# Patient Record
Sex: FEMALE | Race: WHITE | NOT HISPANIC OR LATINO | URBAN - METROPOLITAN AREA
[De-identification: names, ages, dates, MRNs, and addresses within clinical notes are randomized per-mention and may not be internally consistent; named-entity substitution may affect disease eponyms.]

---

## 2017-02-06 ENCOUNTER — OFFICE (AMBULATORY)
Dept: URBAN - METROPOLITAN AREA CLINIC 64 | Facility: CLINIC | Age: 62
End: 2017-02-06

## 2017-02-06 VITALS
SYSTOLIC BLOOD PRESSURE: 111 MMHG | DIASTOLIC BLOOD PRESSURE: 62 MMHG | WEIGHT: 293 LBS | HEIGHT: 65 IN | HEART RATE: 84 BPM

## 2017-02-06 DIAGNOSIS — K62.5 HEMORRHAGE OF ANUS AND RECTUM: ICD-10-CM

## 2017-02-06 DIAGNOSIS — K62.89 OTHER SPECIFIED DISEASES OF ANUS AND RECTUM: ICD-10-CM

## 2017-02-06 PROCEDURE — 99203 OFFICE O/P NEW LOW 30 MIN: CPT | Performed by: NURSE PRACTITIONER

## 2017-02-06 RX ORDER — HYDROCORTISONE ACETATE AND PRAMOXINE HYDROCHLORIDE 25; 10 MG/G; MG/G
CREAM TOPICAL
Qty: 1 | Refills: -1 | Status: COMPLETED
Start: 2017-02-06 | End: 2019-01-07

## 2017-02-10 ENCOUNTER — ON CAMPUS - OUTPATIENT (AMBULATORY)
Dept: URBAN - METROPOLITAN AREA HOSPITAL 2 | Facility: HOSPITAL | Age: 62
End: 2017-02-10

## 2017-02-10 ENCOUNTER — HOSPITAL ENCOUNTER (OUTPATIENT)
Dept: OTHER | Facility: HOSPITAL | Age: 62
Setting detail: SPECIMEN
Discharge: HOME | End: 2017-02-10
Attending: INTERNAL MEDICINE | Admitting: INTERNAL MEDICINE

## 2017-02-10 ENCOUNTER — OFFICE (AMBULATORY)
Dept: URBAN - METROPOLITAN AREA CLINIC 64 | Facility: CLINIC | Age: 62
End: 2017-02-10

## 2017-02-10 VITALS
SYSTOLIC BLOOD PRESSURE: 86 MMHG | HEART RATE: 95 BPM | SYSTOLIC BLOOD PRESSURE: 155 MMHG | HEART RATE: 90 BPM | OXYGEN SATURATION: 97 % | SYSTOLIC BLOOD PRESSURE: 134 MMHG | OXYGEN SATURATION: 93 % | HEART RATE: 83 BPM | SYSTOLIC BLOOD PRESSURE: 128 MMHG | HEART RATE: 88 BPM | DIASTOLIC BLOOD PRESSURE: 42 MMHG | OXYGEN SATURATION: 100 % | DIASTOLIC BLOOD PRESSURE: 66 MMHG | SYSTOLIC BLOOD PRESSURE: 106 MMHG | HEART RATE: 108 BPM | OXYGEN SATURATION: 96 % | OXYGEN SATURATION: 91 % | DIASTOLIC BLOOD PRESSURE: 49 MMHG | SYSTOLIC BLOOD PRESSURE: 133 MMHG | RESPIRATION RATE: 16 BRPM | RESPIRATION RATE: 15 BRPM | WEIGHT: 293 LBS | SYSTOLIC BLOOD PRESSURE: 107 MMHG | DIASTOLIC BLOOD PRESSURE: 62 MMHG | TEMPERATURE: 97.2 F | SYSTOLIC BLOOD PRESSURE: 78 MMHG | DIASTOLIC BLOOD PRESSURE: 89 MMHG | DIASTOLIC BLOOD PRESSURE: 56 MMHG | HEART RATE: 76 BPM | SYSTOLIC BLOOD PRESSURE: 154 MMHG | DIASTOLIC BLOOD PRESSURE: 61 MMHG | RESPIRATION RATE: 18 BRPM | DIASTOLIC BLOOD PRESSURE: 57 MMHG | OXYGEN SATURATION: 90 % | HEART RATE: 82 BPM | RESPIRATION RATE: 14 BRPM | HEIGHT: 65 IN | HEART RATE: 111 BPM

## 2017-02-10 DIAGNOSIS — K62.5 HEMORRHAGE OF ANUS AND RECTUM: ICD-10-CM

## 2017-02-10 DIAGNOSIS — Z86.010 PERSONAL HISTORY OF COLONIC POLYPS: ICD-10-CM

## 2017-02-10 DIAGNOSIS — D12.8 BENIGN NEOPLASM OF RECTUM: ICD-10-CM

## 2017-02-10 DIAGNOSIS — K64.1 SECOND DEGREE HEMORRHOIDS: ICD-10-CM

## 2017-02-10 DIAGNOSIS — D17.79 BENIGN LIPOMATOUS NEOPLASM OF OTHER SITES: ICD-10-CM

## 2017-02-10 LAB
GI HISTOLOGY: A. UNSPECIFIED: (no result)
GI HISTOLOGY: PDF REPORT: (no result)

## 2017-02-10 PROCEDURE — 88305 TISSUE EXAM BY PATHOLOGIST: CPT | Mod: 26 | Performed by: INTERNAL MEDICINE

## 2017-02-10 PROCEDURE — 45380 COLONOSCOPY AND BIOPSY: CPT | Performed by: INTERNAL MEDICINE

## 2017-02-10 RX ADMIN — PROPOFOL: 10 INJECTION, EMULSION INTRAVENOUS at 08:12

## 2017-02-15 ENCOUNTER — HOSPITAL ENCOUNTER (OUTPATIENT)
Dept: CT IMAGING | Facility: HOSPITAL | Age: 62
Discharge: HOME | End: 2017-02-15
Attending: INTERNAL MEDICINE | Admitting: INTERNAL MEDICINE

## 2017-02-15 LAB — CREAT BLDA-MCNC: 0.8 MG/DL (ref 0.6–1.3)

## 2017-03-07 ENCOUNTER — ON CAMPUS - OUTPATIENT (AMBULATORY)
Dept: URBAN - METROPOLITAN AREA HOSPITAL 77 | Facility: HOSPITAL | Age: 62
End: 2017-03-07

## 2017-03-07 DIAGNOSIS — D12.8 BENIGN NEOPLASM OF RECTUM: ICD-10-CM

## 2017-03-07 PROCEDURE — 45341 SIGMOIDOSCOPY W/ULTRASOUND: CPT | Mod: 59 | Performed by: INTERNAL MEDICINE

## 2017-03-07 PROCEDURE — 45349 SIGMOIDOSCOPY W/RESECTION: CPT | Performed by: INTERNAL MEDICINE

## 2018-12-28 ENCOUNTER — HOSPITAL ENCOUNTER (OUTPATIENT)
Dept: OTHER | Facility: HOSPITAL | Age: 63
Discharge: HOME | End: 2018-12-28
Attending: SURGERY | Admitting: SURGERY

## 2018-12-28 LAB
ANION GAP SERPL CALC-SCNC: 11.1 MMOL/L (ref 10–20)
BASOPHILS # BLD AUTO: 0 10*3/UL (ref 0–0.2)
BASOPHILS NFR BLD AUTO: 1 % (ref 0–2)
BUN SERPL-MCNC: 15 MG/DL (ref 8–20)
BUN/CREAT SERPL: 16.7 (ref 5.4–26.2)
CALCIUM SERPL-MCNC: 9.1 MG/DL (ref 8.9–10.3)
CHLORIDE SERPL-SCNC: 101 MMOL/L (ref 101–111)
CONV CO2: 28 MMOL/L (ref 22–32)
CREAT UR-MCNC: 0.9 MG/DL (ref 0.4–1)
DIFFERENTIAL METHOD BLD: (no result)
EOSINOPHIL # BLD AUTO: 0.1 10*3/UL (ref 0–0.3)
EOSINOPHIL # BLD AUTO: 2 % (ref 0–3)
ERYTHROCYTE [DISTWIDTH] IN BLOOD BY AUTOMATED COUNT: 13 % (ref 11.5–14.5)
GLUCOSE SERPL-MCNC: 98 MG/DL (ref 65–99)
HCT VFR BLD AUTO: 39.8 % (ref 35–49)
HGB BLD-MCNC: 12.8 G/DL (ref 12–15)
LYMPHOCYTES # BLD AUTO: 2.3 10*3/UL (ref 0.8–4.8)
LYMPHOCYTES NFR BLD AUTO: 40 % (ref 18–42)
MCH RBC QN AUTO: 30.8 PG (ref 26–32)
MCHC RBC AUTO-ENTMCNC: 32 G/DL (ref 32–36)
MCV RBC AUTO: 96.2 FL (ref 80–94)
MONOCYTES # BLD AUTO: 0.4 10*3/UL (ref 0.1–1.3)
MONOCYTES NFR BLD AUTO: 7 % (ref 2–11)
NEUTROPHILS # BLD AUTO: 2.9 10*3/UL (ref 2.3–8.6)
NEUTROPHILS NFR BLD AUTO: 50 % (ref 50–75)
NRBC BLD AUTO-RTO: 0 /100{WBCS}
NRBC/RBC NFR BLD MANUAL: 0 10*3/UL
PLATELET # BLD AUTO: 241 10*3/UL (ref 150–450)
PMV BLD AUTO: 8.6 FL (ref 7.4–10.4)
POTASSIUM SERPL-SCNC: 4.1 MMOL/L (ref 3.6–5.1)
RBC # BLD AUTO: 4.14 10*6/UL (ref 4–5.4)
SODIUM SERPL-SCNC: 136 MMOL/L (ref 136–144)
WBC # BLD AUTO: 5.7 10*3/UL (ref 4.5–11.5)

## 2019-01-07 ENCOUNTER — OFFICE (AMBULATORY)
Dept: URBAN - METROPOLITAN AREA CLINIC 64 | Facility: CLINIC | Age: 64
End: 2019-01-07

## 2019-01-07 VITALS
WEIGHT: 219 LBS | SYSTOLIC BLOOD PRESSURE: 99 MMHG | HEIGHT: 65 IN | DIASTOLIC BLOOD PRESSURE: 66 MMHG | HEART RATE: 73 BPM

## 2019-01-07 DIAGNOSIS — K60.2 ANAL FISSURE, UNSPECIFIED: ICD-10-CM

## 2019-01-07 DIAGNOSIS — Z86.010 PERSONAL HISTORY OF COLONIC POLYPS: ICD-10-CM

## 2019-01-07 PROCEDURE — 99214 OFFICE O/P EST MOD 30 MIN: CPT | Performed by: INTERNAL MEDICINE

## 2019-02-08 ENCOUNTER — OFFICE (AMBULATORY)
Dept: URBAN - METROPOLITAN AREA CLINIC 64 | Facility: CLINIC | Age: 64
End: 2019-02-08

## 2019-02-08 VITALS
SYSTOLIC BLOOD PRESSURE: 115 MMHG | HEART RATE: 60 BPM | WEIGHT: 206 LBS | HEIGHT: 65 IN | DIASTOLIC BLOOD PRESSURE: 60 MMHG

## 2019-02-08 DIAGNOSIS — K62.89 OTHER SPECIFIED DISEASES OF ANUS AND RECTUM: ICD-10-CM

## 2019-02-08 DIAGNOSIS — K60.2 ANAL FISSURE, UNSPECIFIED: ICD-10-CM

## 2019-02-08 PROCEDURE — 99214 OFFICE O/P EST MOD 30 MIN: CPT | Performed by: NURSE PRACTITIONER

## 2019-04-12 ENCOUNTER — HOSPITAL ENCOUNTER (OUTPATIENT)
Dept: GASTROENTEROLOGY | Facility: HOSPITAL | Age: 64
Setting detail: HOSPITAL OUTPATIENT SURGERY
Discharge: HOME | End: 2019-04-12
Attending: INTERNAL MEDICINE | Admitting: INTERNAL MEDICINE

## 2019-04-15 LAB — GLUCOSE BLD-MCNC: 88 MG/DL (ref 70–105)

## 2019-08-27 ENCOUNTER — HOSPITAL ENCOUNTER (OUTPATIENT)
Facility: HOSPITAL | Age: 64
Setting detail: HOSPITAL OUTPATIENT SURGERY
End: 2019-08-27
Attending: INTERNAL MEDICINE | Admitting: INTERNAL MEDICINE

## 2019-09-20 ENCOUNTER — HOSPITAL ENCOUNTER (OUTPATIENT)
Facility: HOSPITAL | Age: 64
Setting detail: OBSERVATION
LOS: 1 days | Discharge: HOME | End: 2019-09-24
Attending: EMERGENCY MEDICINE | Admitting: INTERNAL MEDICINE

## 2019-09-20 ENCOUNTER — APPOINTMENT (OUTPATIENT)
Dept: GENERAL RADIOLOGY | Facility: HOSPITAL | Age: 64
End: 2019-09-20

## 2019-09-20 ENCOUNTER — APPOINTMENT (OUTPATIENT)
Dept: CT IMAGING | Facility: HOSPITAL | Age: 64
End: 2019-09-20

## 2019-09-20 DIAGNOSIS — R55 SYNCOPE, UNSPECIFIED SYNCOPE TYPE: Primary | ICD-10-CM

## 2019-09-20 DIAGNOSIS — S80.812A ABRASION OF ANTERIOR LEFT LOWER LEG, INITIAL ENCOUNTER: ICD-10-CM

## 2019-09-20 LAB
ANION GAP SERPL CALCULATED.3IONS-SCNC: 16.3 MMOL/L (ref 5–15)
APTT PPP: 18.3 SECONDS (ref 24–31)
BASOPHILS # BLD AUTO: 0.1 10*3/MM3 (ref 0–0.2)
BASOPHILS NFR BLD AUTO: 0.5 % (ref 0–1.5)
BUN BLD-MCNC: 28 MG/DL (ref 8–20)
BUN/CREAT SERPL: 18.7 (ref 5.4–26.2)
CALCIUM SPEC-SCNC: 9.3 MG/DL (ref 8.9–10.3)
CHLORIDE SERPL-SCNC: 102 MMOL/L (ref 101–111)
CO2 SERPL-SCNC: 25 MMOL/L (ref 22–32)
CREAT BLD-MCNC: 1.5 MG/DL (ref 0.4–1)
DEPRECATED RDW RBC AUTO: 41.6 FL (ref 37–54)
EOSINOPHIL # BLD AUTO: 0.1 10*3/MM3 (ref 0–0.4)
EOSINOPHIL NFR BLD AUTO: 1.6 % (ref 0.3–6.2)
ERYTHROCYTE [DISTWIDTH] IN BLOOD BY AUTOMATED COUNT: 12.8 % (ref 12.3–15.4)
GFR SERPL CREATININE-BSD FRML MDRD: 35 ML/MIN/1.73
GLUCOSE BLD-MCNC: 108 MG/DL (ref 65–99)
GLUCOSE BLDC GLUCOMTR-MCNC: 122 MG/DL (ref 70–105)
HCT VFR BLD AUTO: 40.5 % (ref 34–46.6)
HGB BLD-MCNC: 13.4 G/DL (ref 12–15.9)
INR PPP: 1 (ref 0.9–1.1)
LYMPHOCYTES # BLD AUTO: 2.6 10*3/MM3 (ref 0.7–3.1)
LYMPHOCYTES NFR BLD AUTO: 28.7 % (ref 19.6–45.3)
MCH RBC QN AUTO: 30.4 PG (ref 26.6–33)
MCHC RBC AUTO-ENTMCNC: 33.1 G/DL (ref 31.5–35.7)
MCV RBC AUTO: 91.8 FL (ref 79–97)
MONOCYTES # BLD AUTO: 0.7 10*3/MM3 (ref 0.1–0.9)
MONOCYTES NFR BLD AUTO: 7.4 % (ref 5–12)
NEUTROPHILS # BLD AUTO: 5.6 10*3/MM3 (ref 1.7–7)
NEUTROPHILS NFR BLD AUTO: 61.8 % (ref 42.7–76)
NRBC BLD AUTO-RTO: 0.1 /100 WBC (ref 0–0.2)
PLATELET # BLD AUTO: 288 10*3/MM3 (ref 140–450)
PMV BLD AUTO: 7.9 FL (ref 6–12)
POTASSIUM BLD-SCNC: 4.3 MMOL/L (ref 3.6–5.1)
PROTHROMBIN TIME: 10.3 SECONDS (ref 9.6–11.7)
RBC # BLD AUTO: 4.41 10*6/MM3 (ref 3.77–5.28)
SODIUM BLD-SCNC: 139 MMOL/L (ref 136–144)
TROPONIN I SERPL-MCNC: <0.03 NG/ML (ref 0–0.03)
WBC NRBC COR # BLD: 9.1 10*3/MM3 (ref 3.4–10.8)

## 2019-09-20 PROCEDURE — 85025 COMPLETE CBC W/AUTO DIFF WBC: CPT | Performed by: EMERGENCY MEDICINE

## 2019-09-20 PROCEDURE — 70450 CT HEAD/BRAIN W/O DYE: CPT

## 2019-09-20 PROCEDURE — G0378 HOSPITAL OBSERVATION PER HR: HCPCS

## 2019-09-20 PROCEDURE — 85730 THROMBOPLASTIN TIME PARTIAL: CPT | Performed by: EMERGENCY MEDICINE

## 2019-09-20 PROCEDURE — 85652 RBC SED RATE AUTOMATED: CPT | Performed by: INTERNAL MEDICINE

## 2019-09-20 PROCEDURE — 93005 ELECTROCARDIOGRAM TRACING: CPT | Performed by: EMERGENCY MEDICINE

## 2019-09-20 PROCEDURE — 84484 ASSAY OF TROPONIN QUANT: CPT | Performed by: EMERGENCY MEDICINE

## 2019-09-20 PROCEDURE — 99284 EMERGENCY DEPT VISIT MOD MDM: CPT

## 2019-09-20 PROCEDURE — 86140 C-REACTIVE PROTEIN: CPT | Performed by: INTERNAL MEDICINE

## 2019-09-20 PROCEDURE — 99220 PR INITIAL OBSERVATION CARE/DAY 70 MINUTES: CPT | Performed by: INTERNAL MEDICINE

## 2019-09-20 PROCEDURE — 71045 X-RAY EXAM CHEST 1 VIEW: CPT

## 2019-09-20 PROCEDURE — 82962 GLUCOSE BLOOD TEST: CPT

## 2019-09-20 PROCEDURE — 85610 PROTHROMBIN TIME: CPT | Performed by: EMERGENCY MEDICINE

## 2019-09-20 PROCEDURE — 84550 ASSAY OF BLOOD/URIC ACID: CPT | Performed by: INTERNAL MEDICINE

## 2019-09-20 PROCEDURE — 82728 ASSAY OF FERRITIN: CPT | Performed by: INTERNAL MEDICINE

## 2019-09-20 PROCEDURE — 80048 BASIC METABOLIC PNL TOTAL CA: CPT | Performed by: EMERGENCY MEDICINE

## 2019-09-20 PROCEDURE — 73590 X-RAY EXAM OF LOWER LEG: CPT

## 2019-09-20 RX ORDER — GABAPENTIN 100 MG/1
100 CAPSULE ORAL 2 TIMES DAILY
COMMUNITY
End: 2019-09-24 | Stop reason: HOSPADM

## 2019-09-20 RX ORDER — OXYCODONE AND ACETAMINOPHEN 10; 325 MG/1; MG/1
1 TABLET ORAL EVERY 8 HOURS PRN
COMMUNITY

## 2019-09-20 RX ORDER — SODIUM CHLORIDE 0.9 % (FLUSH) 0.9 %
10 SYRINGE (ML) INJECTION AS NEEDED
Status: DISCONTINUED | OUTPATIENT
Start: 2019-09-20 | End: 2019-09-24 | Stop reason: HOSPADM

## 2019-09-20 RX ORDER — TIZANIDINE 4 MG/1
2 TABLET ORAL 2 TIMES DAILY
COMMUNITY
End: 2019-09-24 | Stop reason: HOSPADM

## 2019-09-20 RX ORDER — TEMAZEPAM 30 MG/1
CAPSULE ORAL
COMMUNITY
Start: 2012-11-16 | End: 2019-09-24 | Stop reason: HOSPADM

## 2019-09-20 RX ORDER — CITALOPRAM 20 MG/1
20 TABLET ORAL DAILY
COMMUNITY
Start: 2012-11-16

## 2019-09-20 RX ORDER — GEMFIBROZIL 600 MG/1
600 TABLET, FILM COATED ORAL
COMMUNITY
End: 2019-09-24 | Stop reason: HOSPADM

## 2019-09-20 RX ORDER — LISINOPRIL 40 MG/1
TABLET ORAL
COMMUNITY
Start: 2012-11-16 | End: 2019-09-24 | Stop reason: HOSPADM

## 2019-09-20 RX ORDER — MELOXICAM 15 MG/1
15 TABLET ORAL DAILY
Status: ON HOLD | COMMUNITY
End: 2019-09-20

## 2019-09-20 RX ORDER — PHENTERMINE HYDROCHLORIDE 30 MG/1
15 CAPSULE ORAL EVERY MORNING
COMMUNITY
End: 2019-09-24 | Stop reason: HOSPADM

## 2019-09-20 RX ORDER — SIMVASTATIN 40 MG
40 TABLET ORAL NIGHTLY
COMMUNITY
End: 2022-08-25

## 2019-09-20 RX ORDER — FENOFIBRATE 160 MG/1
160 TABLET ORAL DAILY
COMMUNITY
Start: 2012-11-16 | End: 2022-08-25

## 2019-09-20 NOTE — ED NOTES
Pt reports syncopal episode x 2 since last night.  Denies hitting head.  States unknown cause for syncopal episodes.  C/O dizziness with ambulation, nausea.       Ly Bird RN  09/20/19 8886

## 2019-09-20 NOTE — ED PROVIDER NOTES
Subjective   64-year-old female presents after multiple syncopal episodes.  Patient states she started noticing some dizziness on Tuesday.  She states when she would stand up she would feel lightheaded like she might pass out.  Yesterday evening she was walking to her bedroom and was feeling lightheaded.  She states she was having to hold onto the walls to steady herself.  When she got to her bedroom she had a syncopal episode and fell forward.  She hit her left shin on a dresser.  Then today she was standing talking on the phone and became lightheaded and the next thing she knew she was waking up on the floor.  She denies any injuries from that fall.  She has had no chest pain or shortness of breath.  She denies any focal numbness, weakness, tingling.  She states she recently got over an upper respiratory infection but she has been feeling well for the past week until this started.        History provided by:  Patient      Review of Systems   Constitutional: Negative for fatigue and fever.   HENT: Negative for congestion and sore throat.    Eyes: Negative for pain and redness.   Respiratory: Negative for cough and shortness of breath.    Cardiovascular: Negative for chest pain and palpitations.   Gastrointestinal: Negative for abdominal pain, nausea and vomiting.   Genitourinary: Negative for dysuria and frequency.   Musculoskeletal: Negative for back pain.   Skin: Negative for rash.   Neurological: Positive for dizziness and syncope. Negative for headaches.   Psychiatric/Behavioral: Negative for behavioral problems and confusion.       No past medical history on file.    Allergies   Allergen Reactions   • Indomethacin Hives   • Iodine Hives       No past surgical history on file.    No family history on file.    Social History     Socioeconomic History   • Marital status:      Spouse name: Not on file   • Number of children: Not on file   • Years of education: Not on file   • Highest education level: Not on  "file       /71 (BP Location: Left arm, Patient Position: Sitting)   Pulse 85   Temp 98.8 °F (37.1 °C) (Oral)   Resp 14   Ht 162.6 cm (64\")   Wt 102 kg (225 lb 1.4 oz)   SpO2 100%   Breastfeeding? No   BMI 38.64 kg/m²       Objective   Physical Exam   Constitutional: She is oriented to person, place, and time. She appears well-developed and well-nourished.   HENT:   Head: Normocephalic and atraumatic.   Eyes: EOM are normal. Pupils are equal, round, and reactive to light.   Neck: Normal range of motion. Neck supple.   Cardiovascular: Normal rate, regular rhythm and normal heart sounds.   Pulmonary/Chest: Effort normal and breath sounds normal. No respiratory distress.   Abdominal: Soft. Bowel sounds are normal. There is no tenderness.   Musculoskeletal: Normal range of motion.   There is some tenderness to the left mid shin with a small abrasion, neurovascular intact distally   Neurological: She is alert and oriented to person, place, and time.   Skin: Skin is warm and dry.   Nursing note and vitals reviewed.      Procedures           ED Course      My interpretation of EKG shows sinus rhythm, rate of 80, no ST elevation    Results for orders placed or performed during the hospital encounter of 09/20/19   Basic Metabolic Panel   Result Value Ref Range    Glucose 108 (H) 65 - 99 mg/dL    BUN 28 (H) 8 - 20 mg/dL    Creatinine 1.50 (H) 0.40 - 1.00 mg/dL    Sodium 139 136 - 144 mmol/L    Potassium 4.3 3.6 - 5.1 mmol/L    Chloride 102 101 - 111 mmol/L    CO2 25.0 22.0 - 32.0 mmol/L    Calcium 9.3 8.9 - 10.3 mg/dL    eGFR Non African Amer 35 (L) >60 mL/min/1.73    BUN/Creatinine Ratio 18.7 5.4 - 26.2    Anion Gap 16.3 (H) 5.0 - 15.0 mmol/L   Protime-INR   Result Value Ref Range    Protime 10.3 9.6 - 11.7 Seconds    INR 1.00 0.90 - 1.10   aPTT   Result Value Ref Range    PTT 18.3 (L) 24.0 - 31.0 seconds   Troponin   Result Value Ref Range    Troponin I <0.030 0.000 - 0.030 ng/mL   CBC Auto Differential "   Result Value Ref Range    WBC 9.10 3.40 - 10.80 10*3/mm3    RBC 4.41 3.77 - 5.28 10*6/mm3    Hemoglobin 13.4 12.0 - 15.9 g/dL    Hematocrit 40.5 34.0 - 46.6 %    MCV 91.8 79.0 - 97.0 fL    MCH 30.4 26.6 - 33.0 pg    MCHC 33.1 31.5 - 35.7 g/dL    RDW 12.8 12.3 - 15.4 %    RDW-SD 41.6 37.0 - 54.0 fl    MPV 7.9 6.0 - 12.0 fL    Platelets 288 140 - 450 10*3/mm3    Neutrophil % 61.8 42.7 - 76.0 %    Lymphocyte % 28.7 19.6 - 45.3 %    Monocyte % 7.4 5.0 - 12.0 %    Eosinophil % 1.6 0.3 - 6.2 %    Basophil % 0.5 0.0 - 1.5 %    Neutrophils, Absolute 5.60 1.70 - 7.00 10*3/mm3    Lymphocytes, Absolute 2.60 0.70 - 3.10 10*3/mm3    Monocytes, Absolute 0.70 0.10 - 0.90 10*3/mm3    Eosinophils, Absolute 0.10 0.00 - 0.40 10*3/mm3    Basophils, Absolute 0.10 0.00 - 0.20 10*3/mm3    nRBC 0.1 0.0 - 0.2 /100 WBC     Xr Tibia Fibula 2 View Left    Result Date: 9/20/2019  Negative for fracture.  Electronically Signed By-Mahesh Mouser On:9/20/2019 5:54 PM This report was finalized on [id number] by  Mahesh Carr .    Ct Head Without Contrast    Result Date: 9/20/2019  No acute intracranial abnormality.  Electronically Signed By-Mahesh Mouser On:9/20/2019 6:55 PM This report was finalized on [id number] by  Mahesh Carr .    Xr Chest 1 View    Result Date: 9/20/2019  No acute process.  Electronically Signed By-Mahesh Mouser On:9/20/2019 5:50 PM This report was finalized on [id number] by  Mahesh Carr .            MDM   Patient had the above evaluation.  Results were discussed with the patient.  Patient remained well-appearing in the emergency room.  Work-up is been unremarkable.  CT head showed no acute on normality.  Chest x-ray showed no acute disease.  X-ray of the left tib-fib showed no fracture.  Labs were unremarkable.  Patient will be admitted for further evaluation of her syncopal episodes.  I discussed with the PA on call for the hospitalist who agreed to admit.      Final diagnoses:   Syncope, unspecified syncope type    Abrasion of anterior left lower leg, initial encounter              Robin Brown MD  09/20/19 1927

## 2019-09-21 ENCOUNTER — HOSPITAL ENCOUNTER (OUTPATIENT)
Dept: CARDIOLOGY | Facility: HOSPITAL | Age: 64
Setting detail: OBSERVATION
Discharge: HOME | End: 2019-09-21

## 2019-09-21 ENCOUNTER — APPOINTMENT (OUTPATIENT)
Dept: RESPIRATORY THERAPY | Facility: HOSPITAL | Age: 64
End: 2019-09-21

## 2019-09-21 VITALS
HEIGHT: 64 IN | BODY MASS INDEX: 38.41 KG/M2 | DIASTOLIC BLOOD PRESSURE: 63 MMHG | WEIGHT: 225 LBS | SYSTOLIC BLOOD PRESSURE: 101 MMHG

## 2019-09-21 LAB
ALBUMIN SERPL-MCNC: 3.4 G/DL (ref 3.5–4.8)
ALBUMIN/GLOB SERPL: 1.3 G/DL (ref 1–1.7)
ALP SERPL-CCNC: 95 U/L (ref 32–91)
ALT SERPL W P-5'-P-CCNC: 14 U/L (ref 14–54)
ANION GAP SERPL CALCULATED.3IONS-SCNC: 15.3 MMOL/L (ref 5–15)
ARTICHOKE IGE QN: 118 MG/DL (ref 0–100)
AST SERPL-CCNC: 19 U/L (ref 15–41)
BASOPHILS # BLD AUTO: 0 10*3/MM3 (ref 0–0.2)
BASOPHILS NFR BLD AUTO: 0.4 % (ref 0–1.5)
BH CV ECHO MEAS - EF(MOD-BP): 71 %
BH CV ECHO MEAS - LA DIMENSION(2D): 4.2 CM
BILIRUB SERPL-MCNC: 0.2 MG/DL (ref 0.3–1.2)
BILIRUB UR QL STRIP: NEGATIVE
BNP SERPL-MCNC: 25 PG/ML
BUN BLD-MCNC: 28 MG/DL (ref 8–20)
BUN/CREAT SERPL: 18.7 (ref 5.4–26.2)
CALCIUM SPEC-SCNC: 9.1 MG/DL (ref 8.9–10.3)
CHLORIDE SERPL-SCNC: 104 MMOL/L (ref 101–111)
CHOLEST SERPL-MCNC: 176 MG/DL
CK SERPL-CCNC: 68 U/L (ref 38–234)
CLARITY UR: CLEAR
CO2 SERPL-SCNC: 25 MMOL/L (ref 22–32)
COLOR UR: YELLOW
CORTIS SERPL-MCNC: 11.61 MCG/DL
CORTIS SERPL-MCNC: 22.87 MCG/DL
CORTIS SERPL-MCNC: 25.06 MCG/DL
CREAT BLD-MCNC: 1.5 MG/DL (ref 0.4–1)
CRP SERPL-MCNC: 0.23 MG/DL (ref 0–0.7)
D DIMER PPP FEU-MCNC: 0.38 MCGFEU/ML (ref 0.17–0.59)
D-LACTATE SERPL-SCNC: 0.8 MMOL/L (ref 0.5–2.2)
DEPRECATED RDW RBC AUTO: 42.9 FL (ref 37–54)
EOSINOPHIL # BLD AUTO: 0.2 10*3/MM3 (ref 0–0.4)
EOSINOPHIL NFR BLD AUTO: 1.9 % (ref 0.3–6.2)
ERYTHROCYTE [DISTWIDTH] IN BLOOD BY AUTOMATED COUNT: 13.1 % (ref 12.3–15.4)
ERYTHROCYTE [SEDIMENTATION RATE] IN BLOOD: 21 MM/HR (ref 0–30)
FERRITIN SERPL-MCNC: 113 NG/ML (ref 11–307)
FERRITIN SERPL-MCNC: 97 NG/ML (ref 11–307)
FOLATE SERPL-MCNC: >24.8 NG/ML (ref 5.9–24.8)
GFR SERPL CREATININE-BSD FRML MDRD: 35 ML/MIN/1.73
GLOBULIN UR ELPH-MCNC: 2.6 GM/DL (ref 2.5–3.8)
GLUCOSE BLD-MCNC: 95 MG/DL (ref 65–99)
GLUCOSE BLDC GLUCOMTR-MCNC: 86 MG/DL (ref 70–105)
GLUCOSE BLDC GLUCOMTR-MCNC: 94 MG/DL (ref 70–105)
GLUCOSE BLDC GLUCOMTR-MCNC: 95 MG/DL (ref 70–105)
GLUCOSE BLDC GLUCOMTR-MCNC: 97 MG/DL (ref 70–105)
GLUCOSE UR STRIP-MCNC: NEGATIVE MG/DL
HCT VFR BLD AUTO: 38.2 % (ref 34–46.6)
HDLC SERPL QL: 4.4
HDLC SERPL-MCNC: 40 MG/DL
HGB BLD-MCNC: 12.8 G/DL (ref 12–15.9)
HGB UR QL STRIP.AUTO: NEGATIVE
INR PPP: 1.01 (ref 0.9–1.1)
KETONES UR QL STRIP: NEGATIVE
LDLC/HDLC SERPL: 2.64 {RATIO}
LEUKOCYTE ESTERASE UR QL STRIP.AUTO: NEGATIVE
LV EF 2D ECHO EST: 67 %
LYMPHOCYTES # BLD AUTO: 2.9 10*3/MM3 (ref 0.7–3.1)
LYMPHOCYTES NFR BLD AUTO: 31.6 % (ref 19.6–45.3)
MAGNESIUM SERPL-MCNC: 1.8 MG/DL (ref 1.8–2.5)
MAXIMAL PREDICTED HEART RATE: 157 BPM
MCH RBC QN AUTO: 30.8 PG (ref 26.6–33)
MCHC RBC AUTO-ENTMCNC: 33.4 G/DL (ref 31.5–35.7)
MCV RBC AUTO: 92.3 FL (ref 79–97)
MONOCYTES # BLD AUTO: 0.8 10*3/MM3 (ref 0.1–0.9)
MONOCYTES NFR BLD AUTO: 8.9 % (ref 5–12)
NEUTROPHILS # BLD AUTO: 5.3 10*3/MM3 (ref 1.7–7)
NEUTROPHILS NFR BLD AUTO: 57.2 % (ref 42.7–76)
NITRITE UR QL STRIP: NEGATIVE
NRBC BLD AUTO-RTO: 0.1 /100 WBC (ref 0–0.2)
PH UR STRIP.AUTO: 5.5 [PH] (ref 5–8)
PHOSPHATE SERPL-MCNC: 3.9 MG/DL (ref 2.4–4.7)
PLATELET # BLD AUTO: 278 10*3/MM3 (ref 140–450)
PMV BLD AUTO: 8.3 FL (ref 6–12)
POTASSIUM BLD-SCNC: 4.3 MMOL/L (ref 3.6–5.1)
PROCALCITONIN SERPL-MCNC: <0.05 NG/ML (ref 0–0.5)
PROT SERPL-MCNC: 6 G/DL (ref 6.1–7.9)
PROT UR QL STRIP: NEGATIVE
PROTHROMBIN TIME: 10.4 SECONDS (ref 9.6–11.7)
RBC # BLD AUTO: 4.14 10*6/MM3 (ref 3.77–5.28)
SODIUM BLD-SCNC: 140 MMOL/L (ref 136–144)
SP GR UR STRIP: 1.01 (ref 1–1.03)
STRESS TARGET HR: 133 BPM
T4 FREE SERPL-MCNC: 0.85 NG/DL (ref 0.58–1.64)
TRIGL SERPL-MCNC: 153 MG/DL
TROPONIN I SERPL-MCNC: <0.03 NG/ML (ref 0–0.03)
TSH SERPL DL<=0.05 MIU/L-ACNC: 1.88 UIU/ML (ref 0.34–5.6)
URATE SERPL-MCNC: 5.3 MG/DL (ref 2.6–8)
UROBILINOGEN UR QL STRIP: NORMAL
VIT B12 BLD-MCNC: 205 PG/ML (ref 180–914)
VLDLC SERPL-MCNC: 30.6 MG/DL
WBC NRBC COR # BLD: 9.3 10*3/MM3 (ref 3.4–10.8)

## 2019-09-21 PROCEDURE — 85379 FIBRIN DEGRADATION QUANT: CPT | Performed by: INTERNAL MEDICINE

## 2019-09-21 PROCEDURE — 25010000002 COSYNTROPIN PER 0.25 MG: Performed by: INTERNAL MEDICINE

## 2019-09-21 PROCEDURE — 93306 TTE W/DOPPLER COMPLETE: CPT | Performed by: INTERNAL MEDICINE

## 2019-09-21 PROCEDURE — 84443 ASSAY THYROID STIM HORMONE: CPT | Performed by: INTERNAL MEDICINE

## 2019-09-21 PROCEDURE — 96372 THER/PROPH/DIAG INJ SC/IM: CPT

## 2019-09-21 PROCEDURE — 82607 VITAMIN B-12: CPT | Performed by: INTERNAL MEDICINE

## 2019-09-21 PROCEDURE — 82728 ASSAY OF FERRITIN: CPT | Performed by: INTERNAL MEDICINE

## 2019-09-21 PROCEDURE — 25010000002 SULFUR HEXAFLUORIDE MICROSPH 60.7-25 MG RECONSTITUTED SUSPENSION: Performed by: INTERNAL MEDICINE

## 2019-09-21 PROCEDURE — 84100 ASSAY OF PHOSPHORUS: CPT | Performed by: INTERNAL MEDICINE

## 2019-09-21 PROCEDURE — 84145 PROCALCITONIN (PCT): CPT | Performed by: INTERNAL MEDICINE

## 2019-09-21 PROCEDURE — 93225 XTRNL ECG REC<48 HRS REC: CPT

## 2019-09-21 PROCEDURE — 83880 ASSAY OF NATRIURETIC PEPTIDE: CPT | Performed by: INTERNAL MEDICINE

## 2019-09-21 PROCEDURE — 85610 PROTHROMBIN TIME: CPT | Performed by: INTERNAL MEDICINE

## 2019-09-21 PROCEDURE — 84484 ASSAY OF TROPONIN QUANT: CPT | Performed by: INTERNAL MEDICINE

## 2019-09-21 PROCEDURE — 82746 ASSAY OF FOLIC ACID SERUM: CPT | Performed by: INTERNAL MEDICINE

## 2019-09-21 PROCEDURE — 83036 HEMOGLOBIN GLYCOSYLATED A1C: CPT | Performed by: INTERNAL MEDICINE

## 2019-09-21 PROCEDURE — 83735 ASSAY OF MAGNESIUM: CPT | Performed by: INTERNAL MEDICINE

## 2019-09-21 PROCEDURE — 82550 ASSAY OF CK (CPK): CPT | Performed by: INTERNAL MEDICINE

## 2019-09-21 PROCEDURE — 83605 ASSAY OF LACTIC ACID: CPT | Performed by: INTERNAL MEDICINE

## 2019-09-21 PROCEDURE — 80061 LIPID PANEL: CPT | Performed by: INTERNAL MEDICINE

## 2019-09-21 PROCEDURE — 82962 GLUCOSE BLOOD TEST: CPT

## 2019-09-21 PROCEDURE — 96375 TX/PRO/DX INJ NEW DRUG ADDON: CPT

## 2019-09-21 PROCEDURE — 96374 THER/PROPH/DIAG INJ IV PUSH: CPT

## 2019-09-21 PROCEDURE — 85025 COMPLETE CBC W/AUTO DIFF WBC: CPT | Performed by: INTERNAL MEDICINE

## 2019-09-21 PROCEDURE — 82533 TOTAL CORTISOL: CPT | Performed by: INTERNAL MEDICINE

## 2019-09-21 PROCEDURE — 80053 COMPREHEN METABOLIC PANEL: CPT | Performed by: INTERNAL MEDICINE

## 2019-09-21 PROCEDURE — 25010000002 CYANOCOBALAMIN PER 1000 MCG: Performed by: HOSPITALIST

## 2019-09-21 PROCEDURE — 84439 ASSAY OF FREE THYROXINE: CPT | Performed by: INTERNAL MEDICINE

## 2019-09-21 PROCEDURE — 25010000002 KETOROLAC TROMETHAMINE PER 15 MG: Performed by: INTERNAL MEDICINE

## 2019-09-21 PROCEDURE — 99226 PR SBSQ OBSERVATION CARE/DAY 35 MINUTES: CPT | Performed by: HOSPITALIST

## 2019-09-21 PROCEDURE — G0378 HOSPITAL OBSERVATION PER HR: HCPCS

## 2019-09-21 PROCEDURE — 93306 TTE W/DOPPLER COMPLETE: CPT

## 2019-09-21 PROCEDURE — 81003 URINALYSIS AUTO W/O SCOPE: CPT | Performed by: HOSPITALIST

## 2019-09-21 RX ORDER — ACETAMINOPHEN 325 MG/1
650 TABLET ORAL EVERY 4 HOURS PRN
Status: DISCONTINUED | OUTPATIENT
Start: 2019-09-21 | End: 2019-09-24 | Stop reason: HOSPADM

## 2019-09-21 RX ORDER — ACETAMINOPHEN 160 MG/5ML
650 SOLUTION ORAL EVERY 4 HOURS PRN
Status: DISCONTINUED | OUTPATIENT
Start: 2019-09-21 | End: 2019-09-24 | Stop reason: HOSPADM

## 2019-09-21 RX ORDER — SODIUM CHLORIDE 0.9 % (FLUSH) 0.9 %
10 SYRINGE (ML) INJECTION AS NEEDED
Status: DISCONTINUED | OUTPATIENT
Start: 2019-09-21 | End: 2019-09-24 | Stop reason: HOSPADM

## 2019-09-21 RX ORDER — TIZANIDINE 4 MG/1
2 TABLET ORAL 2 TIMES DAILY
Status: DISCONTINUED | OUTPATIENT
Start: 2019-09-21 | End: 2019-09-23

## 2019-09-21 RX ORDER — KETOROLAC TROMETHAMINE 30 MG/ML
30 INJECTION, SOLUTION INTRAMUSCULAR; INTRAVENOUS EVERY 6 HOURS PRN
Status: DISCONTINUED | OUTPATIENT
Start: 2019-09-21 | End: 2019-09-21

## 2019-09-21 RX ORDER — FAMOTIDINE 20 MG/1
40 TABLET, FILM COATED ORAL DAILY
Status: DISCONTINUED | OUTPATIENT
Start: 2019-09-21 | End: 2019-09-24 | Stop reason: HOSPADM

## 2019-09-21 RX ORDER — CHOLECALCIFEROL (VITAMIN D3) 125 MCG
5 CAPSULE ORAL NIGHTLY PRN
Status: DISCONTINUED | OUTPATIENT
Start: 2019-09-21 | End: 2019-09-24 | Stop reason: HOSPADM

## 2019-09-21 RX ORDER — ACETAMINOPHEN 650 MG/1
650 SUPPOSITORY RECTAL EVERY 4 HOURS PRN
Status: DISCONTINUED | OUTPATIENT
Start: 2019-09-21 | End: 2019-09-24 | Stop reason: HOSPADM

## 2019-09-21 RX ORDER — COSYNTROPIN 0.25 MG/ML
0.25 INJECTION, POWDER, FOR SOLUTION INTRAMUSCULAR; INTRAVENOUS ONCE
Status: DISCONTINUED | OUTPATIENT
Start: 2019-09-21 | End: 2019-09-21

## 2019-09-21 RX ORDER — DOCUSATE SODIUM 100 MG/1
100 CAPSULE, LIQUID FILLED ORAL 2 TIMES DAILY PRN
Status: DISCONTINUED | OUTPATIENT
Start: 2019-09-21 | End: 2019-09-24 | Stop reason: HOSPADM

## 2019-09-21 RX ORDER — ATORVASTATIN CALCIUM 20 MG/1
20 TABLET, FILM COATED ORAL DAILY
Status: DISCONTINUED | OUTPATIENT
Start: 2019-09-21 | End: 2019-09-24 | Stop reason: HOSPADM

## 2019-09-21 RX ORDER — SODIUM CHLORIDE 0.9 % (FLUSH) 0.9 %
10 SYRINGE (ML) INJECTION EVERY 12 HOURS SCHEDULED
Status: DISCONTINUED | OUTPATIENT
Start: 2019-09-21 | End: 2019-09-24 | Stop reason: HOSPADM

## 2019-09-21 RX ORDER — GABAPENTIN 100 MG/1
100 CAPSULE ORAL 2 TIMES DAILY
Status: DISCONTINUED | OUTPATIENT
Start: 2019-09-21 | End: 2019-09-23

## 2019-09-21 RX ORDER — COSYNTROPIN 0.25 MG/ML
0.25 INJECTION, POWDER, FOR SOLUTION INTRAMUSCULAR; INTRAVENOUS ONCE
Status: COMPLETED | OUTPATIENT
Start: 2019-09-21 | End: 2019-09-21

## 2019-09-21 RX ORDER — NITROGLYCERIN 0.4 MG/1
0.4 TABLET SUBLINGUAL
Status: DISCONTINUED | OUTPATIENT
Start: 2019-09-21 | End: 2019-09-24 | Stop reason: HOSPADM

## 2019-09-21 RX ORDER — OXYCODONE HYDROCHLORIDE 5 MG/1
10 TABLET ORAL EVERY 8 HOURS PRN
Status: DISCONTINUED | OUTPATIENT
Start: 2019-09-21 | End: 2019-09-24 | Stop reason: HOSPADM

## 2019-09-21 RX ORDER — CITALOPRAM 20 MG/1
20 TABLET ORAL DAILY
Status: DISCONTINUED | OUTPATIENT
Start: 2019-09-21 | End: 2019-09-24 | Stop reason: HOSPADM

## 2019-09-21 RX ORDER — CYANOCOBALAMIN 1000 UG/ML
1000 INJECTION, SOLUTION INTRAMUSCULAR; SUBCUTANEOUS DAILY
Status: DISCONTINUED | OUTPATIENT
Start: 2019-09-21 | End: 2019-09-24

## 2019-09-21 RX ORDER — FENOFIBRATE 145 MG/1
145 TABLET, COATED ORAL DAILY
Status: DISCONTINUED | OUTPATIENT
Start: 2019-09-21 | End: 2019-09-24 | Stop reason: HOSPADM

## 2019-09-21 RX ORDER — SODIUM CHLORIDE 9 MG/ML
100 INJECTION, SOLUTION INTRAVENOUS CONTINUOUS
Status: DISCONTINUED | OUTPATIENT
Start: 2019-09-21 | End: 2019-09-23

## 2019-09-21 RX ORDER — LISINOPRIL 20 MG/1
40 TABLET ORAL
Status: DISCONTINUED | OUTPATIENT
Start: 2019-09-21 | End: 2019-09-21

## 2019-09-21 RX ORDER — ONDANSETRON 2 MG/ML
4 INJECTION INTRAMUSCULAR; INTRAVENOUS EVERY 6 HOURS PRN
Status: DISCONTINUED | OUTPATIENT
Start: 2019-09-21 | End: 2019-09-24 | Stop reason: HOSPADM

## 2019-09-21 RX ADMIN — GABAPENTIN 100 MG: 100 CAPSULE ORAL at 00:37

## 2019-09-21 RX ADMIN — SODIUM CHLORIDE 100 ML/HR: 900 INJECTION, SOLUTION INTRAVENOUS at 01:21

## 2019-09-21 RX ADMIN — GABAPENTIN 100 MG: 100 CAPSULE ORAL at 21:59

## 2019-09-21 RX ADMIN — LISINOPRIL 40 MG: 20 TABLET ORAL at 08:50

## 2019-09-21 RX ADMIN — TIZANIDINE 2 MG: 4 TABLET ORAL at 00:37

## 2019-09-21 RX ADMIN — TIZANIDINE 2 MG: 4 TABLET ORAL at 08:57

## 2019-09-21 RX ADMIN — FAMOTIDINE 40 MG: 20 TABLET ORAL at 08:50

## 2019-09-21 RX ADMIN — GABAPENTIN 100 MG: 100 CAPSULE ORAL at 08:56

## 2019-09-21 RX ADMIN — OXYCODONE HYDROCHLORIDE 10 MG: 5 TABLET ORAL at 23:21

## 2019-09-21 RX ADMIN — Medication 10 ML: at 00:38

## 2019-09-21 RX ADMIN — Medication 10 ML: at 22:00

## 2019-09-21 RX ADMIN — KETOROLAC TROMETHAMINE 30 MG: 30 INJECTION, SOLUTION INTRAMUSCULAR at 00:37

## 2019-09-21 RX ADMIN — CITALOPRAM HYDROBROMIDE 20 MG: 20 TABLET ORAL at 08:50

## 2019-09-21 RX ADMIN — SODIUM CHLORIDE 1000 ML: 900 INJECTION, SOLUTION INTRAVENOUS at 02:38

## 2019-09-21 RX ADMIN — CYANOCOBALAMIN 1000 MCG: 1000 INJECTION, SOLUTION INTRAMUSCULAR; SUBCUTANEOUS at 10:39

## 2019-09-21 RX ADMIN — Medication 10 ML: at 01:21

## 2019-09-21 RX ADMIN — COSYNTROPIN 0.25 MG: 0.25 INJECTION, POWDER, LYOPHILIZED, FOR SOLUTION INTRAMUSCULAR; INTRAVENOUS at 08:10

## 2019-09-21 RX ADMIN — SULFUR HEXAFLUORIDE 2 ML: KIT at 11:00

## 2019-09-21 RX ADMIN — TIZANIDINE 2 MG: 4 TABLET ORAL at 21:59

## 2019-09-21 RX ADMIN — ATORVASTATIN CALCIUM 20 MG: 20 TABLET, FILM COATED ORAL at 08:50

## 2019-09-21 RX ADMIN — OXYCODONE HYDROCHLORIDE 10 MG: 5 TABLET ORAL at 15:02

## 2019-09-21 RX ADMIN — METFORMIN HYDROCHLORIDE 500 MG: 500 TABLET ORAL at 08:50

## 2019-09-21 RX ADMIN — FENOFIBRATE 145 MG: 145 TABLET ORAL at 08:50

## 2019-09-21 NOTE — PLAN OF CARE
Problem: Patient Care Overview  Goal: Plan of Care Review  Outcome: Ongoing (interventions implemented as appropriate)   09/21/19 0548   Coping/Psychosocial   Plan of Care Reviewed With patient   Plan of Care Review   Progress improving       Problem: Pain, Chronic (Adult)  Goal: Identify Related Risk Factors and Signs and Symptoms  Outcome: Ongoing (interventions implemented as appropriate)   09/21/19 0548   Pain, Chronic (Adult)   Related Risk Factors (Chronic Pain) disease process;procedures/treatments   Signs and Symptoms (Chronic Pain) fatigue/weakness;verbalization of pain descriptors     Goal: Acceptable Pain/Comfort Level and Functional Ability  Outcome: Ongoing (interventions implemented as appropriate)   09/21/19 0548   Pain, Chronic (Adult)   Acceptable Pain/Comfort Level and Functional Ability making progress toward outcome       Problem: Syncope (Adult)  Goal: Identify Related Risk Factors and Signs and Symptoms  Outcome: Ongoing (interventions implemented as appropriate)   09/21/19 0548   Syncope (Adult)   Related Risk Factors (Syncope) hypotension   Signs and Symptoms (Syncope) dizziness     Goal: Physical Safety/Health Maintenance  Outcome: Ongoing (interventions implemented as appropriate)   09/21/19 0548   Syncope (Adult)   Physical Safety/Health Maintenance making progress toward outcome     Goal: Optimal Emotional/Functional [place]  Outcome: Ongoing (interventions implemented as appropriate)   09/21/19 0548   Syncope (Adult)   Optimal Emotional/Functional [place] making progress toward outcome       Problem: Fall Risk (Adult)  Goal: Identify Related Risk Factors and Signs and Symptoms  Outcome: Ongoing (interventions implemented as appropriate)   09/21/19 0548   Fall Risk (Adult)   Related Risk Factors (Fall Risk) age-related changes;history of falls   Signs and Symptoms (Fall Risk) presence of risk factors     Goal: Absence of Fall  Outcome: Ongoing (interventions implemented as  appropriate)   09/21/19 0548   Fall Risk (Adult)   Absence of Fall making progress toward outcome

## 2019-09-21 NOTE — PROGRESS NOTES
"Hospitalist Team      Patient Care Team:  Berlin Ramos MD as PCP - General (Family Medicine)  Berlin Ramos MD as PCP - Family Medicine        Chief Complaint: Dizziness with lightheadedness and syncopal episodes    History of present illness and Hospital course  HPI: Patient is a 64-year-old female with past medical history of uterine cancer, kidney stones, hyperlipidemia, diabetes mellitus type 2, hypertension and degenerative disc disease who presents to the ED with multiple episodes of dizziness, lightheadedness and syncopal episodes for the past 4 days.  Patient states she has been having symptoms intermittently with changes in position especially standing.  She notes that yesterday evening while walking to her bedroom she began feeling dizzy weak and had a syncopal episode.  She notes that typically when she begins feeling lightheaded she can just sit down and the symptoms will resolve within 10 to 15 minutes without further treatment.  No pain, prodrome other than dizziness or preceding aura reported.  She notes she was recently treated by her primary care physician for a cough and fever on an outpatient basis with cough suppressant.  She did not receive any antibiotics.  She reports some recent nausea and diarrhea but denies any vomiting, chest pain or shortness of breath.  In addition to her uterine cancer patient has a history of multiple \"precancerous colon lesions\", and is on a 3-month colonoscopy schedule with her next one scheduled in October.  She denies any bright red blood or dark tarry stools, notes her urine has been normal and does not report any changes in appetite.  She has not noted any palpitations.  Patient did have a soft tissue injury to her left leg with her last syncopal episode.        In the ED troponins were less than 0.030, creatinine: 1.50, BUN: 28, EGFR 35, WBCs: 9.10, hemoglobin: 3.4, hematocrit: 40.5.  EKG shows sinus rhythm at 80.  CT of the head without contrast " "shows no acute intracranial abnormality.  Chest x-ray shows no acute process and x-ray of the left tibia and fibula are negative for fracture.  Patient's neurologic exam is normal.  During the interview orthostatic vital signs were obtained by ER tech, patient had a decrease of greater than 40 mmHg systolic blood pressure with change from lying to standing position, and she noted profound symptoms with this change.  She will be admitted to the hospital for further evaluation and management of her symptoms.     Past medical history is positive for history of depression/hypertension/hyperlipidemia      Subjective/Objective  Patient reports continuing to feel poorly.  She reports any change in position from laying to sitting or sitting to standing causes lightheadedness.  She denies any chest pain or shortness of breath, congestion or cough, GI or  complaints.    Review of systems  12 point review of systems was reviewed and was negative except as above.    Vital Signs  Temp:  [97.8 °F (36.6 °C)-98.8 °F (37.1 °C)] 98.5 °F (36.9 °C)  Heart Rate:  [60-97] 70  Resp:  [12-20] 12  BP: ()/(49-78) 104/67  Oxygen Therapy  SpO2: 98 %  Pulse Oximetry Type: Intermittent  Device (Oxygen Therapy): room air  Flowsheet Rows      First Filed Value   Admission Height  162.6 cm (64\") Documented at 09/20/2019 1634   Admission Weight  102 kg (225 lb 1.4 oz) Documented at 09/20/2019 1634        Intake & Output (last 3 days)       09/18 0701 - 09/19 0700 09/19 0701 - 09/20 0700 09/20 0701 - 09/21 0700 09/21 0701 - 09/22 0700    P.O.    240    I.V. (mL/kg)   985 (9.7)     Total Intake(mL/kg)   985 (9.7) 240 (2.4)    Urine (mL/kg/hr)   200     Stool   0     Total Output   200     Net   +785 +240            Stool Unmeasured Occurrence   0 x         Lines, Drains & Airways    Active LDAs     Name:   Placement date:   Placement time:   Site:   Days:    Peripheral IV 09/20/19 1719 Right Antecubital   09/20/19 1719    Antecubital   less " than 1                Physical Exam:  Well-developed over nourished female sitting up in bed awake and alert comfortable on room air no acute distress; sclera anicteric, mucous membranes moist; lungs clear to auscultation bilaterally; CV regular rate and rhythm; abdomen soft and nontender; extremities with no edema or calf tenderness; left anterior lower leg with ecchymosis and tenderness to palpation; palpable pedal pulses bilaterally.      Results Review:     I reviewed the patient's new clinical results.    Lab Results (last 24 hours)     Procedure Component Value Units Date/Time    POC Glucose Once [[id number]]  (Normal) Collected:  09/21/19 1145    Specimen:  Blood Updated:  09/21/19 1147     Glucose 97 mg/dL      Comment: Serial Number: [id number]:  [id number]       Cortisol [[id number]]  (Abnormal) Collected:  09/21/19 0916    Specimen:  Blood Updated:  09/21/19 1012     Cortisol 25.06 mcg/dL      Comment: Results may be falsely increased if patient taking Biotin.       Narrative:       Cortisol Reference Ranges:    Cortisol AM 6-28 ug/dL  Cortisol PM 2-12 ug/dL    Cortisol [[id number]]  (Abnormal) Collected:  09/21/19 0844    Specimen:  Blood Updated:  09/21/19 0930     Cortisol 22.87 mcg/dL      Comment: Results may be falsely increased if patient taking Biotin.       Narrative:       Cortisol Reference Ranges:    Cortisol AM 6-28 ug/dL  Cortisol PM 2-12 ug/dL    Cortisol [[id number]]  (Normal) Collected:  09/21/19 0813    Specimen:  Blood Updated:  09/21/19 0930     Cortisol 11.61 mcg/dL      Comment: Results may be falsely increased if patient taking Biotin.       Narrative:       Cortisol Reference Ranges:    Cortisol AM 6-28 ug/dL  Cortisol PM 2-12 ug/dL    POC Glucose Once [[id number]]  (Normal) Collected:  09/21/19 0744    Specimen:  Blood Updated:  09/21/19 0745     Glucose 86 mg/dL      Comment: Serial Number: [id number]:  [id number]       Troponin [[id number]]  (Normal) Collected:  09/21/19  0607    Specimen:  Blood Updated:  09/21/19 0651     Troponin I <0.030 ng/mL     Narrative:       Troponin I Reference Range:    0.00-0.03  Negative.  Repeat testing in 4-6 hours if clinically indicated.    0.04-0.29  Suspicious for myocardial injury. Serial measurements and clinical  correlation may be necessary to confirm or exclude diagnosis of acute  coronary syndrome.  Repeat testing in 4-6 hours if indicated.     >0.29 Consistent with myocardial injury.  Recommend clinical and laboratory correlation.     Results my be falsely decreased if patient taking Biotin.     Lactic Acid, Plasma [[id number]]  (Normal) Collected:  09/21/19 0607    Specimen:  Blood Updated:  09/21/19 0632     Lactate 0.8 mmol/L     Vitamin B12 [[id number]]  (Normal) Collected:  09/21/19 0110    Specimen:  Blood Updated:  09/21/19 0333     Vitamin B-12 205 pg/mL      Comment: Results may be falsely increased if patient taking Biotin.       Folate [[id number]]  (Abnormal) Collected:  09/21/19 0110    Specimen:  Blood Updated:  09/21/19 0333     Folate >24.80 ng/mL     Narrative:       Results may be falsely increased if patient taking Biotin.    CK [[id number]]  (Normal) Collected:  09/21/19 0110    Specimen:  Blood Updated:  09/21/19 0312     Creatine Kinase 68 U/L     Comprehensive Metabolic Panel [[id number]]  (Abnormal) Collected:  09/21/19 0110    Specimen:  Blood Updated:  09/21/19 0312     Glucose 95 mg/dL      BUN 28 mg/dL      Creatinine 1.50 mg/dL      Sodium 140 mmol/L      Potassium 4.3 mmol/L      Chloride 104 mmol/L      CO2 25.0 mmol/L      Calcium 9.1 mg/dL      Total Protein 6.0 g/dL      Albumin 3.40 g/dL      ALT (SGPT) 14 U/L      AST (SGOT) 19 U/L      Alkaline Phosphatase 95 U/L      Total Bilirubin 0.2 mg/dL      eGFR Non African Amer 35 mL/min/1.73      Globulin 2.6 gm/dL      A/G Ratio 1.3 g/dL      BUN/Creatinine Ratio 18.7     Anion Gap 15.3 mmol/L     Magnesium [[id number]]  (Normal) Collected:  09/21/19 0110     Specimen:  Blood Updated:  09/21/19 0312     Magnesium 1.8 mg/dL     Phosphorus [[id number]]  (Normal) Collected:  09/21/19 0110    Specimen:  Blood Updated:  09/21/19 0312     Phosphorus 3.9 mg/dL     Procalcitonin [[id number]]  (Normal) Collected:  09/21/19 0110    Specimen:  Blood Updated:  09/21/19 0308     Procalcitonin <0.05 ng/mL     Ferritin [[id number]]  (Normal) Collected:  09/21/19 0110    Specimen:  Blood Updated:  09/21/19 0253     Ferritin 97.00 ng/mL      Comment: Results may be falsely decreased if patient taking Biotin.       T4, Free [[id number]]  (Normal) Collected:  09/21/19 0110    Specimen:  Blood Updated:  09/21/19 0253     Free T4 0.85 ng/dL      Comment: Results may be falsely increased if patient taking Biotin.       TSH [[id number]]  (Normal) Collected:  09/21/19 0110    Specimen:  Blood Updated:  09/21/19 0253     TSH 1.880 uIU/mL      Comment: Results may be falsely decreased if patient taking Biotin.       Lipid Panel [[id number]]  (Abnormal) Collected:  09/21/19 0110    Specimen:  Blood Updated:  09/21/19 0239     Total Cholesterol 176 mg/dL      Triglycerides 153 mg/dL      HDL Cholesterol 40 mg/dL      LDL Cholesterol  118 mg/dL      VLDL Cholesterol 30.6 mg/dL      LDL/HDL Ratio 2.64     Chol/HDL Ratio 4.40    Narrative:       The following guidelines have been recommended by the NCEP for Total Cholesterol, Total Triglycerides, LDL Cholesterol, and HDL Cholesterols    Total Cholesterol  Desirable:        <200 mg/dL  Borderline High:  200-239 mg/dL  High:             > or = 240 mg/dL    Total Triglyceride  Normal:           <150 mg/dL  Borderline High:  150-199 mg/dL  High:             200-499 mg/dL  Very High:        > or = 500 mg/dL    HDL Cholesterol  Low HDL:          <40 mg/dL  Normal:           40-60 mg/dL  Desirable:        >60 mg/dL    LDL Cholesterol  Optimal:          <100 mg/dL  Low Risk:         100-129 mg/dL  Borderline High:  130-159 mg/dL  High:             160-189  mg/dL  Very High:        > or = 190 mg/dL    The following ratios of LDL to HDL and Total cholesterol to HDL are for information only:    LDL/HDL Ratio  Desirable:        <5  Optimal:          < or = 3.5    Total Cholesterol/HDL Ratio  Low Risk:         3.3-4.4  Average Risk:     4.4-7.1  Medium Risk:      7.1-11  High Risk:        >11       BNP [[id number]]  (Normal) Collected:  09/21/19 0110    Specimen:  Blood Updated:  09/21/19 0230     BNP 25.0 pg/mL      Comment: Results may be falsely decreased if patient taking Biotin.       D-dimer, Quantitative [[id number]]  (Normal) Collected:  09/21/19 0110    Specimen:  Blood Updated:  09/21/19 0219     D-Dimer, Quantitative 0.38 MCGFEU/mL     Narrative:       Reference Range  --------------------------------------------------------------------     < 0.50   Negative Predictive Value  0.50-0.59   Indeterminate    >= 0.60   Probable VTE             A very low percentage of patients with DVT may yield D-Dimer results   below the cut-off of 0.50 MCGFEU/mL.  This is known to be more   prevalent in patients with distal DVT.             Results of this test should always be interpreted in conjunction with   the patient's medical history, clinical presentation and other   findings.  Clinical diagnosis should not be based on the result of   [place] D-Dimer alone.    Protime-INR [[id number]]  (Normal) Collected:  09/21/19 0110    Specimen:  Blood Updated:  09/21/19 0219     Protime 10.4 Seconds      INR 1.01    CBC Auto Differential [[id number]]  (Normal) Collected:  09/21/19 0110    Specimen:  Blood Updated:  09/21/19 0209     WBC 9.30 10*3/mm3      RBC 4.14 10*6/mm3      Hemoglobin 12.8 g/dL      Hematocrit 38.2 %      MCV 92.3 fL      MCH 30.8 pg      MCHC 33.4 g/dL      RDW 13.1 %      RDW-SD 42.9 fl      MPV 8.3 fL      Platelets 278 10*3/mm3      Neutrophil % 57.2 %      Lymphocyte % 31.6 %      Monocyte % 8.9 %      Eosinophil % 1.9 %      Basophil % 0.4 %      Neutrophils,  Absolute 5.30 10*3/mm3      Lymphocytes, Absolute 2.90 10*3/mm3      Monocytes, Absolute 0.80 10*3/mm3      Eosinophils, Absolute 0.20 10*3/mm3      Basophils, Absolute 0.00 10*3/mm3      nRBC 0.1 /100 WBC     Hemoglobin A1c [[id number]] Collected:  09/21/19 0110    Specimen:  Blood Updated:  09/21/19 0203    Ferritin [[id number]]  (Normal) Collected:  09/20/19 1708    Specimen:  Blood Updated:  09/21/19 0146     Ferritin 113.00 ng/mL      Comment: Results may be falsely decreased if patient taking Biotin.       Sedimentation Rate [[id number]]  (Normal) Collected:  09/20/19 1708    Specimen:  Blood Updated:  09/21/19 0105     Sed Rate 21 mm/hr     Uric Acid [[id number]]  (Normal) Collected:  09/20/19 1708    Specimen:  Blood Updated:  09/21/19 0101     Uric Acid 5.3 mg/dL     C-reactive Protein [[id number]]  (Normal) Collected:  09/20/19 1708    Specimen:  Blood Updated:  09/21/19 0032     C-Reactive Protein 0.23 mg/dL     POC Glucose Once [[id number]]  (Abnormal) Collected:  09/20/19 2344    Specimen:  Blood Updated:  09/20/19 2345     Glucose 122 mg/dL      Comment: Serial Number: [id number]:  [id number]       Troponin [[id number]]  (Normal) Collected:  09/20/19 1708    Specimen:  Blood Updated:  09/20/19 1735     Troponin I <0.030 ng/mL     Narrative:       Troponin I Reference Range:    0.00-0.03  Negative.  Repeat testing in 4-6 hours if clinically indicated.    0.04-0.29  Suspicious for myocardial injury. Serial measurements and clinical  correlation may be necessary to confirm or exclude diagnosis of acute  coronary syndrome.  Repeat testing in 4-6 hours if indicated.     >0.29 Consistent with myocardial injury.  Recommend clinical and laboratory correlation.     Results my be falsely decreased if patient taking Biotin.     Basic Metabolic Panel [[id number]]  (Abnormal) Collected:  09/20/19 1708    Specimen:  Blood Updated:  09/20/19 1729     Glucose 108 mg/dL      BUN 28 mg/dL      Creatinine 1.50 mg/dL       Sodium 139 mmol/L      Potassium 4.3 mmol/L      Chloride 102 mmol/L      CO2 25.0 mmol/L      Calcium 9.3 mg/dL      eGFR Non African Amer 35 mL/min/1.73      BUN/Creatinine Ratio 18.7     Anion Gap 16.3 mmol/L     Protime-INR [[id number]]  (Normal) Collected:  09/20/19 1708    Specimen:  Blood Updated:  09/20/19 1722     Protime 10.3 Seconds      INR 1.00    aPTT [[id number]]  (Abnormal) Collected:  09/20/19 1708    Specimen:  Blood Updated:  09/20/19 1722     PTT 18.3 seconds     CBC & Differential [[id number]] Collected:  09/20/19 1708    Specimen:  Blood Updated:  09/20/19 1711    Narrative:       The following orders were created for panel order CBC & Differential.  Procedure                               Abnormality         Status                     ---------                               -----------         ------                     CBC Auto Differential[[id number]]        Normal              Final result                 Please view results for these tests on the individual orders.    CBC Auto Differential [[id number]]  (Normal) Collected:  09/20/19 1708    Specimen:  Blood Updated:  09/20/19 1711     WBC 9.10 10*3/mm3      RBC 4.41 10*6/mm3      Hemoglobin 13.4 g/dL      Hematocrit 40.5 %      MCV 91.8 fL      MCH 30.4 pg      MCHC 33.1 g/dL      RDW 12.8 %      RDW-SD 41.6 fl      MPV 7.9 fL      Platelets 288 10*3/mm3      Neutrophil % 61.8 %      Lymphocyte % 28.7 %      Monocyte % 7.4 %      Eosinophil % 1.6 %      Basophil % 0.5 %      Neutrophils, Absolute 5.60 10*3/mm3      Lymphocytes, Absolute 2.60 10*3/mm3      Monocytes, Absolute 0.70 10*3/mm3      Eosinophils, Absolute 0.10 10*3/mm3      Basophils, Absolute 0.10 10*3/mm3      nRBC 0.1 /100 WBC           Imaging Results (last 24 hours)     Procedure Component Value Units Date/Time    CT Head Without Contrast [[id number]] Collected:  09/20/19 1853     Updated:  09/20/19 1857    Narrative:          DATE OF EXAM:  9/20/2019 6:32 PM     PROCEDURE:   CT  HEAD WO CONTRAST-     INDICATIONS:   syncope, fall     COMPARISON:  No Comparisons Available     TECHNIQUE:   Routine transaxial cuts were obtained through the head without the  administration of contrast. Automated exposure control and iterative  reconstruction methods were used.      FINDINGS:  No acute intracranial hemorrhage. No mass effect or midline shift. The  ventricles and cortical sulci are normally configured. Gray-white matter  differentiation is maintained. Basilar cisterns are patent. The  posterior fossa is without acute abnormality. Globes are intact and  symmetric. No retro-orbital abnormality. The paranasal sinuses are  without air fluid level. Mastoid air cells well aerated. Calvarium  intact. Small dural calcification falx cerebra measuring 4 mm related to  tiny meningioma.       Impression:       No acute intracranial abnormality.      Electronically Signed By-Mahesh Mouser On:9/20/2019 6:55 PM  This report was finalized on [id number] by  Mahesh Carr .    XR Tibia Fibula 2 View Left [[id number]] Collected:  09/20/19 1751     Updated:  09/20/19 1756    Narrative:       DATE OF EXAM:  9/20/2019 5:25 PM     PROCEDURE:  XR TIBIA FIBULA 2 VW LEFT-     INDICATIONS:  fall     COMPARISON:  No Comparisons Available     TECHNIQUE:   Two views of the left tibia and fibula were obtained.     FINDINGS:  Mild degenerative spurring at the knee related to osteoarthritis. The  tibia and fibula are intact. No joint effusion at the knee.        Impression:       Negative for fracture.     Electronically Signed By-Mahesh Mouser On:9/20/2019 5:54 PM  This report was finalized on [id number] by  Mahesh Carr .    XR Chest 1 View [[id number]] Collected:  09/20/19 1750     Updated:  09/20/19 1754    Narrative:          DATE OF EXAM:   9/20/2019 5:25 PM     PROCEDURE:   XR CHEST 1 VW-     INDICATIONS:   syncope     COMPARISON:  07/24/2017     TECHNIQUE:   [Portable chest radiograph]     FINDINGS:    The  cardiomediastinal silhouette is within normal limits. The lungs are  clear. There is no pneumothorax, focal consolidation, or large pleural  effusion. Osseous structures grossly intact.        Impression:       No acute process.      Electronically Signed By-Mahesh Carr On:9/20/2019 5:50 PM  This report was finalized on [id number] by  Mahesh Carr, .                                     Xrays, labs reviewed personally by physician.    ECG/EMG Results (most recent)     Procedure Component Value Units Date/Time    ECG 12 Lead [[id number]] Collected:  09/20/19 1648     Updated:  09/21/19 0623    Narrative:       HEART RATE= 80  bpm  RR Interval= 748  ms  [place] Interval= 164  ms  P Horizontal Axis= 10  deg  P Front Axis= 33  deg  QRSD Interval= 82  ms  QT Interval= 384  ms  QRS Axis= -20  deg  T Wave Axis= 45  deg  - NORMAL ECG -  Sinus rhythm  Electronically Signed By:   Date and Time of Study: 2019-09-20 16:48:00    Transthoracic Echo Complete With Contrast if Necessary Per Protocol [[id number]] Resulted:  09/21/19 1012     Updated:  09/21/19 1013     Target HR (85%) 133 bpm      Max. Pred. HR (100%) 157 bpm      EF(MOD-bp) 71.0 %      LA dimension(2D) 4.2 cm             Medication Review:   I have reviewed the patient's current medication list  Scheduled Meds:  atorvastatin 20 mg Oral Daily   citalopram 20 mg Oral Daily   cyanocobalamin 1,000 mcg Subcutaneous Daily   enoxaparin 40 mg Subcutaneous Q24H   famotidine 40 mg Oral Daily   fenofibrate 145 mg Oral Daily   gabapentin 100 mg Oral BID   sodium chloride 10 mL Intravenous Q12H   tiZANidine 2 mg Oral BID     Continuous Infusions:  sodium chloride 100 mL/hr Last Rate: Stopped (09/21/19 0636)     PRN Meds:.•  acetaminophen **OR** acetaminophen **OR** acetaminophen  •  docusate sodium  •  melatonin  •  nitroglycerin  •  ondansetron  •  oxyCODONE  •  [COMPLETED] Insert peripheral IV **AND** sodium chloride  •  sodium chloride        Assessment/Plan     Syncope  due to  volume depletion versus cardiogenic cause  -Patient noted with an episode of syncope and several near syncopal episodes which involve profound dizziness and are related to position changes. positive orthostatic changes are noted.  No carotid bruits appreciated on exam  -EKG: Sinus rhythm at 80 with a QTC of 423 ms  -Continue IV fluids  -CT of the head without contrast shows no acute intracranial abnormality  -Serial troponins negative  -Continue cardiac monitoring  -d-dimer normal  -cosyntropin stimulation test normal  -Cardiology consulted    CRISSY likely secondary to prerenal azotemia from volume deficit  -SCr: 1.50, BUN-28, this represents a change from normal values on 12/28/2018  -Hold lisinopril and metformin  -Avoid nephrotoxic medications  -Avoid IV contrast  -Continue IV fluids   -UA clear  -Monitor BMP     Diabetes Mellitus type 2 well-controlled  -Hold metformin  -Start SSI  -Monitor glucose before meals and at bedtime     Primary hypertension currently with orthostatic changes  -Patient BP decreased from 141/60 to 98/49 with change of position from lying to standing, and patient experience profound symptoms.  -Lisinopril held as above  -Monitor while admitted     Hyperlipidemia  -Continue atorvastatin     Obesity, BMI 39  -Encourage diet and lifestyle modification after resolution of acute symptoms     VTE prophylaxis: Lovenox    Plan for disposition: Pending clinical course    Marla Marrufo MD  09/21/19  2:57 PM

## 2019-09-21 NOTE — H&P
"[facility] HOSPITALIST     Berlin Ramos MD    Patient Care Team:  Berlin Ramos MD as PCP - General (Family Medicine)  Berlin Ramos MD as PCP - Family Medicine    CHIEF COMPLAINT:     Chief Complaint   Patient presents with   • Syncope       Syncope    HISTORY OF PRESENT ILLNESS:    HPI: Patient is a 64-year-old female with past medical history of uterine cancer, kidney stones, hyperlipidemia, diabetes mellitus type 2, hypertension and degenerative disc disease who presents to the ED with multiple episodes of dizziness, lightheadedness and syncopal episodes for the past 4 days.  Patient states she has been having symptoms intermittently with changes in position especially standing.  She notes that yesterday evening while walking to her bedroom she began feeling dizzy weak and had a syncopal episode.  She notes that typically when she begins feeling lightheaded she can just sit down and the symptoms will resolve within 10 to 15 minutes without further treatment.  No pain, prodrome other than dizziness or preceding aura reported.  She notes she was recently treated by her primary care physician for a cough and fever on an outpatient basis with cough suppressant.  She did not receive any antibiotics.  She reports some recent nausea and diarrhea but denies any vomiting, chest pain or shortness of breath.  In addition to her uterine cancer patient has a history of multiple \"precancerous colon lesions\", and is on a 3-month colonoscopy schedule with her next one scheduled in October.  She denies any bright red blood or dark tarry stools, notes her urine has been normal and does not report any changes in appetite.  She has not noted any palpitations.  Patient did have a soft tissue injury to her left leg with her last syncopal episode.      In the ED troponins were less than 0.030, creatinine: 1.50, BUN: 28, EGFR 35, WBCs: 9.10, hemoglobin: 3.4, hematocrit: 40.5.  EKG shows sinus rhythm " at 80.  CT of the head without contrast shows no acute intracranial abnormality.  Chest x-ray shows no acute process and x-ray of the left tibia and fibula are negative for fracture.  Patient's neurologic exam is normal.  During the interview orthostatic vital signs were obtained by ER tech, patient had a decrease of greater than 40 mmHg systolic blood pressure with change from lying to standing position, and she noted profound symptoms with this change.  She will be admitted to the hospital for further evaluation and management of her symptoms.    Past medical history is positive for history of depression/hypertension/hyperlipidemia    Social history is negative    Family history is negative    History reviewed. No pertinent past medical history.  History reviewed. No pertinent surgical history.  History reviewed. No pertinent family history.  Social History     Tobacco Use   • Smoking status: Never Smoker   • Smokeless tobacco: Never Used   Substance Use Topics   • Alcohol use: Not on file   • Drug use: Not on file     Medications Prior to Admission   Medication Sig Dispense Refill Last Dose   • citalopram (CeleXA) 20 MG tablet CITALOPRAM HYDROBROMIDE 20 MG TABS      • fenofibrate 160 MG tablet FENOFIBRATE 160 MG TABS      • gabapentin (NEURONTIN) 100 MG capsule Take 100 mg by mouth 2 (Two) Times a Day.      • gemfibrozil (LOPID) 600 MG tablet Take 600 mg by mouth 2 (Two) Times a Day Before Meals.      • lisinopril (PRINIVIL,ZESTRIL) 40 MG tablet LISINOPRIL 40 MG TABS      • metFORMIN (GLUCOPHAGE) 500 MG tablet Take 500 mg by mouth 2 (Two) Times a Day With Meals.      • oxyCODONE-acetaminophen (PERCOCET)  MG per tablet Take 1 tablet by mouth 3 (Three) Times a Day.      • phentermine 30 MG capsule Take 15 mg by mouth Every Morning.      • simvastatin (ZOCOR) 40 MG tablet Take 40 mg by mouth Every Night.      • temazepam (RESTORIL) 30 MG capsule TEMAZEPAM 30 MG CAPS      • tiZANidine (ZANAFLEX) 4 MG tablet  "Take 2 mg by mouth 2 (Two) Times a Day.        Allergies:  Indomethacin and Iodine      There is no immunization history on file for this patient.        REVIEW OF SYSTEMS: TO BE COMPLETED BY ATTENDING    Review of Systems   Constitution: Negative.   HENT: Negative.    Eyes: Negative.    Cardiovascular: Negative.    Respiratory: Negative.    Endocrine: Negative.    Hematologic/Lymphatic: Negative.    Skin: Negative.    Musculoskeletal: Negative.    Gastrointestinal: Negative.    Genitourinary: Negative.    Neurological: Negative.    Psychiatric/Behavioral: Negative.    Allergic/Immunologic: Negative.    All other systems reviewed and are negative.      Vital Signs  Temp:  [97.9 °F (36.6 °C)-98.8 °F (37.1 °C)] 97.9 °F (36.6 °C)  Heart Rate:  [71-97] 77  Resp:  [14-20] 20  BP: ()/(49-71) 111/67    Flowsheet Rows      First Filed Value   Admission Height  162.6 cm (64\") Documented at 09/20/2019 1634   Admission Weight  102 kg (225 lb 1.4 oz) Documented at 09/20/2019 1634           Physical Exam:TO BE COMPLETED BY ATTENDING    Physical Exam   Constitutional: She is oriented to person, place, and time. She appears well-developed and well-nourished. No distress.   HENT:   Head: Normocephalic and atraumatic.   Right Ear: External ear normal.   Left Ear: External ear normal.   Nose: Nose normal.   Mouth/Throat: Oropharynx is clear and moist. No oropharyngeal exudate.   Eyes: Conjunctivae and EOM are normal. Pupils are equal, round, and reactive to light. Right eye exhibits no discharge. Left eye exhibits no discharge. No scleral icterus.   Neck: Normal range of motion. No JVD present. No tracheal deviation present. No thyromegaly present.   Cardiovascular: Normal rate, regular rhythm, normal heart sounds and intact distal pulses. Exam reveals no gallop and no friction rub.   No murmur heard.  Pulmonary/Chest: Effort normal and breath sounds normal. No stridor. No respiratory distress. She has no wheezes. She has no " rales. She exhibits no tenderness.   Abdominal: Soft. Bowel sounds are normal. She exhibits no distension and no mass. There is no tenderness. There is no rebound and no guarding. No hernia.   Musculoskeletal: Normal range of motion. She exhibits no edema, tenderness or deformity.   Lymphadenopathy:     She has no cervical adenopathy.   Neurological: She is alert and oriented to person, place, and time. No cranial nerve deficit or sensory deficit. She exhibits normal muscle tone. Coordination normal.   Skin: Skin is warm and dry. No rash noted. She is not diaphoretic. No erythema.   Psychiatric: She has a normal mood and affect. Her behavior is normal.   Nursing note and vitals reviewed.      Emotional Behavior:   APPRORPIATE  Debilities:  Age appropriate      Results Review:    I reviewed the patient's new clinical results.  Lab Results (most recent)     Procedure Component Value Units Date/Time    Troponin [[id number]]  (Normal) Collected:  09/20/19 1708    Specimen:  Blood Updated:  09/20/19 1735     Troponin I <0.030 ng/mL     Narrative:       Troponin I Reference Range:    0.00-0.03  Negative.  Repeat testing in 4-6 hours if clinically indicated.    0.04-0.29  Suspicious for myocardial injury. Serial measurements and clinical  correlation may be necessary to confirm or exclude diagnosis of acute  coronary syndrome.  Repeat testing in 4-6 hours if indicated.     >0.29 Consistent with myocardial injury.  Recommend clinical and laboratory correlation.     Results my be falsely decreased if patient taking Biotin.     Basic Metabolic Panel [[id number]]  (Abnormal) Collected:  09/20/19 1708    Specimen:  Blood Updated:  09/20/19 1729     Glucose 108 mg/dL      BUN 28 mg/dL      Creatinine 1.50 mg/dL      Sodium 139 mmol/L      Potassium 4.3 mmol/L      Chloride 102 mmol/L      CO2 25.0 mmol/L      Calcium 9.3 mg/dL      eGFR Non African Amer 35 mL/min/1.73      BUN/Creatinine Ratio 18.7     Anion Gap 16.3 mmol/L      Protime-INR [[id number]]  (Normal) Collected:  09/20/19 1708    Specimen:  Blood Updated:  09/20/19 1722     Protime 10.3 Seconds      INR 1.00    aPTT [[id number]]  (Abnormal) Collected:  09/20/19 1708    Specimen:  Blood Updated:  09/20/19 1722     PTT 18.3 seconds     CBC & Differential [[id number]] Collected:  09/20/19 1708    Specimen:  Blood Updated:  09/20/19 1711    Narrative:       The following orders were created for panel order CBC & Differential.  Procedure                               Abnormality         Status                     ---------                               -----------         ------                     CBC Auto Differential[[id number]]        Normal              Final result                 Please view results for these tests on the individual orders.    CBC Auto Differential [[id number]]  (Normal) Collected:  09/20/19 1708    Specimen:  Blood Updated:  09/20/19 1711     WBC 9.10 10*3/mm3      RBC 4.41 10*6/mm3      Hemoglobin 13.4 g/dL      Hematocrit 40.5 %      MCV 91.8 fL      MCH 30.4 pg      MCHC 33.1 g/dL      RDW 12.8 %      RDW-SD 41.6 fl      MPV 7.9 fL      Platelets 288 10*3/mm3      Neutrophil % 61.8 %      Lymphocyte % 28.7 %      Monocyte % 7.4 %      Eosinophil % 1.6 %      Basophil % 0.5 %      Neutrophils, Absolute 5.60 10*3/mm3      Lymphocytes, Absolute 2.60 10*3/mm3      Monocytes, Absolute 0.70 10*3/mm3      Eosinophils, Absolute 0.10 10*3/mm3      Basophils, Absolute 0.10 10*3/mm3      nRBC 0.1 /100 WBC           Imaging Results (most recent)     Procedure Component Value Units Date/Time    CT Head Without Contrast [[id number]] Collected:  09/20/19 1853     Updated:  09/20/19 1857    Narrative:          DATE OF EXAM:  9/20/2019 6:32 PM     PROCEDURE:   CT HEAD WO CONTRAST-     INDICATIONS:   syncope, fall     COMPARISON:  No Comparisons Available     TECHNIQUE:   Routine transaxial cuts were obtained through the head without the  administration of contrast. Automated  exposure control and iterative  reconstruction methods were used.      FINDINGS:  No acute intracranial hemorrhage. No mass effect or midline shift. The  ventricles and cortical sulci are normally configured. Gray-white matter  differentiation is maintained. Basilar cisterns are patent. The  posterior fossa is without acute abnormality. Globes are intact and  symmetric. No retro-orbital abnormality. The paranasal sinuses are  without air fluid level. Mastoid air cells well aerated. Calvarium  intact. Small dural calcification falx cerebra measuring 4 mm related to  tiny meningioma.       Impression:       No acute intracranial abnormality.      Electronically Signed By-Mahesh Mouser On:9/20/2019 6:55 PM  This report was finalized on [id number] by  Mahesh Carr .    XR Tibia Fibula 2 View Left [[id number]] Collected:  09/20/19 1751     Updated:  09/20/19 1756    Narrative:       DATE OF EXAM:  9/20/2019 5:25 PM     PROCEDURE:  XR TIBIA FIBULA 2 VW LEFT-     INDICATIONS:  fall     COMPARISON:  No Comparisons Available     TECHNIQUE:   Two views of the left tibia and fibula were obtained.     FINDINGS:  Mild degenerative spurring at the knee related to osteoarthritis. The  tibia and fibula are intact. No joint effusion at the knee.        Impression:       Negative for fracture.     Electronically Signed By-Mahesh Mouser On:9/20/2019 5:54 PM  This report was finalized on [id number] by  Mahesh Carr .    XR Chest 1 View [[id number]] Collected:  09/20/19 1750     Updated:  09/20/19 1754    Narrative:          DATE OF EXAM:   9/20/2019 5:25 PM     PROCEDURE:   XR CHEST 1 VW-     INDICATIONS:   syncope     COMPARISON:  07/24/2017     TECHNIQUE:   [Portable chest radiograph]     FINDINGS:    The cardiomediastinal silhouette is within normal limits. The lungs are  clear. There is no pneumothorax, focal consolidation, or large pleural  effusion. Osseous structures grossly intact.        Impression:       No acute process.       Electronically Signed By-Mahesh Carr On:9/20/2019 5:50 PM  This report was finalized on [id number] by  Mahesh Carr .        reviewed    ECG/EMG Results (most recent)     Procedure Component Value Units Date/Time    ECG 12 Lead [[id number]] Collected:  09/20/19 1648     Updated:  09/20/19 1651    Narrative:       HEART RATE= 80  bpm  RR Interval= 748  ms  FL Interval= 164  ms  P Horizontal Axis= 10  deg  P Front Axis= 33  deg  QRSD Interval= 82  ms  QT Interval= 384  ms  QRS Axis= -20  deg  T Wave Axis= 45  deg  - NORMAL ECG -  Sinus rhythm  Electronically Signed By:   Date and Time of Study: 2019-09-20 16:48:00        reviewed              CT Head Without Contrast  Narrative:    DATE OF EXAM:  9/20/2019 6:32 PM     PROCEDURE:   CT HEAD WO CONTRAST-     INDICATIONS:   syncope, fall     COMPARISON:  No Comparisons Available     TECHNIQUE:   Routine transaxial cuts were obtained through the head without the  administration of contrast. Automated exposure control and iterative  reconstruction methods were used.      FINDINGS:  No acute intracranial hemorrhage. No mass effect or midline shift. The  ventricles and cortical sulci are normally configured. Gray-white matter  differentiation is maintained. Basilar cisterns are patent. The  posterior fossa is without acute abnormality. Globes are intact and  symmetric. No retro-orbital abnormality. The paranasal sinuses are  without air fluid level. Mastoid air cells well aerated. Calvarium  intact. Small dural calcification falx cerebra measuring 4 mm related to  tiny meningioma.     Impression: No acute intracranial abnormality.      Electronically Signed By-Mahesh Carr On:9/20/2019 6:55 PM  This report was finalized on [id number] by  Mahesh Carr .  XR Tibia Fibula 2 View Left  Narrative: DATE OF EXAM:  9/20/2019 5:25 PM     PROCEDURE:  XR TIBIA FIBULA 2 VW LEFT-     INDICATIONS:  fall     COMPARISON:  No Comparisons Available     TECHNIQUE:   Two views of the  left tibia and fibula were obtained.     FINDINGS:  Mild degenerative spurring at the knee related to osteoarthritis. The  tibia and fibula are intact. No joint effusion at the knee.      Impression: Negative for fracture.     Electronically Signed By-Mahesh Carr On:9/20/2019 5:54 PM  This report was finalized on [id number] by  Mahesh Carr .  XR Chest 1 View  Narrative:    DATE OF EXAM:   9/20/2019 5:25 PM     PROCEDURE:   XR CHEST 1 VW-     INDICATIONS:   syncope     COMPARISON:  07/24/2017     TECHNIQUE:   [Portable chest radiograph]     FINDINGS:    The cardiomediastinal silhouette is within normal limits. The lungs are  clear. There is no pneumothorax, focal consolidation, or large pleural  effusion. Osseous structures grossly intact.      Impression: No acute process.      Electronically Signed By-Mahesh Carr On:9/20/2019 5:50 PM  This report was finalized on [id number] by  Mahesh Carr .      Assessment/Plan       Syncope  CRISSY  Diabetes Mellitus type 2   Hypertension  Hyperlipidemia  Obesity    Plan    Syncope volume depletion versus cardiogenic cause  -Patient noted with an episode of syncope and several near syncopal episodes which involve profound dizziness and are related to position changes. positive orthostatic changes are noted.  No carotid bruits appreciated on exam  -EKG: Sinus rhythm at 80 with a QTC of 423 ms  -Continue IV fluids  -CT of the head without contrast shows no acute intracranial abnormality  -Troponin less than 0.030, monitor serial  -Continue cardiac monitoring  -Check d-dimer  -Ordered cosyntropin stimulation test  -Monitor VS per protocol    CRISSY  -SCr: 1.50, BUN-28, this represents a change from normal values on 12/28/2018  -Hold lisinopril and metformin  -Avoid nephrotoxic medications  -Avoid IV contrast  -Continue IV fluids as above  -Check UA  -Monitor BMP    Diabetes Mellitus type 2 well-controlled  -Hold metformin  -Start SSI  -Monitor glucose before meals and at  bedtime    Hypertension currently with orthostatic changes  -Patient BP decreased from 141/60 to 98/49 with change of position from lying to standing, and patient experience profound symptoms.  -Current BP 1 one 1/67, HR: 77  -Lisinopril held as above  -Monitor while admitted    Hyperlipidemia  -Continue atorvastatin    Obesity, BMI three 8.17  -Encourage diet and lifestyle modification after resolution of acute symptoms    VTE prophylaxis: Lovenox    Disposition      I discussed the patients findings and my recommendations with patient and family.     Dougie Leigh MD  09/21/19  1:40 AM

## 2019-09-21 NOTE — PLAN OF CARE
Problem: Patient Care Overview  Goal: Plan of Care Review  Outcome: Ongoing (interventions implemented as appropriate)   09/21/19 0548 09/21/19 0857 09/21/19 1549   Coping/Psychosocial   Plan of Care Reviewed With --  patient --    Plan of Care Review   Progress improving --  --    OTHER   Outcome Summary --  --  Patient states that she is feeling better today but still feels dizzy when standing up. MD has placed a holter monitor on patient for 24 hours to monitor heart. Patient has had no complaints today outside of chonic backpain.

## 2019-09-21 NOTE — PLAN OF CARE
Problem: Fall Risk (Adult)  Intervention: Monitor/Assist with Self Care   09/21/19 0558   Activity   Activity Assistance Provided assistance, 1 person   Daily Care Interventions   Self-Care Promotion independence encouraged       Goal: Identify Related Risk Factors and Signs and Symptoms   09/21/19 0556   Fall Risk (Adult)   Related Risk Factors (Fall Risk) age-related changes;history of falls     Goal: Absence of Fall  Outcome: Ongoing (interventions implemented as appropriate)

## 2019-09-22 LAB
GLUCOSE BLDC GLUCOMTR-MCNC: 100 MG/DL (ref 70–105)
GLUCOSE BLDC GLUCOMTR-MCNC: 105 MG/DL (ref 70–105)
GLUCOSE BLDC GLUCOMTR-MCNC: 82 MG/DL (ref 70–105)
GLUCOSE BLDC GLUCOMTR-MCNC: 91 MG/DL (ref 70–105)
TROPONIN I SERPL-MCNC: <0.03 NG/ML (ref 0–0.03)

## 2019-09-22 PROCEDURE — 84484 ASSAY OF TROPONIN QUANT: CPT | Performed by: NURSE PRACTITIONER

## 2019-09-22 PROCEDURE — 96372 THER/PROPH/DIAG INJ SC/IM: CPT

## 2019-09-22 PROCEDURE — 25010000002 CYANOCOBALAMIN PER 1000 MCG: Performed by: HOSPITALIST

## 2019-09-22 PROCEDURE — 25010000002 ENOXAPARIN PER 10 MG: Performed by: INTERNAL MEDICINE

## 2019-09-22 PROCEDURE — 99204 OFFICE O/P NEW MOD 45 MIN: CPT | Performed by: NURSE PRACTITIONER

## 2019-09-22 PROCEDURE — G0378 HOSPITAL OBSERVATION PER HR: HCPCS

## 2019-09-22 PROCEDURE — 99226 PR SBSQ OBSERVATION CARE/DAY 35 MINUTES: CPT | Performed by: HOSPITALIST

## 2019-09-22 PROCEDURE — 96361 HYDRATE IV INFUSION ADD-ON: CPT

## 2019-09-22 PROCEDURE — 82962 GLUCOSE BLOOD TEST: CPT

## 2019-09-22 RX ORDER — SODIUM CHLORIDE 9 MG/ML
75 INJECTION, SOLUTION INTRAVENOUS CONTINUOUS
Status: DISCONTINUED | OUTPATIENT
Start: 2019-09-22 | End: 2019-09-23

## 2019-09-22 RX ORDER — MIDODRINE HYDROCHLORIDE 5 MG/1
5 TABLET ORAL 2 TIMES DAILY
Status: DISCONTINUED | OUTPATIENT
Start: 2019-09-22 | End: 2019-09-24 | Stop reason: HOSPADM

## 2019-09-22 RX ADMIN — GABAPENTIN 100 MG: 100 CAPSULE ORAL at 21:16

## 2019-09-22 RX ADMIN — OXYCODONE HYDROCHLORIDE 10 MG: 5 TABLET ORAL at 19:14

## 2019-09-22 RX ADMIN — FAMOTIDINE 40 MG: 20 TABLET ORAL at 09:31

## 2019-09-22 RX ADMIN — FENOFIBRATE 145 MG: 145 TABLET ORAL at 09:31

## 2019-09-22 RX ADMIN — GABAPENTIN 100 MG: 100 CAPSULE ORAL at 09:31

## 2019-09-22 RX ADMIN — MIDODRINE HYDROCHLORIDE 5 MG: 5 TABLET ORAL at 21:16

## 2019-09-22 RX ADMIN — Medication 10 ML: at 09:36

## 2019-09-22 RX ADMIN — Medication 10 ML: at 21:16

## 2019-09-22 RX ADMIN — CITALOPRAM HYDROBROMIDE 20 MG: 20 TABLET ORAL at 09:31

## 2019-09-22 RX ADMIN — SODIUM CHLORIDE 75 ML/HR: 900 INJECTION, SOLUTION INTRAVENOUS at 17:43

## 2019-09-22 RX ADMIN — CYANOCOBALAMIN 1000 MCG: 1000 INJECTION, SOLUTION INTRAMUSCULAR; SUBCUTANEOUS at 09:31

## 2019-09-22 RX ADMIN — OXYCODONE HYDROCHLORIDE 10 MG: 5 TABLET ORAL at 09:34

## 2019-09-22 RX ADMIN — ATORVASTATIN CALCIUM 20 MG: 20 TABLET, FILM COATED ORAL at 09:31

## 2019-09-22 RX ADMIN — ENOXAPARIN SODIUM 40 MG: 40 INJECTION SUBCUTANEOUS at 17:42

## 2019-09-22 RX ADMIN — TIZANIDINE 2 MG: 4 TABLET ORAL at 09:31

## 2019-09-22 RX ADMIN — TIZANIDINE 2 MG: 4 TABLET ORAL at 21:16

## 2019-09-22 NOTE — PLAN OF CARE
Problem: Patient Care Overview  Goal: Plan of Care Review  Outcome: Ongoing (interventions implemented as appropriate)   09/21/19 0548 09/21/19 1915 09/22/19 0409   Coping/Psychosocial   Plan of Care Reviewed With --  patient --    Plan of Care Review   Progress improving --  --    OTHER   Outcome Summary --  --  reqiurs assist with ambulation , holtr monitor in place , chronic back pain control with medication        Problem: Pain, Chronic (Adult)  Goal: Identify Related Risk Factors and Signs and Symptoms  Outcome: Ongoing (interventions implemented as appropriate)   09/21/19 0548   Pain, Chronic (Adult)   Related Risk Factors (Chronic Pain) disease process;procedures/treatments   Signs and Symptoms (Chronic Pain) fatigue/weakness;verbalization of pain descriptors     Goal: Acceptable Pain/Comfort Level and Functional Ability  Outcome: Ongoing (interventions implemented as appropriate)   09/21/19 0548   Pain, Chronic (Adult)   Acceptable Pain/Comfort Level and Functional Ability making progress toward outcome       Problem: Syncope (Adult)  Goal: Identify Related Risk Factors and Signs and Symptoms  Outcome: Ongoing (interventions implemented as appropriate)   09/21/19 0548   Syncope (Adult)   Related Risk Factors (Syncope) hypotension   Signs and Symptoms (Syncope) dizziness     Goal: Physical Safety/Health Maintenance  Outcome: Ongoing (interventions implemented as appropriate)   09/22/19 0409   Syncope (Adult)   Physical Safety/Health Maintenance making progress toward outcome     Goal: Optimal Emotional/Functional [place]  Outcome: Ongoing (interventions implemented as appropriate)   09/22/19 0409   Syncope (Adult)   Optimal Emotional/Functional [place] making progress toward outcome       Problem: Fall Risk (Adult)  Goal: Identify Related Risk Factors and Signs and Symptoms  Outcome: Ongoing (interventions implemented as appropriate)   09/21/19 0548   Fall Risk (Adult)   Related Risk Factors (Fall  Risk) age-related changes;history of falls   Signs and Symptoms (Fall Risk) presence of risk factors     Goal: Absence of Fall  Outcome: Ongoing (interventions implemented as appropriate)   09/21/19 3212   Fall Risk (Adult)   Absence of Fall making progress toward outcome

## 2019-09-22 NOTE — PROGRESS NOTES
Hospitalist Team      Patient Care Team:  Berlin Ramos MD as PCP - General (Family Medicine)  Berlin Ramos MD as PCP - Family Medicine        Chief Complaint: Dizziness with lightheadedness and syncopal episodes    History of present illness and Hospital course  HPI: Patient is a 64-year-old female with past medical history of uterine cancer, kidney stones, hyperlipidemia, diabetes mellitus type 2, hypertension and degenerative disc disease who presents to the ED with multiple episodes of dizziness, lightheadedness and syncopal episodes for the past 4 days.  Patient states she has been having symptoms intermittently with changes in position especially standing.  She notes that yesterday evening while walking to her bedroom she began feeling dizzy weak and had a syncopal episode.  She notes that typically when she begins feeling lightheaded she can just sit down and the symptoms will resolve within 10 to 15 minutes without further treatment.  No pain, prodrome other than dizziness or preceding aura reported.  She notes she was recently treated by her primary care physician for a cough and fever on an outpatient basis with cough suppressant.  She did not receive any antibiotics.  She reports some recent nausea and diarrhea but denies any vomiting, chest pain or shortness of breath.  In addition to her uterine cancer patient has a history of rapidly growing polyps with precancerous changes, and and is on a 3-month colonoscopy schedule with her next one scheduled in October.  She denies any bright red blood or dark tarry stools.  Her urine has been normal and she does not report any changes in appetite.  She has not noted any palpitations.  Patient did have a soft tissue injury to her anterior lower left leg with her last syncopal episode.        In the ED troponins were less than 0.030, creatinine: 1.50, BUN: 28, EGFR 35, WBCs: 9.10, hemoglobin: 3.4, hematocrit: 40.5.  EKG shows sinus rhythm at 80.  CT of  "the head without contrast shows no acute intracranial abnormality.  Chest x-ray shows no acute process and x-ray of the left tibia and fibula are negative for fracture.  Patient's neurologic exam is normal.  During the interview orthostatic vital signs were obtained by ER tech, patient had a decrease of greater than 40 mmHg systolic blood pressure with change from lying to standing position, and she noted profound symptoms with this change.  She was admitted to the hospital for further evaluation and management of her symptoms.     Past medical history is positive for history of depression/hypertension/hyperlipidemia    9/21/2019: Patient reports continuing to feel poorly.  She reports any change in position from laying to sitting or sitting to standing causes lightheadedness.  She denies any chest pain or shortness of breath, congestion or cough, GI or  complaints.    Subjective/Objective  Patient reports feeling about the same.  She denies chest pain or shortness of breath.  There were multiple family members at the bedside and all questions were answered.  I also spoke with Dr. Gudino.    Review of systems  12 point review of systems was reviewed and was negative except as above.    Vital Signs  Temp:  [97.4 °F (36.3 °C)-98.1 °F (36.7 °C)] 97.7 °F (36.5 °C)  Heart Rate:  [54-65] 60  Resp:  [12-20] 12  BP: (116-128)/(68-73) 116/68  Oxygen Therapy  SpO2: 98 %  Pulse Oximetry Type: Intermittent  Device (Oxygen Therapy): room air  Flowsheet Rows      First Filed Value   Admission Height  162.6 cm (64\") Documented at 09/20/2019 1634   Admission Weight  102 kg (225 lb 1.4 oz) Documented at 09/20/2019 1634        Intake & Output (last 3 days)       09/19 0701 - 09/20 0700 09/20 0701 - 09/21 0700 09/21 0701 - 09/22 0700 09/22 0701 - 09/23 0700    P.O.   240 240    I.V. (mL/kg)  985 (9.7)      Total Intake(mL/kg)  985 (9.7) 240 (2.3) 240 (2.3)    Urine (mL/kg/hr)  200 400 (0.2) 2200 (2)    Stool  0 0     Total Output  200 " 400 0960    Net  +785 -160 -1960            Stool Unmeasured Occurrence  0 x 0 x         Lines, Drains & Airways    Active LDAs     Name:   Placement date:   Placement time:   Site:   Days:    Peripheral IV 09/20/19 1719 Right Antecubital   09/20/19 1719    Antecubital   less than 1                Physical Exam:  Well-developed over nourished female sitting up in the bed awake and alert comfortable on room air no acute distress; sclera anicteric, mucous membranes moist; lungs clear to auscultation bilaterally; CV regular rate and rhythm; abdomen soft and nontender; extremities with no edema or calf tenderness; left anterior lower leg with ecchymosis and tenderness to palpation; palpable pedal pulses bilaterally.  No Purvis catheter.      Results Review:     I reviewed the patient's new clinical results.    Lab Results (last 24 hours)     Procedure Component Value Units Date/Time    POC Glucose Once [[id number]]  (Normal) Collected:  09/22/19 1629    Specimen:  Blood Updated:  09/22/19 1630     Glucose 91 mg/dL      Comment: Serial Number: [id number]:  [id number]       Troponin [[id number]]  (Normal) Collected:  09/22/19 1426    Specimen:  Blood Updated:  09/22/19 1520     Troponin I <0.030 ng/mL     Narrative:       Troponin I Reference Range:    0.00-0.03  Negative.  Repeat testing in 4-6 hours if clinically indicated.    0.04-0.29  Suspicious for myocardial injury. Serial measurements and clinical  correlation may be necessary to confirm or exclude diagnosis of acute  coronary syndrome.  Repeat testing in 4-6 hours if indicated.     >0.29 Consistent with myocardial injury.  Recommend clinical and laboratory correlation.     Results my be falsely decreased if patient taking Biotin.     POC Glucose Once [[id number]]  (Normal) Collected:  09/22/19 1110    Specimen:  Blood Updated:  09/22/19 1111     Glucose 100 mg/dL      Comment: Serial Number: [id number]:  [id number]       POC Glucose Once [[id number]]   (Normal) Collected:  09/22/19 0740    Specimen:  Blood Updated:  09/22/19 0741     Glucose 82 mg/dL      Comment: Serial Number: [id number]:  [id number]       POC Glucose Once [[id number]]  (Normal) Collected:  09/21/19 2005    Specimen:  Blood Updated:  09/21/19 2006     Glucose 95 mg/dL      Comment: Serial Number: [id number]:  [id number]             Imaging Results (last 24 hours)     ** No results found for the last 24 hours. **                                     Xrays, labs reviewed personally by physician.    ECG/EMG Results (most recent)     Procedure Component Value Units Date/Time    Transthoracic Echo Complete With Contrast if Necessary Per Protocol [[id number]] Resulted:  09/21/19 1556     Updated:  09/21/19 1600     Target HR (85%) 133 bpm      Max. Pred. HR (100%) 157 bpm      EF(MOD-bp) 71.0 %      LA dimension(2D) 4.2 cm      Echo EF Estimated 67 %     Narrative:       · Left ventricular systolic function is normal.  · Estimated EF = 67%.       ECG 12 Lead [[id number]] Collected:  09/20/19 1648     Updated:  09/22/19 1121    Narrative:       HEART RATE= 80  bpm  RR Interval= 748  ms  MO Interval= 164  ms  P Horizontal Axis= 10  deg  P Front Axis= 33  deg  QRSD Interval= 82  ms  QT Interval= 384  ms  QRS Axis= -20  deg  T Wave Axis= 45  deg  - NORMAL ECG -  Sinus rhythm  Electronically Signed By: Robin Brown ([place]) 22-Sep-2019 11:20:49  Date and Time of Study: 2019-09-20 16:48:00            Medication Review:   I have reviewed the patient's current medication list  Scheduled Meds:    atorvastatin 20 mg Oral Daily   citalopram 20 mg Oral Daily   cyanocobalamin 1,000 mcg Subcutaneous Daily   enoxaparin 40 mg Subcutaneous Q24H   famotidine 40 mg Oral Daily   fenofibrate 145 mg Oral Daily   gabapentin 100 mg Oral BID   midodrine 5 mg Oral BID   sodium chloride 10 mL Intravenous Q12H   tiZANidine 2 mg Oral BID     Continuous Infusions:    sodium chloride 100 mL/hr Last Rate: Stopped (09/21/19 0636)    sodium chloride 75 mL/hr      PRN Meds:.•  acetaminophen **OR** acetaminophen **OR** acetaminophen  •  docusate sodium  •  melatonin  •  nitroglycerin  •  ondansetron  •  oxyCODONE  •  [COMPLETED] Insert peripheral IV **AND** sodium chloride  •  sodium chloride        Assessment/Plan     Syncope  likely due to volume depletion on top of autonomic dysfunction due to diabetes  -Patient noted with an episode of syncope and several near syncopal episodes which involve profound dizziness and are related to position changes  -positive orthostatic changes are noted  -No carotid bruits appreciated on exam  -EKG: Sinus rhythm at 80 with a QTC of 423 ms  -Continuous IV fluids  -CT of the head without contrast shows no acute intracranial abnormality  -Serial troponins negative  -d-dimer normal  -cosyntropin stimulation test normal  -Continue cardiac monitoring  -Cardiology consulting plans for stress Myoview 9/23/2019    CRISSY likely secondary to prerenal azotemia from volume deficit  -SCr: 1.50, BUN-28, this represents a change from normal values on 12/28/2018  -Continue to hold lisinopril and metformin  -Avoid nephrotoxic medications  -Avoid IV contrast  -Continue IV fluids   -UA clear  -Monitor BMP     Diabetes Mellitus type 2 chronic and controlled  -Hold metformin  -Continue SSI  -Monitor glucose before meals and at bedtime  -Check A1c     Primary hypertension currently with orthostatic changes  -Patient BP decreased from 141/60 to 98/49 with change of position from lying to standing, and patient experience profound symptoms.  -Lisinopril held as above  -Monitor     Hyperlipidemia  -Continue atorvastatin     Obesity, BMI 39  -Encourage diet and lifestyle modification after resolution of acute symptoms     VTE prophylaxis: Lovenox    Plan for disposition: Pending clinical course    Marla Marrufo MD  09/22/19  5:40 PM

## 2019-09-22 NOTE — PROGRESS NOTES
Continued Stay Note  [facility]     Patient Name: Kiki Perdomo  MRN: [id number]  Today's Date: 9/22/2019    Admit Date: 9/20/2019    Discharge Plan     Row Name 09/22/19 1615       Plan    Plan  Anticipate routine home.                  Anjana Lynn RN

## 2019-09-22 NOTE — PLAN OF CARE
Problem: Patient Care Overview  Goal: Plan of Care Review  Outcome: Ongoing (interventions implemented as appropriate)   09/21/19 0548 09/22/19 0933 09/22/19 5004   Coping/Psychosocial   Plan of Care Reviewed With --  patient --    Plan of Care Review   Progress improving --  --    OTHER   Outcome Summary --  --  Patient will be having a myoview tomorrow and a cardio consult was called (Dr. Gudino). Has only asked for pain medication once and has been fine throughout the day.

## 2019-09-22 NOTE — CONSULTS
CARDIOLOGY CONSULT NOTE      Referring Provider: Dr. Marrufo    Reason for Consultation: Syncope, orthostatic hypotension    Attending: Marla Marrufo MD    Chief complaint    Syncope    Subjective .     History of present illness:  Kiki Perdomo is a 64 y.o. female who presents with reported syncope.     She has a  past medical history of uterine cancer, kidney stones, hyperlipidemia, diabetes mellitus type 2, hypertension and degenerative disc disease, chronic fatigue syndrome    She presented to the ED with multiple episodes of dizziness, lightheadedness and syncopal episodes for the past 4 days.  Patient states she has been having symptoms intermittently with changes in position especially standing.  She notes that while walking to her bedroom she began feeling dizzy weak and had a syncopal episode.  She notes that typically when she begins feeling lightheaded she can just sit down and the symptoms will resolve within 10 to 15 minutes without further treatment.      She denies chest pain or shortness of breath.  She denies palpitations or feelings of tachycardia    EKG shows sinus rhythm at 80.  CT of the head without contrast shows no acute intracranial abnormality.    Orthostatic vital signs were obtained by ER tech, patient had a decrease of greater than 40 mmHg systolic blood pressure with change from lying to standing position, and she noted profound symptoms with this change.       Reports she had a negative cardiac catheterization in 2005 at [facility] following complaints of chest pain    Review of Systems   Constitution: Negative for decreased appetite, diaphoresis and weakness.   HENT: Negative for congestion, hearing loss and nosebleeds.    Cardiovascular: Positive for near-syncope and syncope. Negative for chest pain, claudication, dyspnea on exertion, irregular heartbeat, leg swelling, orthopnea, palpitations and paroxysmal nocturnal dyspnea.   Respiratory: Negative for cough, shortness of  breath and sleep disturbances due to breathing.    Endocrine: Negative for polyuria.   Hematologic/Lymphatic: Does not bruise/bleed easily.   Skin: Negative for itching and rash.   Musculoskeletal: Negative for back pain, muscle weakness and myalgias.   Gastrointestinal: Negative for abdominal pain, change in bowel habit and nausea.   Genitourinary: Negative for dysuria, flank pain, frequency and hesitancy.   Neurological: Negative for dizziness and tremors.   Psychiatric/Behavioral: Negative for altered mental status. The patient does not have insomnia.        History  Past Medical History:   Diagnosis Date   • Chronic fatigue syndrome    • Diabetes (CMS/HCC)    • Hypertension    • Uterine cancer (CMS/HCC)        History reviewed. No pertinent surgical history.    Family History   Problem Relation Age of Onset   • Heart disease Mother    • Heart disease Father        Social History     Tobacco Use   • Smoking status: Never Smoker   • Smokeless tobacco: Never Used   Substance Use Topics   • Alcohol use: No     Frequency: Never   • Drug use: No        Medications Prior to Admission   Medication Sig Dispense Refill Last Dose   • citalopram (CeleXA) 20 MG tablet CITALOPRAM HYDROBROMIDE 20 MG TABS      • fenofibrate 160 MG tablet FENOFIBRATE 160 MG TABS      • gabapentin (NEURONTIN) 100 MG capsule Take 100 mg by mouth 2 (Two) Times a Day.      • gemfibrozil (LOPID) 600 MG tablet Take 600 mg by mouth 2 (Two) Times a Day Before Meals.      • lisinopril (PRINIVIL,ZESTRIL) 40 MG tablet LISINOPRIL 40 MG TABS      • metFORMIN (GLUCOPHAGE) 500 MG tablet Take 500 mg by mouth 2 (Two) Times a Day With Meals.      • oxyCODONE-acetaminophen (PERCOCET)  MG per tablet Take 1 tablet by mouth 3 (Three) Times a Day.      • phentermine 30 MG capsule Take 15 mg by mouth Every Morning.      • simvastatin (ZOCOR) 40 MG tablet Take 40 mg by mouth Every Night.      • temazepam (RESTORIL) 30 MG capsule TEMAZEPAM 30 MG CAPS      •  "tiZANidine (ZANAFLEX) 4 MG tablet Take 2 mg by mouth 2 (Two) Times a Day.            Indomethacin and Iodine    Scheduled Meds:    atorvastatin 20 mg Oral Daily   citalopram 20 mg Oral Daily   cyanocobalamin 1,000 mcg Subcutaneous Daily   enoxaparin 40 mg Subcutaneous Q24H   famotidine 40 mg Oral Daily   fenofibrate 145 mg Oral Daily   gabapentin 100 mg Oral BID   sodium chloride 10 mL Intravenous Q12H   tiZANidine 2 mg Oral BID     Continuous Infusions:    sodium chloride 100 mL/hr Last Rate: Stopped (09/21/19 0636)     PRN Meds:.•  acetaminophen **OR** acetaminophen **OR** acetaminophen  •  docusate sodium  •  melatonin  •  nitroglycerin  •  ondansetron  •  oxyCODONE  •  [COMPLETED] Insert peripheral IV **AND** sodium chloride  •  sodium chloride    Objective     VITAL SIGNS  Vitals:    09/21/19 1147 09/21/19 2007 09/22/19 0322 09/22/19 1108   BP: 104/67 119/71 128/73 116/68   BP Location: Left arm   Right arm   Patient Position: Standing   Lying   Pulse:  65 54 60   Resp:  20 16 12   Temp:  98.1 °F (36.7 °C) 97.4 °F (36.3 °C) 97.7 °F (36.5 °C)   TempSrc:    Oral   SpO2:  98% 98% 98%   Weight:   104 kg (229 lb 15 oz)    Height:           Flowsheet Rows      First Filed Value   Admission Height  162.6 cm (64\") Documented at 09/20/2019 1634   Admission Weight  102 kg (225 lb 1.4 oz) Documented at 09/20/2019 1634           TELEMETRY: SR    Physical Exam:  Physical Exam   Constitutional: She is oriented to person, place, and time. She appears well-developed and well-nourished. No distress.   HENT:   Head: Normocephalic and atraumatic.   Eyes: Pupils are equal, round, and reactive to light.   Neck: Normal range of motion. Neck supple. No JVD present.   Cardiovascular: Normal rate, regular rhythm, S1 normal, S2 normal, normal heart sounds and intact distal pulses.   No murmur heard.  Pulmonary/Chest: Effort normal and breath sounds normal.   Abdominal: Soft. Normal appearance. She exhibits no distension. There is no " tenderness.   Musculoskeletal: Normal range of motion. She exhibits no edema.   Neurological: She is alert and oriented to person, place, and time.   Skin: Skin is warm and dry.   Psychiatric: She has a normal mood and affect.        Results Review:   I reviewed the patient's new clinical results.  CBC    Results from last 7 days   Lab Units 09/21/19  0110 09/20/19  1708   WBC 10*3/mm3 9.30 9.10   HEMOGLOBIN g/dL 12.8 13.4   PLATELETS 10*3/mm3 278 288     BMP   Results from last 7 days   Lab Units 09/21/19  0110 09/20/19  1708   SODIUM mmol/L 140 139   POTASSIUM mmol/L 4.3 4.3   CHLORIDE mmol/L 104 102   CO2 mmol/L 25.0 25.0   BUN mg/dL 28* 28*   CREATININE mg/dL 1.50* 1.50*   GLUCOSE mg/dL 95 108*   MAGNESIUM mg/dL 1.8  --    PHOSPHORUS mg/dL 3.9  --      Cr Clearance Estimated Creatinine Clearance: 45.1 mL/min (A) (by C-G formula based on SCr of 1.5 mg/dL (H)).  Coag   Results from last 7 days   Lab Units 09/21/19  0110 09/20/19  1708   INR  1.01 1.00   APTT seconds  --  18.3*     HbA1C No results found for: HGBA1C  Blood Glucose   Glucose   Date/Time Value Ref Range Status   09/22/2019 1110 100 70 - 105 mg/dL Final     Comment:     Serial Number: [id number]:  [id number]   09/22/2019 0740 82 70 - 105 mg/dL Final     Comment:     Serial Number: [id number]:  [id number]   09/21/2019 2005 95 70 - 105 mg/dL Final     Comment:     Serial Number: [id number]:  [id number]   09/21/2019 1644 94 70 - 105 mg/dL Final     Comment:     Serial Number: [id number]:  [id number]   09/21/2019 1145 97 70 - 105 mg/dL Final     Comment:     Serial Number: [id number]:  [id number]   09/21/2019 0744 86 70 - 105 mg/dL Final     Comment:     Serial Number: [id number]:  [id number]   09/20/2019 2344 122 (H) 70 - 105 mg/dL Final     Comment:     Serial Number: [id number]:  [id number]     Infection   Results from last 7 days   Lab Units 09/21/19  0110   PROCALCITONIN ng/mL <0.05     CMP   Results from last 7  days   Lab Units 09/21/19  0110 09/20/19  1708   SODIUM mmol/L 140 139   POTASSIUM mmol/L 4.3 4.3   CHLORIDE mmol/L 104 102   CO2 mmol/L 25.0 25.0   BUN mg/dL 28* 28*   CREATININE mg/dL 1.50* 1.50*   GLUCOSE mg/dL 95 108*   ALBUMIN g/dL 3.40*  --    BILIRUBIN mg/dL 0.2*  --    ALK PHOS U/L 95*  --    AST (SGOT) U/L 19  --    ALT (SGPT) U/L 14  --      ABG      UA    Results from last 7 days   Lab Units 09/21/19  1611   NITRITE UA  Negative     SRI  No results found for: POCMETH, POCAMPHET, POCBARBITUR, POCBENZO, POCCOCAINE, POCOPIATES, POCOXYCODO, POCPHENCYC, POCPROPOXY, POCTHC, POCTRICYC  Lysis Labs   Results from last 7 days   Lab Units 09/21/19  0110 09/20/19  1708   INR  1.01 1.00   APTT seconds  --  18.3*   HEMOGLOBIN g/dL 12.8 13.4   PLATELETS 10*3/mm3 278 288   CREATININE mg/dL 1.50* 1.50*     Radiology(recent) Xr Tibia Fibula 2 View Left    Result Date: 9/20/2019  Negative for fracture.  Electronically Signed By-Mahesh Mouser On:9/20/2019 5:54 PM This report was finalized on [id number] by  Mahesh Carr .    Ct Head Without Contrast    Result Date: 9/20/2019  No acute intracranial abnormality.  Electronically Signed By-[employer] Mouser On:9/20/2019 6:55 PM This report was finalized on [id number] by  Mahesh Carr .    Xr Chest 1 View    Result Date: 9/20/2019  No acute process.  Electronically Signed By-Mahesh Mouser On:9/20/2019 5:50 PM This report was finalized on [id number] by  Mahesh Carr .      Results from last 7 days   Lab Units 09/21/19  0607 09/21/19  0110   CK TOTAL U/L  --  68   TROPONIN I ng/mL <0.030  --        Imaging Results (last 24 hours)     ** No results found for the last 24 hours. **          EKG          I personally viewed and interpreted the patient's EKG/Telemetry data:    ECHOCARDIOGRAM:  Echocardiogram Findings     Left Ventricle Left ventricular systolic function is normal. Calculated EF = 71.0%. Estimated EF = 67%. Normal left ventricular cavity size and wall thickness noted.  All left ventricular wall segments contract normally. Left ventricular diastolic function is normal. There is no evidence of a left ventricular thrombus present.   Right Ventricle Normal right ventricular cavity size and systolic function noted.   Left Atrium Normal left atrial size noted.   Right Atrium Normal right atrial size noted. The inferior vena cava is normally sized. Normal IVC inspiratory collapse of greater than 50% noted.   Aortic Valve The aortic valve is structurally normal. No aortic valve regurgitation is present. No aortic valve stenosis is present.   Mitral Valve No mitral valve regurgitation is present. No significant mitral valve stenosis is present.   Tricuspid Valve The tricuspid valve is grossly normal. No evidence of tricuspid valve stenosis is present. No tricuspid valve regurgitation is present.   Pulmonic Valve The pulmonic valve is grossly normal in structure. There is no significant pulmonic valve stenosis present. There is no pulmonic valve regurgitation present.   Greater Vessels No dilation of the aortic root is present.   Pericardium There is no evidence of pericardial effusion.         STRESS MYOVIEW:    CARDIAC CATHETERIZATION:    OTHER:         Assessment/Plan     Syncope  Orthostatic Hypotension  Hx Hypertension  Uterine Cancer  DM      EF normal by echo with no significant valvular flow abnormalities  Holter pending, no observed arrhythmias  Significant orthostasis documented  Stop Lisinopril  Tizanadine can also precipitate hypotension and neurontin dizziness  Consider Compression stocking  Stress myoview in am to rule out ischemia    I discussed the patients findings and my recommendations with patient and     KANDY Baxter  09/22/19  1:52 PM

## 2019-09-23 ENCOUNTER — APPOINTMENT (OUTPATIENT)
Dept: NUCLEAR MEDICINE | Facility: HOSPITAL | Age: 64
End: 2019-09-23

## 2019-09-23 LAB
ANION GAP SERPL CALCULATED.3IONS-SCNC: 13.9 MMOL/L (ref 5–15)
BASOPHILS # BLD AUTO: 0.1 10*3/MM3 (ref 0–0.2)
BASOPHILS NFR BLD AUTO: 0.8 % (ref 0–1.5)
BH CV NUCLEAR PRIOR STUDY: 3
BH CV STRESS BP STAGE 1: NORMAL
BH CV STRESS BP STAGE 2: NORMAL
BH CV STRESS BP STAGE 3: NORMAL
BH CV STRESS BP STAGE 4: NORMAL
BH CV STRESS COMMENTS STAGE 1: NORMAL
BH CV STRESS DOSE REGADENOSON STAGE 1: 0.4
BH CV STRESS DURATION MIN STAGE 1: 1
BH CV STRESS DURATION MIN STAGE 2: 1
BH CV STRESS DURATION MIN STAGE 3: 1
BH CV STRESS DURATION MIN STAGE 4: 1
BH CV STRESS DURATION SEC STAGE 2: 0
BH CV STRESS DURATION SEC STAGE 4: 3
BH CV STRESS HR STAGE 1: 64
BH CV STRESS HR STAGE 2: 90
BH CV STRESS HR STAGE 3: 95
BH CV STRESS HR STAGE 4: 85
BH CV STRESS PROTOCOL 1: NORMAL
BH CV STRESS RECOVERY BP: NORMAL MMHG
BH CV STRESS RECOVERY HR: 69 BPM
BH CV STRESS STAGE 1: 1
BH CV STRESS STAGE 2: 2
BH CV STRESS STAGE 3: 3
BH CV STRESS STAGE 4: 4
BUN BLD-MCNC: 29 MG/DL (ref 8–20)
BUN/CREAT SERPL: 32.2 (ref 5.4–26.2)
CALCIUM SPEC-SCNC: 8.5 MG/DL (ref 8.9–10.3)
CHLORIDE SERPL-SCNC: 102 MMOL/L (ref 101–111)
CO2 SERPL-SCNC: 26 MMOL/L (ref 22–32)
CREAT BLD-MCNC: 0.9 MG/DL (ref 0.4–1)
DEPRECATED RDW RBC AUTO: 41.6 FL (ref 37–54)
EOSINOPHIL # BLD AUTO: 0.2 10*3/MM3 (ref 0–0.4)
EOSINOPHIL NFR BLD AUTO: 2.6 % (ref 0.3–6.2)
ERYTHROCYTE [DISTWIDTH] IN BLOOD BY AUTOMATED COUNT: 12.6 % (ref 12.3–15.4)
GFR SERPL CREATININE-BSD FRML MDRD: 63 ML/MIN/1.73
GLUCOSE BLD-MCNC: 103 MG/DL (ref 65–99)
GLUCOSE BLDC GLUCOMTR-MCNC: 95 MG/DL (ref 70–105)
GLUCOSE BLDC GLUCOMTR-MCNC: 96 MG/DL (ref 70–105)
GLUCOSE BLDC GLUCOMTR-MCNC: 99 MG/DL (ref 70–105)
HBA1C MFR BLD: 5.5 % (ref 3.5–5.6)
HCT VFR BLD AUTO: 36.3 % (ref 34–46.6)
HGB BLD-MCNC: 11.9 G/DL (ref 12–15.9)
LV EF NUC BP: 66 %
LYMPHOCYTES # BLD AUTO: 3 10*3/MM3 (ref 0.7–3.1)
LYMPHOCYTES NFR BLD AUTO: 41.3 % (ref 19.6–45.3)
MAXIMAL PREDICTED HEART RATE: 157 BPM
MCH RBC QN AUTO: 30.5 PG (ref 26.6–33)
MCHC RBC AUTO-ENTMCNC: 32.8 G/DL (ref 31.5–35.7)
MCV RBC AUTO: 93.1 FL (ref 79–97)
MONOCYTES # BLD AUTO: 0.5 10*3/MM3 (ref 0.1–0.9)
MONOCYTES NFR BLD AUTO: 6.9 % (ref 5–12)
NEUTROPHILS # BLD AUTO: 3.6 10*3/MM3 (ref 1.7–7)
NEUTROPHILS NFR BLD AUTO: 48.4 % (ref 42.7–76)
NRBC BLD AUTO-RTO: 0.1 /100 WBC (ref 0–0.2)
PERCENT MAX PREDICTED HR: 61.15 %
PLATELET # BLD AUTO: 232 10*3/MM3 (ref 140–450)
PMV BLD AUTO: 8 FL (ref 6–12)
POTASSIUM BLD-SCNC: 4.9 MMOL/L (ref 3.6–5.1)
RBC # BLD AUTO: 3.9 10*6/MM3 (ref 3.77–5.28)
SODIUM BLD-SCNC: 137 MMOL/L (ref 136–144)
STRESS BASELINE BP: NORMAL MMHG
STRESS BASELINE HR: 64 BPM
STRESS PERCENT HR: 72 %
STRESS POST PEAK BP: NORMAL MMHG
STRESS POST PEAK HR: 96 BPM
STRESS TARGET HR: 133 BPM
WBC NRBC COR # BLD: 7.4 10*3/MM3 (ref 3.4–10.8)

## 2019-09-23 PROCEDURE — 25010000002 CYANOCOBALAMIN PER 1000 MCG: Performed by: HOSPITALIST

## 2019-09-23 PROCEDURE — 82962 GLUCOSE BLOOD TEST: CPT

## 2019-09-23 PROCEDURE — G0378 HOSPITAL OBSERVATION PER HR: HCPCS

## 2019-09-23 PROCEDURE — 25010000002 REGADENOSON 0.4 MG/5ML SOLUTION: Performed by: INTERNAL MEDICINE

## 2019-09-23 PROCEDURE — 93017 CV STRESS TEST TRACING ONLY: CPT

## 2019-09-23 PROCEDURE — 78452 HT MUSCLE IMAGE SPECT MULT: CPT

## 2019-09-23 PROCEDURE — 80048 BASIC METABOLIC PNL TOTAL CA: CPT | Performed by: HOSPITALIST

## 2019-09-23 PROCEDURE — 78452 HT MUSCLE IMAGE SPECT MULT: CPT | Performed by: INTERNAL MEDICINE

## 2019-09-23 PROCEDURE — A9500 TC99M SESTAMIBI: HCPCS | Performed by: INTERNAL MEDICINE

## 2019-09-23 PROCEDURE — 96361 HYDRATE IV INFUSION ADD-ON: CPT

## 2019-09-23 PROCEDURE — 85025 COMPLETE CBC W/AUTO DIFF WBC: CPT | Performed by: HOSPITALIST

## 2019-09-23 PROCEDURE — 0 TECHNETIUM SESTAMIBI: Performed by: INTERNAL MEDICINE

## 2019-09-23 PROCEDURE — 25010000002 ENOXAPARIN PER 10 MG: Performed by: INTERNAL MEDICINE

## 2019-09-23 PROCEDURE — 96372 THER/PROPH/DIAG INJ SC/IM: CPT

## 2019-09-23 PROCEDURE — 93018 CV STRESS TEST I&R ONLY: CPT | Performed by: INTERNAL MEDICINE

## 2019-09-23 PROCEDURE — 93016 CV STRESS TEST SUPVJ ONLY: CPT | Performed by: NURSE PRACTITIONER

## 2019-09-23 PROCEDURE — 99225 PR SBSQ OBSERVATION CARE/DAY 25 MINUTES: CPT | Performed by: INTERNAL MEDICINE

## 2019-09-23 PROCEDURE — 99213 OFFICE O/P EST LOW 20 MIN: CPT | Performed by: NURSE PRACTITIONER

## 2019-09-23 RX ADMIN — CITALOPRAM HYDROBROMIDE 20 MG: 20 TABLET ORAL at 09:29

## 2019-09-23 RX ADMIN — TECHNETIUM TC 99M SESTAMIBI 1 DOSE: 1 INJECTION INTRAVENOUS at 09:05

## 2019-09-23 RX ADMIN — OXYCODONE HYDROCHLORIDE 10 MG: 5 TABLET ORAL at 06:55

## 2019-09-23 RX ADMIN — OXYCODONE HYDROCHLORIDE 10 MG: 5 TABLET ORAL at 21:58

## 2019-09-23 RX ADMIN — OXYCODONE HYDROCHLORIDE 10 MG: 5 TABLET ORAL at 15:30

## 2019-09-23 RX ADMIN — Medication 10 ML: at 22:00

## 2019-09-23 RX ADMIN — ENOXAPARIN SODIUM 40 MG: 40 INJECTION SUBCUTANEOUS at 15:31

## 2019-09-23 RX ADMIN — TECHNETIUM TC 99M SESTAMIBI 1 DOSE: 1 INJECTION INTRAVENOUS at 11:15

## 2019-09-23 RX ADMIN — ATORVASTATIN CALCIUM 20 MG: 20 TABLET, FILM COATED ORAL at 09:29

## 2019-09-23 RX ADMIN — FAMOTIDINE 40 MG: 20 TABLET ORAL at 09:29

## 2019-09-23 RX ADMIN — MIDODRINE HYDROCHLORIDE 5 MG: 5 TABLET ORAL at 21:58

## 2019-09-23 RX ADMIN — SODIUM CHLORIDE 75 ML/HR: 900 INJECTION, SOLUTION INTRAVENOUS at 03:22

## 2019-09-23 RX ADMIN — Medication 10 ML: at 09:32

## 2019-09-23 RX ADMIN — REGADENOSON 0.4 MG: 0.08 INJECTION, SOLUTION INTRAVENOUS at 11:15

## 2019-09-23 RX ADMIN — MIDODRINE HYDROCHLORIDE 5 MG: 5 TABLET ORAL at 09:28

## 2019-09-23 RX ADMIN — GABAPENTIN 100 MG: 100 CAPSULE ORAL at 09:28

## 2019-09-23 RX ADMIN — FENOFIBRATE 145 MG: 145 TABLET ORAL at 09:28

## 2019-09-23 RX ADMIN — TIZANIDINE 2 MG: 4 TABLET ORAL at 09:28

## 2019-09-23 RX ADMIN — CYANOCOBALAMIN 1000 MCG: 1000 INJECTION, SOLUTION INTRAMUSCULAR; SUBCUTANEOUS at 09:29

## 2019-09-23 NOTE — PLAN OF CARE
Problem: Patient Care Overview  Goal: Plan of Care Review  Outcome: Ongoing (interventions implemented as appropriate)   09/21/19 0548 09/22/19 1829 09/22/19 1914   Coping/Psychosocial   Plan of Care Reviewed With --  --  patient   Plan of Care Review   Progress improving --  --    OTHER   Outcome Summary --  Patient will be having a myoview tomorrow and a cardio consult was called (Dr. Gudino). Has only asked for pain medication once and has been fine throughout the day. --        Problem: Pain, Chronic (Adult)  Goal: Identify Related Risk Factors and Signs and Symptoms  Outcome: Ongoing (interventions implemented as appropriate)   09/21/19 0548   Pain, Chronic (Adult)   Related Risk Factors (Chronic Pain) disease process;procedures/treatments   Signs and Symptoms (Chronic Pain) fatigue/weakness;verbalization of pain descriptors     Goal: Acceptable Pain/Comfort Level and Functional Ability  Outcome: Ongoing (interventions implemented as appropriate)   09/21/19 0548   Pain, Chronic (Adult)   Acceptable Pain/Comfort Level and Functional Ability making progress toward outcome       Problem: Syncope (Adult)  Goal: Identify Related Risk Factors and Signs and Symptoms  Outcome: Ongoing (interventions implemented as appropriate)   09/21/19 0548   Syncope (Adult)   Related Risk Factors (Syncope) hypotension   Signs and Symptoms (Syncope) dizziness     Goal: Physical Safety/Health Maintenance  Outcome: Ongoing (interventions implemented as appropriate)   09/22/19 0409   Syncope (Adult)   Physical Safety/Health Maintenance making progress toward outcome     Goal: Optimal Emotional/Functional [place]  Outcome: Ongoing (interventions implemented as appropriate)   09/22/19 0409   Syncope (Adult)   Optimal Emotional/Functional [place] making progress toward outcome       Problem: Fall Risk (Adult)  Goal: Identify Related Risk Factors and Signs and Symptoms  Outcome: Ongoing (interventions implemented as appropriate)    09/21/19 0548   Fall Risk (Adult)   Related Risk Factors (Fall Risk) age-related changes;history of falls   Signs and Symptoms (Fall Risk) presence of risk factors      09/21/19 0548   Fall Risk (Adult)   Related Risk Factors (Fall Risk) age-related changes;history of falls   Signs and Symptoms (Fall Risk) presence of risk factors     Goal: Absence of Fall  Outcome: Ongoing (interventions implemented as appropriate)

## 2019-09-23 NOTE — PLAN OF CARE
Problem: Patient Care Overview  Goal: Plan of Care Review  Outcome: Ongoing (interventions implemented as appropriate)    Goal: Individualization and Mutuality  Outcome: Ongoing (interventions implemented as appropriate)    Goal: Discharge Needs Assessment  Outcome: Ongoing (interventions implemented as appropriate)    Goal: Interprofessional Rounds/Family Conf  Outcome: Ongoing (interventions implemented as appropriate)      Problem: Pain, Chronic (Adult)  Goal: Identify Related Risk Factors and Signs and Symptoms  Outcome: Ongoing (interventions implemented as appropriate)    Goal: Acceptable Pain/Comfort Level and Functional Ability  Outcome: Ongoing (interventions implemented as appropriate)      Problem: Syncope (Adult)  Goal: Identify Related Risk Factors and Signs and Symptoms  Outcome: Ongoing (interventions implemented as appropriate)    Goal: Physical Safety/Health Maintenance  Outcome: Ongoing (interventions implemented as appropriate)    Goal: Optimal Emotional/Functional [place]  Outcome: Ongoing (interventions implemented as appropriate)      Problem: Fall Risk (Adult)  Goal: Identify Related Risk Factors and Signs and Symptoms  Outcome: Ongoing (interventions implemented as appropriate)    Goal: Absence of Fall  Outcome: Ongoing (interventions implemented as appropriate)

## 2019-09-23 NOTE — PROGRESS NOTES
CARDIOLOGY FOLLOW-UP PROGRESS NOTE      Reason for follow-up:    Syncope  Orthostatic Hypotension     Attending: Jorge L Stone DO Subjective .     No recurrent syncope     Review of Systems   Constitution: Negative for decreased appetite, diaphoresis and weakness.   HENT: Negative for congestion, hearing loss and nosebleeds.    Cardiovascular: Negative for chest pain, claudication, dyspnea on exertion, irregular heartbeat, leg swelling, near-syncope, orthopnea, palpitations, paroxysmal nocturnal dyspnea and syncope.   Respiratory: Negative for cough, shortness of breath and sleep disturbances due to breathing.    Endocrine: Negative for polyuria.   Hematologic/Lymphatic: Does not bruise/bleed easily.   Skin: Negative for itching and rash.   Musculoskeletal: Negative for back pain, muscle weakness and myalgias.   Gastrointestinal: Negative for abdominal pain, change in bowel habit and nausea.   Genitourinary: Negative for dysuria, flank pain, frequency and hesitancy.   Neurological: Negative for dizziness and tremors.   Psychiatric/Behavioral: Negative for altered mental status. The patient does not have insomnia.        Allergies: Indomethacin and Iodine    Scheduled Meds:  atorvastatin 20 mg Oral Daily   citalopram 20 mg Oral Daily   cyanocobalamin 1,000 mcg Subcutaneous Daily   enoxaparin 40 mg Subcutaneous Q24H   famotidine 40 mg Oral Daily   fenofibrate 145 mg Oral Daily   gabapentin 100 mg Oral BID   midodrine 5 mg Oral BID   sodium chloride 10 mL Intravenous Q12H   tiZANidine 2 mg Oral BID       Continuous Infusions:  sodium chloride 100 mL/hr Last Rate: Stopped (09/21/19 0636)   sodium chloride 75 mL/hr Last Rate: 75 mL/hr (09/23/19 0322)       PRN Meds:.•  acetaminophen **OR** acetaminophen **OR** acetaminophen  •  docusate sodium  •  melatonin  •  nitroglycerin  •  ondansetron  •  oxyCODONE  •  [COMPLETED] Insert peripheral IV **AND** sodium chloride  •  sodium chloride    Objective     VITAL  "SIGNS  Patient Vitals for the past 24 hrs:   BP Temp Temp src Pulse Resp SpO2 Weight   09/23/19 0341 125/71 97.3 °F (36.3 °C) Oral 54 16 98 % 104 kg (228 lb 2.8 oz)   09/22/19 2101 124/73 97.7 °F (36.5 °C) Oral 65 18 96 % --        Flowsheet Rows      First Filed Value   Admission Height  162.6 cm (64\") Documented at 09/20/2019 1634   Admission Weight  102 kg (225 lb 1.4 oz) Documented at 09/20/2019 1634            Intake/Output Summary (Last 24 hours) at 9/23/2019 1123  Last data filed at 9/23/2019 0822  Gross per 24 hour   Intake 1165 ml   Output 2000 ml   Net -835 ml        TELEMETRY:     SR    Physical Exam:  Physical Exam   Constitutional: She is oriented to person, place, and time. She appears well-developed and well-nourished. No distress.   HENT:   Head: Normocephalic and atraumatic.   Eyes: Pupils are equal, round, and reactive to light.   Neck: Normal range of motion. Neck supple. No JVD present.   Cardiovascular: Normal rate, regular rhythm, S1 normal, S2 normal, normal heart sounds and intact distal pulses.   No murmur heard.  Pulmonary/Chest: Effort normal and breath sounds normal.   Abdominal: Soft. Normal appearance. She exhibits no distension. There is no tenderness.   Musculoskeletal: Normal range of motion. She exhibits no edema.   Neurological: She is alert and oriented to person, place, and time.   Skin: Skin is warm and dry.   Psychiatric: She has a normal mood and affect.          Results Review:   I reviewed the patient's new clinical results.    CBC    Results from last 7 days   Lab Units 09/23/19  0426 09/21/19  0110 09/20/19  1708   WBC 10*3/mm3 7.40 9.30 9.10   HEMOGLOBIN g/dL 11.9* 12.8 13.4   PLATELETS 10*3/mm3 232 278 288     BMP   Results from last 7 days   Lab Units 09/23/19  0415 09/21/19  0110 09/20/19  1708   SODIUM mmol/L 137 140 139   POTASSIUM mmol/L 4.9 4.3 4.3   CHLORIDE mmol/L 102 104 102   CO2 mmol/L 26.0 25.0 25.0   BUN mg/dL 29* 28* 28*   CREATININE mg/dL 0.90 1.50* 1.50* "   GLUCOSE mg/dL 103* 95 108*   MAGNESIUM mg/dL  --  1.8  --    PHOSPHORUS mg/dL  --  3.9  --      Cr Clearance Estimated Creatinine Clearance: 75.1 mL/min (by C-G formula based on SCr of 0.9 mg/dL).  Coag   Results from last 7 days   Lab Units 09/21/19  0110 09/20/19  1708   INR  1.01 1.00   APTT seconds  --  18.3*     HbA1C   Lab Results   Component Value Date    HGBA1C 5.5 09/21/2019     Blood Glucose   Glucose   Date/Time Value Ref Range Status   09/23/2019 0727 95 70 - 105 mg/dL Final     Comment:     Serial Number: [id number]:  [id number]   09/22/2019 2046 105 70 - 105 mg/dL Final     Comment:     Serial Number: [id number]:  [id number]   09/22/2019 1629 91 70 - 105 mg/dL Final     Comment:     Serial Number: [id number]:  [id number]   09/22/2019 1110 100 70 - 105 mg/dL Final     Comment:     Serial Number: [id number]:  [id number]   09/22/2019 0740 82 70 - 105 mg/dL Final     Comment:     Serial Number: [id number]:  [id number]   09/21/2019 2005 95 70 - 105 mg/dL Final     Comment:     Serial Number: [id number]:  [id number]   09/21/2019 1644 94 70 - 105 mg/dL Final     Comment:     Serial Number: [id number]:  [id number]   09/21/2019 1145 97 70 - 105 mg/dL Final     Comment:     Serial Number: [id number]:  [id number]     Infection   Results from last 7 days   Lab Units 09/21/19  0110   PROCALCITONIN ng/mL <0.05     CMP   Results from last 7 days   Lab Units 09/23/19  0415 09/21/19  0110 09/20/19  1708   SODIUM mmol/L 137 140 139   POTASSIUM mmol/L 4.9 4.3 4.3   CHLORIDE mmol/L 102 104 102   CO2 mmol/L 26.0 25.0 25.0   BUN mg/dL 29* 28* 28*   CREATININE mg/dL 0.90 1.50* 1.50*   GLUCOSE mg/dL 103* 95 108*   ALBUMIN g/dL  --  3.40*  --    BILIRUBIN mg/dL  --  0.2*  --    ALK PHOS U/L  --  95*  --    AST (SGOT) U/L  --  19  --    ALT (SGPT) U/L  --  14  --      ABG      UA    Results from last 7 days   Lab Units 09/21/19  1611   NITRITE UA  Negative     SRI  No results found  for: POCMETH, POCAMPHET, POCBARBITUR, POCBENZO, POCCOCAINE, POCOPIATES, POCOXYCODO, POCPHENCYC, POCPROPOXY, POCTHC, POCTRICYC  Lysis Labs   Results from last 7 days   Lab Units 09/23/19  0426 09/23/19  0415 09/21/19  0110 09/20/19  1708   INR   --   --  1.01 1.00   APTT seconds  --   --   --  18.3*   HEMOGLOBIN g/dL 11.9*  --  12.8 13.4   PLATELETS 10*3/mm3 232  --  278 288   CREATININE mg/dL  --  0.90 1.50* 1.50*     Radiology(recent) No radiology results for the last day    Imaging Results (last 24 hours)     ** No results found for the last 24 hours. **          Results from last 7 days   Lab Units 09/22/19  1426  09/21/19  0110   CK TOTAL U/L  --   --  68   TROPONIN I ng/mL <0.030   < >  --     < > = values in this interval not displayed.       EKG             I personally viewed and interpreted the patient's EKG/Telemetry data:        ECHOCARDIOGRAM:     STRESS MYOVIEW:    CARDIAC CATHETERIZATION:    OTHER:         Assessment/Plan     Syncope  Orthostatic Hypotension  Hx Hypertension  Uterine Cancer  DM        EF normal by echo with no significant valvular flow abnormalities  Holter pending, no observed arrhythmias  Significant orthostasis documented  Stop Lisinopril  Tizanadine can also precipitate hypotension and neurontin dizziness  Consider Compression stocking  Stress myoview completed/results pending      Dr. Gudino to review echocardiogram  Additional recommendations per Dr. Gudino    I discussed the patients findings and my recommendations with patient.    Meagan Martin, APRN  09/23/19  11:23 AM

## 2019-09-23 NOTE — PROGRESS NOTES
[facility], [place], operated by [facility] Hospitalist Team      Patient Care Team:  Berlin Ramos MD as PCP - General (Family Medicine)  Berlin Ramos MD as PCP - Family Medicine    History of present illness and Hospital course  HPI: Patient is a 64-year-old female with past medical history of uterine cancer, kidney stones, hyperlipidemia, diabetes mellitus type 2, hypertension and degenerative disc disease who presents to the ED with multiple episodes of dizziness, lightheadedness and syncopal episodes for the past 4 days.  Patient states she has been having symptoms intermittently with changes in position especially standing.  She notes that yesterday evening while walking to her bedroom she began feeling dizzy weak and had a syncopal episode.  She notes that typically when she begins feeling lightheaded she can just sit down and the symptoms will resolve within 10 to 15 minutes without further treatment.  No pain, prodrome other than dizziness or preceding aura reported.  She notes she was recently treated by her primary care physician for a cough and fever on an outpatient basis with cough suppressant.  She did not receive any antibiotics.  She reports some recent nausea and diarrhea but denies any vomiting, chest pain or shortness of breath.  In addition to her uterine cancer patient has a history of rapidly growing polyps with precancerous changes, and and is on a 3-month colonoscopy schedule with her next one scheduled in October.  She denies any bright red blood or dark tarry stools.  Her urine has been normal and she does not report any changes in appetite.  She has not noted any palpitations.  Patient did have a soft tissue injury to her anterior lower left leg with her last syncopal episode.        In the ED troponins were less than 0.030, creatinine: 1.50, BUN: 28, EGFR 35, WBCs: 9.10, hemoglobin: 3.4, hematocrit: 40.5.  EKG shows sinus rhythm at 80.  CT of the head without contrast shows no acute intracranial  "abnormality.  Chest x-ray shows no acute process and x-ray of the left tibia and fibula are negative for fracture.  Patient's neurologic exam is normal.  During the interview orthostatic vital signs were obtained by ER tech, patient had a decrease of greater than 40 mmHg systolic blood pressure with change from lying to standing position, and she noted profound symptoms with this change.  She was admitted to the hospital for further evaluation and management of her symptoms.    Chief Complaint:  F/u syncope, dizziness    Subjective: Patient still complaining of significant dizziness worse with standing. Denies any chest pain, palpitations or sob.        Objective:    Review of Systems   Respiratory: Negative for shortness of breath and wheezing.    Cardiovascular: Negative for chest pain and palpitations.   Gastrointestinal: Negative for nausea and vomiting.   Neurological: Positive for dizziness.         Vital Signs  Temp:  [97.3 °F (36.3 °C)-98 °F (36.7 °C)] 98 °F (36.7 °C)  Heart Rate:  [54-72] 72  Resp:  [16-18] 17  BP: (124-152)/(71-77) 152/77  Oxygen Therapy  SpO2: 99 %  Pulse Oximetry Type: Intermittent  Device (Oxygen Therapy): room air  Flowsheet Rows      First Filed Value   Admission Height  162.6 cm (64\") Documented at 09/20/2019 1634   Admission Weight  102 kg (225 lb 1.4 oz) Documented at 09/20/2019 1634        Intake & Output (last 3 days)       09/20 0701 - 09/21 0700 09/21 0701 - 09/22 0700 09/22 0701 - 09/23 0700 09/23 0701 - 09/24 0700    P.O.  240 240 360    I.V. (mL/kg) 985 (9.7)  925 (9)     Total Intake(mL/kg) 985 (9.7) 240 (2.3) 1165 (11.3) 360 (3.5)    Urine (mL/kg/hr) 200 400 (0.2) 2600 (1.1) 200 (0.2)    Stool 0 0 0     Total Output  200    Net +785 -160 -1435 +160            Stool Unmeasured Occurrence 0 x 0 x          Lines, Drains & Airways    Active LDAs     Name:   Placement date:   Placement time:   Site:   Days:    Peripheral IV 09/20/19 1719 Right Antecubital   09/20/19 "    1719    Antecubital   3                Physical Exam:    General: NAD, resting in bed  Card: S1, S2, no murmurs  Resp: CTA b/l, no r/r/w  GI: soft, nt, nd, +bs  Skin: warm, dry, intact, no rashes  Extremities: LLE ecchymosis, +2 pedal pulses b/l   Neuro: CN II-XII grossly intact, no focal deficits  Psych: appropriate mood and affect        Results Review:      Results from last 7 days   Lab Units 09/23/19  0426 09/21/19  0110 09/20/19  1708   WBC 10*3/mm3 7.40 9.30 9.10   HEMOGLOBIN g/dL 11.9* 12.8 13.4   HEMATOCRIT % 36.3 38.2 40.5   PLATELETS 10*3/mm3 232 278 288     Results from last 7 days   Lab Units 09/23/19  0415 09/21/19  0110 09/20/19  1708   SODIUM mmol/L 137 140 139   POTASSIUM mmol/L 4.9 4.3 4.3   CHLORIDE mmol/L 102 104 102   CO2 mmol/L 26.0 25.0 25.0   BUN mg/dL 29* 28* 28*   CREATININE mg/dL 0.90 1.50* 1.50*   CALCIUM mg/dL 8.5* 9.1 9.3   BILIRUBIN mg/dL  --  0.2*  --    ALK PHOS U/L  --  95*  --    ALT (SGPT) U/L  --  14  --    AST (SGOT) U/L  --  19  --    GLUCOSE mg/dL 103* 95 108*       Results from last 7 days   Lab Units 09/21/19  0110   MAGNESIUM mg/dL 1.8                                Imaging Results (last 24 hours)     ** No results found for the last 24 hours. **          I have personally reviewed the patient's new imaging and lab results.          ECG/EMG Results (most recent)     Procedure Component Value Units Date/Time    Transthoracic Echo Complete With Contrast if Necessary Per Protocol [[id number]] Resulted:  09/21/19 1556     Updated:  09/21/19 1600     Target HR (85%) 133 bpm      Max. Pred. HR (100%) 157 bpm      EF(MOD-bp) 71.0 %      LA dimension(2D) 4.2 cm      Echo EF Estimated 67 %     Narrative:       · Left ventricular systolic function is normal.  · Estimated EF = 67%.       ECG 12 Lead [[id number]] Collected:  09/20/19 1648     Updated:  09/22/19 1121    Narrative:       HEART RATE= 80  bpm  RR Interval= 748  ms  NE Interval= 164  ms  P Horizontal Axis= 10  deg  P Front  Axis= 33  deg  QRSD Interval= 82  ms  QT Interval= 384  ms  QRS Axis= -20  deg  T Wave Axis= 45  deg  - NORMAL ECG -  Sinus rhythm  Electronically Signed By: Robin Brown ([place]) 22-Sep-2019 11:20:49  Date and Time of Study: 2019-09-20 16:48:00          Results for orders placed during the hospital encounter of 09/20/19   Transthoracic Echo Complete With Contrast if Necessary Per Protocol    Narrative · Left ventricular systolic function is normal.  · Estimated EF = 67%.              Medication Review:     Scheduled Meds:  atorvastatin 20 mg Oral Daily   citalopram 20 mg Oral Daily   cyanocobalamin 1,000 mcg Subcutaneous Daily   enoxaparin 40 mg Subcutaneous Q24H   famotidine 40 mg Oral Daily   fenofibrate 145 mg Oral Daily   midodrine 5 mg Oral BID   sodium chloride 10 mL Intravenous Q12H     Continuous Infusions:   PRN Meds:.•  acetaminophen **OR** acetaminophen **OR** acetaminophen  •  docusate sodium  •  melatonin  •  nitroglycerin  •  ondansetron  •  oxyCODONE  •  [COMPLETED] Insert peripheral IV **AND** sodium chloride  •  sodium chloride      I have reviewed the patient's current medication list    Assessment/Plan        Syncope  likely due to volume depletion on top of autonomic dysfunction due to diabetes  -Patient noted with an episode of syncope and several near syncopal episodes which involve profound dizziness and are related to position changes  -positive orthostatic changes are noted  -No carotid bruits appreciated on exam  -EKG: Sinus rhythm at 80 with a QTC of 423 ms  -CT of the head without contrast shows no acute intracranial abnormality  -Serial troponins negative  -d-dimer normal  -cosyntropin stimulation test normal  -Continue cardiac monitoring  -cardiology consulted  - stress test reportedly negative  - will dc tizandine and gabapentin as both of these can cause dizziness as well as hypotension  - consult neurology  - consider CTA head and neck but will defer to neurology      CRISSY likely  secondary to prerenal azotemia from volume deficit  - SCr: 1.50, BUN-28, this represents a change from normal values on 12/28/2018  - Continue to hold lisinopril and metformin  - Avoid nephrotoxic medications  - Continue IV fluids   - UA clear  - Monitor BMP     Diabetes Mellitus type 2 chronic and controlled  - Hold metformin  - Continue SSI  - Monitor glucose before meals and at bedtime  - Hgb A1c 5.5    Primary hypertension currently with orthostatic changes  - Patient BP decreased from 141/60 to 98/49 with change of position from lying to standing, and patient experience profound symptoms.  - Lisinopril held as above  - dc tizanidine      Hyperlipidemia  - Continue atorvastatin     Obesity, BMI 39  - Encourage diet and lifestyle modification after resolution of acute symptoms     VTE prophylaxis  - Lovenox         Plan for disposition: home hopefully tomorrow     Jorge L Stone DO  09/23/19  5:40 PM

## 2019-09-24 ENCOUNTER — HOSPITAL ENCOUNTER (INPATIENT)
Dept: CARDIOLOGY | Facility: HOSPITAL | Age: 64
Discharge: HOME | End: 2019-09-24

## 2019-09-24 VITALS
HEART RATE: 64 BPM | WEIGHT: 227.29 LBS | DIASTOLIC BLOOD PRESSURE: 80 MMHG | SYSTOLIC BLOOD PRESSURE: 149 MMHG | OXYGEN SATURATION: 100 % | TEMPERATURE: 97.8 F | BODY MASS INDEX: 38.8 KG/M2 | RESPIRATION RATE: 14 BRPM | HEIGHT: 64 IN

## 2019-09-24 LAB
ANION GAP SERPL CALCULATED.3IONS-SCNC: 12.3 MMOL/L (ref 5–15)
BH CV XLRA MEAS LEFT CCA RATIO VEL: 86.2 CM/SEC
BH CV XLRA MEAS LEFT DIST CCA EDV: 23.1 CM/SEC
BH CV XLRA MEAS LEFT DIST CCA PSV: 79.2 CM/SEC
BH CV XLRA MEAS LEFT DIST ICA EDV: -35 CM/SEC
BH CV XLRA MEAS LEFT DIST ICA PSV: -94.6 CM/SEC
BH CV XLRA MEAS LEFT ICA RATIO VEL: -94.6 CM/SEC
BH CV XLRA MEAS LEFT ICA/CCA RATIO: -1.1
BH CV XLRA MEAS LEFT PROX CCA EDV: 23.2 CM/SEC
BH CV XLRA MEAS LEFT PROX CCA PSV: 86.2 CM/SEC
BH CV XLRA MEAS LEFT PROX ECA EDV: -6.3 CM/SEC
BH CV XLRA MEAS LEFT PROX ECA PSV: -68 CM/SEC
BH CV XLRA MEAS LEFT PROX ICA EDV: -31.5 CM/SEC
BH CV XLRA MEAS LEFT PROX ICA PSV: -87.6 CM/SEC
BH CV XLRA MEAS LEFT PROX SCLA EDV: 1.1 CM/SEC
BH CV XLRA MEAS LEFT PROX SCLA PSV: 99.9 CM/SEC
BH CV XLRA MEAS LEFT VERTEBRAL A EDV: 20.1 CM/SEC
BH CV XLRA MEAS LEFT VERTEBRAL A PSV: 63.9 CM/SEC
BH CV XLRA MEAS RIGHT CCA RATIO VEL: 82.6 CM/SEC
BH CV XLRA MEAS RIGHT DIST CCA EDV: 21.1 CM/SEC
BH CV XLRA MEAS RIGHT DIST CCA PSV: 76.4 CM/SEC
BH CV XLRA MEAS RIGHT DIST ICA EDV: -21.9 CM/SEC
BH CV XLRA MEAS RIGHT DIST ICA PSV: -71.3 CM/SEC
BH CV XLRA MEAS RIGHT ICA RATIO VEL: -95.1 CM/SEC
BH CV XLRA MEAS RIGHT ICA/CCA RATIO: -1.2
BH CV XLRA MEAS RIGHT PROX CCA EDV: 14.9 CM/SEC
BH CV XLRA MEAS RIGHT PROX CCA PSV: 82.6 CM/SEC
BH CV XLRA MEAS RIGHT PROX ECA EDV: -13.7 CM/SEC
BH CV XLRA MEAS RIGHT PROX ECA PSV: -80.1 CM/SEC
BH CV XLRA MEAS RIGHT PROX ICA EDV: -29.2 CM/SEC
BH CV XLRA MEAS RIGHT PROX ICA PSV: -95.1 CM/SEC
BH CV XLRA MEAS RIGHT PROX SCLA EDV: -1.9 CM/SEC
BH CV XLRA MEAS RIGHT PROX SCLA PSV: -116 CM/SEC
BH CV XLRA MEAS RIGHT VERTEBRAL A EDV: 19.4 CM/SEC
BH CV XLRA MEAS RIGHT VERTEBRAL A PSV: 79.4 CM/SEC
BUN BLD-MCNC: 26 MG/DL (ref 8–20)
BUN/CREAT SERPL: 23.6 (ref 5.4–26.2)
CALCIUM SPEC-SCNC: 8.5 MG/DL (ref 8.9–10.3)
CHLORIDE SERPL-SCNC: 103 MMOL/L (ref 101–111)
CO2 SERPL-SCNC: 27 MMOL/L (ref 22–32)
CREAT BLD-MCNC: 1.1 MG/DL (ref 0.4–1)
GFR SERPL CREATININE-BSD FRML MDRD: 50 ML/MIN/1.73
GLUCOSE BLD-MCNC: 105 MG/DL (ref 65–99)
GLUCOSE BLDC GLUCOMTR-MCNC: 102 MG/DL (ref 70–105)
GLUCOSE BLDC GLUCOMTR-MCNC: 102 MG/DL (ref 70–105)
POTASSIUM BLD-SCNC: 4.3 MMOL/L (ref 3.6–5.1)
SODIUM BLD-SCNC: 138 MMOL/L (ref 136–144)

## 2019-09-24 PROCEDURE — 93880 EXTRACRANIAL BILAT STUDY: CPT

## 2019-09-24 PROCEDURE — G0378 HOSPITAL OBSERVATION PER HR: HCPCS

## 2019-09-24 PROCEDURE — 99217 PR OBSERVATION CARE DISCHARGE MANAGEMENT: CPT | Performed by: INTERNAL MEDICINE

## 2019-09-24 PROCEDURE — 80048 BASIC METABOLIC PNL TOTAL CA: CPT | Performed by: INTERNAL MEDICINE

## 2019-09-24 PROCEDURE — 99213 OFFICE O/P EST LOW 20 MIN: CPT | Performed by: INTERNAL MEDICINE

## 2019-09-24 PROCEDURE — 96361 HYDRATE IV INFUSION ADD-ON: CPT

## 2019-09-24 PROCEDURE — 82962 GLUCOSE BLOOD TEST: CPT

## 2019-09-24 RX ORDER — MIDODRINE HYDROCHLORIDE 5 MG/1
5 TABLET ORAL 2 TIMES DAILY
Qty: 60 TABLET | Refills: 0 | Status: SHIPPED | OUTPATIENT
Start: 2019-09-24 | End: 2019-10-24

## 2019-09-24 RX ORDER — LANOLIN ALCOHOL/MO/W.PET/CERES
1000 CREAM (GRAM) TOPICAL DAILY
Status: DISCONTINUED | OUTPATIENT
Start: 2019-09-24 | End: 2019-09-24 | Stop reason: HOSPADM

## 2019-09-24 RX ADMIN — OXYCODONE HYDROCHLORIDE 10 MG: 5 TABLET ORAL at 17:29

## 2019-09-24 RX ADMIN — Medication 10 ML: at 08:03

## 2019-09-24 RX ADMIN — CITALOPRAM HYDROBROMIDE 20 MG: 20 TABLET ORAL at 08:01

## 2019-09-24 RX ADMIN — CYANOCOBALAMIN TAB 1000 MCG 1000 MCG: 1000 TAB at 12:15

## 2019-09-24 RX ADMIN — ATORVASTATIN CALCIUM 20 MG: 20 TABLET, FILM COATED ORAL at 08:02

## 2019-09-24 RX ADMIN — FENOFIBRATE 145 MG: 145 TABLET ORAL at 08:01

## 2019-09-24 RX ADMIN — FAMOTIDINE 40 MG: 20 TABLET ORAL at 08:02

## 2019-09-24 RX ADMIN — OXYCODONE HYDROCHLORIDE 10 MG: 5 TABLET ORAL at 08:01

## 2019-09-24 RX ADMIN — MIDODRINE HYDROCHLORIDE 5 MG: 5 TABLET ORAL at 08:01

## 2019-09-24 NOTE — PROGRESS NOTES
LOS: 1 day   Admiting Physician- No att. providers found    Reason For Followup:    Near-syncope  Orthostatic hypotension  Diabetes mellitus  Hypertension    Subjective     No chest pain    Objective     Hemodynamics are stable.    Review of Systems:   Review of Systems   Constitution: Negative for chills and fever.   HENT: Negative for ear discharge and nosebleeds.    Eyes: Negative for discharge and redness.   Cardiovascular: Negative for chest pain, orthopnea, palpitations, paroxysmal nocturnal dyspnea and syncope.   Respiratory: Negative for cough, shortness of breath and wheezing.    Endocrine: Negative for heat intolerance.   Skin: Negative for rash.   Musculoskeletal: Negative for arthritis and myalgias.   Gastrointestinal: Negative for abdominal pain, melena, nausea and vomiting.   Genitourinary: Negative for dysuria and hematuria.   Neurological: Negative for dizziness, light-headedness, numbness and tremors.   Psychiatric/Behavioral: Negative for depression. The patient is not nervous/anxious.          Vital Signs  Vitals:    09/23/19 1505 09/23/19 1928 09/24/19 0333 09/24/19 1121   BP: 152/77 137/76 136/73 149/80   BP Location: Left arm Left arm Left arm Right arm   Patient Position: Sitting Lying Lying Lying   Pulse: 72 70 55 64   Resp: 17 16 16 14   Temp: 98 °F (36.7 °C) 98.2 °F (36.8 °C) 97.9 °F (36.6 °C) 97.8 °F (36.6 °C)   TempSrc: Oral Oral Oral Oral   SpO2: 99% 94% 97% 100%   Weight:   103 kg (227 lb 4.7 oz)    Height:         Wt Readings from Last 1 Encounters:   09/24/19 103 kg (227 lb 4.7 oz)     No intake or output data in the 24 hours ending 09/24/19 1945  Physical Exam:  Physical Exam   Constitutional: She is oriented to person, place, and time. She appears well-developed and well-nourished.   HENT:   Head: Normocephalic and atraumatic.   Eyes: No scleral icterus.   Neck: No thyromegaly present.   Cardiovascular: Normal rate, regular rhythm and normal heart sounds. Exam reveals no gallop  and no friction rub.   No murmur heard.  Pulmonary/Chest: Effort normal and breath sounds normal. No respiratory distress. She has no wheezes. She has no rales.   Abdominal: There is no tenderness.   Musculoskeletal: She exhibits no edema.   Lymphadenopathy:     She has no cervical adenopathy.   Neurological: She is alert and oriented to person, place, and time.   Skin: No rash noted. No erythema.   Psychiatric: She has a normal mood and affect.       Results Review:   Lab Results (last 24 hours)     Procedure Component Value Units Date/Time    POC Glucose Once [[id number]]  (Normal) Collected:  09/24/19 1123    Specimen:  Blood Updated:  09/24/19 1126     Glucose 102 mg/dL      Comment: Serial Number: [id number]:  [id number]       POC Glucose Once [[id number]]  (Normal) Collected:  09/24/19 0730    Specimen:  Blood Updated:  09/24/19 0732     Glucose 102 mg/dL      Comment: Serial Number: [id number]:  [id number]       Basic Metabolic Panel [[id number]]  (Abnormal) Collected:  09/24/19 0356    Specimen:  Blood Updated:  09/24/19 0441     Glucose 105 mg/dL      BUN 26 mg/dL      Creatinine 1.10 mg/dL      Sodium 138 mmol/L      Potassium 4.3 mmol/L      Chloride 103 mmol/L      CO2 27.0 mmol/L      Calcium 8.5 mg/dL      eGFR Non African Amer 50 mL/min/1.73      BUN/Creatinine Ratio 23.6     Anion Gap 12.3 mmol/L     POC Glucose Once [[id number]]  (Normal) Collected:  09/23/19 1927    Specimen:  Blood Updated:  09/23/19 1929     Glucose 96 mg/dL      Comment: Serial Number: [id number]:  [id number]           Imaging Results (last 72 hours)     ** No results found for the last 72 hours. **        ECG/EMG Results (most recent)     Procedure Component Value Units Date/Time    Transthoracic Echo Complete With Contrast if Necessary Per Protocol [[id number]] Resulted:  09/21/19 1556     Updated:  09/21/19 1600     Target HR (85%) 133 bpm      Max. Pred. HR (100%) 157 bpm      EF(MOD-bp) 71.0 %      LA  dimension(2D) 4.2 cm      Echo EF Estimated 67 %     Narrative:       · Left ventricular systolic function is normal.  · Estimated EF = 67%.       ECG 12 Lead [[id number]] Collected:  09/20/19 1648     Updated:  09/22/19 1121    Narrative:       HEART RATE= 80  bpm  RR Interval= 748  ms  W[place] Interval= 164  ms  P Horizontal Axis= 10  deg  P Front Axis= 33  deg  QRSD Interval= 82  ms  QT Interval= 384  ms  QRS Axis= -20  deg  T Wave Axis= 45  deg  - NORMAL ECG -  Sinus rhythm  Electronically Signed By: Robin Brown ([place]) 22-Sep-2019 11:20:49  Date and Time of Study: 2019-09-20 16:48:00        CBC    Results from last 7 days   Lab Units 09/23/19  0426 09/21/19  0110 09/20/19  1708   WBC 10*3/mm3 7.40 9.30 9.10   HEMOGLOBIN g/dL 11.9* 12.8 13.4   PLATELETS 10*3/mm3 232 278 288     BMP   Results from last 7 days   Lab Units 09/24/19  0356 09/23/19  0415 09/21/19  0110 09/20/19  1708   SODIUM mmol/L 138 137 140 139   POTASSIUM mmol/L 4.3 4.9 4.3 4.3   CHLORIDE mmol/L 103 102 104 102   CO2 mmol/L 27.0 26.0 25.0 25.0   BUN mg/dL 26* 29* 28* 28*   CREATININE mg/dL 1.10* 0.90 1.50* 1.50*   GLUCOSE mg/dL 105* 103* 95 108*   MAGNESIUM mg/dL  --   --  1.8  --    PHOSPHORUS mg/dL  --   --  3.9  --      CMP   Results from last 7 days   Lab Units 09/24/19  0356 09/23/19  0415 09/21/19  0110 09/20/19  1708   SODIUM mmol/L 138 137 140 139   POTASSIUM mmol/L 4.3 4.9 4.3 4.3   CHLORIDE mmol/L 103 102 104 102   CO2 mmol/L 27.0 26.0 25.0 25.0   BUN mg/dL 26* 29* 28* 28*   CREATININE mg/dL 1.10* 0.90 1.50* 1.50*   GLUCOSE mg/dL 105* 103* 95 108*   ALBUMIN g/dL  --   --  3.40*  --    BILIRUBIN mg/dL  --   --  0.2*  --    ALK PHOS U/L  --   --  95*  --    AST (SGOT) U/L  --   --  19  --    ALT (SGPT) U/L  --   --  14  --      Cardiac Studies:  Echo-   Results for orders placed during the hospital encounter of 09/20/19   Transthoracic Echo Complete With Contrast if Necessary Per Protocol    Narrative · Left ventricular systolic function is  normal.  · Estimated EF = 67%.        Stress Myoview-  Cath-      Medication Review:   Scheduled Meds:  atorvastatin 20 mg Oral Daily   citalopram 20 mg Oral Daily   enoxaparin 40 mg Subcutaneous Q24H   famotidine 40 mg Oral Daily   fenofibrate 145 mg Oral Daily   midodrine 5 mg Oral BID   sodium chloride 10 mL Intravenous Q12H   vitamin B-12 1,000 mcg Oral Daily     Continuous Infusions:   PRN Meds:.•  acetaminophen **OR** acetaminophen **OR** acetaminophen  •  docusate sodium  •  melatonin  •  nitroglycerin  •  ondansetron  •  oxyCODONE  •  [COMPLETED] Insert peripheral IV **AND** sodium chloride  •  sodium chloride      Assessment/Plan   Patient Active Problem List   Diagnosis   • Syncope     Plan:    At this stage, I will continue current treatment.  Patient is advised to take precaution for fall.  Elastic stocking and hydration is recommended.  I discussed in detail that patient has probably underlying autonomic dysfunction due to diabetic neuropathy.  If patient is interested patient would need comprehensive autonomic function testing as a outpatient possibly in [place] or [facility].  At this stage continue current treatment.  Patient can be discharged and follow with me.    Frederick Gudino MD  09/24/19  7:45 PM

## 2019-09-24 NOTE — CONSULTS
Primary Care Provider: Berlin Ramos MD     Consult requested by: Dr. Stone    Reason for Consultation: Neurological evaluation, dizziness    History taken from: patient chart family    Chief complaint: Dizziness, syncope       SUBJECTIVE:    History of present illness: HPI: Patient is a 64-year-old female with past medical history of uterine cancer, kidney stones, hyperlipidemia, diabetes mellitus type 2, hypertension and degenerative disc disease who presents to the ED with multiple episodes of dizziness, lightheadedness and syncopal episodes for the past 4 days.  Patient states she has been having symptoms intermittently with changes in position especially standing.  She notes that yesterday evening while walking to her bedroom she began feeling dizzy weak and had a syncopal episode.  She notes that typically when she begins feeling lightheaded she can just sit down and the symptoms will resolve within 10 to 15 minutes without further treatment.  No pain, prodrome other than dizziness or preceding aura reported.  She notes she was recently treated by her primary care physician for a cough and fever on an outpatient basis with cough suppressant.  She did not receive any antibiotics.  She reports some recent nausea and diarrhea but denies any vomiting, chest pain or shortness of breath.  In addition to her uterine cancer patient has a history of rapidly growing polyps with precancerous changes, and and is on a 3-month colonoscopy schedule with her next one scheduled in October.  She denies any bright red blood or dark tarry stools.  Her urine has been normal and she does not report any changes in appetite.  She has not noted any palpitations.  Patient did have a soft tissue injury to her anterior lower left leg with her last syncopal episode.    Portions of the above HPI have been copied from previous encounters and edited as appropriate.    Patient states she had multiple spells where she gets very  lightheaded after standing.  She had 2 syncopal episodes with these spells, she also feels otherwise weakness after standing.  This started on Wednesday night after she recently recovered from an upper respiratory viral infection.  Both syncopal events happened after patient went from sitting to standing position.  She feels very lightheaded, diaphoretic, and has tunnel vision with the spells.  She denies any history of seizures, no vomiting, no loss of bowel bladder control, no unexplained falls or events.  She does have a mild headache right now but no other significant complaints.  She denies any room spinning spells, hearing loss or tinnitus, no syncopal events with position of head/arms, no difficulty with speech, no focal deficits.     Review of Systems   Constitutional: Negative.    Cardiovascular: Negative.    Neurological: Positive for light-headedness. Negative for dizziness, tremors, seizures, syncope, facial asymmetry, speech difficulty, weakness, numbness and headaches.          PATIENT HISTORY:  Past Medical History:   Diagnosis Date   • Chronic fatigue syndrome    • Diabetes (CMS/HCC)    • Hypertension    • Uterine cancer (CMS/HCC)    , History reviewed. No pertinent surgical history., Family History   Problem Relation Age of Onset   • Heart disease Mother    • Heart disease Father    , Social History     Tobacco Use   • Smoking status: Never Smoker   • Smokeless tobacco: Never Used   Substance Use Topics   • Alcohol use: No     Frequency: Never   • Drug use: No   , Medications Prior to Admission   Medication Sig Dispense Refill Last Dose   • citalopram (CeleXA) 20 MG tablet CITALOPRAM HYDROBROMIDE 20 MG TABS      • fenofibrate 160 MG tablet FENOFIBRATE 160 MG TABS      • gabapentin (NEURONTIN) 100 MG capsule Take 100 mg by mouth 2 (Two) Times a Day.      • gemfibrozil (LOPID) 600 MG tablet Take 600 mg by mouth 2 (Two) Times a Day Before Meals.      • lisinopril (PRINIVIL,ZESTRIL) 40 MG tablet  LISINOPRIL 40 MG TABS      • metFORMIN (GLUCOPHAGE) 500 MG tablet Take 500 mg by mouth 2 (Two) Times a Day With Meals.      • oxyCODONE-acetaminophen (PERCOCET)  MG per tablet Take 1 tablet by mouth 3 (Three) Times a Day.      • phentermine 30 MG capsule Take 15 mg by mouth Every Morning.      • simvastatin (ZOCOR) 40 MG tablet Take 40 mg by mouth Every Night.      • temazepam (RESTORIL) 30 MG capsule TEMAZEPAM 30 MG CAPS      • tiZANidine (ZANAFLEX) 4 MG tablet Take 2 mg by mouth 2 (Two) Times a Day.      , Scheduled Meds:    atorvastatin 20 mg Oral Daily   citalopram 20 mg Oral Daily   enoxaparin 40 mg Subcutaneous Q24H   famotidine 40 mg Oral Daily   fenofibrate 145 mg Oral Daily   midodrine 5 mg Oral BID   sodium chloride 10 mL Intravenous Q12H   vitamin B-12 1,000 mcg Oral Daily   , Continuous Infusions:   , PRN Meds:  •  acetaminophen **OR** acetaminophen **OR** acetaminophen  •  docusate sodium  •  melatonin  •  nitroglycerin  •  ondansetron  •  oxyCODONE  •  [COMPLETED] Insert peripheral IV **AND** sodium chloride  •  sodium chloride, Allergies:  Indomethacin and Iodine    ________________________________________________________        OBJECTIVE:    PHYSICAL EXAM:    Constitutional: The patient is in no apparent distress, bright awake and alert. There is no shortness of breath.     HEENT: There is no tenderness over the temporal arteries bilaterally. Normocephalic, atraumatic.     Chest: Breathing unlabored    Cardiac: Regular rate and rhythm.     Extremities:  No clubbing, cyanosis or edema.    NEUROLOGICAL:    Cognition:   Fully oriented.  Fund of knowledge excellent.  Concentration and attention normal.   Language normal with normal comprehension, fluent speech, intact repetition and naming.   Short and long term memory appears intact    Cranial nerves;    II - pupils bilaterally equal reacting to light,  No new Visual field deficits;  Fundoscopic exam- Not able to be done, non-dilated  exam  III,IV,VI: EOMI with no diplopia  V: Normal facial sensations  VII: No facial asymmetry,  VIII: No New hearing abnormality  IX, X, XI: normal gag and shoulder shrug;  XII: tongue is in the midline.    Sensory:  Intact to light touch in all extremities.     Motor: Strength 5/5 bilaterally upper and lower extremities, 5-/5 left lower extremity patient states is chronic.  No involuntary movements present. Normal tone and bulk.  Deep tendon reflexes: 2/4 and symmetrical in biceps, brachioradialis, triceps, bilateral 2/4 knees and ankles. Both plantars are flexor.    Cerebellar: Finger to nose and mirror movements normal bilaterally.    Gait and balance: Deferred.    Physical exam performed by NEEL Hutton  ________________________________________________________   RESULTS REVIEW:    VITAL SIGNS:   Temp:  [97.9 °F (36.6 °C)-98.2 °F (36.8 °C)] 97.9 °F (36.6 °C)  Heart Rate:  [55-72] 55  Resp:  [16-17] 16  BP: (136-152)/(73-77) 136/73     LABS:  WBC   Date Value Ref Range Status   09/23/2019 7.40 3.40 - 10.80 10*3/mm3 Final     RBC   Date Value Ref Range Status   09/23/2019 3.90 3.77 - 5.28 10*6/mm3 Final     Hemoglobin   Date Value Ref Range Status   09/23/2019 11.9 (L) 12.0 - 15.9 g/dL Final     Hematocrit   Date Value Ref Range Status   09/23/2019 36.3 34.0 - 46.6 % Final     MCV   Date Value Ref Range Status   09/23/2019 93.1 79.0 - 97.0 fL Final     MCH   Date Value Ref Range Status   09/23/2019 30.5 26.6 - 33.0 pg Final     MCHC   Date Value Ref Range Status   09/23/2019 32.8 31.5 - 35.7 g/dL Final     RDW   Date Value Ref Range Status   09/23/2019 12.6 12.3 - 15.4 % Final     RDW-SD   Date Value Ref Range Status   09/23/2019 41.6 37.0 - 54.0 fl Final     MPV   Date Value Ref Range Status   09/23/2019 8.0 6.0 - 12.0 fL Final     Platelets   Date Value Ref Range Status   09/23/2019 232 140 - 450 10*3/mm3 Final     Neutrophil %   Date Value Ref Range Status   09/23/2019 48.4 42.7 - 76.0 % Final      Lymphocyte %   Date Value Ref Range Status   09/23/2019 41.3 19.6 - 45.3 % Final     Monocyte %   Date Value Ref Range Status   09/23/2019 6.9 5.0 - 12.0 % Final     Eosinophil %   Date Value Ref Range Status   09/23/2019 2.6 0.3 - 6.2 % Final     Basophil %   Date Value Ref Range Status   09/23/2019 0.8 0.0 - 1.5 % Final     Neutrophils, Absolute   Date Value Ref Range Status   09/23/2019 3.60 1.70 - 7.00 10*3/mm3 Final     Lymphocytes, Absolute   Date Value Ref Range Status   09/23/2019 3.00 0.70 - 3.10 10*3/mm3 Final     Monocytes, Absolute   Date Value Ref Range Status   09/23/2019 0.50 0.10 - 0.90 10*3/mm3 Final     Eosinophils, Absolute   Date Value Ref Range Status   09/23/2019 0.20 0.00 - 0.40 10*3/mm3 Final     Basophils, Absolute   Date Value Ref Range Status   09/23/2019 0.10 0.00 - 0.20 10*3/mm3 Final     nRBC   Date Value Ref Range Status   09/23/2019 0.1 0.0 - 0.2 /100 WBC Final     Glucose   Date Value Ref Range Status   09/24/2019 105 (H) 65 - 99 mg/dL Final     BUN   Date Value Ref Range Status   09/24/2019 26 (H) 8 - 20 mg/dL Final     Creatinine   Date Value Ref Range Status   09/24/2019 1.10 (H) 0.40 - 1.00 mg/dL Final     Sodium   Date Value Ref Range Status   09/24/2019 138 136 - 144 mmol/L Final     Potassium   Date Value Ref Range Status   09/24/2019 4.3 3.6 - 5.1 mmol/L Final     Chloride   Date Value Ref Range Status   09/24/2019 103 101 - 111 mmol/L Final     CO2   Date Value Ref Range Status   09/24/2019 27.0 22.0 - 32.0 mmol/L Final     Calcium   Date Value Ref Range Status   09/24/2019 8.5 (L) 8.9 - 10.3 mg/dL Final     eGFR Non  Amer   Date Value Ref Range Status   09/24/2019 50 (L) >60 mL/min/1.73 Final     BUN/Creatinine Ratio   Date Value Ref Range Status   09/24/2019 23.6 5.4 - 26.2 Final     Anion Gap   Date Value Ref Range Status   09/24/2019 12.3 5.0 - 15.0 mmol/L Final       Lab Results   Component Value Date    TSH 1.880 09/21/2019     (H) 09/21/2019     HGBA1C 5.5 09/21/2019    [id number] 09/21/2019         IMAGING STUDIES:  No radiology results for the last day    I reviewed the patient's new clinical results.      ________________________________________________________     PROBLEM LIST:    Syncope          Assessment/Plan   ASSESSMENT/PLAN:  1.  Lightheadedness with 2 syncopal episodes. These spells are most likely caused by orthostatic hypotension concerning for autonomic dysfunction.  The spells do not sound like seizures, a CVA, or VBI.   - CT head reviewed and negative   - Carotids: pending   - Echo is normal, EF 57%  - EKG: Sinus rhythm, rate 80  - Holter monitor on discharge  - Midodrine per primary  - Would recommend trial of Mestinon if no improvement from Midodrine   - Tilt table test outpatient  - Compression stockings on at all times when out of bed  - Discussed with patient importance of sitting 2-3 minutes prior to standing when getting out of bed.   - Avoid alcohol and smoking  - Control blood glucose     2. CRISSY  - Lisinopril and metformin held at this time  - IVF    3. Type 2 DM, controlled   - SSI, A1C 5.5    4. Vitamin B12 deficiency  - replaced, continue outpatient     Ok to be discharged if carotid duplex is WNL. Would recommend follow up with PCP for tilt table test outpatient.  I discussed the patients findings and my recommendations with patient, family and nursing staff.     Brianda Escobar, KANDY  09/24/19  11:14 AM

## 2019-09-24 NOTE — PLAN OF CARE
Problem: Patient Care Overview  Goal: Plan of Care Review  Outcome: Ongoing (interventions implemented as appropriate)   09/24/19 1321   Coping/Psychosocial   Plan of Care Reviewed With patient;spouse   OTHER   Outcome Summary Neuro NP seen patient, new orders received. May go home today or tomorrow depending on what testing shows     Goal: Individualization and Mutuality  Outcome: Ongoing (interventions implemented as appropriate)    Goal: Discharge Needs Assessment  Outcome: Ongoing (interventions implemented as appropriate)    Goal: Interprofessional Rounds/Family Conf  Outcome: Ongoing (interventions implemented as appropriate)   09/24/19 1321   Interdisciplinary Rounds/Family Conf   Summary Patient lives at home with her . At this time no needs will be noted for discharge   Participants ;nursing;patient;pharmacy;social work/services       Problem: Pain, Chronic (Adult)  Goal: Identify Related Risk Factors and Signs and Symptoms  Outcome: Ongoing (interventions implemented as appropriate)    Goal: Acceptable Pain/Comfort Level and Functional Ability  Outcome: Ongoing (interventions implemented as appropriate)      Problem: Syncope (Adult)  Goal: Identify Related Risk Factors and Signs and Symptoms  Outcome: Ongoing (interventions implemented as appropriate)    Goal: Physical Safety/Health Maintenance  Outcome: Ongoing (interventions implemented as appropriate)    Goal: Optimal Emotional/Functional [place]  Outcome: Ongoing (interventions implemented as appropriate)      Problem: Fall Risk (Adult)  Goal: Identify Related Risk Factors and Signs and Symptoms  Outcome: Ongoing (interventions implemented as appropriate)    Goal: Absence of Fall  Outcome: Ongoing (interventions implemented as appropriate)

## 2019-09-24 NOTE — DISCHARGE INSTR - LAB
Dr Gudino office Cardiology, office number [phone] call to schedule appointment for one month

## 2019-09-24 NOTE — PLAN OF CARE
Problem: Patient Care Overview  Goal: Plan of Care Review  Outcome: Ongoing (interventions implemented as appropriate)   09/21/19 0548 09/23/19 1902 09/24/19 0634   Coping/Psychosocial   Plan of Care Reviewed With --  patient;spouse --    Plan of Care Review   Progress improving --  --    OTHER   Outcome Summary --  --  neuro consult , med held to eval        Problem: Pain, Chronic (Adult)  Goal: Identify Related Risk Factors and Signs and Symptoms  Outcome: Ongoing (interventions implemented as appropriate)   09/21/19 0548   Pain, Chronic (Adult)   Related Risk Factors (Chronic Pain) disease process;procedures/treatments   Signs and Symptoms (Chronic Pain) fatigue/weakness;verbalization of pain descriptors     Goal: Acceptable Pain/Comfort Level and Functional Ability  Outcome: Ongoing (interventions implemented as appropriate)   09/23/19 1350   Pain, Chronic (Adult)   Acceptable Pain/Comfort Level and Functional Ability making progress toward outcome       Problem: Syncope (Adult)  Goal: Identify Related Risk Factors and Signs and Symptoms  Outcome: Ongoing (interventions implemented as appropriate)   09/21/19 0548   Syncope (Adult)   Related Risk Factors (Syncope) hypotension   Signs and Symptoms (Syncope) dizziness     Goal: Physical Safety/Health Maintenance   09/23/19 1350   Syncope (Adult)   Physical Safety/Health Maintenance making progress toward outcome     Goal: Optimal Emotional/Functional [place]   09/23/19 1350   Syncope (Adult)   Optimal Emotional/Functional [place] making progress toward outcome       Problem: Fall Risk (Adult)  Goal: Identify Related Risk Factors and Signs and Symptoms  Outcome: Ongoing (interventions implemented as appropriate)   09/21/19 0548 09/23/19 1350   Fall Risk (Adult)   Related Risk Factors (Fall Risk) --  history of falls   Signs and Symptoms (Fall Risk) presence of risk factors --      Goal: Absence of Fall   09/23/19 1350   Fall Risk (Adult)   Absence of Fall  making progress toward outcome

## 2019-09-25 ENCOUNTER — READMISSION MANAGEMENT (OUTPATIENT)
Dept: CALL CENTER | Facility: HOSPITAL | Age: 64
End: 2019-09-25

## 2019-09-25 NOTE — DISCHARGE SUMMARY
Date of Admission: 9/20/2019    Date of Discharge:  9/24/2019    Length of stay:  LOS: 1 day     Admission Diagnosis:   Syncope volume depletion versus cardiogenic cause     CRISSY      Discharge Diagnosis:      Syncope  likely due to volume depletion on top of autonomic dysfunction due to diabetes  -Patient noted with an episode of syncope and several near syncopal episodes which involve profound dizziness and are related to position changes  -positive orthostatic changes are noted  -No carotid bruits appreciated on exam  -EKG: Sinus rhythm at 80 with a QTC of 423 ms  -CT of the head without contrast shows no acute intracranial abnormality  -Serial troponins negative  -d-dimer normal  -cosyntropin stimulation test normal  -Continue cardiac monitoring  -cardiology consulted  - stress test reportedly negative  - will dc tizandine and gabapentin as both of these can cause dizziness as well as hypotension  - consult neurology  - carotid doppler normal   - add compression stockings  - consider tilt table testing as an outpt  - f/u with Dr. Gudino     CRISSY likely secondary to prerenal azotemia from volume deficit  - resolved  - stop lisinopril  - resume metformin     Diabetes Mellitus type 2 chronic and controlled  - resume metformin   - Hgb A1c 5.5     Primary hypertension currently with orthostatic changes  - Lisinopril held as above  - dc tizanidine   - started on midodrine 5mg bid      Hyperlipidemia  - Continue atorvastatin     Obesity, BMI 39  - Encourage diet and lifestyle modification     Hospital Course:  Patient is a 64 y.o. female  with past medical history of uterine cancer, kidney stones, hyperlipidemia, diabetes mellitus type 2, hypertension and degenerative disc disease who presents to the ED with multiple episodes of dizziness, lightheadedness and syncopal episodes for the past 4 days.  Patient states she has been having symptoms intermittently with changes in position especially standing.  She notes that  "yesterday evening while walking to her bedroom she began feeling dizzy weak and had a syncopal episode.  She notes that typically when she begins feeling lightheaded she can just sit down and the symptoms will resolve within 10 to 15 minutes without further treatment.  No pain, prodrome other than dizziness or preceding aura reported.  She notes she was recently treated by her primary care physician for a cough and fever on an outpatient basis with cough suppressant.  She did not receive any antibiotics.  She reports some recent nausea and diarrhea but denies any vomiting, chest pain or shortness of breath.  In addition to her uterine cancer patient has a history of multiple \"precancerous colon lesions\", and is on a 3-month colonoscopy schedule with her next one scheduled in October.  She denies any bright red blood or dark tarry stools, notes her urine has been normal and does not report any changes in appetite.  She has not noted any palpitations.  Patient did have a soft tissue injury to her left leg with her last syncopal episode.        In the ED troponins were less than 0.030, creatinine: 1.50, BUN: 28, EGFR 35, WBCs: 9.10, hemoglobin: 3.4, hematocrit: 40.5.  EKG shows sinus rhythm at 80.  CT of the head without contrast shows no acute intracranial abnormality.  Chest x-ray shows no acute process and x-ray of the left tibia and fibula are negative for fracture.  Patient's neurologic exam is normal.  During the interview orthostatic vital signs were obtained by ER tech, patient had a decrease of greater than 40 mmHg systolic blood pressure with change from lying to standing position, and she noted profound symptoms with this change.  She will be admitted to the hospital for further evaluation and management of her symptoms.       Patient underwent the above testing. She was found to have orthostatic hypotension and was started on midodrine and lisionpril was discontinued. She complained or some continued " dizziness. She had compression stockings placed and was instructed to wear these when ambulating and to have slow position changes. She will also follow up with PCP and Dr. Gudino and may consider tilt table test as an outpatient.  Patient will be discharged home ins table condition.   Procedures Performed:         Consults:   Consults     Date and Time Order Name Status Description    9/22/2019 1152 Inpatient Cardiology Consult Completed     9/20/2019 1902 Hospitalist (on-call MD unless specified) Completed           Vital Signs:  Temp:  [97.8 °F (36.6 °C)-97.9 °F (36.6 °C)] 97.8 °F (36.6 °C)  Heart Rate:  [55-64] 64  Resp:  [14-16] 14  BP: (136-149)/(73-80) 149/80      Physical Exam:  Physical Exam   Constitutional: She is oriented to person, place, and time. She appears well-developed and well-nourished.   Cardiovascular: Normal rate and regular rhythm.   Pulmonary/Chest: Effort normal and breath sounds normal.   Neurological: She is alert and oriented to person, place, and time.   Skin: Skin is warm and dry.             Pertinent Test Results:   Lab Results (last 72 hours)     Procedure Component Value Units Date/Time    POC Glucose Once [[id number]]  (Normal) Collected:  09/24/19 1123    Specimen:  Blood Updated:  09/24/19 1126     Glucose 102 mg/dL      Comment: Serial Number: [id number]:  [id number]       POC Glucose Once [[id number]]  (Normal) Collected:  09/24/19 0730    Specimen:  Blood Updated:  09/24/19 0732     Glucose 102 mg/dL      Comment: Serial Number: [id number]:  [id number]       Basic Metabolic Panel [[id number]]  (Abnormal) Collected:  09/24/19 0356    Specimen:  Blood Updated:  09/24/19 0441     Glucose 105 mg/dL      BUN 26 mg/dL      Creatinine 1.10 mg/dL      Sodium 138 mmol/L      Potassium 4.3 mmol/L      Chloride 103 mmol/L      CO2 27.0 mmol/L      Calcium 8.5 mg/dL      eGFR Non African Amer 50 mL/min/1.73      BUN/Creatinine Ratio 23.6     Anion Gap 12.3 mmol/L     POC Glucose  Once [[id number]]  (Normal) Collected:  09/23/19 1927    Specimen:  Blood Updated:  09/23/19 1929     Glucose 96 mg/dL      Comment: Serial Number: [id number]:  [id number]       POC Glucose Once [[id number]]  (Normal) Collected:  09/23/19 1647    Specimen:  Blood Updated:  09/23/19 1648     Glucose 99 mg/dL      Comment: Serial Number: [id number]:  [id number]       Hemoglobin A1c [[id number]]  (Normal) Collected:  09/21/19 0110    Specimen:  Blood Updated:  09/23/19 0802     Hemoglobin A1C 5.5 %     Narrative:       Hemoglobin A1C Reference Range:    <5.7 %        Normal  5.7-6.4 %     Increased risk for diabetes  > 6.4 %        Diabetes       These guidelines have been recommended by the American Diabetic Association for Hgb A1c.      The following 2010 guidelines have been recommended by the American Diabetes Association for Hemoglobin A1c.    HBA1c 5.7-6.4% Increased risk for future diabetes (pre-diabetes)  HBA1c     >6.4% Diabetes    POC Glucose Once [[id number]]  (Normal) Collected:  09/23/19 0727    Specimen:  Blood Updated:  09/23/19 0729     Glucose 95 mg/dL      Comment: Serial Number: [id number]:  [id number]       Basic Metabolic Panel [[id number]]  (Abnormal) Collected:  09/23/19 0415    Specimen:  Blood Updated:  09/23/19 0515     Glucose 103 mg/dL      BUN 29 mg/dL      Creatinine 0.90 mg/dL      Sodium 137 mmol/L      Potassium 4.9 mmol/L      Comment: Specimen hemolyzed.  Results may be affected.        Chloride 102 mmol/L      CO2 26.0 mmol/L      Calcium 8.5 mg/dL      eGFR Non African Amer 63 mL/min/1.73      BUN/Creatinine Ratio 32.2     Anion Gap 13.9 mmol/L     CBC & Differential [[id number]] Collected:  09/23/19 0426    Specimen:  Blood Updated:  09/23/19 0454    Narrative:       The following orders were created for panel order CBC & Differential.  Procedure                               Abnormality         Status                     ---------                                -----------         ------                     CBC Auto Differential[[id number]]        Abnormal            Final result                 Please view results for these tests on the individual orders.    CBC Auto Differential [[id number]]  (Abnormal) Collected:  09/23/19 0426    Specimen:  Blood Updated:  09/23/19 0454     WBC 7.40 10*3/mm3      RBC 3.90 10*6/mm3      Hemoglobin 11.9 g/dL      Hematocrit 36.3 %      MCV 93.1 fL      MCH 30.5 pg      MCHC 32.8 g/dL      RDW 12.6 %      RDW-SD 41.6 fl      MPV 8.0 fL      Platelets 232 10*3/mm3      Neutrophil % 48.4 %      Lymphocyte % 41.3 %      Monocyte % 6.9 %      Eosinophil % 2.6 %      Basophil % 0.8 %      Neutrophils, Absolute 3.60 10*3/mm3      Lymphocytes, Absolute 3.00 10*3/mm3      Monocytes, Absolute 0.50 10*3/mm3      Eosinophils, Absolute 0.20 10*3/mm3      Basophils, Absolute 0.10 10*3/mm3      nRBC 0.1 /100 WBC     POC Glucose Once [[id number]]  (Normal) Collected:  09/22/19 2046    Specimen:  Blood Updated:  09/22/19 2107     Glucose 105 mg/dL      Comment: Serial Number: [id number]:  [id number]       POC Glucose Once [[id number]]  (Normal) Collected:  09/22/19 1629    Specimen:  Blood Updated:  09/22/19 1630     Glucose 91 mg/dL      Comment: Serial Number: [id number]:  [id number]       Troponin [[id number]]  (Normal) Collected:  09/22/19 1426    Specimen:  Blood Updated:  09/22/19 1520     Troponin I <0.030 ng/mL     Narrative:       Troponin I Reference Range:    0.00-0.03  Negative.  Repeat testing in 4-6 hours if clinically indicated.    0.04-0.29  Suspicious for myocardial injury. Serial measurements and clinical  correlation may be necessary to confirm or exclude diagnosis of acute  coronary syndrome.  Repeat testing in 4-6 hours if indicated.     >0.29 Consistent with myocardial injury.  Recommend clinical and laboratory correlation.     Results my be falsely decreased if patient taking Biotin.     POC Glucose Once [[id number]]  (Normal)  Collected:  09/22/19 1110    Specimen:  Blood Updated:  09/22/19 1111     Glucose 100 mg/dL      Comment: Serial Number: [id number]:  [id number]       POC Glucose Once [[id number]]  (Normal) Collected:  09/22/19 0740    Specimen:  Blood Updated:  09/22/19 0741     Glucose 82 mg/dL      Comment: Serial Number: [id number]:  [id number]       POC Glucose Once [[id number]]  (Normal) Collected:  09/21/19 2005    Specimen:  Blood Updated:  09/21/19 2006     Glucose 95 mg/dL      Comment: Serial Number: [id number]:  [id number]               Results for orders placed during the hospital encounter of 09/20/19   Transthoracic Echo Complete With Contrast if Necessary Per Protocol    Narrative · Left ventricular systolic function is normal.  · Estimated EF = 67%.          Imaging Results (all)     Procedure Component Value Units Date/Time    CT Head Without Contrast [[id number]] Collected:  09/20/19 1853     Updated:  09/20/19 1857    Narrative:          DATE OF EXAM:  9/20/2019 6:32 PM     PROCEDURE:   CT HEAD WO CONTRAST-     INDICATIONS:   syncope, fall     COMPARISON:  No Comparisons Available     TECHNIQUE:   Routine transaxial cuts were obtained through the head without the  administration of contrast. Automated exposure control and iterative  reconstruction methods were used.      FINDINGS:  No acute intracranial hemorrhage. No mass effect or midline shift. The  ventricles and cortical sulci are normally configured. Gray-white matter  differentiation is maintained. Basilar cisterns are patent. The  posterior fossa is without acute abnormality. Globes are intact and  symmetric. No retro-orbital abnormality. The paranasal sinuses are  without air fluid level. Mastoid air cells well aerated. Calvarium  intact. Small dural calcification falx cerebra measuring 4 mm related to  tiny meningioma.       Impression:       No acute intracranial abnormality.      Electronically Signed By-Mahesh Mouser On:9/20/2019 6:55  PM  This report was finalized on [id number] by  Mahesh Carr .    XR Tibia Fibula 2 View Left [[id number]] Collected:  09/20/19 1751     Updated:  09/20/19 1756    Narrative:       DATE OF EXAM:  9/20/2019 5:25 PM     PROCEDURE:  XR TIBIA FIBULA 2 VW LEFT-     INDICATIONS:  fall     COMPARISON:  No Comparisons Available     TECHNIQUE:   Two views of the left tibia and fibula were obtained.     FINDINGS:  Mild degenerative spurring at the knee related to osteoarthritis. The  tibia and fibula are intact. No joint effusion at the knee.        Impression:       Negative for fracture.     Electronically Signed By-Mahesh Carr On:9/20/2019 5:54 PM  This report was finalized on [id number] by  Paulette Leary    XR Chest 1 View [[id number]] Collected:  09/20/19 1750     Updated:  09/20/19 1754    Narrative:          DATE OF EXAM:   9/20/2019 5:25 PM     PROCEDURE:   XR CHEST 1 VW-     INDICATIONS:   syncope     COMPARISON:  07/24/2017     TECHNIQUE:   [Portable chest radiograph]     FINDINGS:    The cardiomediastinal silhouette is within normal limits. The lungs are  clear. There is no pneumothorax, focal consolidation, or large pleural  effusion. Osseous structures grossly intact.        Impression:       No acute process.      Electronically Signed By-Mahesh Carr On:9/20/2019 5:50 PM  This report was finalized on [id number] by  Mahesh Carr .                Discharge Disposition:  Home or Self Care    Discharge Medications:     Discharge Medications      New Medications      Instructions Start Date   cyanocobalamin 1000 MCG tablet  Commonly known as:  VITAMIN B-12   1,000 mcg, Oral, Daily   Start Date:  9/25/2019     midodrine 5 MG tablet  Commonly known as:  PROAMATINE   5 mg, Oral, 2 Times Daily         Continue These Medications      Instructions Start Date   citalopram 20 MG tablet  Commonly known as:  CeleXA   CITALOPRAM HYDROBROMIDE 20 MG TABS      fenofibrate 160 MG tablet   FENOFIBRATE 160 MG TABS       metFORMIN 500 MG tablet  Commonly known as:  GLUCOPHAGE   500 mg, Oral, 2 Times Daily With Meals      oxyCODONE-acetaminophen  MG per tablet  Commonly known as:  PERCOCET   1 tablet, Oral, 3 Times Daily      simvastatin 40 MG tablet  Commonly known as:  ZOCOR   40 mg, Oral, Nightly         Stop These Medications    gabapentin 100 MG capsule  Commonly known as:  NEURONTIN     gemfibrozil 600 MG tablet  Commonly known as:  LOPID     lisinopril 40 MG tablet  Commonly known as:  PRINIVIL,ZESTRIL     phentermine 30 MG capsule     temazepam 30 MG capsule  Commonly known as:  RESTORIL     tiZANidine 4 MG tablet  Commonly known as:  ZANAFLEX            Discharge Diet:   Diet Instructions     Diet: Consistent Carbohydrate      Discharge Diet:  Consistent Carbohydrate          Activity at Discharge:   Activity Instructions     Driving Restrictions      Type of Restriction:  Driving    Driving Restrictions:  No Driving Until Next Appointment    Gradually Increase Activity Until at Pre-Hospitalization Level      Other Activity Instructions      Activity Instructions: Slow position changes          Follow-up Appointments:  No future appointments.      Test Results Pending at Discharge:  none    Condition on Discharge:    stable     Risk for Readmission (LACE): Score: 4 (9/24/2019  6:00 AM)          Jorge L Stone DO  09/24/19  8:16 PM    Time: Discharge 33 min with face-to-face history/exam, writing all prescriptions, and documenting discharge data including care coordination

## 2019-09-25 NOTE — PROGRESS NOTES
Continued Stay Note   [facility]     Patient Name: Kiki Perdomo  MRN: [id number]  Today's Date: 9/25/2019    Admit Date: 9/20/2019    Discharge Plan     Row Name 09/25/19 0856       Plan    Final Discharge Disposition Code  01 - home or self-care              Expected Discharge Date and Time     Expected Discharge Date Expected Discharge Time    Sep 24, 2019             Anjana Lynn RN

## 2019-09-25 NOTE — OUTREACH NOTE
Prep Survey      Responses   Facility patient discharged from?  [facility]   Is patient eligible?  Yes   Discharge diagnosis  SYNCOPE   Does the patient have one of the following disease processes/diagnoses(primary or secondary)?  Other   Does the patient have Home health ordered?  No   Is there a DME ordered?  No   Medication alerts for this patient  SEE AVS   Prep survey completed?  Yes          Freida Manzanares LPN

## 2019-09-26 ENCOUNTER — READMISSION MANAGEMENT (OUTPATIENT)
Dept: CALL CENTER | Facility: HOSPITAL | Age: 64
End: 2019-09-26

## 2019-09-26 NOTE — OUTREACH NOTE
Medical Week 1 Survey      Responses   Facility patient discharged from?  [facility]   Does the patient have one of the following disease processes/diagnoses(primary or secondary)?  Other   Is there a successful TCM telephone encounter documented?  No   Week 1 attempt successful?  Yes   Call start time  1527   Call end time  1529   Discharge diagnosis  SYNCOPE   Meds reviewed with patient/caregiver?  Yes   Is the patient having any side effects they believe may be caused by any medication additions or changes?  No   Does the patient have all medications ordered at discharge?  Yes   Is the patient taking all medications as directed (includes completed medication regime)?  Yes   Does the patient have a primary care provider?   Yes   Does the patient have an appointment with their PCP within 7 days of discharge?  Yes   Has the patient kept scheduled appointments due by today?  N/A   Has home health visited the patient within 72 hours of discharge?  N/A   Did the patient receive a copy of their discharge instructions?  Yes   Nursing interventions  Reviewed instructions with patient   What is the patient's perception of their health status since discharge?  Improving   Is the patient/caregiver able to teach back signs and symptoms related to disease process for when to call PCP?  Yes   Is the patient/caregiver able to teach back signs and symptoms related to disease process for when to call 911?  Yes   Week 1 call completed?  Yes   Graduated  Yes   Did the patient feel the follow up calls were helpful during their recovery period?  Yes   Was the number of calls appropriate?  Yes          Freida Manzanares LPN

## 2019-10-06 PROCEDURE — 93227 XTRNL ECG REC<48 HR R&I: CPT | Performed by: INTERNAL MEDICINE

## 2020-01-05 ENCOUNTER — APPOINTMENT (OUTPATIENT)
Dept: GENERAL RADIOLOGY | Facility: HOSPITAL | Age: 65
End: 2020-01-05

## 2020-01-05 ENCOUNTER — HOSPITAL ENCOUNTER (INPATIENT)
Facility: HOSPITAL | Age: 65
LOS: 1 days | Discharge: HOME | End: 2020-01-06
Attending: INTERNAL MEDICINE | Admitting: INTERNAL MEDICINE

## 2020-01-05 DIAGNOSIS — R09.02 HYPOXIA: Primary | ICD-10-CM

## 2020-01-05 DIAGNOSIS — J10.1 INFLUENZA B: ICD-10-CM

## 2020-01-05 DIAGNOSIS — R06.2 WHEEZING: ICD-10-CM

## 2020-01-05 PROBLEM — J45.909 ASTHMA: Status: ACTIVE | Noted: 2020-01-05

## 2020-01-05 LAB
ALBUMIN SERPL-MCNC: 3.6 G/DL (ref 3.5–5.2)
ALBUMIN/GLOB SERPL: 1.3 G/DL
ALP SERPL-CCNC: 70 U/L (ref 39–117)
ALT SERPL W P-5'-P-CCNC: 14 U/L (ref 1–33)
ANION GAP SERPL CALCULATED.3IONS-SCNC: 12 MMOL/L (ref 5–15)
AST SERPL-CCNC: 22 U/L (ref 1–32)
BASOPHILS # BLD AUTO: 0 10*3/MM3 (ref 0–0.2)
BASOPHILS NFR BLD AUTO: 0.4 % (ref 0–1.5)
BILIRUB SERPL-MCNC: 0.2 MG/DL (ref 0.2–1.2)
BUN BLD-MCNC: 11 MG/DL (ref 8–23)
BUN/CREAT SERPL: 12.2 (ref 7–25)
CALCIUM SPEC-SCNC: 8.9 MG/DL (ref 8.6–10.5)
CHLORIDE SERPL-SCNC: 100 MMOL/L (ref 98–107)
CO2 SERPL-SCNC: 28 MMOL/L (ref 22–29)
CREAT BLD-MCNC: 0.9 MG/DL (ref 0.57–1)
DEPRECATED RDW RBC AUTO: 42.4 FL (ref 37–54)
EOSINOPHIL # BLD AUTO: 0 10*3/MM3 (ref 0–0.4)
EOSINOPHIL NFR BLD AUTO: 0 % (ref 0.3–6.2)
ERYTHROCYTE [DISTWIDTH] IN BLOOD BY AUTOMATED COUNT: 12.8 % (ref 12.3–15.4)
FLUAV SUBTYP SPEC NAA+PROBE: NOT DETECTED
FLUBV RNA ISLT QL NAA+PROBE: DETECTED
GFR SERPL CREATININE-BSD FRML MDRD: 63 ML/MIN/1.73
GLOBULIN UR ELPH-MCNC: 2.7 GM/DL
GLUCOSE BLD-MCNC: 126 MG/DL (ref 65–99)
GLUCOSE BLDC GLUCOMTR-MCNC: 189 MG/DL (ref 70–105)
HCT VFR BLD AUTO: 37.2 % (ref 34–46.6)
HGB BLD-MCNC: 12.2 G/DL (ref 12–15.9)
HOLD SPECIMEN: NORMAL
HOLD SPECIMEN: NORMAL
LYMPHOCYTES # BLD AUTO: 0.8 10*3/MM3 (ref 0.7–3.1)
LYMPHOCYTES NFR BLD AUTO: 12.2 % (ref 19.6–45.3)
MCH RBC QN AUTO: 30.5 PG (ref 26.6–33)
MCHC RBC AUTO-ENTMCNC: 32.9 G/DL (ref 31.5–35.7)
MCV RBC AUTO: 93 FL (ref 79–97)
MONOCYTES # BLD AUTO: 0.7 10*3/MM3 (ref 0.1–0.9)
MONOCYTES NFR BLD AUTO: 11.2 % (ref 5–12)
NEUTROPHILS # BLD AUTO: 4.9 10*3/MM3 (ref 1.7–7)
NEUTROPHILS NFR BLD AUTO: 76.2 % (ref 42.7–76)
NRBC BLD AUTO-RTO: 0.1 /100 WBC (ref 0–0.2)
PLATELET # BLD AUTO: 165 10*3/MM3 (ref 140–450)
PMV BLD AUTO: 8.1 FL (ref 6–12)
POTASSIUM BLD-SCNC: 4 MMOL/L (ref 3.5–5.2)
PROT SERPL-MCNC: 6.3 G/DL (ref 6–8.5)
RBC # BLD AUTO: 4 10*6/MM3 (ref 3.77–5.28)
S PYO AG THROAT QL: NEGATIVE
SODIUM BLD-SCNC: 140 MMOL/L (ref 136–145)
WBC NRBC COR # BLD: 6.4 10*3/MM3 (ref 3.4–10.8)
WHOLE BLOOD HOLD SPECIMEN: NORMAL
WHOLE BLOOD HOLD SPECIMEN: NORMAL

## 2020-01-05 PROCEDURE — 25010000002 ENOXAPARIN PER 10 MG: Performed by: INTERNAL MEDICINE

## 2020-01-05 PROCEDURE — 94799 UNLISTED PULMONARY SVC/PX: CPT

## 2020-01-05 PROCEDURE — 87502 INFLUENZA DNA AMP PROBE: CPT | Performed by: NURSE PRACTITIONER

## 2020-01-05 PROCEDURE — 87651 STREP A DNA AMP PROBE: CPT | Performed by: NURSE PRACTITIONER

## 2020-01-05 PROCEDURE — 36415 COLL VENOUS BLD VENIPUNCTURE: CPT

## 2020-01-05 PROCEDURE — 25010000002 METHYLPREDNISOLONE PER 125 MG: Performed by: NURSE PRACTITIONER

## 2020-01-05 PROCEDURE — 94640 AIRWAY INHALATION TREATMENT: CPT

## 2020-01-05 PROCEDURE — 99222 1ST HOSP IP/OBS MODERATE 55: CPT | Performed by: INTERNAL MEDICINE

## 2020-01-05 PROCEDURE — 80053 COMPREHEN METABOLIC PANEL: CPT | Performed by: NURSE PRACTITIONER

## 2020-01-05 PROCEDURE — 63710000001 INSULIN LISPRO (HUMAN) PER 5 UNITS: Performed by: INTERNAL MEDICINE

## 2020-01-05 PROCEDURE — 85025 COMPLETE CBC W/AUTO DIFF WBC: CPT | Performed by: NURSE PRACTITIONER

## 2020-01-05 PROCEDURE — 71046 X-RAY EXAM CHEST 2 VIEWS: CPT

## 2020-01-05 PROCEDURE — 82962 GLUCOSE BLOOD TEST: CPT

## 2020-01-05 PROCEDURE — 99284 EMERGENCY DEPT VISIT MOD MDM: CPT

## 2020-01-05 RX ORDER — METHYLPREDNISOLONE SODIUM SUCCINATE 125 MG/2ML
125 INJECTION, POWDER, LYOPHILIZED, FOR SOLUTION INTRAMUSCULAR; INTRAVENOUS ONCE
Status: COMPLETED | OUTPATIENT
Start: 2020-01-05 | End: 2020-01-05

## 2020-01-05 RX ORDER — GABAPENTIN 100 MG/1
100 CAPSULE ORAL 2 TIMES DAILY
COMMUNITY
End: 2022-08-25

## 2020-01-05 RX ORDER — ALBUTEROL SULFATE 2.5 MG/3ML
2.5 SOLUTION RESPIRATORY (INHALATION) ONCE
Status: COMPLETED | OUTPATIENT
Start: 2020-01-05 | End: 2020-01-05

## 2020-01-05 RX ORDER — OMEPRAZOLE 20 MG/1
40 CAPSULE, DELAYED RELEASE ORAL DAILY
COMMUNITY
End: 2023-02-14 | Stop reason: SDUPTHER

## 2020-01-05 RX ORDER — PHENTERMINE HYDROCHLORIDE 15 MG/1
15 CAPSULE ORAL EVERY MORNING
COMMUNITY
End: 2020-04-02 | Stop reason: HOSPADM

## 2020-01-05 RX ORDER — HYDROXYZINE 50 MG/1
50 TABLET, FILM COATED ORAL NIGHTLY PRN
COMMUNITY
End: 2022-02-19 | Stop reason: HOSPADM

## 2020-01-05 RX ORDER — ACETAMINOPHEN 325 MG/1
650 TABLET ORAL EVERY 4 HOURS PRN
Status: DISCONTINUED | OUTPATIENT
Start: 2020-01-05 | End: 2020-01-06 | Stop reason: HOSPADM

## 2020-01-05 RX ORDER — MAGNESIUM SULFATE HEPTAHYDRATE 40 MG/ML
2 INJECTION, SOLUTION INTRAVENOUS AS NEEDED
Status: DISCONTINUED | OUTPATIENT
Start: 2020-01-05 | End: 2020-01-06 | Stop reason: HOSPADM

## 2020-01-05 RX ORDER — ATORVASTATIN CALCIUM 20 MG/1
20 TABLET, FILM COATED ORAL NIGHTLY
Status: DISCONTINUED | OUTPATIENT
Start: 2020-01-05 | End: 2020-01-06 | Stop reason: HOSPADM

## 2020-01-05 RX ORDER — LISINOPRIL 20 MG/1
20 TABLET ORAL DAILY
COMMUNITY

## 2020-01-05 RX ORDER — OXYCODONE HYDROCHLORIDE 5 MG/1
10 TABLET ORAL EVERY 4 HOURS PRN
Status: DISCONTINUED | OUTPATIENT
Start: 2020-01-05 | End: 2020-01-06 | Stop reason: HOSPADM

## 2020-01-05 RX ORDER — SODIUM CHLORIDE 0.9 % (FLUSH) 0.9 %
10 SYRINGE (ML) INJECTION AS NEEDED
Status: DISCONTINUED | OUTPATIENT
Start: 2020-01-05 | End: 2020-01-06 | Stop reason: HOSPADM

## 2020-01-05 RX ORDER — POTASSIUM CHLORIDE 1.5 G/1.77G
40 POWDER, FOR SOLUTION ORAL AS NEEDED
Status: DISCONTINUED | OUTPATIENT
Start: 2020-01-05 | End: 2020-01-06 | Stop reason: HOSPADM

## 2020-01-05 RX ORDER — ACETAMINOPHEN 160 MG/5ML
650 SOLUTION ORAL EVERY 4 HOURS PRN
Status: DISCONTINUED | OUTPATIENT
Start: 2020-01-05 | End: 2020-01-06 | Stop reason: HOSPADM

## 2020-01-05 RX ORDER — DOCUSATE SODIUM 100 MG/1
100 CAPSULE, LIQUID FILLED ORAL 2 TIMES DAILY PRN
Status: DISCONTINUED | OUTPATIENT
Start: 2020-01-05 | End: 2020-01-06 | Stop reason: HOSPADM

## 2020-01-05 RX ORDER — NICOTINE POLACRILEX 4 MG
15 LOZENGE BUCCAL
Status: DISCONTINUED | OUTPATIENT
Start: 2020-01-05 | End: 2020-01-06 | Stop reason: HOSPADM

## 2020-01-05 RX ORDER — DEXTROSE MONOHYDRATE 25 G/50ML
25 INJECTION, SOLUTION INTRAVENOUS
Status: DISCONTINUED | OUTPATIENT
Start: 2020-01-05 | End: 2020-01-06 | Stop reason: HOSPADM

## 2020-01-05 RX ORDER — ALBUTEROL SULFATE 2.5 MG/3ML
2.5 SOLUTION RESPIRATORY (INHALATION)
Status: DISCONTINUED | OUTPATIENT
Start: 2020-01-05 | End: 2020-01-06 | Stop reason: HOSPADM

## 2020-01-05 RX ORDER — CHOLECALCIFEROL (VITAMIN D3) 125 MCG
5 CAPSULE ORAL NIGHTLY PRN
Status: DISCONTINUED | OUTPATIENT
Start: 2020-01-05 | End: 2020-01-06 | Stop reason: HOSPADM

## 2020-01-05 RX ORDER — OXYCODONE HYDROCHLORIDE 5 MG/1
10 TABLET ORAL EVERY 6 HOURS PRN
Status: DISCONTINUED | OUTPATIENT
Start: 2020-01-05 | End: 2020-01-06 | Stop reason: HOSPADM

## 2020-01-05 RX ORDER — ACETAMINOPHEN 650 MG/1
650 SUPPOSITORY RECTAL EVERY 4 HOURS PRN
Status: DISCONTINUED | OUTPATIENT
Start: 2020-01-05 | End: 2020-01-06 | Stop reason: HOSPADM

## 2020-01-05 RX ORDER — MAGNESIUM SULFATE HEPTAHYDRATE 40 MG/ML
4 INJECTION, SOLUTION INTRAVENOUS AS NEEDED
Status: DISCONTINUED | OUTPATIENT
Start: 2020-01-05 | End: 2020-01-06 | Stop reason: HOSPADM

## 2020-01-05 RX ORDER — IPRATROPIUM BROMIDE AND ALBUTEROL SULFATE 2.5; .5 MG/3ML; MG/3ML
3 SOLUTION RESPIRATORY (INHALATION)
Status: DISCONTINUED | OUTPATIENT
Start: 2020-01-05 | End: 2020-01-06 | Stop reason: HOSPADM

## 2020-01-05 RX ORDER — ONDANSETRON 2 MG/ML
4 INJECTION INTRAMUSCULAR; INTRAVENOUS EVERY 6 HOURS PRN
Status: DISCONTINUED | OUTPATIENT
Start: 2020-01-05 | End: 2020-01-06 | Stop reason: HOSPADM

## 2020-01-05 RX ORDER — ONDANSETRON 4 MG/1
4 TABLET, FILM COATED ORAL EVERY 6 HOURS PRN
Status: DISCONTINUED | OUTPATIENT
Start: 2020-01-05 | End: 2020-01-06 | Stop reason: HOSPADM

## 2020-01-05 RX ORDER — POTASSIUM CHLORIDE 750 MG/1
10 TABLET, FILM COATED, EXTENDED RELEASE ORAL DAILY
Status: DISCONTINUED | OUTPATIENT
Start: 2020-01-06 | End: 2020-01-06 | Stop reason: HOSPADM

## 2020-01-05 RX ORDER — FUROSEMIDE 40 MG/1
40 TABLET ORAL DAILY
COMMUNITY
End: 2020-03-31

## 2020-01-05 RX ORDER — CITALOPRAM 20 MG/1
20 TABLET ORAL DAILY
Status: DISCONTINUED | OUTPATIENT
Start: 2020-01-06 | End: 2020-01-06 | Stop reason: HOSPADM

## 2020-01-05 RX ORDER — HYDROXYZINE HYDROCHLORIDE 25 MG/1
50 TABLET, FILM COATED ORAL NIGHTLY PRN
Status: DISCONTINUED | OUTPATIENT
Start: 2020-01-05 | End: 2020-01-06 | Stop reason: HOSPADM

## 2020-01-05 RX ORDER — POTASSIUM CHLORIDE 20 MEQ/1
40 TABLET, EXTENDED RELEASE ORAL AS NEEDED
Status: DISCONTINUED | OUTPATIENT
Start: 2020-01-05 | End: 2020-01-06 | Stop reason: HOSPADM

## 2020-01-05 RX ORDER — OSELTAMIVIR PHOSPHATE 75 MG/1
75 CAPSULE ORAL EVERY 12 HOURS SCHEDULED
Status: DISCONTINUED | OUTPATIENT
Start: 2020-01-05 | End: 2020-01-06 | Stop reason: HOSPADM

## 2020-01-05 RX ORDER — ACETAMINOPHEN 325 MG/1
650 TABLET ORAL EVERY 6 HOURS PRN
Status: DISCONTINUED | OUTPATIENT
Start: 2020-01-05 | End: 2020-01-06 | Stop reason: HOSPADM

## 2020-01-05 RX ORDER — NITROGLYCERIN 0.4 MG/1
0.4 TABLET SUBLINGUAL
Status: DISCONTINUED | OUTPATIENT
Start: 2020-01-05 | End: 2020-01-06 | Stop reason: HOSPADM

## 2020-01-05 RX ORDER — GABAPENTIN 100 MG/1
100 CAPSULE ORAL 2 TIMES DAILY
Status: DISCONTINUED | OUTPATIENT
Start: 2020-01-05 | End: 2020-01-06 | Stop reason: HOSPADM

## 2020-01-05 RX ORDER — PANTOPRAZOLE SODIUM 40 MG/1
40 TABLET, DELAYED RELEASE ORAL EVERY MORNING
Status: DISCONTINUED | OUTPATIENT
Start: 2020-01-06 | End: 2020-01-06 | Stop reason: HOSPADM

## 2020-01-05 RX ORDER — BENZONATATE 100 MG/1
200 CAPSULE ORAL 3 TIMES DAILY PRN
Status: DISCONTINUED | OUTPATIENT
Start: 2020-01-05 | End: 2020-01-06 | Stop reason: HOSPADM

## 2020-01-05 RX ORDER — POTASSIUM CHLORIDE 750 MG/1
10 TABLET, EXTENDED RELEASE ORAL DAILY
COMMUNITY
End: 2020-03-31

## 2020-01-05 RX ORDER — SODIUM CHLORIDE 0.9 % (FLUSH) 0.9 %
10 SYRINGE (ML) INJECTION EVERY 12 HOURS SCHEDULED
Status: DISCONTINUED | OUTPATIENT
Start: 2020-01-05 | End: 2020-01-06 | Stop reason: HOSPADM

## 2020-01-05 RX ADMIN — ALBUTEROL SULFATE 2.5 MG: 2.5 SOLUTION RESPIRATORY (INHALATION) at 14:15

## 2020-01-05 RX ADMIN — ENOXAPARIN SODIUM 40 MG: 40 INJECTION SUBCUTANEOUS at 21:12

## 2020-01-05 RX ADMIN — SODIUM CHLORIDE 1000 ML: 900 INJECTION, SOLUTION INTRAVENOUS at 13:00

## 2020-01-05 RX ADMIN — ACETAMINOPHEN 650 MG: 325 TABLET ORAL at 17:50

## 2020-01-05 RX ADMIN — IPRATROPIUM BROMIDE AND ALBUTEROL SULFATE 3 ML: .5; 3 SOLUTION RESPIRATORY (INHALATION) at 23:18

## 2020-01-05 RX ADMIN — METHYLPREDNISOLONE SODIUM SUCCINATE 125 MG: 125 INJECTION, POWDER, FOR SOLUTION INTRAMUSCULAR; INTRAVENOUS at 13:00

## 2020-01-05 RX ADMIN — INSULIN LISPRO 2 UNITS: 100 INJECTION, SOLUTION INTRAVENOUS; SUBCUTANEOUS at 21:26

## 2020-01-05 RX ADMIN — OXYCODONE HYDROCHLORIDE 10 MG: 5 TABLET ORAL at 16:00

## 2020-01-05 RX ADMIN — ATORVASTATIN CALCIUM 20 MG: 20 TABLET, FILM COATED ORAL at 21:12

## 2020-01-05 RX ADMIN — PHENOL 2 SPRAY: 1.5 LIQUID ORAL at 21:12

## 2020-01-05 RX ADMIN — OXYCODONE HYDROCHLORIDE 10 MG: 5 TABLET ORAL at 23:28

## 2020-01-05 RX ADMIN — GABAPENTIN 100 MG: 100 CAPSULE ORAL at 21:13

## 2020-01-05 RX ADMIN — ALBUTEROL SULFATE 2.5 MG: 2.5 SOLUTION RESPIRATORY (INHALATION) at 12:07

## 2020-01-05 RX ADMIN — OSELTAMIVIR PHOSPHATE 75 MG: 75 CAPSULE ORAL at 21:13

## 2020-01-05 NOTE — PLAN OF CARE
Patient just arrived on floor, labored breathing noted, wheezes are audible. Patient educated on admission, pleasant, alert and oriented xs 4

## 2020-01-05 NOTE — ED PROVIDER NOTES
Subjective   64-year-old female presents with a 2-day history of subjective fever, cough with pleuritic chest pain with coughing and deep inspiration, dyspnea, and myalgias.  She denies any pulmonary history.  She denies nausea vomiting diarrhea.  Denies urinary signs or symptoms.  She does also complain of a sore throat.  She reports she was recently on antibiotics for bilateral otitis media 1 month ago and completed her amoxicillin.  She denies recent ill exposure.  Denies obtaining a flu shot.    1. Location: Right upper chest  2. Quality: Sharp  3. Severity: Moderate  4. Worsening factors: Cough, deep inspiration  5. Alleviating factors: Rest  6. Onset: 2 days  7. Radiation: Denies  8. Frequency: Intermittent  9. Co-morbidities: Chronic fatigue syndrome, chronic back pain, depression, RLS, diabetes mellitus type 2, hypertension, hyperlipidemia  10. Source: Patient            Review of Systems   Constitutional: Negative for chills, diaphoresis and fever.   HENT: Positive for ear pain and sore throat. Negative for congestion, rhinorrhea, sneezing, trouble swallowing and voice change.    Respiratory: Positive for cough and shortness of breath. Negative for chest tightness, wheezing and stridor.    Cardiovascular: Negative for chest pain, palpitations and leg swelling.   Gastrointestinal: Negative for abdominal pain, nausea and vomiting.   Genitourinary: Negative for dysuria.   Musculoskeletal: Negative for arthralgias and myalgias.   Skin: Negative for color change, pallor and rash.   Allergic/Immunologic: Negative for immunocompromised state.   Neurological: Negative for dizziness, weakness and headaches.   Psychiatric/Behavioral: Negative for confusion.   All other systems reviewed and are negative.      Past Medical History:   Diagnosis Date   • Chronic fatigue syndrome    • Diabetes (CMS/HCC)    • Hypertension    • Uterine cancer (CMS/HCC)        Allergies   Allergen Reactions   • Indomethacin Hives   • Iodine  Hives       No past surgical history on file.    Family History   Problem Relation Age of Onset   • Heart disease Mother    • Heart disease Father        Social History     Socioeconomic History   • Marital status:      Spouse name: Not on file   • Number of children: Not on file   • Years of education: Not on file   • Highest education level: Not on file   Tobacco Use   • Smoking status: Never Smoker   • Smokeless tobacco: Never Used   Substance and Sexual Activity   • Alcohol use: No     Frequency: Never   • Drug use: No           Objective   Physical Exam   Constitutional: She is oriented to person, place, and time. Vital signs are normal. She appears well-developed and well-nourished. She is active and cooperative. She appears ill. No distress.   HENT:   Head: Normocephalic and atraumatic. Head is without right periorbital erythema and without left periorbital erythema.   Right Ear: Hearing, external ear and ear canal normal. Tympanic membrane is erythematous and bulging. Tympanic membrane is not injected, not scarred, not perforated and not retracted. A middle ear effusion is present.   Left Ear: Hearing, external ear and ear canal normal. Tympanic membrane is erythematous and bulging. Tympanic membrane is not injected, not scarred, not perforated and not retracted. A middle ear effusion is present.   Nose: Mucosal edema present. No rhinorrhea or sinus tenderness. Right sinus exhibits no maxillary sinus tenderness and no frontal sinus tenderness. Left sinus exhibits no maxillary sinus tenderness and no frontal sinus tenderness.   Mouth/Throat: Uvula is midline and mucous membranes are normal. Normal dentition. No dental abscesses, uvula swelling or dental caries. Posterior oropharyngeal erythema present. No oropharyngeal exudate. Tonsils are 2+ on the right. Tonsils are 2+ on the left. No tonsillar exudate.   Eyes: Pupils are equal, round, and reactive to light. Conjunctivae and EOM are normal.   Neck:  Trachea normal, normal range of motion, full passive range of motion without pain and phonation normal. Neck supple. Normal range of motion present. No Brudzinski's sign and no Kernig's sign noted.   Cardiovascular: Normal rate, regular rhythm, S1 normal, S2 normal, normal heart sounds and normal pulses. Exam reveals no gallop and no friction rub.   No murmur heard.  Pulses:       Radial pulses are 2+ on the right side, and 2+ on the left side.        Dorsalis pedis pulses are 2+ on the right side, and 2+ on the left side.        Posterior tibial pulses are 2+ on the right side, and 2+ on the left side.   Pulmonary/Chest: Effort normal. No stridor. No respiratory distress. She has decreased breath sounds in the right lower field and the left lower field. She has no wheezes. She has no rales.   Abdominal: Soft. Normal appearance and bowel sounds are normal. There is no tenderness.   Lymphadenopathy:     She has no cervical adenopathy.   Neurological: She is alert and oriented to person, place, and time. She has normal strength. No cranial nerve deficit or sensory deficit. She exhibits normal muscle tone. GCS eye subscore is 4. GCS verbal subscore is 5. GCS motor subscore is 6.   Skin: Skin is warm and dry. Capillary refill takes less than 2 seconds. No rash noted. No pallor.   Psychiatric: She has a normal mood and affect. Her behavior is normal. Judgment and thought content normal.   Nursing note and vitals reviewed.      Procedures           ED Course      Xr Chest 2 View    Result Date: 1/5/2020   1. Mild cardiomegaly. 2. No active pulmonary disease.  Electronically Signed By-Osman Soto On:1/5/2020 12:30 PM This report was finalized on [id number] by  Osman Soto, .    Medications   sodium chloride 0.9 % flush 10 mL (has no administration in time range)   albuterol (PROVENTIL) nebulizer solution 0.083% 2.5 mg/3mL (has no administration in time range)   albuterol (PROVENTIL) nebulizer solution 0.083% 2.5 mg/3mL  (2.5 mg Nebulization Given 1/5/20 1207)   methylPREDNISolone sodium succinate (SOLU-Medrol) injection 125 mg (125 mg Intravenous Given 1/5/20 1300)   sodium chloride 0.9 % bolus 1,000 mL (1,000 mL Intravenous New Bag 1/5/20 1300)     Labs Reviewed   INFLUENZA ANTIGEN, RAPID - Abnormal; Notable for the following components:       Result Value    Influenza B PCR Detected (*)     All other components within normal limits   COMPREHENSIVE METABOLIC PANEL - Abnormal; Notable for the following components:    Glucose 126 (*)     All other components within normal limits    Narrative:     GFR Normal >60  Chronic Kidney Disease <60  Kidney Failure <15     CBC WITH AUTO DIFFERENTIAL - Abnormal; Notable for the following components:    Neutrophil % 76.2 (*)     Lymphocyte % 12.2 (*)     Eosinophil % 0.0 (*)     All other components within normal limits   RAPID STREP A SCREEN - Normal   RAINBOW DRAW    Narrative:     The following orders were created for panel order [facility] Draw.  Procedure                               Abnormality         Status                     ---------                               -----------         ------                     Light Blue Top[[id number]]                                   In process                 Green Top (Gel)[[id number]]                                  In process                 Lavender Top[[id number]]                                     In process                 Gold Top - SST[[id number]]                                   In process                   Please view results for these tests on the individual orders.   CBC AND DIFFERENTIAL    Narrative:     The following orders were created for panel order CBC & Differential.  Procedure                               Abnormality         Status                     ---------                               -----------         ------                     CBC Auto Differential[[id number]]        Abnormal            Final result                  Please view results for these tests on the individual orders.   LIGHT BLUE TOP   GREEN TOP   LAVENDER TOP   GOLD TOP - SST                                              MDM  Number of Diagnoses or Management Options  Hypoxia:   Influenza B:   Wheezing:   Diagnosis management comments: Chart Review: 12/26/2019 patient was seen by PCP for refills on her phentermine and Percocet.  Comorbidity: Chronic fatigue syndrome, chronic back pain, depression, RLS, diabetes mellitus type 2, hypertension, hyperlipidemia  Imaging: Was interpreted by physician and reviewed by myself: Xr Chest 2 View    Result Date: 1/5/2020   1. Mild cardiomegaly. 2. No active pulmonary disease.  Electronically Signed By-Osman Soto On:1/5/2020 12:30 PM This report was finalized on [id number] by  Osman Soto, .    Disposition/Treatment: Discussed results with patient, verbalized understanding.  Agreeable with plan of care.    Patient undressed and placed in gown for exam. 64-year-old female presents with a 2-day history of subjective fever, cough with pleuritic chest pain with coughing and deep inspiration, dyspnea, and myalgias.  She denies any pulmonary history.  She denies nausea vomiting diarrhea.  Denies urinary signs or symptoms.  She does also complain of a sore throat.  She reports she was recently on antibiotics for bilateral otitis media 1 month ago and completed her amoxicillin.  She denies recent ill exposure.  Denies obtaining a flu shot.  IV established and labs obtained.  Normal saline 1 L bolus, Solu-Medrol 125 mg IV, and albuterol mini neb given.  Influenza and strep screens obtained.  Chest x-ray obtained, which showed mild cardiomegaly otherwise no active pulmonary disease.  Patient's influenza was positive for influenza B.  Upon reassessment, patient remains flushed, warm.  She denies relief with medications given.  Removed nasal cannula and rechecked sats which decreased to 82% on room air.  Nasal cannula was reapplied and  she improved to 98% on 2 L.  Albuterol mini neb ordered at this time.  Hospitalist paged for admission.  Spoke with Dr. Burrows who accepted admission.         Amount and/or Complexity of Data Reviewed  Clinical lab tests: reviewed  Tests in the radiology section of CPT®: reviewed    Patient Progress  Patient progress: stable      Final diagnoses:   Hypoxia   Influenza B   Wheezing            Kristie Tariq, NP  01/09/20 6397

## 2020-01-05 NOTE — ED NOTES
Pt states she feels like she has the flu. C/o cough, difficulty breathing, fever, chest pain, generalized body aches starting Thursday. Pt denies exposure to flu and did not receive flu shot.     Prior, ANAT Castano  01/05/20 7946

## 2020-01-06 VITALS
OXYGEN SATURATION: 94 % | DIASTOLIC BLOOD PRESSURE: 69 MMHG | TEMPERATURE: 98.4 F | RESPIRATION RATE: 18 BRPM | WEIGHT: 263.6 LBS | HEART RATE: 51 BPM | HEIGHT: 65 IN | SYSTOLIC BLOOD PRESSURE: 127 MMHG | BODY MASS INDEX: 43.92 KG/M2

## 2020-01-06 PROBLEM — M54.16 LUMBAR RADICULOPATHY: Status: ACTIVE | Noted: 2018-09-17

## 2020-01-06 PROBLEM — E66.9 OBESITY WITH BODY MASS INDEX 30 OR GREATER: Status: ACTIVE | Noted: 2018-10-15

## 2020-01-06 PROBLEM — K21.9 GASTROESOPHAGEAL REFLUX DISEASE: Status: ACTIVE | Noted: 2020-01-06

## 2020-01-06 PROBLEM — M06.9 RHEUMATOID ARTHRITIS: Status: ACTIVE | Noted: 2020-01-06

## 2020-01-06 PROBLEM — R06.2 WHEEZING: Status: ACTIVE | Noted: 2020-01-06

## 2020-01-06 PROBLEM — R09.02 HYPOXIA: Status: RESOLVED | Noted: 2020-01-05 | Resolved: 2020-01-06

## 2020-01-06 PROBLEM — R06.2 WHEEZING: Status: RESOLVED | Noted: 2020-01-06 | Resolved: 2020-01-06

## 2020-01-06 PROBLEM — R55 SYNCOPE: Status: RESOLVED | Noted: 2019-09-20 | Resolved: 2020-01-06

## 2020-01-06 PROBLEM — K59.00 CONSTIPATION: Status: ACTIVE | Noted: 2018-09-12

## 2020-01-06 PROBLEM — E11.9 DIABETES MELLITUS: Status: ACTIVE | Noted: 2018-09-12

## 2020-01-06 PROBLEM — F32.A DEPRESSION: Status: ACTIVE | Noted: 2020-01-06

## 2020-01-06 PROBLEM — G93.32 CHRONIC FATIGUE SYNDROME: Status: ACTIVE | Noted: 2018-09-12

## 2020-01-06 PROBLEM — IMO0002 UNCONTROLLED TYPE 2 DIABETES MELLITUS: Status: ACTIVE | Noted: 2019-07-30

## 2020-01-06 PROBLEM — E78.5 HYPERLIPIDEMIA: Status: ACTIVE | Noted: 2018-09-17

## 2020-01-06 PROBLEM — I10 HYPERTENSION: Status: ACTIVE | Noted: 2018-09-12

## 2020-01-06 LAB
ANION GAP SERPL CALCULATED.3IONS-SCNC: 10 MMOL/L (ref 5–15)
BASOPHILS # BLD AUTO: 0 10*3/MM3 (ref 0–0.2)
BASOPHILS NFR BLD AUTO: 0.4 % (ref 0–1.5)
BUN BLD-MCNC: 11 MG/DL (ref 8–23)
BUN/CREAT SERPL: 14.7 (ref 7–25)
CALCIUM SPEC-SCNC: 8.7 MG/DL (ref 8.6–10.5)
CHLORIDE SERPL-SCNC: 102 MMOL/L (ref 98–107)
CO2 SERPL-SCNC: 27 MMOL/L (ref 22–29)
CREAT BLD-MCNC: 0.75 MG/DL (ref 0.57–1)
DEPRECATED RDW RBC AUTO: 42.9 FL (ref 37–54)
EOSINOPHIL # BLD AUTO: 0 10*3/MM3 (ref 0–0.4)
EOSINOPHIL NFR BLD AUTO: 0 % (ref 0.3–6.2)
ERYTHROCYTE [DISTWIDTH] IN BLOOD BY AUTOMATED COUNT: 12.7 % (ref 12.3–15.4)
GFR SERPL CREATININE-BSD FRML MDRD: 78 ML/MIN/1.73
GLUCOSE BLD-MCNC: 131 MG/DL (ref 65–99)
GLUCOSE BLDC GLUCOMTR-MCNC: 112 MG/DL (ref 70–105)
HCT VFR BLD AUTO: 36.3 % (ref 34–46.6)
HGB BLD-MCNC: 11.9 G/DL (ref 12–15.9)
LYMPHOCYTES # BLD AUTO: 1 10*3/MM3 (ref 0.7–3.1)
LYMPHOCYTES NFR BLD AUTO: 20.5 % (ref 19.6–45.3)
MCH RBC QN AUTO: 31 PG (ref 26.6–33)
MCHC RBC AUTO-ENTMCNC: 32.8 G/DL (ref 31.5–35.7)
MCV RBC AUTO: 94.4 FL (ref 79–97)
MONOCYTES # BLD AUTO: 0.5 10*3/MM3 (ref 0.1–0.9)
MONOCYTES NFR BLD AUTO: 10.2 % (ref 5–12)
NEUTROPHILS # BLD AUTO: 3.4 10*3/MM3 (ref 1.7–7)
NEUTROPHILS NFR BLD AUTO: 68.9 % (ref 42.7–76)
NRBC BLD AUTO-RTO: 0 /100 WBC (ref 0–0.2)
PLATELET # BLD AUTO: 149 10*3/MM3 (ref 140–450)
PMV BLD AUTO: 9 FL (ref 6–12)
POTASSIUM BLD-SCNC: 3.7 MMOL/L (ref 3.5–5.2)
RBC # BLD AUTO: 3.84 10*6/MM3 (ref 3.77–5.28)
SODIUM BLD-SCNC: 139 MMOL/L (ref 136–145)
WBC NRBC COR # BLD: 5 10*3/MM3 (ref 3.4–10.8)

## 2020-01-06 PROCEDURE — 25010000002 ENOXAPARIN PER 10 MG: Performed by: INTERNAL MEDICINE

## 2020-01-06 PROCEDURE — 85025 COMPLETE CBC W/AUTO DIFF WBC: CPT | Performed by: INTERNAL MEDICINE

## 2020-01-06 PROCEDURE — 80048 BASIC METABOLIC PNL TOTAL CA: CPT | Performed by: INTERNAL MEDICINE

## 2020-01-06 PROCEDURE — 94799 UNLISTED PULMONARY SVC/PX: CPT

## 2020-01-06 PROCEDURE — 82962 GLUCOSE BLOOD TEST: CPT

## 2020-01-06 PROCEDURE — 99238 HOSP IP/OBS DSCHRG MGMT 30/<: CPT | Performed by: INTERNAL MEDICINE

## 2020-01-06 RX ORDER — OSELTAMIVIR PHOSPHATE 75 MG/1
75 CAPSULE ORAL 2 TIMES DAILY
Qty: 14 CAPSULE | Refills: 0 | Status: SHIPPED | OUTPATIENT
Start: 2020-01-06 | End: 2020-03-31

## 2020-01-06 RX ADMIN — POTASSIUM CHLORIDE 10 MEQ: 750 TABLET, EXTENDED RELEASE ORAL at 09:03

## 2020-01-06 RX ADMIN — CITALOPRAM HYDROBROMIDE 20 MG: 20 TABLET ORAL at 09:03

## 2020-01-06 RX ADMIN — ENOXAPARIN SODIUM 40 MG: 40 INJECTION SUBCUTANEOUS at 09:03

## 2020-01-06 RX ADMIN — IPRATROPIUM BROMIDE AND ALBUTEROL SULFATE 3 ML: .5; 3 SOLUTION RESPIRATORY (INHALATION) at 06:51

## 2020-01-06 RX ADMIN — IPRATROPIUM BROMIDE AND ALBUTEROL SULFATE 3 ML: .5; 3 SOLUTION RESPIRATORY (INHALATION) at 11:12

## 2020-01-06 RX ADMIN — OXYCODONE HYDROCHLORIDE 10 MG: 5 TABLET ORAL at 07:44

## 2020-01-06 RX ADMIN — PANTOPRAZOLE SODIUM 40 MG: 40 TABLET, DELAYED RELEASE ORAL at 07:38

## 2020-01-06 RX ADMIN — GABAPENTIN 100 MG: 100 CAPSULE ORAL at 09:03

## 2020-01-06 RX ADMIN — OSELTAMIVIR PHOSPHATE 75 MG: 75 CAPSULE ORAL at 09:03

## 2020-01-06 NOTE — PLAN OF CARE
Problem: Patient Care Overview  Goal: Plan of Care Review  Outcome: Ongoing (interventions implemented as appropriate)  Note:   Pt on room air and feeling well. Will discharge today.

## 2020-01-06 NOTE — PROGRESS NOTES
Discharge Planning Assessment  [facility]     Patient Name: Kiki Perdomo  MRN: [id number]  Today's Date: 1/6/2020    Admit Date: 1/5/2020    Discharge Needs Assessment     Row Name 01/06/20 1016       Living Environment    Lives With  spouse    Current Living Arrangements  home/apartment/condo    Primary Care Provided by  self    Provides Primary Care For  no one    Family Caregiver if Needed  none    Able to Return to Prior Arrangements  yes       Resource/Environmental Concerns    Resource/Environmental Concerns  none    Transportation Concerns  car, none       Transition Planning    Patient/Family Anticipates Transition to  home;home with family    Patient/Family Anticipated Services at Transition  none    Transportation Anticipated  car, drives self       Discharge Needs Assessment    Readmission Within the Last 30 Days  no previous admission in last 30 days    Concerns to be Addressed  no discharge needs identified    Equipment Currently Used at Home  none    Anticipated Changes Related to Illness  none    Equipment Needed After Discharge  none    Provided post acute provider list?  -- None needed    Discharge Coordination/Progress  Routine discharge home with spouse. Denies any needs at this time.         Discharge Plan     Row Name 01/06/20 1017       Plan    Plan  Routine discharge home with spouse. Denies any needs at this time.     Patient/Family in Agreement with Plan  yes    Plan Comments  Patient has been independent, drives, and is IADL's. PCP is Dr. Ramos. Barriers to discharge: None noted.          Demographic Summary     Row Name 01/06/20 1014       General Information    Admission Type  inpatient    Arrived From  emergency department    Referral Source  other (see comments)    Reason for Consult  discharge planning    Preferred Language  English     Used During This Interaction  no       Contact Information    Permission Granted to Share Info With          Functional  Status     Row Name 01/06/20 1015       Functional Status    Usual Activity Tolerance  good    Current Activity Tolerance  moderate       Functional Status, IADL    Medications  independent    Meal Preparation  independent    Housekeeping  independent    Laundry  independent    Shopping  independent       Mental Status    General Appearance WDL  WDL       Mental Status Summary    Recent Changes in Mental Status/Cognitive Functioning  no changes        Psychosocial     Row Name 01/06/20 1015       Values/Beliefs    Spiritual, Cultural Beliefs, Worship Practices, Values that Affect Care  no       Behavior WDL    Behavior WDL  WDL       Emotion Mood WDL    Emotion/Mood/Affect WDL  WDL       Speech WDL    Speech WDL  WDL       Perceptual State WDL    Perceptual State WDL  WDL       Thought Process WDL    Thought Process WDL  WDL       Intellectual Performance WDL    Intellectual Performance WDL  WDL    Level of Consciousness  Alert       Coping/Stress    Major Change/Loss/Stressor  none    Patient Personal Strengths  able to adapt    Sources of Support  spouse    Reaction to Health Status  accepting    Understanding of Condition and Treatment  adequate understanding of medical condition       C-SSRS (Screen-Recent) Past Month    Q1 Wished to be Dead (Past Month)  no    Q2 Suicidal Thoughts (Past Month)  no    Q6 Suicide Behavior (Lifetime)  no       Violence Risk    Feels Like Hurting Others  no    Previous Attempt to Harm Others  no          Anna Naegele RN    Phone [phone]  Fax   [phone]

## 2020-01-06 NOTE — DISCHARGE INSTRUCTIONS
1. Please take your medicationss as prescribed    2. Please review your discharge medication list and compare it with medication you are already taking or will be taking. Report any inconsistency to our team or your primary care provider.    3. Please call us if you have any concerns or questions about your stay at this hospital. You may call us at [phone] and [phone] or email [email or web address]    4. Please follow up with your primary care doctor as soon as possible and reveiw your pending results from your stay at the hospital as you may have some pendng results for your test.     5. Please fill out your survey if you receive one and provide us with your honest feedback.     6. We wish you get well soon and let us know if there is anything we can do to make it easy for your next visit if any.

## 2020-01-07 ENCOUNTER — READMISSION MANAGEMENT (OUTPATIENT)
Dept: CALL CENTER | Facility: HOSPITAL | Age: 65
End: 2020-01-07

## 2020-01-07 NOTE — PROGRESS NOTES
Case Management Discharge Note      Final Note: Routine discharge home with spouse. Denies any needs at this time    Provided post acute provider list?: (None needed)              Final Discharge Disposition Code: 01 - home or self-care

## 2020-01-07 NOTE — OUTREACH NOTE
Prep Survey      Responses   Facility patient discharged from?  [facility]   Is patient eligible?  Yes   Discharge diagnosis  Influenza B   Does the patient have one of the following disease processes/diagnoses(primary or secondary)?  Other   Does the patient have Home health ordered?  No   Is there a DME ordered?  No   Prep survey completed?  Yes          Radha Beckett RN

## 2020-01-07 NOTE — DISCHARGE SUMMARY
[facility]  DISCHARGE SUMMARY        Prepared For PCP:  Berlin Ramos MD    Patient Name: Kiki Perdomo  : 1955  MRN: [id number]      Date of Admission:   2020    Date of Discharge:  2020    Length of stay:  LOS: 1 day     Hospital Course     Presenting Problem:   Wheezing [R06.2]  Influenza B [J10.1]  Hypoxia [R09.02]  Hypoxia [R09.02]      Active Hospital Problems    Diagnosis  POA   • **Influenza B [J10.1]  Yes   • Gastroesophageal reflux disease [K21.9]  Yes   • Depression [F32.9]  Yes   • Rheumatoid arthritis (CMS/HCC) [M06.9]  Yes   • Asthma [J45.909]  Yes   • Uncontrolled type 2 diabetes mellitus (CMS/[facility]) [E11.65]  Yes   • Obesity with body mass index 30 or greater [E66.9]  Yes   • Hyperlipidemia [E78.5]  Yes   • Lumbar radiculopathy [M54.16]  Yes   • Chronic fatigue syndrome [R53.82]  Yes   • Constipation [K59.00]  Yes   • Diabetes mellitus (CMS/HCC) [E11.9]  Yes   • Hypertension [I10]  Yes      Resolved Hospital Problems    Diagnosis Date Resolved POA   • Wheezing [R06.2] 2020 Yes   • Hypoxia [R09.02] 2020 Yes           Hospital Course:  The patient is a 64-year-old female who has a history of asthma, hypertension and type 2 diabetes mellitus who has had a 4-day history of fever, cough, chills, sore throat and myalgias and arthralgias.  The patient says she is just felt too poorly to even get out of bed.  When it did not improve at all she decided to come to the hospital this evening and was found to have influenza B and the hospitalist service has been asked to admit the patient.     The patient was found to have hypoxemia in the emergency room.  The patient was placed on oxygen and given breathing treatments.  The patient also received some Solu-Medrol.  The patient's main complaints at this time are the sore throat, fever, chills and cough.  The patient not really coughing anything up and her chest x-ray was negative.  The  "patient just overall feels \"like a truck hit her\".          Recommendation for Outpatient Providers:             Reasons For Change In Medications and Indications for New Medications:        Day of Discharge     HPI:       Vital Signs:   Temp:  [98 °F (36.7 °C)-98.6 °F (37 °C)] 98.4 °F (36.9 °C)  Heart Rate:  [44-57] 51  Resp:  [16-18] 18  BP: (122-165)/(55-77) 127/69     Physical Exam:  Physical Exam   Constitutional: She is oriented to person, place, and time. She appears well-developed and well-nourished. No distress.   HENT:   Head: Normocephalic and atraumatic.   Right Ear: External ear normal.   Left Ear: External ear normal.   Nose: Nose normal.   Mouth/Throat: Oropharynx is clear and moist. No oropharyngeal exudate.   Eyes: Pupils are equal, round, and reactive to light. Conjunctivae and EOM are normal. Right eye exhibits no discharge. Left eye exhibits no discharge. No scleral icterus.   Neck: Normal range of motion. No JVD present. No tracheal deviation present. No thyromegaly present.   Cardiovascular: Normal rate, regular rhythm, normal heart sounds and intact distal pulses. Exam reveals no gallop and no friction rub.   No murmur heard.  Pulmonary/Chest: Effort normal and breath sounds normal. No stridor. No respiratory distress. She has no wheezes. She has no rales. She exhibits no tenderness.   Abdominal: Soft. Bowel sounds are normal. She exhibits no distension and no mass. There is no tenderness. There is no rebound and no guarding. No hernia.   Musculoskeletal: Normal range of motion. She exhibits no edema, tenderness or deformity.   Lymphadenopathy:     She has no cervical adenopathy.   Neurological: She is alert and oriented to person, place, and time. No cranial nerve deficit or sensory deficit. She exhibits normal muscle tone. Coordination normal.   Skin: Skin is warm and dry. No rash noted. She is not diaphoretic. No erythema.   Psychiatric: She has a normal mood and affect. Her behavior is " normal.   Nursing note and vitals reviewed.      Pertinent  and/or Most Recent Results     Results from last 7 days   Lab Units 01/06/20  0455 01/05/20  1311   WBC 10*3/mm3 5.00 6.40   HEMOGLOBIN g/dL 11.9* 12.2   HEMATOCRIT % 36.3 37.2   PLATELETS 10*3/mm3 149 165   SODIUM mmol/L 139 140   POTASSIUM mmol/L 3.7 4.0   CHLORIDE mmol/L 102 100   CO2 mmol/L 27.0 28.0   BUN mg/dL 11 11   CREATININE mg/dL 0.75 0.90   GLUCOSE mg/dL 131* 126*   CALCIUM mg/dL 8.7 8.9     Results from last 7 days   Lab Units 01/05/20  1311   BILIRUBIN mg/dL 0.2   ALK PHOS U/L 70   ALT (SGPT) U/L 14   AST (SGOT) U/L 22           Invalid input(s): TG, LDLCALC, LDLREALC        Brief Urine Lab Results  (Last result in the past 365 days)      Color   Clarity   Blood   Leuk Est   Nitrite   Protein   CREAT   Urine HCG        09/21/19 1611 Yellow Clear Negative Negative Negative Negative               Microbiology Results Abnormal     Procedure Component Value - Date/Time    Rapid Strep A Screen - Swab, Throat [[id number]]  (Normal) Collected:  01/05/20 1300    Lab Status:  Final result Specimen:  Swab from Throat Updated:  01/05/20 1322     Strep A Ag Negative    Influenza Antigen, Rapid - Swab, Nasopharynx [[id number]]  (Abnormal) Collected:  01/05/20 1300    Lab Status:  Final result Specimen:  Swab from Nasopharynx Updated:  01/05/20 1321     Influenza A PCR Not Detected     Influenza B PCR Detected          Xr Chest 2 View    Result Date: 1/5/2020  Impression:  1. Mild cardiomegaly. 2. No active pulmonary disease.  Electronically Signed By-Osman Soto On:1/5/2020 12:30 PM This report was finalized on [id number] by  Osman Soto, .      Results for orders placed during the hospital encounter of 09/20/19   Duplex Carotid Ultrasound CAR    Narrative · Proximal right internal carotid artery is normal.  · Mid right internal carotid artery is normal.  · Distal right internal carotid artery is normal.  · All other right side carotid system vessels are  normal.  · Proximal left internal carotid artery is normal.  · Mid left internal carotid artery is normal.  · Distal left internal carotid artery is normal.  · All other left side carotid system vessels are normal.          Results for orders placed during the hospital encounter of 09/20/19   Duplex Carotid Ultrasound CAR    Narrative · Proximal right internal carotid artery is normal.  · Mid right internal carotid artery is normal.  · Distal right internal carotid artery is normal.  · All other right side carotid system vessels are normal.  · Proximal left internal carotid artery is normal.  · Mid left internal carotid artery is normal.  · Distal left internal carotid artery is normal.  · All other left side carotid system vessels are normal.          Results for orders placed during the hospital encounter of 09/20/19   Transthoracic Echo Complete With Contrast if Necessary Per Protocol    Narrative · Left ventricular systolic function is normal.  · Estimated EF = 67%.                  Test Results Pending at Discharge        Procedures Performed           Consults:   Consults     Date and Time Order Name Status Description    1/5/2020 1401 Hospitalist (on-call MD unless specified) Completed             Discharge Details        Discharge Medications      New Medications      Instructions Start Date   oseltamivir 75 MG capsule  Commonly known as:  TAMIFLU   75 mg, Oral, 2 Times Daily         Continue These Medications      Instructions Start Date   citalopram 20 MG tablet  Commonly known as:  CeleXA   20 mg, Oral, Daily      fenofibrate 160 MG tablet   160 mg, Oral, Daily      furosemide 40 MG tablet  Commonly known as:  LASIX   40 mg, Oral, Daily      gabapentin 100 MG capsule  Commonly known as:  NEURONTIN   100 mg, Oral, 2 Times Daily      hydrOXYzine 50 MG tablet  Commonly known as:  ATARAX   50 mg, Oral, Nightly PRN      lisinopril 20 MG tablet  Commonly known as:  PRINIVIL,ZESTRIL   20 mg, Oral, Daily       metFORMIN 500 MG tablet  Commonly known as:  GLUCOPHAGE   500 mg, Oral, 2 Times Daily With Meals      omeprazole 20 MG capsule  Commonly known as:  priLOSEC   20 mg, Oral, Daily      oxyCODONE-acetaminophen  MG per tablet  Commonly known as:  PERCOCET   1 tablet, Oral, Every 8 Hours PRN      phentermine 15 MG capsule   15 mg, Oral, Every Morning      potassium chloride 10 MEQ CR tablet  Commonly known as:  K-DUR,KLOR-CON   10 mEq, Oral, Daily      simvastatin 40 MG tablet  Commonly known as:  ZOCOR   40 mg, Oral, Nightly             Allergies   Allergen Reactions   • Indomethacin Hives   • Iodine Hives         Discharge Disposition:  Home or Self Care    Diet:  Hospital:No active diet order        Discharge Activity:   Activity Instructions     As tolerated                  CODE STATUS:    Code Status and Medical Interventions:   Ordered at: 01/05/20 2014     Level Of Support Discussed With:    Patient     Code Status:    CPR     Medical Interventions (Level of Support Prior to Arrest):    Full         Follow-up Appointments  No future appointments.          Condition on Discharge:      Stable          Electronically signed by Dougie Leigh MD, 01/06/20, 9:00 PM.    Time: I spent  23  minutes on this discharge activity which included face-to-face encounter with the patient/reviewing the data in the system/coordination of the care with the nursing staff as well as consultants/documentation/entering orders.

## 2020-01-08 ENCOUNTER — READMISSION MANAGEMENT (OUTPATIENT)
Dept: CALL CENTER | Facility: HOSPITAL | Age: 65
End: 2020-01-08

## 2020-01-08 NOTE — PAYOR COMM NOTE
"Clinicals for Inpatient Authorization per request -  For Kiki Jordan  1955   Pending  [id number]    AUTHORIZATION PENDING  PLEASE FORWARD DETERMINATION TO FOLLOWING CONTACT:    CARLA SAMS LPN [facility]    [phone]    Kiki Jordan (64 y.o. Female)     Date of Birth Social Security Number Address Home Phone MRN    1955  [id number] [place] [id number] [phone] [id number]    Hoahaoism Marital Status          Taoism        Admission Date Admission Type Admitting Provider Attending Provider Department, Room/Bed    20 Emergency Dougie Leigh MD  [facility] SURGICAL INPATIENT,     Discharge Date Discharge Disposition Discharge Destination        2020 Home or Self Care              Attending Provider:  (none)   Allergies:  Indomethacin, Iodine    Isolation:  Contact Drop   Infection:  Influenza (20)   Code Status:  Prior    Ht:  165.1 cm (65\")   Wt:  120 kg (263 lb 9.6 oz)    Admission Cmt:  None   Principal Problem:  Influenza B [J10.1]                 Active Insurance as of 2020     Primary Coverage     Payor Plan Insurance Group Employer/Plan Group    HUMANA MEDICARE REPLACEMENT HUMANA MEDICARE REPLACEMENT [id number]     Payor Plan Address Payor Plan Phone Number Payor Plan Fax Number Effective Dates    [address] [address] [phone]  2018 - None Entered    [facility] [address]       Subscriber Name Subscriber Birth Date Member ID       KIKI JORDAN 1955 [id number]                 Emergency Contacts      (Rel.) Home Phone Work Phone Mobile Phone    MILTON JORDAN (Spouse) -- -- [phone]               History & Physical      StormyNatalee MD at 20          [facility] HOSPITALIST     Berlin Ramos MD    CHIEF COMPLAINT:   Fever, chills, sore throat, cough, decreased appetite  HISTORY OF PRESENT ILLNESS:  The patient is a 64-year-old female who " "has a history of asthma, hypertension and type 2 diabetes mellitus who has had a 4-day history of fever, cough, chills, sore throat and myalgias and arthralgias.  The patient says she is just felt too poorly to even get out of bed.  When it did not improve at all she decided to come to the hospital this evening and was found to have influenza B and the hospitalist service has been asked to admit the patient.    The patient was found to have hypoxemia in the emergency room.  The patient was placed on oxygen and given breathing treatments.  The patient also received some Solu-Medrol.  The patient's main complaints at this time are the sore throat, fever, chills and cough.  The patient not really coughing anything up and her chest x-ray was negative.  The patient just overall feels \"like a truck hit her\".      Past Medical History:   Diagnosis Date   • Chronic fatigue syndrome    • Diabetes (CMS/HCC)    • Hypertension    • Uterine cancer (CMS/HCC)      No past surgical history on file.  Family History   Problem Relation Age of Onset   • Heart disease Mother    • Heart disease Father      Social History     Tobacco Use   • Smoking status: Never Smoker   • Smokeless tobacco: Never Used   Substance Use Topics   • Alcohol use: No     Frequency: Never   • Drug use: No     Medications Prior to Admission   Medication Sig Dispense Refill Last Dose   • furosemide (LASIX) 40 MG tablet Take 40 mg by mouth Daily.      • hydrOXYzine (ATARAX) 50 MG tablet Take 50 mg by mouth At Night As Needed for Itching.      • lisinopril (PRINIVIL,ZESTRIL) 20 MG tablet Take 20 mg by mouth Daily.      • omeprazole (priLOSEC) 20 MG capsule Take 20 mg by mouth Daily.      • phentermine 15 MG capsule Take 15 mg by mouth Every Morning.      • potassium chloride (K-DUR,KLOR-CON) 10 MEQ CR tablet Take 10 mEq by mouth Daily.      • citalopram (CeleXA) 20 MG tablet Take 20 mg by mouth Daily.      • fenofibrate 160 MG tablet Take 160 mg by mouth Daily.    " "  • gabapentin (NEURONTIN) 100 MG capsule Take 100 mg by mouth 2 (Two) Times a Day.      • metFORMIN (GLUCOPHAGE) 500 MG tablet Take 500 mg by mouth 2 (Two) Times a Day With Meals.      • oxyCODONE-acetaminophen (PERCOCET)  MG per tablet Take 1 tablet by mouth Every 8 (Eight) Hours As Needed for Moderate Pain .      • simvastatin (ZOCOR) 40 MG tablet Take 40 mg by mouth Every Night.        Allergies:  Indomethacin and Iodine      There is no immunization history on file for this patient.        REVIEW OF SYSTEMS:  Please see the above history of present illness for pertinent positives and negatives.  The remainder of the patient's systems have been reviewed and are negative.     Vital Signs  Temp:  [97.8 °F (36.6 °C)-102 °F (38.9 °C)] 102 °F (38.9 °C)  Heart Rate:  [61-74] 66  Resp:  [13-24] 20  BP: (111-158)/(48-75) 128/68    Flowsheet Rows      First Filed Value   Admission Height  165.1 cm (65\") Documented at 01/05/2020 1142   Admission Weight  116 kg (256 lb 13.4 oz) Documented at 01/05/2020 1142           Physical Exam:  Physical Exam   Constitutional: Patient is a morbidly obese female with nasal congestion, cough and looks as though she feels unwell.    HEENT:   Head: Normocephalic and atraumatic.   Eyes:  Pupils are equal, round, and reactive to light. EOM are intact. Sclerae are anicteric and noninjected.  Mouth and Throat: Patient has moist mucous membranes. Oropharynx is clear of any erythema or exudate.  Patient does have nasal drainage and a nonproductive phlegmy cough.    Neck: Neck supple.  No thyromegaly present. No lymphadenopathy present. No  masses.     Cardiovascular: Inspection: No JVD present. Palpation: No parasternal heave. Pedal pulses +1 bilaterally. No leg edema. Auscultation: Regular rate, regular rhythm, S1 normal and S2 normal. reveals no gallop and no friction rub. No Carotid bruit bilaterally.    Pulmonary/Chest: Inspection: No distress, no use of accessory muscles. Lungs are " clear to auscultation bilaterally.  There are a lot of upper airway rhonchi which improve after she truly coughs and expectorates.  No respiratory distress. No wheezes. No rhonchi. No rales.     Abdomen /Gastrointestinal: Inspection: no distension. Palpation: no masses, no organomegaly. Soft. There is no tenderness. Bowel sounds are normal.     Musculoskeletal: Normal Muscle tone. Age appropriate, no deformities.    Neurological: Patient is alert and oriented to person, place, and time. Cranial nerves II-XII are grossly intact with no focal deficits. Sensori-motor exam is normal. No cerebellar signs.    Skin: Skin is warm. No rash noted. Nails show no clubbing.  No cyanosis or erythema. No bruising.    Results Review:    I reviewed the patient's new clinical results.  Lab Results (most recent)     Procedure Component Value Units Date/Time    [place] Draw [[id number]] Collected:  01/05/20 1311    Specimen:  Blood from Arm, Left Updated:  01/05/20 1415    Narrative:       The following orders were created for panel order [place] Draw.  Procedure                               Abnormality         Status                     ---------                               -----------         ------                     Light Blue Top[[id number]]                                   Final result               Green Top (Gel)[[id number]]                                  Final result               Lavender Top[[id number]]                                     Final result               Gold Top - SST[[id number]]                                   Final result                 Please view results for these tests on the individual orders.    Light Blue Top [[id number]] Collected:  01/05/20 1311    Specimen:  Blood from Arm, Left Updated:  01/05/20 1415     Extra Tube hold for add-on     Comment: Auto resulted       Green Top (Gel) [[id number]] Collected:  01/05/20 1311    Specimen:  Blood from Arm, Left Updated:  01/05/20 1415     Extra Tube Hold  for add-ons.     Comment: Auto resulted.       Lavender Top [[id number]] Collected:  01/05/20 1311    Specimen:  Blood from Arm, Left Updated:  01/05/20 1415     Extra Tube hold for add-on     Comment: Auto resulted       Gold Top - SST [[id number]] Collected:  01/05/20 1311    Specimen:  Blood from Arm, Left Updated:  01/05/20 1415     Extra Tube Hold for add-ons.     Comment: Auto resulted.       Comprehensive Metabolic Panel [[id number]]  (Abnormal) Collected:  01/05/20 1311    Specimen:  Blood from Arm, Left Updated:  01/05/20 1347     Glucose 126 mg/dL      BUN 11 mg/dL      Creatinine 0.90 mg/dL      Sodium 140 mmol/L      Potassium 4.0 mmol/L      Chloride 100 mmol/L      CO2 28.0 mmol/L      Calcium 8.9 mg/dL      Total Protein 6.3 g/dL      Albumin 3.60 g/dL      ALT (SGPT) 14 U/L      AST (SGOT) 22 U/L      Alkaline Phosphatase 70 U/L      Total Bilirubin 0.2 mg/dL      eGFR Non African Amer 63 mL/min/1.73      Globulin 2.7 gm/dL      A/G Ratio 1.3 g/dL      BUN/Creatinine Ratio 12.2     Anion Gap 12.0 mmol/L     Narrative:       GFR Normal >60  Chronic Kidney Disease <60  Kidney Failure <15      Rapid Strep A Screen - Swab, Throat [[id number]]  (Normal) Collected:  01/05/20 1300    Specimen:  Swab from Throat Updated:  01/05/20 1322     Strep A Ag Negative    Influenza Antigen, Rapid - Swab, Nasopharynx [[id number]]  (Abnormal) Collected:  01/05/20 1300    Specimen:  Swab from Nasopharynx Updated:  01/05/20 1321     Influenza A PCR Not Detected     Influenza B PCR Detected    CBC & Differential [[id number]] Collected:  01/05/20 1311    Specimen:  Blood from Arm, Left Updated:  01/05/20 1317    Narrative:       The following orders were created for panel order CBC & Differential.  Procedure                               Abnormality         Status                     ---------                               -----------         ------                     CBC Auto Differential[[id number]]        Abnormal             Final result                 Please view results for these tests on the individual orders.    CBC Auto Differential [[id number]]  (Abnormal) Collected:  01/05/20 1311    Specimen:  Blood from Arm, Left Updated:  01/05/20 1317     WBC 6.40 10*3/mm3      RBC 4.00 10*6/mm3      Hemoglobin 12.2 g/dL      Hematocrit 37.2 %      MCV 93.0 fL      MCH 30.5 pg      MCHC 32.9 g/dL      RDW 12.8 %      RDW-SD 42.4 fl      MPV 8.1 fL      Platelets 165 10*3/mm3      Neutrophil % 76.2 %      Lymphocyte % 12.2 %      Monocyte % 11.2 %      Eosinophil % 0.0 %      Basophil % 0.4 %      Neutrophils, Absolute 4.90 10*3/mm3      Lymphocytes, Absolute 0.80 10*3/mm3      Monocytes, Absolute 0.70 10*3/mm3      Eosinophils, Absolute 0.00 10*3/mm3      Basophils, Absolute 0.00 10*3/mm3      nRBC 0.1 /100 WBC       Assessment and plan:  1.  Influenza B-we will start the patient on Tamiflu 75 mg twice daily for 5 days.  We will support the patient with fluids and symptomatic care.  Will monitor her glucose closely as well.  2.  Diabetes mellitus type 2-we will hold the patient's metformin in case she needs contrasted studies.  Will check glucose q. before meals and at bedtime and place the patient on a cardiac consistent carbohydrate diet.  Will place her on a sliding scale.  She may need this having received steroids in the emergency room.  We will also discontinue her Fen/Phen which she has been on for 5 months for weight loss as it can cause pulmonary hypertension and she is already hypoxemic and this may lead to additional complications.  3.  Asthma-though this does not appear to be affected at this time she very well may develop complications related to the asthma.  Will follow closely.  At this time we will support her with albuterol and Atrovent every 6 and albuterol every 3 as needed dyspnea.  She is not wheezing at this time so will withhold steroids especially given that she already has diabetes mellitus.  4.  Morbid obesity with  BMI of 44-lifestyle modification and weight reduction diet.  I discussed the patient's findings and my recommendations with patient and she agrees to proceed as outlined above.    Natalee Burrows MD  20  8:24 PM          Electronically signed by Natalee Burrows MD at 20 2031          Emergency Department Notes      Elizabet August RegSched Rep at 20 1203        RT notified of treatment ordered     Elizabet August RegSched Rep  20 1203      Electronically signed by Elizabet August RegSched Rep at 20 1203     Omaira Bailey RN at 20 1301        Pt states she feels like she has the flu. C/o cough, difficulty breathing, fever, chest pain, generalized body aches starting Thursday. Pt denies exposure to flu and did not receive flu shot.     Omaira Bailey RN  20 1302      Electronically signed by Omaira Bailey RN at 20 1302     Elizabet August RegSched Rep at 20 1412        RT notified of treatment ordered     Elizabet August RegSched   20 1413      Electronically signed by Elizabet August RegSched Rep at 20 1413          Discharge Summary      Dougie Leigh MD at 20 1421                [facility] Medicine Services  DISCHARGE SUMMARY        Prepared For PCP:  Berlin Ramos MD    Patient Name: Kiki Perdomo  : 1955  MRN: [id number]      Date of Admission:   2020    Date of Discharge:  2020    Length of stay:  LOS: 1 day     Hospital Course     Presenting Problem:   Wheezing [R06.2]  Influenza B [J10.1]  Hypoxia [R09.02]  Hypoxia [R09.02]      Active Hospital Problems    Diagnosis  POA   • **Influenza B [J10.1]  Yes   • Gastroesophageal reflux disease [K21.9]  Yes   • Depression [F32.9]  Yes   • Rheumatoid arthritis (CMS/HCC) [M06.9]  Yes   • Asthma [J45.909]  Yes   • Uncontrolled type 2 diabetes mellitus (CMS/HCC) [E11.65]  Yes   • Obesity with body mass index 30 or greater [E66.9]  Yes   •  "Hyperlipidemia [E78.5]  Yes   • Lumbar radiculopathy [M54.16]  Yes   • Chronic fatigue syndrome [R53.82]  Yes   • Constipation [K59.00]  Yes   • Diabetes mellitus (CMS/HCC) [E11.9]  Yes   • Hypertension [I10]  Yes      Resolved Hospital Problems    Diagnosis Date Resolved POA   • Wheezing [R06.2] 01/06/2020 Yes   • Hypoxia [R09.02] 01/06/2020 Yes           Hospital Course:  The patient is a 64-year-old female who has a history of asthma, hypertension and type 2 diabetes mellitus who has had a 4-day history of fever, cough, chills, sore throat and myalgias and arthralgias.  The patient says she is just felt too poorly to even get out of bed.  When it did not improve at all she decided to come to the hospital this evening and was found to have influenza B and the hospitalist service has been asked to admit the patient.     The patient was found to have hypoxemia in the emergency room.  The patient was placed on oxygen and given breathing treatments.  The patient also received some Solu-Medrol.  The patient's main complaints at this time are the sore throat, fever, chills and cough.  The patient not really coughing anything up and her chest x-ray was negative.  The patient just overall feels \"like a truck hit her\".          Recommendation for Outpatient Providers:             Reasons For Change In Medications and Indications for New Medications:        Day of Discharge     HPI:       Vital Signs:   Temp:  [98 °F (36.7 °C)-98.6 °F (37 °C)] 98.4 °F (36.9 °C)  Heart Rate:  [44-57] 51  Resp:  [16-18] 18  BP: (122-165)/(55-77) 127/69     Physical Exam:  Physical Exam   Constitutional: She is oriented to person, place, and time. She appears well-developed and well-nourished. No distress.   HENT:   Head: Normocephalic and atraumatic.   Right Ear: External ear normal.   Left Ear: External ear normal.   Nose: Nose normal.   Mouth/Throat: Oropharynx is clear and moist. No oropharyngeal exudate.   Eyes: Pupils are equal, round, " and reactive to light. Conjunctivae and EOM are normal. Right eye exhibits no discharge. Left eye exhibits no discharge. No scleral icterus.   Neck: Normal range of motion. No JVD present. No tracheal deviation present. No thyromegaly present.   Cardiovascular: Normal rate, regular rhythm, normal heart sounds and intact distal pulses. Exam reveals no gallop and no friction rub.   No murmur heard.  Pulmonary/Chest: Effort normal and breath sounds normal. No stridor. No respiratory distress. She has no wheezes. She has no rales. She exhibits no tenderness.   Abdominal: Soft. Bowel sounds are normal. She exhibits no distension and no mass. There is no tenderness. There is no rebound and no guarding. No hernia.   Musculoskeletal: Normal range of motion. She exhibits no edema, tenderness or deformity.   Lymphadenopathy:     She has no cervical adenopathy.   Neurological: She is alert and oriented to person, place, and time. No cranial nerve deficit or sensory deficit. She exhibits normal muscle tone. Coordination normal.   Skin: Skin is warm and dry. No rash noted. She is not diaphoretic. No erythema.   Psychiatric: She has a normal mood and affect. Her behavior is normal.   Nursing note and vitals reviewed.      Pertinent  and/or Most Recent Results     Results from last 7 days   Lab Units 01/06/20  0455 01/05/20  1311   WBC 10*3/mm3 5.00 6.40   HEMOGLOBIN g/dL 11.9* 12.2   HEMATOCRIT % 36.3 37.2   PLATELETS 10*3/mm3 149 165   SODIUM mmol/L 139 140   POTASSIUM mmol/L 3.7 4.0   CHLORIDE mmol/L 102 100   CO2 mmol/L 27.0 28.0   BUN mg/dL 11 11   CREATININE mg/dL 0.75 0.90   GLUCOSE mg/dL 131* 126*   CALCIUM mg/dL 8.7 8.9     Results from last 7 days   Lab Units 01/05/20  1311   BILIRUBIN mg/dL 0.2   ALK PHOS U/L 70   ALT (SGPT) U/L 14   AST (SGOT) U/L 22           Invalid input(s): TG, LDLCALC, LDLREALC        Brief Urine Lab Results  (Last result in the past 365 days)      Color   Clarity   Blood   Leuk Est   Nitrite    Protein   CREAT   Urine HCG        09/21/19 1611 Yellow Clear Negative Negative Negative Negative               Microbiology Results Abnormal     Procedure Component Value - Date/Time    Rapid Strep A Screen - Swab, Throat [[id number]]  (Normal) Collected:  01/05/20 1300    Lab Status:  Final result Specimen:  Swab from Throat Updated:  01/05/20 1322     Strep A Ag Negative    Influenza Antigen, Rapid - Swab, Nasopharynx [[id number]]  (Abnormal) Collected:  01/05/20 1300    Lab Status:  Final result Specimen:  Swab from Nasopharynx Updated:  01/05/20 1321     Influenza A PCR Not Detected     Influenza B PCR Detected          Xr Chest 2 View    Result Date: 1/5/2020  Impression:  1. Mild cardiomegaly. 2. No active pulmonary disease.  Electronically Signed By-Osman Soto On:1/5/2020 12:30 PM This report was finalized on [id number] by  Osman Soto, .      Results for orders placed during the hospital encounter of 09/20/19   Duplex Carotid Ultrasound CAR    Narrative · Proximal right internal carotid artery is normal.  · Mid right internal carotid artery is normal.  · Distal right internal carotid artery is normal.  · All other right side carotid system vessels are normal.  · Proximal left internal carotid artery is normal.  · Mid left internal carotid artery is normal.  · Distal left internal carotid artery is normal.  · All other left side carotid system vessels are normal.          Results for orders placed during the hospital encounter of 09/20/19   Duplex Carotid Ultrasound CAR    Narrative · Proximal right internal carotid artery is normal.  · Mid right internal carotid artery is normal.  · Distal right internal carotid artery is normal.  · All other right side carotid system vessels are normal.  · Proximal left internal carotid artery is normal.  · Mid left internal carotid artery is normal.  · Distal left internal carotid artery is normal.  · All other left side carotid system vessels are normal.           Results for orders placed during the hospital encounter of 09/20/19   Transthoracic Echo Complete With Contrast if Necessary Per Protocol    Narrative · Left ventricular systolic function is normal.  · Estimated EF = 67%.                  Test Results Pending at Discharge        Procedures Performed           Consults:   Consults     Date and Time Order Name Status Description    1/5/2020 1402 Hospitalist (on-call MD unless specified) Completed             Discharge Details        Discharge Medications      New Medications      Instructions Start Date   oseltamivir 75 MG capsule  Commonly known as:  TAMIFLU   75 mg, Oral, 2 Times Daily         Continue These Medications      Instructions Start Date   citalopram 20 MG tablet  Commonly known as:  CeleXA   20 mg, Oral, Daily      fenofibrate 160 MG tablet   160 mg, Oral, Daily      furosemide 40 MG tablet  Commonly known as:  LASIX   40 mg, Oral, Daily      gabapentin 100 MG capsule  Commonly known as:  NEURONTIN   100 mg, Oral, 2 Times Daily      hydrOXYzine 50 MG tablet  Commonly known as:  ATARAX   50 mg, Oral, Nightly PRN      lisinopril 20 MG tablet  Commonly known as:  PRINIVIL,ZESTRIL   20 mg, Oral, Daily      metFORMIN 500 MG tablet  Commonly known as:  GLUCOPHAGE   500 mg, Oral, 2 Times Daily With Meals      omeprazole 20 MG capsule  Commonly known as:  priLOSEC   20 mg, Oral, Daily      oxyCODONE-acetaminophen  MG per tablet  Commonly known as:  PERCOCET   1 tablet, Oral, Every 8 Hours PRN      phentermine 15 MG capsule   15 mg, Oral, Every Morning      potassium chloride 10 MEQ CR tablet  Commonly known as:  K-DUR,KLOR-CON   10 mEq, Oral, Daily      simvastatin 40 MG tablet  Commonly known as:  ZOCOR   40 mg, Oral, Nightly             Allergies   Allergen Reactions   • Indomethacin Hives   • Iodine Hives         Discharge Disposition:  Home or Self Care    Diet:  Hospital:No active diet order        Discharge Activity:   Activity Instructions      As tolerated                  CODE STATUS:    Code Status and Medical Interventions:   Ordered at: 01/05/20 2014     Level Of Support Discussed With:    Patient     Code Status:    CPR     Medical Interventions (Level of Support Prior to Arrest):    Full         Follow-up Appointments  No future appointments.          Condition on Discharge:      Stable          Electronically signed by Dougie Leigh MD, 01/06/20, 9:00 PM.    Time: I spent  23  minutes on this discharge activity which included face-to-face encounter with the patient/reviewing the data in the system/coordination of the care with the nursing staff as well as consultants/documentation/entering orders.      Electronically signed by Dougie Leigh MD at 01/06/20 8037

## 2020-01-08 NOTE — OUTREACH NOTE
Medical Week 1 Survey      Responses   Facility patient discharged from?  [facility]   Does the patient have one of the following disease processes/diagnoses(primary or secondary)?  Other   Is there a successful TCM telephone encounter documented?  No   Week 1 attempt successful?  Yes   Call start time  1658   Call end time  1702   Meds reviewed with patient/caregiver?  Yes   Is the patient having any side effects they believe may be caused by any medication additions or changes?  No   Does the patient have all medications ordered at discharge?  Yes   Is the patient taking all medications as directed (includes completed medication regime)?  Yes   Does the patient have a primary care provider?   Yes   Does the patient have an appointment with their PCP within 7 days of discharge?  Greater than 7 days   Comments regarding PCP  PATIENT STATES SHE HAS AN APPOINTMENT WITH DR. RIOS 1/23/20   What is preventing the patient from scheduling follow up appointments within 7 days of discharge?  Earlier appointment not available   Nursing Interventions  Verified appointment date/time/provider   Has the patient kept scheduled appointments due by today?  N/A   Has home health visited the patient within 72 hours of discharge?  N/A   Did the patient receive a copy of their discharge instructions?  Yes   Nursing interventions  Reviewed instructions with patient   What is the patient's perception of their health status since discharge?  Same   Is the patient/caregiver able to teach back signs and symptoms related to disease process for when to call PCP?  Yes   Is the patient/caregiver able to teach back signs and symptoms related to disease process for when to call 911?  Yes   Is the patient/caregiver able to teach back the hierarchy of who to call/visit for symptoms/problems? PCP, Specialist, Home health nurse, Urgent Care, ED, 911  Yes   Additional teach back comments  PATIENT VOICES THAT SHE IS STILL FEELING BAD, AND NO IMPROVEMENT.  "PATIENT HAD AUDIBLE HOARSNESS TO HER VOICE AND RAPID BREATHING. PATIENT STATES, \"I AM TRYING TO LET THE TAMIFLU DO IT'S THING.\" PATIENT GIVEN WARNING SIGNS FOR CALLING PCP OR GOING TO ED.    Week 1 call completed?  Yes   Graduated  Yes   Did the patient feel the follow up calls were helpful during their recovery period?  Yes   Was the number of calls appropriate?  Yes          Freida Manzanares LPN  "

## 2020-03-30 ENCOUNTER — HOSPITAL ENCOUNTER (OUTPATIENT)
Facility: HOSPITAL | Age: 65
Setting detail: OBSERVATION
Discharge: HOME | End: 2020-04-02
Attending: EMERGENCY MEDICINE | Admitting: EMERGENCY MEDICINE

## 2020-03-30 ENCOUNTER — APPOINTMENT (OUTPATIENT)
Dept: GENERAL RADIOLOGY | Facility: HOSPITAL | Age: 65
End: 2020-03-30

## 2020-03-30 DIAGNOSIS — R05.9 COUGH: ICD-10-CM

## 2020-03-30 DIAGNOSIS — Z20.822 SUSPECTED 2019 NOVEL CORONAVIRUS INFECTION: ICD-10-CM

## 2020-03-30 DIAGNOSIS — R06.00 DYSPNEA, UNSPECIFIED TYPE: Primary | ICD-10-CM

## 2020-03-30 DIAGNOSIS — R91.1 LUNG NODULE: ICD-10-CM

## 2020-03-30 LAB
ALBUMIN SERPL-MCNC: 3.6 G/DL (ref 3.5–5.2)
ALBUMIN/GLOB SERPL: 1.5 G/DL
ALP SERPL-CCNC: 85 U/L (ref 39–117)
ALT SERPL W P-5'-P-CCNC: 14 U/L (ref 1–33)
ANION GAP SERPL CALCULATED.3IONS-SCNC: 8 MMOL/L (ref 5–15)
AST SERPL-CCNC: 18 U/L (ref 1–32)
BASOPHILS # BLD AUTO: 0 10*3/MM3 (ref 0–0.2)
BASOPHILS NFR BLD AUTO: 0.7 % (ref 0–1.5)
BILIRUB SERPL-MCNC: 0.3 MG/DL (ref 0.2–1.2)
BUN BLD-MCNC: 15 MG/DL (ref 8–23)
BUN/CREAT SERPL: 16.5 (ref 7–25)
CALCIUM SPEC-SCNC: 9 MG/DL (ref 8.6–10.5)
CHLORIDE SERPL-SCNC: 107 MMOL/L (ref 98–107)
CO2 SERPL-SCNC: 26 MMOL/L (ref 22–29)
CREAT BLD-MCNC: 0.91 MG/DL (ref 0.57–1)
D DIMER PPP FEU-MCNC: 0.63 MCGFEU/ML (ref 0.17–0.59)
DEPRECATED RDW RBC AUTO: 44.6 FL (ref 37–54)
EOSINOPHIL # BLD AUTO: 0.1 10*3/MM3 (ref 0–0.4)
EOSINOPHIL NFR BLD AUTO: 1.3 % (ref 0.3–6.2)
ERYTHROCYTE [DISTWIDTH] IN BLOOD BY AUTOMATED COUNT: 14 % (ref 12.3–15.4)
GFR SERPL CREATININE-BSD FRML MDRD: 62 ML/MIN/1.73
GLOBULIN UR ELPH-MCNC: 2.4 GM/DL
GLUCOSE BLD-MCNC: 105 MG/DL (ref 65–99)
HCT VFR BLD AUTO: 33.2 % (ref 34–46.6)
HGB BLD-MCNC: 11.2 G/DL (ref 12–15.9)
LYMPHOCYTES # BLD AUTO: 2.2 10*3/MM3 (ref 0.7–3.1)
LYMPHOCYTES NFR BLD AUTO: 38.7 % (ref 19.6–45.3)
MCH RBC QN AUTO: 30.5 PG (ref 26.6–33)
MCHC RBC AUTO-ENTMCNC: 33.8 G/DL (ref 31.5–35.7)
MCV RBC AUTO: 90.2 FL (ref 79–97)
MONOCYTES # BLD AUTO: 0.5 10*3/MM3 (ref 0.1–0.9)
MONOCYTES NFR BLD AUTO: 8.6 % (ref 5–12)
NEUTROPHILS # BLD AUTO: 2.8 10*3/MM3 (ref 1.7–7)
NEUTROPHILS NFR BLD AUTO: 50.7 % (ref 42.7–76)
NRBC BLD AUTO-RTO: 0.1 /100 WBC (ref 0–0.2)
NT-PROBNP SERPL-MCNC: 338.9 PG/ML (ref 5–900)
PLATELET # BLD AUTO: 202 10*3/MM3 (ref 140–450)
PMV BLD AUTO: 8.1 FL (ref 6–12)
POTASSIUM BLD-SCNC: 4.7 MMOL/L (ref 3.5–5.2)
PROT SERPL-MCNC: 6 G/DL (ref 6–8.5)
RBC # BLD AUTO: 3.68 10*6/MM3 (ref 3.77–5.28)
SODIUM BLD-SCNC: 141 MMOL/L (ref 136–145)
TROPONIN T SERPL-MCNC: <0.01 NG/ML (ref 0–0.03)
WBC NRBC COR # BLD: 5.6 10*3/MM3 (ref 3.4–10.8)

## 2020-03-30 PROCEDURE — 85025 COMPLETE CBC W/AUTO DIFF WBC: CPT | Performed by: EMERGENCY MEDICINE

## 2020-03-30 PROCEDURE — 99284 EMERGENCY DEPT VISIT MOD MDM: CPT

## 2020-03-30 PROCEDURE — 96372 THER/PROPH/DIAG INJ SC/IM: CPT

## 2020-03-30 PROCEDURE — 80053 COMPREHEN METABOLIC PANEL: CPT | Performed by: EMERGENCY MEDICINE

## 2020-03-30 PROCEDURE — 71045 X-RAY EXAM CHEST 1 VIEW: CPT

## 2020-03-30 PROCEDURE — 96374 THER/PROPH/DIAG INJ IV PUSH: CPT

## 2020-03-30 PROCEDURE — G0378 HOSPITAL OBSERVATION PER HR: HCPCS

## 2020-03-30 PROCEDURE — 93005 ELECTROCARDIOGRAM TRACING: CPT | Performed by: EMERGENCY MEDICINE

## 2020-03-30 PROCEDURE — 0099U HC BIOFIRE FILMARRAY RESP PANEL 1: CPT | Performed by: NURSE PRACTITIONER

## 2020-03-30 PROCEDURE — 25010000002 DIPHENHYDRAMINE PER 50 MG

## 2020-03-30 PROCEDURE — 85379 FIBRIN DEGRADATION QUANT: CPT | Performed by: EMERGENCY MEDICINE

## 2020-03-30 PROCEDURE — 25010000002 METHYLPREDNISOLONE PER 125 MG

## 2020-03-30 PROCEDURE — U0001 2019-NCOV DIAGNOSTIC P: HCPCS | Performed by: EMERGENCY MEDICINE

## 2020-03-30 PROCEDURE — 83880 ASSAY OF NATRIURETIC PEPTIDE: CPT | Performed by: EMERGENCY MEDICINE

## 2020-03-30 PROCEDURE — 84484 ASSAY OF TROPONIN QUANT: CPT | Performed by: EMERGENCY MEDICINE

## 2020-03-30 PROCEDURE — 96375 TX/PRO/DX INJ NEW DRUG ADDON: CPT

## 2020-03-30 RX ORDER — DIPHENHYDRAMINE HYDROCHLORIDE 50 MG/ML
INJECTION INTRAMUSCULAR; INTRAVENOUS
Status: COMPLETED
Start: 2020-03-30 | End: 2020-03-30

## 2020-03-30 RX ORDER — METHYLPREDNISOLONE SODIUM SUCCINATE 125 MG/2ML
125 INJECTION, POWDER, LYOPHILIZED, FOR SOLUTION INTRAMUSCULAR; INTRAVENOUS ONCE
Status: COMPLETED | OUTPATIENT
Start: 2020-03-30 | End: 2020-03-30

## 2020-03-30 RX ORDER — ACETAMINOPHEN 500 MG
1000 TABLET ORAL ONCE
Status: DISCONTINUED | OUTPATIENT
Start: 2020-03-30 | End: 2020-03-30

## 2020-03-30 RX ORDER — SODIUM CHLORIDE 0.9 % (FLUSH) 0.9 %
10 SYRINGE (ML) INJECTION AS NEEDED
Status: DISCONTINUED | OUTPATIENT
Start: 2020-03-30 | End: 2020-04-02 | Stop reason: HOSPADM

## 2020-03-30 RX ORDER — METHYLPREDNISOLONE SODIUM SUCCINATE 125 MG/2ML
INJECTION, POWDER, LYOPHILIZED, FOR SOLUTION INTRAMUSCULAR; INTRAVENOUS
Status: COMPLETED
Start: 2020-03-30 | End: 2020-03-30

## 2020-03-30 RX ORDER — DIPHENHYDRAMINE HYDROCHLORIDE 50 MG/ML
50 INJECTION INTRAMUSCULAR; INTRAVENOUS ONCE
Status: COMPLETED | OUTPATIENT
Start: 2020-03-30 | End: 2020-03-30

## 2020-03-30 RX ADMIN — METHYLPREDNISOLONE SODIUM SUCCINATE 125 MG: 125 INJECTION, POWDER, LYOPHILIZED, FOR SOLUTION INTRAMUSCULAR; INTRAVENOUS at 23:18

## 2020-03-30 RX ADMIN — DIPHENHYDRAMINE HYDROCHLORIDE 50 MG: 50 INJECTION, SOLUTION INTRAMUSCULAR; INTRAVENOUS at 23:18

## 2020-03-30 RX ADMIN — DIPHENHYDRAMINE HYDROCHLORIDE 50 MG: 50 INJECTION INTRAMUSCULAR; INTRAVENOUS at 23:18

## 2020-03-30 RX ADMIN — METHYLPREDNISOLONE SODIUM SUCCINATE 125 MG: 125 INJECTION, POWDER, FOR SOLUTION INTRAMUSCULAR; INTRAVENOUS at 23:18

## 2020-03-31 ENCOUNTER — APPOINTMENT (OUTPATIENT)
Dept: CT IMAGING | Facility: HOSPITAL | Age: 65
End: 2020-03-31

## 2020-03-31 PROBLEM — R06.00 DYSPNEA: Status: ACTIVE | Noted: 2020-03-31

## 2020-03-31 LAB
B PERT DNA SPEC QL NAA+PROBE: NOT DETECTED
C PNEUM DNA NPH QL NAA+NON-PROBE: NOT DETECTED
FLUAV H1 2009 PAND RNA NPH QL NAA+PROBE: NOT DETECTED
FLUAV H1 HA GENE NPH QL NAA+PROBE: NOT DETECTED
FLUAV H3 RNA NPH QL NAA+PROBE: NOT DETECTED
FLUAV SUBTYP SPEC NAA+PROBE: NOT DETECTED
FLUBV RNA ISLT QL NAA+PROBE: NOT DETECTED
GLUCOSE BLDC GLUCOMTR-MCNC: 129 MG/DL (ref 70–105)
GLUCOSE BLDC GLUCOMTR-MCNC: 144 MG/DL (ref 70–105)
GLUCOSE BLDC GLUCOMTR-MCNC: 169 MG/DL (ref 70–105)
GLUCOSE BLDC GLUCOMTR-MCNC: 99 MG/DL (ref 70–105)
HADV DNA SPEC NAA+PROBE: NOT DETECTED
HCOV 229E RNA SPEC QL NAA+PROBE: NOT DETECTED
HCOV HKU1 RNA SPEC QL NAA+PROBE: NOT DETECTED
HCOV NL63 RNA SPEC QL NAA+PROBE: NOT DETECTED
HCOV OC43 RNA SPEC QL NAA+PROBE: NOT DETECTED
HMPV RNA NPH QL NAA+NON-PROBE: NOT DETECTED
HPIV1 RNA SPEC QL NAA+PROBE: NOT DETECTED
HPIV2 RNA SPEC QL NAA+PROBE: NOT DETECTED
HPIV3 RNA NPH QL NAA+PROBE: NOT DETECTED
HPIV4 P GENE NPH QL NAA+PROBE: NOT DETECTED
M PNEUMO IGG SER IA-ACNC: NOT DETECTED
RHINOVIRUS RNA SPEC NAA+PROBE: NOT DETECTED
RSV RNA NPH QL NAA+NON-PROBE: NOT DETECTED
TROPONIN T SERPL-MCNC: <0.01 NG/ML (ref 0–0.03)
TROPONIN T SERPL-MCNC: <0.01 NG/ML (ref 0–0.03)

## 2020-03-31 PROCEDURE — 63710000001 INSULIN LISPRO (HUMAN) PER 5 UNITS: Performed by: NURSE PRACTITIONER

## 2020-03-31 PROCEDURE — 99219 PR INITIAL OBSERVATION CARE/DAY 50 MINUTES: CPT | Performed by: NURSE PRACTITIONER

## 2020-03-31 PROCEDURE — 84484 ASSAY OF TROPONIN QUANT: CPT | Performed by: FAMILY MEDICINE

## 2020-03-31 PROCEDURE — 96372 THER/PROPH/DIAG INJ SC/IM: CPT

## 2020-03-31 PROCEDURE — 82962 GLUCOSE BLOOD TEST: CPT

## 2020-03-31 PROCEDURE — G0378 HOSPITAL OBSERVATION PER HR: HCPCS

## 2020-03-31 PROCEDURE — 84484 ASSAY OF TROPONIN QUANT: CPT | Performed by: NURSE PRACTITIONER

## 2020-03-31 PROCEDURE — 83036 HEMOGLOBIN GLYCOSYLATED A1C: CPT | Performed by: NURSE PRACTITIONER

## 2020-03-31 PROCEDURE — 71275 CT ANGIOGRAPHY CHEST: CPT

## 2020-03-31 PROCEDURE — 25010000002 ENOXAPARIN PER 10 MG: Performed by: NURSE PRACTITIONER

## 2020-03-31 PROCEDURE — 0 IOPAMIDOL PER 1 ML: Performed by: EMERGENCY MEDICINE

## 2020-03-31 RX ORDER — SODIUM CHLORIDE 0.9 % (FLUSH) 0.9 %
10 SYRINGE (ML) INJECTION EVERY 12 HOURS SCHEDULED
Status: DISCONTINUED | OUTPATIENT
Start: 2020-03-31 | End: 2020-04-02 | Stop reason: HOSPADM

## 2020-03-31 RX ORDER — CITALOPRAM 20 MG/1
20 TABLET ORAL DAILY
Status: DISCONTINUED | OUTPATIENT
Start: 2020-03-31 | End: 2020-04-02 | Stop reason: HOSPADM

## 2020-03-31 RX ORDER — ONDANSETRON 2 MG/ML
4 INJECTION INTRAMUSCULAR; INTRAVENOUS EVERY 6 HOURS PRN
Status: DISCONTINUED | OUTPATIENT
Start: 2020-03-31 | End: 2020-04-02 | Stop reason: HOSPADM

## 2020-03-31 RX ORDER — DEXTROSE MONOHYDRATE 25 G/50ML
25 INJECTION, SOLUTION INTRAVENOUS
Status: DISCONTINUED | OUTPATIENT
Start: 2020-03-31 | End: 2020-04-02 | Stop reason: HOSPADM

## 2020-03-31 RX ORDER — ACETAMINOPHEN 160 MG/5ML
650 SOLUTION ORAL EVERY 4 HOURS PRN
Status: DISCONTINUED | OUTPATIENT
Start: 2020-03-31 | End: 2020-04-02 | Stop reason: HOSPADM

## 2020-03-31 RX ORDER — SODIUM CHLORIDE 0.9 % (FLUSH) 0.9 %
10 SYRINGE (ML) INJECTION AS NEEDED
Status: DISCONTINUED | OUTPATIENT
Start: 2020-03-31 | End: 2020-04-02 | Stop reason: HOSPADM

## 2020-03-31 RX ORDER — ACETAMINOPHEN 650 MG/1
650 SUPPOSITORY RECTAL EVERY 4 HOURS PRN
Status: DISCONTINUED | OUTPATIENT
Start: 2020-03-31 | End: 2020-04-02 | Stop reason: HOSPADM

## 2020-03-31 RX ORDER — NICOTINE POLACRILEX 4 MG
15 LOZENGE BUCCAL
Status: DISCONTINUED | OUTPATIENT
Start: 2020-03-31 | End: 2020-04-02 | Stop reason: HOSPADM

## 2020-03-31 RX ORDER — PANTOPRAZOLE SODIUM 40 MG/1
40 TABLET, DELAYED RELEASE ORAL EVERY MORNING
Status: DISCONTINUED | OUTPATIENT
Start: 2020-03-31 | End: 2020-04-02 | Stop reason: HOSPADM

## 2020-03-31 RX ORDER — HYDROXYZINE HYDROCHLORIDE 25 MG/1
50 TABLET, FILM COATED ORAL NIGHTLY PRN
Status: DISCONTINUED | OUTPATIENT
Start: 2020-03-31 | End: 2020-04-02 | Stop reason: HOSPADM

## 2020-03-31 RX ORDER — OXYCODONE HYDROCHLORIDE 5 MG/1
10 TABLET ORAL EVERY 8 HOURS PRN
Status: DISCONTINUED | OUTPATIENT
Start: 2020-03-31 | End: 2020-04-02 | Stop reason: HOSPADM

## 2020-03-31 RX ORDER — ONDANSETRON 4 MG/1
4 TABLET, FILM COATED ORAL EVERY 6 HOURS PRN
Status: DISCONTINUED | OUTPATIENT
Start: 2020-03-31 | End: 2020-04-02 | Stop reason: HOSPADM

## 2020-03-31 RX ORDER — ATORVASTATIN CALCIUM 20 MG/1
20 TABLET, FILM COATED ORAL DAILY
Status: DISCONTINUED | OUTPATIENT
Start: 2020-03-31 | End: 2020-04-02 | Stop reason: HOSPADM

## 2020-03-31 RX ORDER — ACETAMINOPHEN 325 MG/1
650 TABLET ORAL EVERY 4 HOURS PRN
Status: DISCONTINUED | OUTPATIENT
Start: 2020-03-31 | End: 2020-04-02 | Stop reason: HOSPADM

## 2020-03-31 RX ORDER — LISINOPRIL 20 MG/1
20 TABLET ORAL DAILY
Status: DISCONTINUED | OUTPATIENT
Start: 2020-03-31 | End: 2020-04-02 | Stop reason: HOSPADM

## 2020-03-31 RX ORDER — GABAPENTIN 100 MG/1
100 CAPSULE ORAL 2 TIMES DAILY
Status: DISCONTINUED | OUTPATIENT
Start: 2020-03-31 | End: 2020-04-02 | Stop reason: HOSPADM

## 2020-03-31 RX ADMIN — Medication 10 ML: at 22:13

## 2020-03-31 RX ADMIN — GABAPENTIN 100 MG: 100 CAPSULE ORAL at 22:13

## 2020-03-31 RX ADMIN — OXYCODONE HYDROCHLORIDE 10 MG: 5 TABLET ORAL at 04:15

## 2020-03-31 RX ADMIN — PANTOPRAZOLE SODIUM 40 MG: 40 TABLET, DELAYED RELEASE ORAL at 06:15

## 2020-03-31 RX ADMIN — OXYCODONE HYDROCHLORIDE 10 MG: 5 TABLET ORAL at 11:57

## 2020-03-31 RX ADMIN — OXYCODONE HYDROCHLORIDE 10 MG: 5 TABLET ORAL at 22:13

## 2020-03-31 RX ADMIN — Medication 10 ML: at 08:29

## 2020-03-31 RX ADMIN — GABAPENTIN 100 MG: 100 CAPSULE ORAL at 08:29

## 2020-03-31 RX ADMIN — ATORVASTATIN CALCIUM 20 MG: 20 TABLET, FILM COATED ORAL at 08:30

## 2020-03-31 RX ADMIN — LISINOPRIL 20 MG: 20 TABLET ORAL at 08:29

## 2020-03-31 RX ADMIN — SODIUM CHLORIDE 500 ML: 900 INJECTION, SOLUTION INTRAVENOUS at 03:38

## 2020-03-31 RX ADMIN — HYDROXYZINE HYDROCHLORIDE 50 MG: 25 TABLET, FILM COATED ORAL at 22:19

## 2020-03-31 RX ADMIN — IOPAMIDOL 100 ML: 755 INJECTION, SOLUTION INTRAVENOUS at 00:28

## 2020-03-31 RX ADMIN — CITALOPRAM HYDROBROMIDE 20 MG: 20 TABLET ORAL at 08:29

## 2020-03-31 RX ADMIN — ENOXAPARIN SODIUM 40 MG: 40 INJECTION SUBCUTANEOUS at 22:13

## 2020-03-31 RX ADMIN — Medication 10 ML: at 03:39

## 2020-03-31 RX ADMIN — INSULIN LISPRO 2 UNITS: 100 INJECTION, SOLUTION INTRAVENOUS; SUBCUTANEOUS at 11:56

## 2020-03-31 RX ADMIN — ENOXAPARIN SODIUM 40 MG: 40 INJECTION SUBCUTANEOUS at 08:30

## 2020-04-01 LAB
ALBUMIN SERPL-MCNC: 3.5 G/DL (ref 3.5–5.2)
ALBUMIN/GLOB SERPL: 1.5 G/DL
ALP SERPL-CCNC: 80 U/L (ref 39–117)
ALT SERPL W P-5'-P-CCNC: 15 U/L (ref 1–33)
ANION GAP SERPL CALCULATED.3IONS-SCNC: 11 MMOL/L (ref 5–15)
AST SERPL-CCNC: 18 U/L (ref 1–32)
BASOPHILS # BLD AUTO: 0 10*3/MM3 (ref 0–0.2)
BASOPHILS NFR BLD AUTO: 0.1 % (ref 0–1.5)
BILIRUB SERPL-MCNC: 0.2 MG/DL (ref 0.2–1.2)
BUN BLD-MCNC: 19 MG/DL (ref 8–23)
BUN/CREAT SERPL: 20.4 (ref 7–25)
CALCIUM SPEC-SCNC: 9.1 MG/DL (ref 8.6–10.5)
CHLORIDE SERPL-SCNC: 105 MMOL/L (ref 98–107)
CHOLEST SERPL-MCNC: 208 MG/DL (ref 0–200)
CO2 SERPL-SCNC: 24 MMOL/L (ref 22–29)
CREAT BLD-MCNC: 0.93 MG/DL (ref 0.57–1)
CRP SERPL-MCNC: 0.11 MG/DL (ref 0–0.5)
D DIMER PPP FEU-MCNC: 0.44 MCGFEU/ML (ref 0.17–0.59)
DEPRECATED RDW RBC AUTO: 45.1 FL (ref 37–54)
EOSINOPHIL # BLD AUTO: 0 10*3/MM3 (ref 0–0.4)
EOSINOPHIL NFR BLD AUTO: 0.4 % (ref 0.3–6.2)
ERYTHROCYTE [DISTWIDTH] IN BLOOD BY AUTOMATED COUNT: 14.1 % (ref 12.3–15.4)
FERRITIN SERPL-MCNC: 87.54 NG/ML (ref 13–150)
FIBRINOGEN PPP-MCNC: 307 MG/DL (ref 210–450)
GFR SERPL CREATININE-BSD FRML MDRD: 61 ML/MIN/1.73
GLOBULIN UR ELPH-MCNC: 2.3 GM/DL
GLUCOSE BLD-MCNC: 117 MG/DL (ref 65–99)
GLUCOSE BLDC GLUCOMTR-MCNC: 112 MG/DL (ref 70–105)
GLUCOSE BLDC GLUCOMTR-MCNC: 115 MG/DL (ref 70–105)
GLUCOSE BLDC GLUCOMTR-MCNC: 91 MG/DL (ref 70–105)
GLUCOSE BLDC GLUCOMTR-MCNC: 97 MG/DL (ref 70–105)
HBA1C MFR BLD: 5.6 % (ref 3.5–5.6)
HCT VFR BLD AUTO: 36.3 % (ref 34–46.6)
HDLC SERPL-MCNC: 40 MG/DL (ref 40–60)
HGB BLD-MCNC: 12.2 G/DL (ref 12–15.9)
LDH SERPL-CCNC: 197 U/L (ref 135–214)
LDLC SERPL CALC-MCNC: 138 MG/DL (ref 0–100)
LDLC/HDLC SERPL: 3.45 {RATIO}
LYMPHOCYTES # BLD AUTO: 3.3 10*3/MM3 (ref 0.7–3.1)
LYMPHOCYTES NFR BLD AUTO: 27.1 % (ref 19.6–45.3)
MAGNESIUM SERPL-MCNC: 1.8 MG/DL (ref 1.6–2.4)
MCH RBC QN AUTO: 30.7 PG (ref 26.6–33)
MCHC RBC AUTO-ENTMCNC: 33.7 G/DL (ref 31.5–35.7)
MCV RBC AUTO: 91 FL (ref 79–97)
MONOCYTES # BLD AUTO: 0.7 10*3/MM3 (ref 0.1–0.9)
MONOCYTES NFR BLD AUTO: 5.9 % (ref 5–12)
NEUTROPHILS # BLD AUTO: 8 10*3/MM3 (ref 1.7–7)
NEUTROPHILS NFR BLD AUTO: 66.5 % (ref 42.7–76)
NRBC BLD AUTO-RTO: 0 /100 WBC (ref 0–0.2)
PHOSPHATE SERPL-MCNC: 3.4 MG/DL (ref 2.5–4.5)
PLATELET # BLD AUTO: 237 10*3/MM3 (ref 140–450)
PMV BLD AUTO: 8.2 FL (ref 6–12)
POTASSIUM BLD-SCNC: 4.3 MMOL/L (ref 3.5–5.2)
PROT SERPL-MCNC: 5.8 G/DL (ref 6–8.5)
RBC # BLD AUTO: 3.99 10*6/MM3 (ref 3.77–5.28)
REF LAB TEST METHOD: NORMAL
SARS-COV-2 RNA RESP QL NAA+PROBE: NOT DETECTED
SODIUM BLD-SCNC: 140 MMOL/L (ref 136–145)
TRIGL SERPL-MCNC: 151 MG/DL (ref 0–150)
TROPONIN T SERPL-MCNC: <0.01 NG/ML (ref 0–0.03)
VLDLC SERPL-MCNC: 30.2 MG/DL
WBC NRBC COR # BLD: 12 10*3/MM3 (ref 3.4–10.8)

## 2020-04-01 PROCEDURE — 85384 FIBRINOGEN ACTIVITY: CPT | Performed by: FAMILY MEDICINE

## 2020-04-01 PROCEDURE — 25010000002 ENOXAPARIN PER 10 MG: Performed by: NURSE PRACTITIONER

## 2020-04-01 PROCEDURE — 80061 LIPID PANEL: CPT | Performed by: FAMILY MEDICINE

## 2020-04-01 PROCEDURE — 82962 GLUCOSE BLOOD TEST: CPT

## 2020-04-01 PROCEDURE — 83735 ASSAY OF MAGNESIUM: CPT | Performed by: FAMILY MEDICINE

## 2020-04-01 PROCEDURE — 85379 FIBRIN DEGRADATION QUANT: CPT | Performed by: FAMILY MEDICINE

## 2020-04-01 PROCEDURE — G0378 HOSPITAL OBSERVATION PER HR: HCPCS

## 2020-04-01 PROCEDURE — 85025 COMPLETE CBC W/AUTO DIFF WBC: CPT | Performed by: FAMILY MEDICINE

## 2020-04-01 PROCEDURE — 84484 ASSAY OF TROPONIN QUANT: CPT | Performed by: FAMILY MEDICINE

## 2020-04-01 PROCEDURE — 99225 PR SBSQ OBSERVATION CARE/DAY 25 MINUTES: CPT | Performed by: FAMILY MEDICINE

## 2020-04-01 PROCEDURE — 86140 C-REACTIVE PROTEIN: CPT | Performed by: FAMILY MEDICINE

## 2020-04-01 PROCEDURE — 82728 ASSAY OF FERRITIN: CPT | Performed by: FAMILY MEDICINE

## 2020-04-01 PROCEDURE — 84100 ASSAY OF PHOSPHORUS: CPT | Performed by: FAMILY MEDICINE

## 2020-04-01 PROCEDURE — 83615 LACTATE (LD) (LDH) ENZYME: CPT | Performed by: FAMILY MEDICINE

## 2020-04-01 PROCEDURE — 80053 COMPREHEN METABOLIC PANEL: CPT | Performed by: FAMILY MEDICINE

## 2020-04-01 RX ORDER — CALCIUM CARBONATE 200(500)MG
2 TABLET,CHEWABLE ORAL 2 TIMES DAILY PRN
Status: DISCONTINUED | OUTPATIENT
Start: 2020-04-01 | End: 2020-04-02 | Stop reason: HOSPADM

## 2020-04-01 RX ADMIN — GABAPENTIN 100 MG: 100 CAPSULE ORAL at 21:53

## 2020-04-01 RX ADMIN — ATORVASTATIN CALCIUM 20 MG: 20 TABLET, FILM COATED ORAL at 08:26

## 2020-04-01 RX ADMIN — LISINOPRIL 20 MG: 20 TABLET ORAL at 08:27

## 2020-04-01 RX ADMIN — CALCIUM CARBONATE (ANTACID) CHEW TAB 500 MG 2 TABLET: 500 CHEW TAB at 22:15

## 2020-04-01 RX ADMIN — OXYCODONE HYDROCHLORIDE 10 MG: 5 TABLET ORAL at 19:02

## 2020-04-01 RX ADMIN — PANTOPRAZOLE SODIUM 40 MG: 40 TABLET, DELAYED RELEASE ORAL at 08:26

## 2020-04-01 RX ADMIN — CITALOPRAM HYDROBROMIDE 20 MG: 20 TABLET ORAL at 08:27

## 2020-04-01 RX ADMIN — ENOXAPARIN SODIUM 40 MG: 40 INJECTION SUBCUTANEOUS at 08:26

## 2020-04-01 RX ADMIN — ENOXAPARIN SODIUM 40 MG: 40 INJECTION SUBCUTANEOUS at 21:53

## 2020-04-01 RX ADMIN — GABAPENTIN 100 MG: 100 CAPSULE ORAL at 08:27

## 2020-04-01 RX ADMIN — Medication 10 ML: at 21:53

## 2020-04-01 RX ADMIN — OXYCODONE HYDROCHLORIDE 10 MG: 5 TABLET ORAL at 08:27

## 2020-04-01 RX ADMIN — Medication 10 ML: at 08:26

## 2020-04-02 VITALS
RESPIRATION RATE: 18 BRPM | BODY MASS INDEX: 51.51 KG/M2 | DIASTOLIC BLOOD PRESSURE: 77 MMHG | WEIGHT: 262.35 LBS | HEART RATE: 60 BPM | TEMPERATURE: 97.7 F | SYSTOLIC BLOOD PRESSURE: 122 MMHG | HEIGHT: 60 IN | OXYGEN SATURATION: 96 %

## 2020-04-02 LAB
ALBUMIN SERPL-MCNC: 3.5 G/DL (ref 3.5–5.2)
ALBUMIN/GLOB SERPL: 1.4 G/DL
ALP SERPL-CCNC: 90 U/L (ref 39–117)
ALT SERPL W P-5'-P-CCNC: 17 U/L (ref 1–33)
ANION GAP SERPL CALCULATED.3IONS-SCNC: 9 MMOL/L (ref 5–15)
AST SERPL-CCNC: 19 U/L (ref 1–32)
BASOPHILS # BLD AUTO: 0.1 10*3/MM3 (ref 0–0.2)
BASOPHILS NFR BLD AUTO: 0.7 % (ref 0–1.5)
BILIRUB SERPL-MCNC: 0.2 MG/DL (ref 0.2–1.2)
BUN BLD-MCNC: 20 MG/DL (ref 8–23)
BUN/CREAT SERPL: 21.5 (ref 7–25)
CALCIUM SPEC-SCNC: 9.1 MG/DL (ref 8.6–10.5)
CHLORIDE SERPL-SCNC: 106 MMOL/L (ref 98–107)
CO2 SERPL-SCNC: 28 MMOL/L (ref 22–29)
CREAT BLD-MCNC: 0.93 MG/DL (ref 0.57–1)
CRP SERPL-MCNC: 0.09 MG/DL (ref 0–0.5)
DEPRECATED RDW RBC AUTO: 45.5 FL (ref 37–54)
EOSINOPHIL # BLD AUTO: 0.1 10*3/MM3 (ref 0–0.4)
EOSINOPHIL NFR BLD AUTO: 0.9 % (ref 0.3–6.2)
ERYTHROCYTE [DISTWIDTH] IN BLOOD BY AUTOMATED COUNT: 14 % (ref 12.3–15.4)
FERRITIN SERPL-MCNC: 90.56 NG/ML (ref 13–150)
FIBRINOGEN PPP-MCNC: 294 MG/DL (ref 210–450)
GFR SERPL CREATININE-BSD FRML MDRD: 61 ML/MIN/1.73
GLOBULIN UR ELPH-MCNC: 2.5 GM/DL
GLUCOSE BLD-MCNC: 94 MG/DL (ref 65–99)
GLUCOSE BLDC GLUCOMTR-MCNC: 107 MG/DL (ref 70–105)
GLUCOSE BLDC GLUCOMTR-MCNC: 108 MG/DL (ref 70–105)
HCT VFR BLD AUTO: 35.4 % (ref 34–46.6)
HGB BLD-MCNC: 11.7 G/DL (ref 12–15.9)
LDH SERPL-CCNC: 185 U/L (ref 135–214)
LYMPHOCYTES # BLD AUTO: 3.3 10*3/MM3 (ref 0.7–3.1)
LYMPHOCYTES NFR BLD AUTO: 42.4 % (ref 19.6–45.3)
MAGNESIUM SERPL-MCNC: 1.9 MG/DL (ref 1.6–2.4)
MCH RBC QN AUTO: 30.5 PG (ref 26.6–33)
MCHC RBC AUTO-ENTMCNC: 33.2 G/DL (ref 31.5–35.7)
MCV RBC AUTO: 91.8 FL (ref 79–97)
MONOCYTES # BLD AUTO: 0.5 10*3/MM3 (ref 0.1–0.9)
MONOCYTES NFR BLD AUTO: 6.7 % (ref 5–12)
NEUTROPHILS # BLD AUTO: 3.8 10*3/MM3 (ref 1.7–7)
NEUTROPHILS NFR BLD AUTO: 49.3 % (ref 42.7–76)
NRBC BLD AUTO-RTO: 0.1 /100 WBC (ref 0–0.2)
PLATELET # BLD AUTO: 193 10*3/MM3 (ref 140–450)
PMV BLD AUTO: 9.1 FL (ref 6–12)
POTASSIUM BLD-SCNC: 4.5 MMOL/L (ref 3.5–5.2)
PROT SERPL-MCNC: 6 G/DL (ref 6–8.5)
RBC # BLD AUTO: 3.85 10*6/MM3 (ref 3.77–5.28)
SODIUM BLD-SCNC: 143 MMOL/L (ref 136–145)
WBC NRBC COR # BLD: 7.7 10*3/MM3 (ref 3.4–10.8)

## 2020-04-02 PROCEDURE — 82728 ASSAY OF FERRITIN: CPT | Performed by: FAMILY MEDICINE

## 2020-04-02 PROCEDURE — 80053 COMPREHEN METABOLIC PANEL: CPT | Performed by: FAMILY MEDICINE

## 2020-04-02 PROCEDURE — 85025 COMPLETE CBC W/AUTO DIFF WBC: CPT | Performed by: FAMILY MEDICINE

## 2020-04-02 PROCEDURE — G0378 HOSPITAL OBSERVATION PER HR: HCPCS

## 2020-04-02 PROCEDURE — 86140 C-REACTIVE PROTEIN: CPT | Performed by: FAMILY MEDICINE

## 2020-04-02 PROCEDURE — 83615 LACTATE (LD) (LDH) ENZYME: CPT | Performed by: FAMILY MEDICINE

## 2020-04-02 PROCEDURE — 82962 GLUCOSE BLOOD TEST: CPT

## 2020-04-02 PROCEDURE — 85384 FIBRINOGEN ACTIVITY: CPT | Performed by: FAMILY MEDICINE

## 2020-04-02 PROCEDURE — 99217 PR OBSERVATION CARE DISCHARGE MANAGEMENT: CPT | Performed by: FAMILY MEDICINE

## 2020-04-02 PROCEDURE — 83735 ASSAY OF MAGNESIUM: CPT | Performed by: FAMILY MEDICINE

## 2020-04-02 RX ADMIN — ATORVASTATIN CALCIUM 20 MG: 20 TABLET, FILM COATED ORAL at 09:57

## 2020-04-02 RX ADMIN — GABAPENTIN 100 MG: 100 CAPSULE ORAL at 09:57

## 2020-04-02 RX ADMIN — PANTOPRAZOLE SODIUM 40 MG: 40 TABLET, DELAYED RELEASE ORAL at 09:57

## 2020-04-02 RX ADMIN — CITALOPRAM HYDROBROMIDE 20 MG: 20 TABLET ORAL at 09:57

## 2020-04-02 RX ADMIN — OXYCODONE HYDROCHLORIDE 10 MG: 5 TABLET ORAL at 09:57

## 2020-04-02 RX ADMIN — LISINOPRIL 20 MG: 20 TABLET ORAL at 09:57

## 2020-04-03 ENCOUNTER — READMISSION MANAGEMENT (OUTPATIENT)
Dept: CALL CENTER | Facility: HOSPITAL | Age: 65
End: 2020-04-03

## 2020-04-03 NOTE — OUTREACH NOTE
Prep Survey      Responses   [facility] facility patient discharged from?  [facility]   Is LACE score < 7 ?  No   Eligibility  Readm Mgmt   Discharge diagnosis  viral lower resp. infection, Pulmonary nodule, leukocytosis, DM II, HTN, GERD, HLD, Uterine cancer in remission   Does the patient have one of the following disease processes/diagnoses(primary or secondary)?  Other   Does the patient have Home health ordered?  No   Is there a DME ordered?  No   Comments regarding appointments  Pt to schedule F/U appointments   Prep survey completed?  Yes          Kathy Kovacs RN

## 2020-04-06 ENCOUNTER — READMISSION MANAGEMENT (OUTPATIENT)
Dept: CALL CENTER | Facility: HOSPITAL | Age: 65
End: 2020-04-06

## 2020-04-06 NOTE — OUTREACH NOTE
Medical Week 1 Survey      Responses   [facility] patient discharged from?  [facility]   Does the patient have one of the following disease processes/diagnoses(primary or secondary)?  Other   Is there a successful TCM telephone encounter documented?  No   Week 1 attempt successful?  No   Unsuccessful attempts  Attempt 1          Aundrea Marie LPN

## 2020-04-08 ENCOUNTER — READMISSION MANAGEMENT (OUTPATIENT)
Dept: CALL CENTER | Facility: HOSPITAL | Age: 65
End: 2020-04-08

## 2020-04-08 NOTE — OUTREACH NOTE
Medical Week 1 Survey      Responses   [facility] patient discharged from?  [facility]   COVID-19 Test Status  Negative   Does the patient have one of the following disease processes/diagnoses(primary or secondary)?  Other   Is there a successful TCM telephone encounter documented?  No   Week 1 attempt successful?  Yes   Call start time  0954   Call end time  1000   Discharge diagnosis  viral lower resp. infection, Pulmonary nodule, leukocytosis, DM II, HTN, GERD, HLD, Uterine cancer in remission   Is patient permission given to speak with other caregiver?  No   Meds reviewed with patient/caregiver?  Yes   Is the patient having any side effects they believe may be caused by any medication additions or changes?  No   Does the patient have all medications ordered at discharge?  Yes   Is the patient taking all medications as directed (includes completed medication regime)?  Yes   Does the patient have a primary care provider?   Yes   Does the patient have an appointment with their PCP within 7 days of discharge?  Yes   Comments regarding PCP  Patient reports that she saw her PCP yesterday.    Has the patient kept scheduled appointments due by today?  Yes   Has home health visited the patient within 72 hours of discharge?  N/A   Psychosocial issues?  No   Did the patient receive a copy of their discharge instructions?  Yes   Nursing interventions  Reviewed instructions with patient   What is the patient's perception of their health status since discharge?  Improving   Is the patient/caregiver able to teach back signs and symptoms related to disease process for when to call PCP?  Yes   Is the patient/caregiver able to teach back signs and symptoms related to disease process for when to call 911?  Yes   Is the patient/caregiver able to teach back the hierarchy of who to call/visit for symptoms/problems? PCP, Specialist, Home health nurse, Urgent Care, ED, 911  Yes   Week 1 call completed?  Yes          Kathy Silva,  RN

## 2020-04-14 ENCOUNTER — READMISSION MANAGEMENT (OUTPATIENT)
Dept: CALL CENTER | Facility: HOSPITAL | Age: 65
End: 2020-04-14

## 2020-04-14 NOTE — OUTREACH NOTE
Medical Week 2 Survey      Responses   [facility] patient discharged from?  [facility]   COVID-19 Test Status  Negative   Does the patient have one of the following disease processes/diagnoses(primary or secondary)?  Other   Week 2 attempt successful?  Yes   Call start time  1359   Discharge diagnosis  viral lower resp. infection, Pulmonary nodule, leukocytosis, DM II, HTN, GERD, HLD, Uterine cancer in remission   Call end time  1410   Meds reviewed with patient/caregiver?  Yes   Is the patient having any side effects they believe may be caused by any medication additions or changes?  No   Does the patient have all medications ordered at discharge?  Yes   Is the patient taking all medications as directed (includes completed medication regime)?  Yes   Comments regarding appointments  will have pulmonary consult 6/25/20   Does the patient have a primary care provider?   Yes   Does the patient have an appointment with their PCP within 7 days of discharge?  Yes   Has the patient kept scheduled appointments due by today?  Yes   Has home health visited the patient within 72 hours of discharge?  N/A   Psychosocial issues?  No   Comments  states had episode of severe indigestion relieved by Chelita-seltzer chewable and Pepcid, walking and drinking water   Did the patient receive a copy of their discharge instructions?  Yes   Nursing interventions  Reviewed instructions with patient   What is the patient's perception of their health status since discharge?  Improving   Is the patient/caregiver able to teach back signs and symptoms related to disease process for when to call PCP?  Yes   Is the patient/caregiver able to teach back signs and symptoms related to disease process for when to call 911?  Yes   Is the patient/caregiver able to teach back the hierarchy of who to call/visit for symptoms/problems? PCP, Specialist, Home health nurse, Urgent Care, ED, 911  Yes   If the patient is a current smoker, are they able to teach  back resources for cessation?  -- [pt is non-smoker]   Additional teach back comments  pt states is concerned about possible lung tumor, has appt with pul in June, is hoping to be seen sooner if appt comes available, discussed ways to reduce epigastric discomfort by sitting upright after meals, avoiding eating close to bedtime, having small frequent meals, instead of large ones, pt agreed with plan   Week 2 Call Completed?  Yes          Natalee Wesley RN

## 2020-04-20 ENCOUNTER — READMISSION MANAGEMENT (OUTPATIENT)
Dept: CALL CENTER | Facility: HOSPITAL | Age: 65
End: 2020-04-20

## 2020-04-20 NOTE — OUTREACH NOTE
Medical Week 3 Survey      Responses   [facility] patient discharged from?  [facility]   COVID-19 Test Status  Negative   Does the patient have one of the following disease processes/diagnoses(primary or secondary)?  Other   Week 3 attempt successful?  Yes   Call start time  1454   Call end time  1458   Discharge diagnosis  viral lower resp. infection, Pulmonary nodule, leukocytosis, DM II, HTN, GERD, HLD, Uterine cancer in remission   Is patient permission given to speak with other caregiver?  No   Meds reviewed with patient/caregiver?  Yes   Is the patient having any side effects they believe may be caused by any medication additions or changes?  No   Does the patient have all medications ordered at discharge?  Yes   Is the patient taking all medications as directed (includes completed medication regime)?  Yes   Comments regarding appointments  will have pulmonary consult 6/25/20   Does the patient have a primary care provider?   Yes   Does the patient have an appointment with their PCP within 7 days of discharge?  Yes   Has the patient kept scheduled appointments due by today?  Yes   Comments  Had appt today with her PCP.   Has home health visited the patient within 72 hours of discharge?  N/A   Psychosocial issues?  No   Did the patient receive a copy of their discharge instructions?  Yes   Nursing interventions  Reviewed instructions with patient   What is the patient's perception of their health status since discharge?  Improving   Is the patient/caregiver able to teach back signs and symptoms related to disease process for when to call PCP?  Yes   Is the patient/caregiver able to teach back signs and symptoms related to disease process for when to call 911?  Yes   Is the patient/caregiver able to teach back the hierarchy of who to call/visit for symptoms/problems? PCP, Specialist, Home health nurse, Urgent Care, ED, 911  Yes   Additional teach back comments  Pt states she is doing better.  She is just  anxious about seeing Dr. Mckenna regarding the nodule growth.  States in [place] it was 6cm and now it is 8.     Week 3 Call Completed?  Yes   Graduated  Yes   Did the patient feel the follow up calls were helpful during their recovery period?  Yes   Was the number of calls appropriate?  Yes   Graduated/Revoked comments  No questions or needs at this time          Aundrea Marie LPN

## 2020-06-22 ENCOUNTER — TRANSCRIBE ORDERS (OUTPATIENT)
Dept: ADMINISTRATIVE | Facility: HOSPITAL | Age: 65
End: 2020-06-22

## 2020-06-22 DIAGNOSIS — R06.02 SHORTNESS OF BREATH: Primary | ICD-10-CM

## 2020-08-25 ENCOUNTER — HOSPITAL ENCOUNTER (OUTPATIENT)
Dept: RESPIRATORY THERAPY | Facility: HOSPITAL | Age: 65
End: 2020-08-25

## 2020-11-10 NOTE — H&P
"[facility] HOSPITALIST     Berlin Ramos MD    CHIEF COMPLAINT:   Fever, chills, sore throat, cough, decreased appetite  HISTORY OF PRESENT ILLNESS:  The patient is a 64-year-old female who has a history of asthma, hypertension and type 2 diabetes mellitus who has had a 4-day history of fever, cough, chills, sore throat and myalgias and arthralgias.  The patient says she is just felt too poorly to even get out of bed.  When it did not improve at all she decided to come to the hospital this evening and was found to have influenza B and the hospitalist service has been asked to admit the patient.    The patient was found to have hypoxemia in the emergency room.  The patient was placed on oxygen and given breathing treatments.  The patient also received some Solu-Medrol.  The patient's main complaints at this time are the sore throat, fever, chills and cough.  The patient not really coughing anything up and her chest x-ray was negative.  The patient just overall feels \"like a truck hit her\".      Past Medical History:   Diagnosis Date   • Chronic fatigue syndrome    • Diabetes (CMS/HCC)    • Hypertension    • Uterine cancer (CMS/HCC)      No past surgical history on file.  Family History   Problem Relation Age of Onset   • Heart disease Mother    • Heart disease Father      Social History     Tobacco Use   • Smoking status: Never Smoker   • Smokeless tobacco: Never Used   Substance Use Topics   • Alcohol use: No     Frequency: Never   • Drug use: No     Medications Prior to Admission   Medication Sig Dispense Refill Last Dose   • furosemide (LASIX) 40 MG tablet Take 40 mg by mouth Daily.      • hydrOXYzine (ATARAX) 50 MG tablet Take 50 mg by mouth At Night As Needed for Itching.      • lisinopril (PRINIVIL,ZESTRIL) 20 MG tablet Take 20 mg by mouth Daily.      • omeprazole (priLOSEC) 20 MG capsule Take 20 mg by mouth Daily.      • phentermine 15 MG capsule Take 15 mg by mouth Every Morning.    " "  • potassium chloride (K-DUR,KLOR-CON) 10 MEQ CR tablet Take 10 mEq by mouth Daily.      • citalopram (CeleXA) 20 MG tablet Take 20 mg by mouth Daily.      • fenofibrate 160 MG tablet Take 160 mg by mouth Daily.      • gabapentin (NEURONTIN) 100 MG capsule Take 100 mg by mouth 2 (Two) Times a Day.      • metFORMIN (GLUCOPHAGE) 500 MG tablet Take 500 mg by mouth 2 (Two) Times a Day With Meals.      • oxyCODONE-acetaminophen (PERCOCET)  MG per tablet Take 1 tablet by mouth Every 8 (Eight) Hours As Needed for Moderate Pain .      • simvastatin (ZOCOR) 40 MG tablet Take 40 mg by mouth Every Night.        Allergies:  Indomethacin and Iodine      There is no immunization history on file for this patient.        REVIEW OF SYSTEMS:  Please see the above history of present illness for pertinent positives and negatives.  The remainder of the patient's systems have been reviewed and are negative.     Vital Signs  Temp:  [97.8 °F (36.6 °C)-102 °F (38.9 °C)] 102 °F (38.9 °C)  Heart Rate:  [61-74] 66  Resp:  [13-24] 20  BP: (111-158)/(48-75) 128/68    Flowsheet Rows      First Filed Value   Admission Height  165.1 cm (65\") Documented at 01/05/2020 1142   Admission Weight  116 kg (256 lb 13.4 oz) Documented at 01/05/2020 1142           Physical Exam:  Physical Exam   Constitutional: Patient is a morbidly obese female with nasal congestion, cough and looks as though she feels unwell.    HEENT:   Head: Normocephalic and atraumatic.   Eyes:  Pupils are equal, round, and reactive to light. EOM are intact. Sclerae are anicteric and noninjected.  Mouth and Throat: Patient has moist mucous membranes. Oropharynx is clear of any erythema or exudate.  Patient does have nasal drainage and a nonproductive phlegmy cough.    Neck: Neck supple.  No thyromegaly present. No lymphadenopathy present. No  masses.     Cardiovascular: Inspection: No JVD present. Palpation: No parasternal heave. Pedal pulses +1 bilaterally. No leg edema. " OVERNIGHT EVENTS: seizure like activity found on vEEG, started keppra 500mg BID    SUBJECTIVE / INTERVAL HPI: Patient seen and examined at bedside. Pt without complains. denies cp, sob, n/v/d, fever or chills.       PHYSICAL EXAM:    General: WDWN  HEENT: NC/AT; PERRL, anicteric sclera; MMM  Neck: supple  Cardiovascular: +S1/S2, RRR  Respiratory: CTA B/L; no W/R/R  Gastrointestinal: soft, NT/ND; no suprapubic tenderness +BSx4. palpable gastric device  Extremities: WWP; no edema, clubbing or cyanosis  Vascular: 2+ radial, DP/PT pulses B/L  Neurological: AAOx3; RLE 0/5 strength, LLE 1/5. RUE 3/3 LUE 3/3  Psychiatric: pleasant mood and affect  Dermatologic: no appreciable wounds or damage to the skin    VITAL SIGNS:  Vital Signs Last 24 Hrs  T(C): 36.9 (10 Nov 2020 11:25), Max: 36.9 (10 Nov 2020 11:25)  T(F): 98.5 (10 Nov 2020 11:25), Max: 98.5 (10 Nov 2020 11:25)  HR: 93 (10 Nov 2020 11:25) (93 - 105)  BP: 136/84 (10 Nov 2020 11:25) (120/88 - 136/84)  BP(mean): --  RR: 16 (10 Nov 2020 11:25) (16 - 16)  SpO2: 96% (10 Nov 2020 11:25) (94% - 96%)      MEDICATIONS:  MEDICATIONS  (STANDING):  apixaban 2.5 milliGRAM(s) Oral every 12 hours  buPROPion  milliGRAM(s) Oral daily  cefTRIAXone   IVPB 2000 milliGRAM(s) IV Intermittent every 24 hours  chlorhexidine 2% Cloths 1 Application(s) Topical <User Schedule>  escitalopram 40 milliGRAM(s) Oral daily  famotidine    Tablet 20 milliGRAM(s) Oral daily  insulin lispro (ADMELOG) corrective regimen sliding scale   SubCutaneous every 6 hours  lactobacillus acidophilus 1 Tablet(s) Oral daily  levETIRAcetam 500 milliGRAM(s) Oral two times a day  oxybutynin 5 milliGRAM(s) Oral two times a day  pregabalin 150 milliGRAM(s) Oral two times a day  tamsulosin 0.8 milliGRAM(s) Oral at bedtime  valACYclovir 500 milliGRAM(s) Oral every 12 hours  verapamil  milliGRAM(s) Oral daily  vitamin B complex with vitamin C 1 Tablet(s) Oral daily    MEDICATIONS  (PRN):      ALLERGIES:  Allergies    No Known Allergies    Intolerances        LABS:                        13.3   9.14  )-----------( 190      ( 10 Nov 2020 11:21 )             40.9     11-10    143  |  107  |  10  ----------------------------<  88  3.7   |  21<L>  |  0.38<L>    Ca    9.0      10 Nov 2020 11:21  Phos  1.5     11-10  Mg     1.8     11-10    TPro  6.1  /  Alb  3.2<L>  /  TBili  0.2  /  DBili  x   /  AST  15  /  ALT  10  /  AlkPhos  64  11-10        CAPILLARY BLOOD GLUCOSE      POCT Blood Glucose.: 84 mg/dL (10 Nov 2020 11:51)      RADIOLOGY & ADDITIONAL TESTS: Reviewed. Auscultation: Regular rate, regular rhythm, S1 normal and S2 normal. reveals no gallop and no friction rub. No Carotid bruit bilaterally.    Pulmonary/Chest: Inspection: No distress, no use of accessory muscles. Lungs are clear to auscultation bilaterally.  There are a lot of upper airway rhonchi which improve after she truly coughs and expectorates.  No respiratory distress. No wheezes. No rhonchi. No rales.     Abdomen /Gastrointestinal: Inspection: no distension. Palpation: no masses, no organomegaly. Soft. There is no tenderness. Bowel sounds are normal.     Musculoskeletal: Normal Muscle tone. Age appropriate, no deformities.    Neurological: Patient is alert and oriented to person, place, and time. Cranial nerves II-XII are grossly intact with no focal deficits. Sensori-motor exam is normal. No cerebellar signs.    Skin: Skin is warm. No rash noted. Nails show no clubbing.  No cyanosis or erythema. No bruising.    Results Review:    I reviewed the patient's new clinical results.  Lab Results (most recent)     Procedure Component Value Units Date/Time    [place] Draw [[id number]] Collected:  01/05/20 1311    Specimen:  Blood from Arm, Left Updated:  01/05/20 1415    Narrative:       The following orders were created for panel order [place] Draw.  Procedure                               Abnormality         Status                     ---------                               -----------         ------                     Light Blue Top[[id number]]                                   Final result               Green Top (Gel)[[id number]]                                  Final result               Lavender Top[[id number]]                                     Final result               Gold Top - SST[[id number]]                                   Final result                 Please view results for these tests on the individual orders.    Light Blue Top [[id number]] Collected:  01/05/20 1311    Specimen:  Blood from Arm, Left  Updated:  01/05/20 1415     Extra Tube hold for add-on     Comment: Auto resulted       Green Top (Gel) [[id number]] Collected:  01/05/20 1311    Specimen:  Blood from Arm, Left Updated:  01/05/20 1415     Extra Tube Hold for add-ons.     Comment: Auto resulted.       Lavender Top [[id number]] Collected:  01/05/20 1311    Specimen:  Blood from Arm, Left Updated:  01/05/20 1415     Extra Tube hold for add-on     Comment: Auto resulted       Gold Top - SST [[id number]] Collected:  01/05/20 1311    Specimen:  Blood from Arm, Left Updated:  01/05/20 1415     Extra Tube Hold for add-ons.     Comment: Auto resulted.       Comprehensive Metabolic Panel [[id number]]  (Abnormal) Collected:  01/05/20 1311    Specimen:  Blood from Arm, Left Updated:  01/05/20 1347     Glucose 126 mg/dL      BUN 11 mg/dL      Creatinine 0.90 mg/dL      Sodium 140 mmol/L      Potassium 4.0 mmol/L      Chloride 100 mmol/L      CO2 28.0 mmol/L      Calcium 8.9 mg/dL      Total Protein 6.3 g/dL      Albumin 3.60 g/dL      ALT (SGPT) 14 U/L      AST (SGOT) 22 U/L      Alkaline Phosphatase 70 U/L      Total Bilirubin 0.2 mg/dL      eGFR Non African Amer 63 mL/min/1.73      Globulin 2.7 gm/dL      A/G Ratio 1.3 g/dL      BUN/Creatinine Ratio 12.2     Anion Gap 12.0 mmol/L     Narrative:       GFR Normal >60  Chronic Kidney Disease <60  Kidney Failure <15      Rapid Strep A Screen - Swab, Throat [[id number]]  (Normal) Collected:  01/05/20 1300    Specimen:  Swab from Throat Updated:  01/05/20 1322     Strep A Ag Negative    Influenza Antigen, Rapid - Swab, Nasopharynx [[id number]]  (Abnormal) Collected:  01/05/20 1300    Specimen:  Swab from Nasopharynx Updated:  01/05/20 1321     Influenza A PCR Not Detected     Influenza B PCR Detected    CBC & Differential [[id number]] Collected:  01/05/20 1311    Specimen:  Blood from Arm, Left Updated:  01/05/20 1317    Narrative:       The following orders were created for panel order CBC & Differential.  Procedure                                Abnormality         Status                     ---------                               -----------         ------                     CBC Auto Differential[[id number]]        Abnormal            Final result                 Please view results for these tests on the individual orders.    CBC Auto Differential [[id number]]  (Abnormal) Collected:  01/05/20 1311    Specimen:  Blood from Arm, Left Updated:  01/05/20 1317     WBC 6.40 10*3/mm3      RBC 4.00 10*6/mm3      Hemoglobin 12.2 g/dL      Hematocrit 37.2 %      MCV 93.0 fL      MCH 30.5 pg      MCHC 32.9 g/dL      RDW 12.8 %      RDW-SD 42.4 fl      MPV 8.1 fL      Platelets 165 10*3/mm3      Neutrophil % 76.2 %      Lymphocyte % 12.2 %      Monocyte % 11.2 %      Eosinophil % 0.0 %      Basophil % 0.4 %      Neutrophils, Absolute 4.90 10*3/mm3      Lymphocytes, Absolute 0.80 10*3/mm3      Monocytes, Absolute 0.70 10*3/mm3      Eosinophils, Absolute 0.00 10*3/mm3      Basophils, Absolute 0.00 10*3/mm3      nRBC 0.1 /100 WBC       Assessment and plan:  1.  Influenza B-we will start the patient on Tamiflu 75 mg twice daily for 5 days.  We will support the patient with fluids and symptomatic care.  Will monitor her glucose closely as well.  2.  Diabetes mellitus type 2-we will hold the patient's metformin in case she needs contrasted studies.  Will check glucose q. before meals and at bedtime and place the patient on a cardiac consistent carbohydrate diet.  Will place her on a sliding scale.  She may need this having received steroids in the emergency room.  We will also discontinue her Fen/Phen which she has been on for 5 months for weight loss as it can cause pulmonary hypertension and she is already hypoxemic and this may lead to additional complications.  3.  Asthma-though this does not appear to be affected at this time she very well may develop complications related to the asthma.  Will follow closely.  At this time we will support her  with albuterol and Atrovent every 6 and albuterol every 3 as needed dyspnea.  She is not wheezing at this time so will withhold steroids especially given that she already has diabetes mellitus.  4.  Morbid obesity with BMI of 44-lifestyle modification and weight reduction diet.  I discussed the patient's findings and my recommendations with patient and she agrees to proceed as outlined above.    Natalee Burrows MD  01/05/20  8:24 PM

## 2020-11-28 ENCOUNTER — HOSPITAL ENCOUNTER (EMERGENCY)
Facility: HOSPITAL | Age: 65
Discharge: HOME | End: 2020-11-28
Admitting: EMERGENCY MEDICINE

## 2020-11-28 ENCOUNTER — APPOINTMENT (OUTPATIENT)
Dept: GENERAL RADIOLOGY | Facility: HOSPITAL | Age: 65
End: 2020-11-28

## 2020-11-28 VITALS
DIASTOLIC BLOOD PRESSURE: 71 MMHG | OXYGEN SATURATION: 99 % | HEIGHT: 65 IN | SYSTOLIC BLOOD PRESSURE: 152 MMHG | RESPIRATION RATE: 16 BRPM | BODY MASS INDEX: 46.65 KG/M2 | TEMPERATURE: 98.8 F | WEIGHT: 279.98 LBS | HEART RATE: 67 BPM

## 2020-11-28 DIAGNOSIS — S86.912A KNEE STRAIN, LEFT, INITIAL ENCOUNTER: Primary | ICD-10-CM

## 2020-11-28 DIAGNOSIS — M25.462 EFFUSION OF LEFT KNEE: ICD-10-CM

## 2020-11-28 PROCEDURE — 73562 X-RAY EXAM OF KNEE 3: CPT

## 2020-11-28 PROCEDURE — 99283 EMERGENCY DEPT VISIT LOW MDM: CPT

## 2021-05-22 ENCOUNTER — HOSPITAL ENCOUNTER (EMERGENCY)
Facility: HOSPITAL | Age: 66
Discharge: HOME | End: 2021-05-23
Admitting: EMERGENCY MEDICINE

## 2021-05-22 ENCOUNTER — APPOINTMENT (OUTPATIENT)
Dept: GENERAL RADIOLOGY | Facility: HOSPITAL | Age: 66
End: 2021-05-22

## 2021-05-22 DIAGNOSIS — M79.601 RIGHT ARM PAIN: ICD-10-CM
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
## 2021-05-22 PROCEDURE — 73130 X-RAY EXAM OF HAND: CPT

## 2021-05-22 PROCEDURE — 99283 EMERGENCY DEPT VISIT LOW MDM: CPT

## 2021-05-22 PROCEDURE — 73090 X-RAY EXAM OF FOREARM: CPT

## 2021-05-22 PROCEDURE — 73060 X-RAY EXAM OF HUMERUS: CPT

## 2021-05-22 RX ORDER — HYDROCODONE BITARTRATE AND ACETAMINOPHEN 7.5; 325 MG/1; MG/1
1 TABLET ORAL ONCE
Status: COMPLETED | OUTPATIENT
Start: 2021-05-22 | End: 2021-05-22

## 2021-05-22 RX ADMIN — HYDROCODONE BITARTRATE AND ACETAMINOPHEN 1 TABLET: 7.5; 325 TABLET ORAL at 23:09

## 2021-05-23 VITALS
RESPIRATION RATE: 16 BRPM | WEIGHT: 293 LBS | TEMPERATURE: 97.9 F | DIASTOLIC BLOOD PRESSURE: 71 MMHG | SYSTOLIC BLOOD PRESSURE: 165 MMHG | HEART RATE: 88 BPM | HEIGHT: 64 IN | BODY MASS INDEX: 50.02 KG/M2 | OXYGEN SATURATION: 95 %

## 2021-05-23 NOTE — ED PROVIDER NOTES
Subjective   History: Patient is a 65-year-old female complains of right arm pain after she got her foot caught on a concrete lip 10:30 AM and fell with her arms outstretched in front of her.  Patient complains of pain to the right shoulder humerus elbow forearm and hand.  States she has not taken any medication for pain.  Denies neck or back pain did not hit her head or lose consciousness.  Denies any other injury.      Onset: 10:30 AM  Location: Right shoulder humerus elbow forearm hand  Duration: Constant  Character: Ache throb  Aggravating/Alleviating factors: Movement and palpation of extremity makes pain worse  Radiation entirety of the right arm  Severity: Moderate            Review of Systems   Constitutional: Negative for chills, fatigue and fever.   HENT: Negative for congestion, sore throat, tinnitus and trouble swallowing.    Eyes: Negative for photophobia, discharge and visual disturbance.   Respiratory: Negative for cough and shortness of breath.    Cardiovascular: Negative for chest pain.   Gastrointestinal: Negative for abdominal pain, diarrhea, nausea and vomiting.   Genitourinary: Negative for dysuria, frequency and urgency.   Musculoskeletal: Positive for myalgias. Negative for back pain.   Skin: Negative for rash.   Neurological: Negative for dizziness and headaches.   Psychiatric/Behavioral: Negative for confusion.       Past Medical History:   Diagnosis Date   • Chronic back pain    • Chronic fatigue syndrome    • Diabetes (CMS/HCC)    • Hypertension    • Uterine cancer (CMS/HCC)        Allergies   Allergen Reactions   • Indomethacin Hives   • Iodine Hives       No past surgical history on file.    Family History   Problem Relation Age of Onset   • Heart disease Mother    • Heart disease Father        Social History     Socioeconomic History   • Marital status:      Spouse name: Not on file   • Number of children: Not on file   • Years of education: Not on file   • Highest education  "level: Not on file   Tobacco Use   • Smoking status: Never Smoker   • Smokeless tobacco: Never Used   Substance and Sexual Activity   • Alcohol use: No   • Drug use: No           Objective   Physical Exam  Vitals reviewed.   Constitutional:       General: She is in acute distress.      Appearance: She is obese. She is not toxic-appearing.   HENT:      Head: Normocephalic.   Cardiovascular:      Rate and Rhythm: Normal rate.      Pulses: Normal pulses.   Pulmonary:      Effort: Pulmonary effort is normal.   Abdominal:      General: Abdomen is flat.      Palpations: Abdomen is soft.   Musculoskeletal:         General: Tenderness present. No swelling or deformity.      Cervical back: Normal range of motion. No tenderness.      Comments: No deformity ecchymosis erythema or edema of right arm including shoulder humerus elbow forearm wrist and hand however patient is tender on palpation to the entirety of the right arm.  2+ radial pulse distal neurovascular station intact cap refill less than 2 seconds.   Skin:     General: Skin is warm and dry.      Findings: No rash.   Neurological:      Mental Status: She is alert and oriented to person, place, and time.   Psychiatric:         Mood and Affect: Mood normal.         Behavior: Behavior normal.         Thought Content: Thought content normal.         Judgment: Judgment normal.         Procedures           ED Course          /76 (BP Location: Left arm, Patient Position: Sitting)   Pulse 93   Temp 97.7 °F (36.5 °C) (Oral)   Resp 18   Ht 162.6 cm (64\")   Wt (!) 137 kg (302 lb 11.1 oz)   SpO2 94%   BMI 51.96 kg/m²   Labs Reviewed - No data to display  Medications   HYDROcodone-acetaminophen (NORCO) 7.5-325 MG per tablet 1 tablet (1 tablet Oral Given 5/22/21 2309)     No radiology results for the last day                                    MDM   I examined the patient using the appropriate personal protective equipment.      DISPOSITION:   Chart " Review:  Comorbidity:  has a past medical history of Chronic back pain, Chronic fatigue syndrome, Diabetes (CMS/HCC), Hypertension, and Uterine cancer (CMS/[facility]).  Differentials:this list is not all inclusive and does not constitute the entirety of considered causes --> fracture dislocation injury of tendon or ligaments  ECG: interpreted by ER physician and reviewed by myself:   Labs:     Imaging: Was interpreted by physician and reviewed by myself:  No radiology results for the last day    Disposition/Treatment:    On physical examination patient was extremely histrionic there was no area of point tenderness no area that hurt more so than the other. Patient stated her entire arm hurt. Upon attempting to palpate areas of the right arm from her shoulder to her fingertips she jumped with movement. She did supinate and pronate her right forearm without me instructing her to do so but anything after that patient basically refused any range of motion stating she could not do it because it hurt. There was no deformity no obvious injury no surface trauma no abrasion no erythema edema of right hand wrist forearm humerus or shoulder. X-ray right hand forearm and humerus which the elbow and shoulder were also visible within these x-rays were all negative for any fracture dislocation. She was given Norco for pain. Discussed results with patient she is to follow-up with Ortho on-call, Dr. Clark for further evaluation of any ligament or tendon injury and may continue taking her pain medication at home as previously prescribed. Patient likely to have decreased pain threshold due to chronic pain and takes Percocet several times a day.    Prescription: None    Final diagnoses:   Fall, initial encounter   Right arm pain       ED Disposition  ED Disposition     ED Disposition Condition Comment    Discharge Stable           Prosper Clark MD  [address] [place], [place] [id number] [phone]               Medication List       No changes were made to your prescriptions during this visit.          Keerthi Byrne, KANYD  05/23/21 0053       Steven Park MD  05/24/21 0765

## 2021-05-23 NOTE — DISCHARGE INSTRUCTIONS
May continue taking your Percocet at home as previously prescribed. May do ice to sore areas 20 minutes at a time every 3-4 hours. Follow-up with Dr. Amber Rodriguez

## 2021-08-25 ENCOUNTER — TRANSCRIBE ORDERS (OUTPATIENT)
Dept: ADMINISTRATIVE | Facility: HOSPITAL | Age: 66
End: 2021-08-25

## 2021-08-31 RX ORDER — ONDANSETRON 4 MG/1
4 TABLET, FILM COATED ORAL AS NEEDED
COMMUNITY
End: 2023-02-14 | Stop reason: SDUPTHER

## 2021-08-31 RX ORDER — ERGOCALCIFEROL 1.25 MG/1
50000 CAPSULE ORAL WEEKLY
COMMUNITY

## 2021-09-04 ENCOUNTER — LAB (OUTPATIENT)
Dept: LAB | Facility: HOSPITAL | Age: 66
End: 2021-09-04

## 2021-09-04 PROCEDURE — C9803 HOPD COVID-19 SPEC COLLECT: HCPCS

## 2021-09-04 PROCEDURE — U0004 COV-19 TEST NON-CDC HGH THRU: HCPCS

## 2021-09-05 LAB — SARS-COV-2 ORF1AB RESP QL NAA+PROBE: NOT DETECTED

## 2021-09-07 ENCOUNTER — ANESTHESIA EVENT (OUTPATIENT)
Dept: GASTROENTEROLOGY | Facility: HOSPITAL | Age: 66
End: 2021-09-07

## 2021-09-07 ENCOUNTER — ANESTHESIA (OUTPATIENT)
Dept: GASTROENTEROLOGY | Facility: HOSPITAL | Age: 66
End: 2021-09-07

## 2021-09-07 ENCOUNTER — HOSPITAL ENCOUNTER (OUTPATIENT)
Facility: HOSPITAL | Age: 66
Setting detail: HOSPITAL OUTPATIENT SURGERY
Discharge: HOME | End: 2021-09-07
Attending: INTERNAL MEDICINE | Admitting: INTERNAL MEDICINE

## 2021-09-07 ENCOUNTER — ON CAMPUS - OUTPATIENT (AMBULATORY)
Dept: URBAN - METROPOLITAN AREA HOSPITAL 85 | Facility: HOSPITAL | Age: 66
End: 2021-09-07

## 2021-09-07 VITALS
TEMPERATURE: 98.7 F | WEIGHT: 293 LBS | RESPIRATION RATE: 21 BRPM | OXYGEN SATURATION: 95 % | HEART RATE: 60 BPM | HEIGHT: 65 IN | DIASTOLIC BLOOD PRESSURE: 66 MMHG | SYSTOLIC BLOOD PRESSURE: 145 MMHG | BODY MASS INDEX: 48.82 KG/M2

## 2021-09-07 DIAGNOSIS — K29.50 UNSPECIFIED CHRONIC GASTRITIS WITHOUT BLEEDING: ICD-10-CM

## 2021-09-07 DIAGNOSIS — K22.2 ESOPHAGEAL OBSTRUCTION: ICD-10-CM

## 2021-09-07 DIAGNOSIS — R11.0 NAUSEA: ICD-10-CM

## 2021-09-07 DIAGNOSIS — Z86.010 PERSONAL HISTORY OF COLONIC POLYPS: ICD-10-CM

## 2021-09-07 DIAGNOSIS — R13.10 DYSPHAGIA, UNSPECIFIED: ICD-10-CM

## 2021-09-07 DIAGNOSIS — B96.81 HELICOBACTER PYLORI [H. PYLORI] AS THE CAUSE OF DISEASES CLA: ICD-10-CM

## 2021-09-07 DIAGNOSIS — D12.8 BENIGN NEOPLASM OF RECTUM: ICD-10-CM

## 2021-09-07 DIAGNOSIS — K44.9 DIAPHRAGMATIC HERNIA WITHOUT OBSTRUCTION OR GANGRENE: ICD-10-CM

## 2021-09-07 DIAGNOSIS — R13.10 DYSPHAGIA: ICD-10-CM

## 2021-09-07 LAB — GLUCOSE BLDC GLUCOMTR-MCNC: 104 MG/DL (ref 70–105)

## 2021-09-07 PROCEDURE — 45338 SIGMOIDOSCOPY W/TUMR REMOVE: CPT | Mod: PT | Performed by: INTERNAL MEDICINE

## 2021-09-07 PROCEDURE — 43239 EGD BIOPSY SINGLE/MULTIPLE: CPT | Performed by: INTERNAL MEDICINE

## 2021-09-07 PROCEDURE — 25010000002 PROPOFOL 10 MG/ML EMULSION: Performed by: ANESTHESIOLOGY

## 2021-09-07 PROCEDURE — 88305 TISSUE EXAM BY PATHOLOGIST: CPT | Performed by: INTERNAL MEDICINE

## 2021-09-07 PROCEDURE — 82962 GLUCOSE BLOOD TEST: CPT

## 2021-09-07 PROCEDURE — 43450 DILATE ESOPHAGUS 1/MULT PASS: CPT | Performed by: INTERNAL MEDICINE

## 2021-09-07 PROCEDURE — 88342 IMHCHEM/IMCYTCHM 1ST ANTB: CPT | Performed by: INTERNAL MEDICINE

## 2021-09-07 RX ORDER — ONDANSETRON 2 MG/ML
4 INJECTION INTRAMUSCULAR; INTRAVENOUS ONCE AS NEEDED
Status: CANCELLED | OUTPATIENT
Start: 2021-09-07

## 2021-09-07 RX ORDER — ACETAMINOPHEN 650 MG/1
650 SUPPOSITORY RECTAL ONCE AS NEEDED
Status: CANCELLED | OUTPATIENT
Start: 2021-09-07

## 2021-09-07 RX ORDER — PROMETHAZINE HYDROCHLORIDE 25 MG/1
25 SUPPOSITORY RECTAL ONCE AS NEEDED
Status: CANCELLED | OUTPATIENT
Start: 2021-09-07

## 2021-09-07 RX ORDER — FLUMAZENIL 0.1 MG/ML
0.5 INJECTION INTRAVENOUS AS NEEDED
Status: CANCELLED | OUTPATIENT
Start: 2021-09-07

## 2021-09-07 RX ORDER — MORPHINE SULFATE 4 MG/ML
2 INJECTION, SOLUTION INTRAMUSCULAR; INTRAVENOUS
Status: CANCELLED | OUTPATIENT
Start: 2021-09-07 | End: 2021-09-07

## 2021-09-07 RX ORDER — SODIUM CHLORIDE 0.9 % (FLUSH) 0.9 %
3-10 SYRINGE (ML) INJECTION AS NEEDED
Status: DISCONTINUED | OUTPATIENT
Start: 2021-09-07 | End: 2021-09-07 | Stop reason: HOSPADM

## 2021-09-07 RX ORDER — SODIUM CHLORIDE 0.9 % (FLUSH) 0.9 %
3 SYRINGE (ML) INJECTION EVERY 12 HOURS SCHEDULED
Status: DISCONTINUED | OUTPATIENT
Start: 2021-09-07 | End: 2021-09-07 | Stop reason: HOSPADM

## 2021-09-07 RX ORDER — MAGNESIUM CARB/ALUMINUM HYDROX 105-160MG
296 TABLET,CHEWABLE ORAL ONCE
Status: DISCONTINUED | OUTPATIENT
Start: 2021-09-07 | End: 2021-09-07 | Stop reason: HOSPADM

## 2021-09-07 RX ORDER — PROPOFOL 10 MG/ML
VIAL (ML) INTRAVENOUS AS NEEDED
Status: DISCONTINUED | OUTPATIENT
Start: 2021-09-07 | End: 2021-09-07 | Stop reason: SURG

## 2021-09-07 RX ORDER — PROMETHAZINE HYDROCHLORIDE 25 MG/1
25 TABLET ORAL ONCE AS NEEDED
Status: CANCELLED | OUTPATIENT
Start: 2021-09-07

## 2021-09-07 RX ORDER — MEPERIDINE HYDROCHLORIDE 25 MG/ML
12.5 INJECTION INTRAMUSCULAR; INTRAVENOUS; SUBCUTANEOUS
Status: CANCELLED | OUTPATIENT
Start: 2021-09-07 | End: 2021-09-08

## 2021-09-07 RX ORDER — LORAZEPAM 2 MG/ML
0.5 INJECTION INTRAMUSCULAR
Status: CANCELLED | OUTPATIENT
Start: 2021-09-07 | End: 2021-09-17

## 2021-09-07 RX ORDER — SODIUM CHLORIDE 9 MG/ML
30 INJECTION, SOLUTION INTRAVENOUS CONTINUOUS
Status: DISCONTINUED | OUTPATIENT
Start: 2021-09-07 | End: 2021-09-07 | Stop reason: HOSPADM

## 2021-09-07 RX ORDER — ACETAMINOPHEN 325 MG/1
650 TABLET ORAL ONCE AS NEEDED
Status: CANCELLED | OUTPATIENT
Start: 2021-09-07

## 2021-09-07 RX ORDER — ONDANSETRON 2 MG/ML
4 INJECTION INTRAMUSCULAR; INTRAVENOUS ONCE AS NEEDED
Status: DISCONTINUED | OUTPATIENT
Start: 2021-09-07 | End: 2021-09-07 | Stop reason: HOSPADM

## 2021-09-07 RX ORDER — NALOXONE HCL 0.4 MG/ML
0.4 VIAL (ML) INJECTION AS NEEDED
Status: CANCELLED | OUTPATIENT
Start: 2021-09-07

## 2021-09-07 RX ORDER — HYDROCODONE BITARTRATE AND ACETAMINOPHEN 5; 325 MG/1; MG/1
1 TABLET ORAL ONCE AS NEEDED
Status: CANCELLED | OUTPATIENT
Start: 2021-09-07 | End: 2021-09-17

## 2021-09-07 RX ADMIN — PROPOFOL 50 MG: 10 INJECTION, EMULSION INTRAVENOUS at 14:31

## 2021-09-07 RX ADMIN — PROPOFOL 50 MG: 10 INJECTION, EMULSION INTRAVENOUS at 14:32

## 2021-09-07 RX ADMIN — SODIUM CHLORIDE 30 ML/HR: 9 INJECTION, SOLUTION INTRAVENOUS at 13:08

## 2021-09-07 RX ADMIN — PROPOFOL 100 MG: 10 INJECTION, EMULSION INTRAVENOUS at 14:27

## 2021-09-07 RX ADMIN — PROPOFOL 50 MG: 10 INJECTION, EMULSION INTRAVENOUS at 14:34

## 2021-09-07 RX ADMIN — PROPOFOL 40 MG: 10 INJECTION, EMULSION INTRAVENOUS at 14:37

## 2021-09-07 RX ADMIN — PROPOFOL 50 MG: 10 INJECTION, EMULSION INTRAVENOUS at 14:36

## 2021-09-07 NOTE — ANESTHESIA POSTPROCEDURE EVALUATION
Patient: Kiki Perdomo    Procedure Summary     Date: 09/07/21 Room / Location: [facility] ENDOSCOPY 4 / [facility] ENDOSCOPY    Anesthesia Start: 1425 Anesthesia Stop: 1446    Procedures:       FLEX SIGMOIDOSCOPY with polypectomy and cautery of rectal polyp. (N/A Anus)      ESOPHAGOGASTRODUODENOSCOPY with dilation (bougie # 50) (N/A ) Diagnosis:       Personal history of colonic polyps      Dysphagia      Nausea      (Personal history of colonic polyps [Z86.010])      (Dysphagia [R13.10])      (Nausea [R11.0])    Surgeons: Gordon Morales MD Provider: Titi Delgado MD    Anesthesia Type: MAC ASA Status: 3          Anesthesia Type: MAC    Vitals  Vitals Value Taken Time   /65 09/07/21 1446   Temp     Pulse 60 09/07/21 1446   Resp 16 09/07/21 1446   SpO2 100 % 09/07/21 1446           Post Anesthesia Care and Evaluation    Patient location during evaluation: bedside  Patient participation: complete - patient participated  Level of consciousness: awake and alert  Pain score: 1  Pain management: adequate  Airway patency: patent  Anesthetic complications: No anesthetic complications  PONV Status: none  Cardiovascular status: acceptable  Respiratory status: acceptable  Hydration status: acceptable  Post Neuraxial Block status: Motor and sensory function returned to baseline

## 2021-09-07 NOTE — H&P
GI PREOPERATIVE HISTORY AND PHYSICAL:    Referring Provider:    Smita Mayo APRN    Chief complaint: Dysphagia, personal history of colon polyps    Subjective .     History of present illness:      Kiki Perdomo is a 66 y.o. female who presents today for Procedure(s):  FLEX SIGMOIDOSCOPY  ESOPHAGOGASTRODUODENOSCOPY for the indications listed below.     Dysphagia, personal history of colon polyps    The updated Patient Profile was reviewed prior to the procedure, in conjunction with the Physical Exam, including medical conditions, surgical procedures, medications, allergies, family history and social history.     Pre-operatively, I reviewed the indication(s) for the procedure, the risks of the procedure [including but not limited to: unexpected bleeding possibly requiring hospitalization and/or unplanned repeat procedures, perforation possibly requiring surgical treatment, missed lesions and complications of sedation/MAC (also explained by anesthesia staff)].     I have evaluated the patient for risks associated with the planned anesthesia and the procedure to be performed and find the patient an acceptable candidate for IV sedation.    Multiple opportunities were provided for any questions or concerns, and all questions were answered satisfactorily before any anesthesia was administered. We will proceed with the planned procedure.    Past Medical History:  Past Medical History:   Diagnosis Date   • Anxiety and depression    • Arthritis     mild   • Chronic back pain    • Chronic fatigue syndrome    • Diabetes (CMS/HCC)    • Fibromyalgia    • GERD (gastroesophageal reflux disease)    • Hyperlipidemia    • Hypertension    • Nausea    • Neuropathy    • Restless legs    • Uterine cancer (CMS/HCC)        Past Surgical History:  Past Surgical History:   Procedure Laterality Date   • CHOLECYSTECTOMY     • HYSTERECTOMY         Social History:  Social History     Tobacco Use   • Smoking status: Never Smoker   •  Smokeless tobacco: Never Used   Substance Use Topics   • Alcohol use: No   • Drug use: No       Family History:  Family History   Problem Relation Age of Onset   • Heart disease Mother    • Heart disease Father        Medications:  Medications Prior to Admission   Medication Sig Dispense Refill Last Dose   • fenofibrate 160 MG tablet Take 160 mg by mouth Daily.      • gabapentin (NEURONTIN) 100 MG capsule Take 100 mg by mouth 2 (Two) Times a Day.      • lisinopril (PRINIVIL,ZESTRIL) 20 MG tablet Take 20 mg by mouth Daily.      • metFORMIN (GLUCOPHAGE) 500 MG tablet Take 500 mg by mouth 2 (Two) Times a Day With Meals.      • omeprazole (priLOSEC) 20 MG capsule Take 20 mg by mouth Daily.      • ondansetron (ZOFRAN) 4 MG tablet Take 4 mg by mouth As Needed for Nausea or Vomiting.      • oxyCODONE-acetaminophen (PERCOCET)  MG per tablet Take 1 tablet by mouth Every 8 (Eight) Hours As Needed for Moderate Pain .      • sertraline (ZOLOFT) 50 MG tablet Take 50 mg by mouth Daily.      • simvastatin (ZOCOR) 40 MG tablet Take 40 mg by mouth Every Night.      • vitamin D (ERGOCALCIFEROL) 1.25 MG (39248 UT) capsule capsule Take 50,000 Units by mouth 1 (One) Time Per Week. wednesday      • citalopram (CeleXA) 20 MG tablet Take 20 mg by mouth Daily.      • hydrOXYzine (ATARAX) 50 MG tablet Take 50 mg by mouth At Night As Needed for Itching.          Scheduled Meds:   Continuous Infusions:sodium chloride, 30 mL/hr      PRN Meds:.    ALLERGIES:  Indomethacin and Iodine    ROS:  The following systems were reviewed and negative;   Constitution:  No fevers, chills, no unintentional weight loss  Skin: no rash, no jaundice  Eyes:  No blurry vision, no eye pain  HENT:  No change in hearing or smell  Resp:  No dyspnea or cough  CV:  No chest pain or palpitations  :  No dysuria, hematuria  Musculoskeletal:  No leg cramps or arthralgias  Neuro:  No tremor, no numbness  Psych:  No depression or confsuion    Objective     Vital Signs:  "  Vitals:    08/31/21 1117   Weight: 132 kg (290 lb)   Height: 165.1 cm (65\")       Physical Exam:       General Appearance:    Awake and alert, in no acute distress   Head:    Normocephalic, without obvious abnormality, atraumatic   Throat:   No oral lesions, no thrush, oral mucosa moist   Lungs  Cardiac:  Abdomen:  Extremities:     Respirations regular, even and unlabored    Regular rate and rhythm, no murmur, gallop, rub    Non-distended, good bowel sounds, non tender, no masses     No edema, pulses 2+   Skin:   No rash, no jaundice       Results Review:  Lab Results (last 24 hours)     ** No results found for the last 24 hours. **          Imaging Results (Last 24 Hours)     ** No results found for the last 24 hours. **           I reviewed the patient's labs and imaging.    ASSESSMENT AND PLAN:  Dysphagia, personal history of colon polyps    Active Problems:    * No active hospital problems. *       Procedure(s):  FLEX SIGMOIDOSCOPY  ESOPHAGOGASTRODUODENOSCOPY      I discussed the patients findings and my recommendations with the patient.    Gordon Morales MD  09/07/21  12:53 EDT  "

## 2021-09-07 NOTE — OP NOTE
SIGMOIDOSCOPY, ESOPHAGOGASTRODUODENOSCOPY Procedure Report    Patient Name:  Kiki Perdomo  YOB: 1955    Date of Surgery:  9/7/2021     Pre-Op Diagnosis:  Personal history of colonic polyps [Z86.010]  Dysphagia [R13.10]  Nausea [R11.0]       Post-Op Diagnosis Codes:     * Personal history of colonic polyps [Z86.010]     * Dysphagia [R13.10]     * Nausea [R11.0]    Post-Op Diagnosis:  1.  Esophageal ring status post 50 Georgian Rushing dilation  2.  Small hiatal hernia  3.  Nonerosive gastritis, status post biopsy for H. pylori  4.  Small rectal polyp, status post hot snare polypectomy    Procedure/CPT® Codes:        Staff:  Surgeon(s):  Gordon Morales MD         Anesthesia: Monitored Anesthesia Care    Implants:    Nothing was implanted during the procedure    Specimen:        See Below    Complications:  None    Description of Procedure:  Informed consent was obtained for the procedure, including sedation.  Risks of perforation, hemorrhage, adverse drug reaction and aspiration were discussed.  The patient was brought into the endoscopy suite. Continuous cardiopulmonary monitoring was performed. The patient was placed in the left lateral decubitus position.  The bite block was inserted into the patient's mouth. After adequate sedation was attained, the Olympus gastroscope was inserted into the patient's mouth and advanced to the second portion of the duodenum without difficulty.  Circumferential examination was performed. A retroflex exam was performed in the patient's stomach.  On completion of the exam, the bowel was decompressed, the scope was removed from the patient, the patient tolerated the procedure well, there were no immediate post-operative complications.  There was no blood loss.      Examination of the esophagus: Subtle esophageal ring noted.  50 Georgian Rushing dilator passed with no resistance felt and no trauma noted.  Small hiatal hernia noted.  Examination of the stomach:  Erythema consistent with mild nonerosive gastritis.  Biopsies taken for H. pylori.  Examination of the duodenum: Normal    Subsequently,  the Olympus colonoscope was inserted into the patient's rectum and advanced to the level of the proximal descending colon.  The bowel prep was excellent.  Circumferential examination of the patient's colon was performed on scope withdrawal.  The descending colon, sigmoid colon, and rectum were examined.  A retroflex exam was performed in the rectum.  The bowel was decompressed, the scope was withdrawn from the patient, and the patient tolerated the procedure well. There were no immediate post-operative complications.  There was no blood loss.     Findings:   Terminal ileum: Not examined  Colon: Small 4 mm polyp in the distal rectum removed with hot snare polypectomy.  Polypectomy base was fulgurated to eliminate risk for recurrent polyp.    Impression:  1.  Esophageal ring status post 50 French Rushing dilation  2.  Small hiatal hernia  3.  Nonerosive gastritis  4.  Small rectal polyp    Recommendations:  1.  High-fiber diet  2.  Repeat colonoscopy in 5 years  3.  Follow my office as needed      Gordon Morales MD     Date: 9/7/2021  Time: 14:44 EDT

## 2021-09-07 NOTE — DISCHARGE INSTRUCTIONS
-A responsible adult should stay with you and you should rest quietly for the rest of the day.    Do not drink alcohol, drive, operate any heavy machinery or power tools or make any legal/important decisions for the next 24 hours.     Progress your diet as tolerated.  If you begin to experience severe pain, increased shortness of breath, racing heartbeat or a fever above 101 F, seek immediate medical attention.   Recommendations:  1.  High-fiber diet  2.  Repeat colonoscopy in 5 years  3.  Follow my office as needed  Follow up with MD as instructed. Call office for results in 3 to 5 days if needed.      DR MACHADO [phone]

## 2021-09-07 NOTE — ANESTHESIA PREPROCEDURE EVALUATION
Anesthesia Evaluation     Patient summary reviewed and Nursing notes reviewed   NPO Solid Status: > 6 hours  NPO Liquid Status: > 6 hours           Airway   Mallampati: II  TM distance: >3 FB  Neck ROM: full  No difficulty expected  Dental - normal exam     Pulmonary - normal exam    breath sounds clear to auscultation  (+) asthma,shortness of breath,   Cardiovascular - normal exam    ECG reviewed  Rhythm: regular  Rate: normal    (+) hypertension, hyperlipidemia,       Neuro/Psych  (+) numbness, psychiatric history,     GI/Hepatic/Renal/Endo    (+)  GERD,  diabetes mellitus,     Musculoskeletal     Abdominal  - normal exam    Abdomen: soft.  Bowel sounds: normal.   Substance History - negative use     OB/GYN negative ob/gyn ROS         Other   arthritis,    history of cancer                    Anesthesia Plan    ASA 3     MAC     intravenous induction     Anesthetic plan, all risks, benefits, and alternatives have been provided, discussed and informed consent has been obtained with: patient.  Use of blood products discussed with patient .

## 2021-09-10 LAB
LAB AP CASE REPORT: NORMAL
LAB AP CLINICAL INFORMATION: NORMAL
PATH REPORT.FINAL DX SPEC: NORMAL
PATH REPORT.GROSS SPEC: NORMAL

## 2021-12-31 ENCOUNTER — OFFICE (AMBULATORY)
Dept: URBAN - METROPOLITAN AREA CLINIC 64 | Facility: CLINIC | Age: 66
End: 2021-12-31

## 2021-12-31 VITALS
HEART RATE: 72 BPM | DIASTOLIC BLOOD PRESSURE: 55 MMHG | SYSTOLIC BLOOD PRESSURE: 116 MMHG | WEIGHT: 293 LBS | HEIGHT: 65 IN

## 2021-12-31 DIAGNOSIS — B96.81 HELICOBACTER PYLORI [H. PYLORI] AS THE CAUSE OF DISEASES CLA: ICD-10-CM

## 2021-12-31 DIAGNOSIS — R11.0 NAUSEA: ICD-10-CM

## 2021-12-31 PROCEDURE — 99214 OFFICE O/P EST MOD 30 MIN: CPT | Performed by: INTERNAL MEDICINE

## 2021-12-31 RX ORDER — METOCLOPRAMIDE 5 MG/1
10 TABLET ORAL
Qty: 60 | Refills: 11 | Status: COMPLETED
Start: 2021-12-31 | End: 2023-03-03

## 2022-02-11 ENCOUNTER — HOSPITAL ENCOUNTER (EMERGENCY)
Facility: HOSPITAL | Age: 67
Discharge: LEFT BEFORE DISCHARGE | End: 2022-02-11

## 2022-02-11 VITALS
HEIGHT: 65 IN | TEMPERATURE: 98.7 F | WEIGHT: 293 LBS | RESPIRATION RATE: 18 BRPM | BODY MASS INDEX: 48.82 KG/M2 | SYSTOLIC BLOOD PRESSURE: 171 MMHG | DIASTOLIC BLOOD PRESSURE: 73 MMHG | HEART RATE: 64 BPM | OXYGEN SATURATION: 95 %

## 2022-02-11 PROCEDURE — 99211 OFF/OP EST MAY X REQ PHY/QHP: CPT

## 2022-02-12 ENCOUNTER — HOSPITAL ENCOUNTER (EMERGENCY)
Facility: HOSPITAL | Age: 67
Discharge: HOME | End: 2022-02-12
Attending: EMERGENCY MEDICINE | Admitting: EMERGENCY MEDICINE

## 2022-02-12 ENCOUNTER — APPOINTMENT (OUTPATIENT)
Dept: CT IMAGING | Facility: HOSPITAL | Age: 67
End: 2022-02-12

## 2022-02-12 ENCOUNTER — APPOINTMENT (OUTPATIENT)
Dept: GENERAL RADIOLOGY | Facility: HOSPITAL | Age: 67
End: 2022-02-12

## 2022-02-12 VITALS
BODY MASS INDEX: 44.41 KG/M2 | SYSTOLIC BLOOD PRESSURE: 141 MMHG | DIASTOLIC BLOOD PRESSURE: 55 MMHG | WEIGHT: 293 LBS | TEMPERATURE: 98.2 F | RESPIRATION RATE: 18 BRPM | HEIGHT: 68 IN | HEART RATE: 66 BPM | OXYGEN SATURATION: 93 %

## 2022-02-12 DIAGNOSIS — R06.00 DYSPNEA, UNSPECIFIED TYPE: Primary | ICD-10-CM

## 2022-02-12 DIAGNOSIS — U07.1 COVID-19: ICD-10-CM

## 2022-02-12 DIAGNOSIS — R53.1 WEAKNESS: ICD-10-CM

## 2022-02-12 LAB
ALBUMIN SERPL-MCNC: 3.7 G/DL (ref 3.5–5.2)
ALBUMIN/GLOB SERPL: 1.5 G/DL
ALP SERPL-CCNC: 157 U/L (ref 39–117)
ALT SERPL W P-5'-P-CCNC: 71 U/L (ref 1–33)
ANION GAP SERPL CALCULATED.3IONS-SCNC: 10 MMOL/L (ref 5–15)
AST SERPL-CCNC: 59 U/L (ref 1–32)
BASOPHILS # BLD AUTO: 0 10*3/MM3 (ref 0–0.2)
BASOPHILS NFR BLD AUTO: 0.9 % (ref 0–1.5)
BILIRUB SERPL-MCNC: 0.2 MG/DL (ref 0–1.2)
BILIRUB UR QL STRIP: NEGATIVE
BUN SERPL-MCNC: 9 MG/DL (ref 8–23)
BUN/CREAT SERPL: 10.1 (ref 7–25)
CALCIUM SPEC-SCNC: 8.7 MG/DL (ref 8.6–10.5)
CHLORIDE SERPL-SCNC: 101 MMOL/L (ref 98–107)
CLARITY UR: CLEAR
CO2 SERPL-SCNC: 28 MMOL/L (ref 22–29)
COLOR UR: YELLOW
CREAT SERPL-MCNC: 0.89 MG/DL (ref 0.57–1)
D DIMER PPP FEU-MCNC: 1.18 MG/L (FEU) (ref 0–0.59)
D-LACTATE SERPL-SCNC: 1.5 MMOL/L (ref 0.5–2)
DEPRECATED RDW RBC AUTO: 43.8 FL (ref 37–54)
EOSINOPHIL # BLD AUTO: 0 10*3/MM3 (ref 0–0.4)
EOSINOPHIL NFR BLD AUTO: 0.2 % (ref 0.3–6.2)
ERYTHROCYTE [DISTWIDTH] IN BLOOD BY AUTOMATED COUNT: 13.6 % (ref 12.3–15.4)
FLUAV SUBTYP SPEC NAA+PROBE: NOT DETECTED
FLUBV RNA ISLT QL NAA+PROBE: NOT DETECTED
GFR SERPL CREATININE-BSD FRML MDRD: 64 ML/MIN/1.73
GLOBULIN UR ELPH-MCNC: 2.4 GM/DL
GLUCOSE SERPL-MCNC: 122 MG/DL (ref 65–99)
GLUCOSE UR STRIP-MCNC: NEGATIVE MG/DL
HCT VFR BLD AUTO: 37.5 % (ref 34–46.6)
HGB BLD-MCNC: 12.1 G/DL (ref 12–15.9)
HGB UR QL STRIP.AUTO: NEGATIVE
KETONES UR QL STRIP: NEGATIVE
LEUKOCYTE ESTERASE UR QL STRIP.AUTO: NEGATIVE
LYMPHOCYTES # BLD AUTO: 1 10*3/MM3 (ref 0.7–3.1)
LYMPHOCYTES NFR BLD AUTO: 31.2 % (ref 19.6–45.3)
MCH RBC QN AUTO: 29.1 PG (ref 26.6–33)
MCHC RBC AUTO-ENTMCNC: 32.4 G/DL (ref 31.5–35.7)
MCV RBC AUTO: 89.8 FL (ref 79–97)
MONOCYTES # BLD AUTO: 0.5 10*3/MM3 (ref 0.1–0.9)
MONOCYTES NFR BLD AUTO: 16.5 % (ref 5–12)
NEUTROPHILS NFR BLD AUTO: 1.7 10*3/MM3 (ref 1.7–7)
NEUTROPHILS NFR BLD AUTO: 51.2 % (ref 42.7–76)
NITRITE UR QL STRIP: NEGATIVE
NRBC BLD AUTO-RTO: 0.1 /100 WBC (ref 0–0.2)
NT-PROBNP SERPL-MCNC: 263.7 PG/ML (ref 0–900)
PH UR STRIP.AUTO: 6 [PH] (ref 5–8)
PLATELET # BLD AUTO: 157 10*3/MM3 (ref 140–450)
PMV BLD AUTO: 8.1 FL (ref 6–12)
POTASSIUM SERPL-SCNC: 4.8 MMOL/L (ref 3.5–5.2)
PROCALCITONIN SERPL-MCNC: 0.13 NG/ML (ref 0–0.25)
PROT SERPL-MCNC: 6.1 G/DL (ref 6–8.5)
PROT UR QL STRIP: ABNORMAL
RBC # BLD AUTO: 4.17 10*6/MM3 (ref 3.77–5.28)
SARS-COV-2 RNA PNL SPEC NAA+PROBE: DETECTED
SODIUM SERPL-SCNC: 139 MMOL/L (ref 136–145)
SP GR UR STRIP: 1.02 (ref 1–1.03)
TROPONIN T SERPL-MCNC: <0.01 NG/ML (ref 0–0.03)
UROBILINOGEN UR QL STRIP: ABNORMAL
WBC NRBC COR # BLD: 3.3 10*3/MM3 (ref 3.4–10.8)

## 2022-02-12 PROCEDURE — 0 IOPAMIDOL PER 1 ML: Performed by: EMERGENCY MEDICINE

## 2022-02-12 PROCEDURE — 71045 X-RAY EXAM CHEST 1 VIEW: CPT

## 2022-02-12 PROCEDURE — U0003 INFECTIOUS AGENT DETECTION BY NUCLEIC ACID (DNA OR RNA); SEVERE ACUTE RESPIRATORY SYNDROME CORONAVIRUS 2 (SARS-COV-2) (CORONAVIRUS DISEASE [COVID-19]), AMPLIFIED PROBE TECHNIQUE, MAKING USE OF HIGH THROUGHPUT TECHNOLOGIES AS DESCRIBED BY CMS-2020-01-R: HCPCS | Performed by: EMERGENCY MEDICINE

## 2022-02-12 PROCEDURE — 96375 TX/PRO/DX INJ NEW DRUG ADDON: CPT

## 2022-02-12 PROCEDURE — 85379 FIBRIN DEGRADATION QUANT: CPT | Performed by: EMERGENCY MEDICINE

## 2022-02-12 PROCEDURE — 71275 CT ANGIOGRAPHY CHEST: CPT

## 2022-02-12 PROCEDURE — 25010000002 DIPHENHYDRAMINE PER 50 MG: Performed by: EMERGENCY MEDICINE

## 2022-02-12 PROCEDURE — 84484 ASSAY OF TROPONIN QUANT: CPT | Performed by: EMERGENCY MEDICINE

## 2022-02-12 PROCEDURE — 25010000002 ONDANSETRON PER 1 MG: Performed by: EMERGENCY MEDICINE

## 2022-02-12 PROCEDURE — 83605 ASSAY OF LACTIC ACID: CPT

## 2022-02-12 PROCEDURE — 87502 INFLUENZA DNA AMP PROBE: CPT | Performed by: EMERGENCY MEDICINE

## 2022-02-12 PROCEDURE — 80053 COMPREHEN METABOLIC PANEL: CPT | Performed by: EMERGENCY MEDICINE

## 2022-02-12 PROCEDURE — 96374 THER/PROPH/DIAG INJ IV PUSH: CPT

## 2022-02-12 PROCEDURE — 85025 COMPLETE CBC W/AUTO DIFF WBC: CPT | Performed by: EMERGENCY MEDICINE

## 2022-02-12 PROCEDURE — 81003 URINALYSIS AUTO W/O SCOPE: CPT | Performed by: EMERGENCY MEDICINE

## 2022-02-12 PROCEDURE — 84145 PROCALCITONIN (PCT): CPT | Performed by: EMERGENCY MEDICINE

## 2022-02-12 PROCEDURE — 99284 EMERGENCY DEPT VISIT MOD MDM: CPT

## 2022-02-12 PROCEDURE — 87040 BLOOD CULTURE FOR BACTERIA: CPT | Performed by: EMERGENCY MEDICINE

## 2022-02-12 PROCEDURE — 93005 ELECTROCARDIOGRAM TRACING: CPT | Performed by: EMERGENCY MEDICINE

## 2022-02-12 PROCEDURE — 25010000002 METHYLPREDNISOLONE PER 125 MG: Performed by: EMERGENCY MEDICINE

## 2022-02-12 PROCEDURE — 83880 ASSAY OF NATRIURETIC PEPTIDE: CPT | Performed by: EMERGENCY MEDICINE

## 2022-02-12 RX ORDER — METHYLPREDNISOLONE SODIUM SUCCINATE 125 MG/2ML
125 INJECTION, POWDER, LYOPHILIZED, FOR SOLUTION INTRAMUSCULAR; INTRAVENOUS ONCE
Status: COMPLETED | OUTPATIENT
Start: 2022-02-12 | End: 2022-02-12

## 2022-02-12 RX ORDER — ONDANSETRON 2 MG/ML
8 INJECTION INTRAMUSCULAR; INTRAVENOUS ONCE
Status: COMPLETED | OUTPATIENT
Start: 2022-02-12 | End: 2022-02-12

## 2022-02-12 RX ORDER — SODIUM CHLORIDE 0.9 % (FLUSH) 0.9 %
10 SYRINGE (ML) INJECTION AS NEEDED
Status: DISCONTINUED | OUTPATIENT
Start: 2022-02-12 | End: 2022-02-12 | Stop reason: HOSPADM

## 2022-02-12 RX ORDER — DIPHENHYDRAMINE HYDROCHLORIDE 50 MG/ML
25 INJECTION INTRAMUSCULAR; INTRAVENOUS ONCE
Status: COMPLETED | OUTPATIENT
Start: 2022-02-12 | End: 2022-02-12

## 2022-02-12 RX ADMIN — ONDANSETRON 8 MG: 2 INJECTION INTRAMUSCULAR; INTRAVENOUS at 11:15

## 2022-02-12 RX ADMIN — FAMOTIDINE 20 MG: 10 INJECTION INTRAVENOUS at 15:05

## 2022-02-12 RX ADMIN — IOPAMIDOL 100 ML: 755 INJECTION, SOLUTION INTRAVENOUS at 15:28

## 2022-02-12 RX ADMIN — METHYLPREDNISOLONE SODIUM SUCCINATE 125 MG: 125 INJECTION, POWDER, FOR SOLUTION INTRAMUSCULAR; INTRAVENOUS at 15:02

## 2022-02-12 RX ADMIN — DIPHENHYDRAMINE HYDROCHLORIDE 25 MG: 50 INJECTION, SOLUTION INTRAMUSCULAR; INTRAVENOUS at 15:05

## 2022-02-12 RX ADMIN — SODIUM CHLORIDE, POTASSIUM CHLORIDE, SODIUM LACTATE AND CALCIUM CHLORIDE 1000 ML: 600; 310; 30; 20 INJECTION, SOLUTION INTRAVENOUS at 11:15

## 2022-02-12 NOTE — DISCHARGE INSTRUCTIONS
Paperwork has been faxed to the monoclonal antibody infusion clinic should receive a phone call Monday for further directions and scheduling.  Plenty fluids Tylenol for fevers  Return for increasing shortness of breath persistent oxygen saturations below 90% vomiting cannot hold anything down altered mental status or any other new or worsening problems or concerns

## 2022-02-12 NOTE — ED PROVIDER NOTES
Subjective   Chief complaint cough congestion body aches weakness feel bad all over shortness of breath    History of present illness 66-year-old white female who states that she has had symptoms for about 6 days of cough congestion body aches hot and cold and chills fever weakness body aches poor appetite and some increasing shortness of breath.  The patient has had no vomiting.  Denies any foreign travels or ill exposures she is not vaccinated against COVID.  Symptoms are moderate ongoing continuous 6 days worse with activity and better with rest.  No rashes no urinary problems no black or bloody stool.  No leg pain or swelling.          Review of Systems   Constitutional: Positive for chills, fatigue and fever.   HENT: Positive for congestion. Negative for sinus pressure.    Eyes: Negative for photophobia and visual disturbance.   Respiratory: Positive for cough and shortness of breath. Negative for chest tightness.    Cardiovascular: Negative for chest pain and leg swelling.   Gastrointestinal: Negative for abdominal pain and vomiting.   Endocrine: Negative for cold intolerance and heat intolerance.   Genitourinary: Negative for difficulty urinating and dysuria.   Musculoskeletal: Positive for arthralgias. Negative for back pain.   Skin: Negative for color change and rash.   Neurological: Positive for weakness. Negative for dizziness and light-headedness.   Psychiatric/Behavioral: Negative for agitation and behavioral problems.       Past Medical History:   Diagnosis Date   • Anxiety and depression    • Arthritis     mild   • Chronic back pain    • Chronic fatigue syndrome    • Diabetes (CMS/HCC)    • Fibromyalgia    • GERD (gastroesophageal reflux disease)    • Hyperlipidemia    • Hypertension    • Nausea    • Neuropathy    • Restless legs    • Uterine cancer (CMS/HCC)        Allergies   Allergen Reactions   • Indomethacin Hives   • Iodine Hives       Past Surgical History:   Procedure Laterality Date   •  CHOLECYSTECTOMY     • COLONOSCOPY     • ENDOSCOPY N/A 9/7/2021    Procedure: ESOPHAGOGASTRODUODENOSCOPY with dilation (bougie # [phone]);  Surgeon: Gordon Morales MD;  Location: [facility] ENDOSCOPY;  Service: Gastroenterology;  Laterality: N/A;  Post Op: gastritis, hiatal hernia, esophageal ring   • HYSTERECTOMY     • SIGMOIDOSCOPY N/A 9/7/2021    Procedure: FLEX SIGMOIDOSCOPY with polypectomy and cautery of rectal polyp;  Surgeon: Gordon Morales MD;  Location: [facility] ENDOSCOPY;  Service: Gastroenterology;  Laterality: N/A;  Post Op: rectal polyp       Family History   Problem Relation Age of Onset   • Heart disease Mother    • Heart disease Father        Social History     Socioeconomic History   • Marital status:    Tobacco Use   • Smoking status: Never Smoker   • Smokeless tobacco: Never Used   Substance and Sexual Activity   • Alcohol use: No   • Drug use: No   • Sexual activity: Defer     Prior to Admission medications    Medication Sig Start Date End Date Taking? Authorizing Provider   citalopram (CeleXA) 20 MG tablet Take 20 mg by mouth Daily. 11/16/12   Tierra Willett MD   fenofibrate 160 MG tablet Take 160 mg by mouth Daily. 11/16/12   Tierra Willett MD   gabapentin (NEURONTIN) 100 MG capsule Take 100 mg by mouth 2 (Two) Times a Day.    Tierra Willett MD   hydrOXYzine (ATARAX) 50 MG tablet Take 50 mg by mouth At Night As Needed for Itching.    Tierra Willett MD   lisinopril (PRINIVIL,ZESTRIL) 20 MG tablet Take 20 mg by mouth Daily.    Tierra Willett MD   metFORMIN (GLUCOPHAGE) 500 MG tablet Take 500 mg by mouth 2 (Two) Times a Day With Meals.    Tierra Willett MD   omeprazole (priLOSEC) 20 MG capsule Take 20 mg by mouth Daily.    Tierra Willett MD   ondansetron (ZOFRAN) 4 MG tablet Take 4 mg by mouth As Needed for Nausea or Vomiting.    Tierra Willett MD   oxyCODONE-acetaminophen (PERCOCET)  MG per tablet Take 1 tablet by mouth  Every 8 (Eight) Hours As Needed for Moderate Pain .    Tierra Willett MD   sertraline (ZOLOFT) 50 MG tablet Take 50 mg by mouth Daily.    Tierra Willett MD   simvastatin (ZOCOR) 40 MG tablet Take 40 mg by mouth Every Night.    Tierra Willett MD   vitamin D (ERGOCALCIFEROL) 1.25 MG (00315 UT) capsule capsule Take 50,000 Units by mouth 1 (One) Time Per Week. wednesday    Tierra Willett MD     Prior to Admission medications    Medication Sig Start Date End Date Taking? Authorizing Provider   citalopram (CeleXA) 20 MG tablet Take 20 mg by mouth Daily. 11/16/12   Tierra Willett MD   fenofibrate 160 MG tablet Take 160 mg by mouth Daily. 11/16/12   Tierra Willett MD   gabapentin (NEURONTIN) 100 MG capsule Take 100 mg by mouth 2 (Two) Times a Day.    Tierra Willett MD   hydrOXYzine (ATARAX) 50 MG tablet Take 50 mg by mouth At Night As Needed for Itching.    Tierra Willett MD   lisinopril (PRINIVIL,ZESTRIL) 20 MG tablet Take 20 mg by mouth Daily.    Tierra Willett MD   metFORMIN (GLUCOPHAGE) 500 MG tablet Take 500 mg by mouth 2 (Two) Times a Day With Meals.    Tierra Willett MD   omeprazole (priLOSEC) 20 MG capsule Take 20 mg by mouth Daily.    Tierra Willett MD   ondansetron (ZOFRAN) 4 MG tablet Take 4 mg by mouth As Needed for Nausea or Vomiting.    Tierra Willett MD   oxyCODONE-acetaminophen (PERCOCET)  MG per tablet Take 1 tablet by mouth Every 8 (Eight) Hours As Needed for Moderate Pain .    Tierra Willett MD   sertraline (ZOLOFT) 50 MG tablet Take 50 mg by mouth Daily.    Tierra Willett MD   simvastatin (ZOCOR) 40 MG tablet Take 40 mg by mouth Every Night.    Tierra Willett MD   vitamin D (ERGOCALCIFEROL) 1.25 MG (78879 UT) capsule capsule Take 50,000 Units by mouth 1 (One) Time Per Week. wednesday    Tierra Willett MD             Objective   Physical Exam  66-year-old awake alert no distress this  patient saturations are 98% on room air nontoxic-appearing.  HEENT extraocular muscles are intact pupils equal reactive no photophobia sclera clear mouth is clear neck supple no adenopathy no JVD no bruits no meningeal signs no meningeal signs lungs clear no retraction no use of accessories heart regular without murmur abdomen soft without tenderness good bowel sounds no peritoneal findings extremities pulses equal throughout upper and lower extremities no edema cords or Homans' sign or evidence of DVT skin warm dry without rashes or cellulitic changes neurologic awake alert orientated x4 no facial asymmetry normal speech without focal weakness  Procedures           ED Course      Results for orders placed or performed during the hospital encounter of 02/12/22   COVID-19,CEPHEID/MURIEL,COR/SADIE/PAD/MEGHAN IN-HOUSE(OR EMERGENT/ADD-ON),NP SWAB IN TRANSPORT MEDIA 3-4 HR TAT, RT-PCR - Swab, Nasopharynx    Specimen: Nasopharynx; Swab   Result Value Ref Range    COVID19 Detected (C) Not Detected - Ref. Range   Influenza Antigen, Rapid - Swab, Nasopharynx    Specimen: Nasopharynx; Swab   Result Value Ref Range    Influenza A PCR Not Detected Not Detected    Influenza B PCR Not Detected Not Detected   Comprehensive Metabolic Panel    Specimen: Blood   Result Value Ref Range    Glucose 122 (H) 65 - 99 mg/dL    BUN 9 8 - 23 mg/dL    Creatinine 0.89 0.57 - 1.00 mg/dL    Sodium 139 136 - 145 mmol/L    Potassium 4.8 3.5 - 5.2 mmol/L    Chloride 101 98 - 107 mmol/L    CO2 28.0 22.0 - 29.0 mmol/L    Calcium 8.7 8.6 - 10.5 mg/dL    Total Protein 6.1 6.0 - 8.5 g/dL    Albumin 3.70 3.50 - 5.20 g/dL    ALT (SGPT) 71 (H) 1 - 33 U/L    AST (SGOT) 59 (H) 1 - 32 U/L    Alkaline Phosphatase 157 (H) 39 - 117 U/L    Total Bilirubin 0.2 0.0 - 1.2 mg/dL    eGFR Non African Amer 64 >60 mL/min/1.73    Globulin 2.4 gm/dL    A/G Ratio 1.5 g/dL    BUN/Creatinine Ratio 10.1 7.0 - 25.0    Anion Gap 10.0 5.0 - 15.0 mmol/L   Urinalysis With Microscopic If  Indicated (No Culture) - Urine, Clean Catch    Specimen: Urine, Clean Catch   Result Value Ref Range    Color, UA Yellow Yellow, Straw    Appearance, UA Clear Clear    pH, UA 6.0 5.0 - 8.0    Specific Gravity, UA 1.019 1.005 - 1.030    Glucose, UA Negative Negative    Ketones, UA Negative Negative    Bilirubin, UA Negative Negative    Blood, UA Negative Negative    Protein, UA Trace (A) Negative    Leuk Esterase, UA Negative Negative    Nitrite, UA Negative Negative    Urobilinogen, UA 1.0 E.U./dL 0.2 - 1.0 E.U./dL   Troponin    Specimen: Blood   Result Value Ref Range    Troponin T <0.010 0.000 - 0.030 ng/mL   D-dimer, Quantitative    Specimen: Blood   Result Value Ref Range    D-Dimer, Quantitative 1.18 (H) 0.00 - 0.59 mg/L (FEU)   BNP    Specimen: Blood   Result Value Ref Range    proBNP 263.7 0.0 - 900.0 pg/mL   Procalcitonin    Specimen: Blood   Result Value Ref Range    Procalcitonin 0.13 0.00 - 0.25 ng/mL   CBC Auto Differential    Specimen: Blood   Result Value Ref Range    WBC 3.30 (L) 3.40 - 10.80 10*3/mm3    RBC 4.17 3.77 - 5.28 10*6/mm3    Hemoglobin 12.1 12.0 - 15.9 g/dL    Hematocrit 37.5 34.0 - 46.6 %    MCV 89.8 79.0 - 97.0 fL    MCH 29.1 26.6 - 33.0 pg    MCHC 32.4 31.5 - 35.7 g/dL    RDW 13.6 12.3 - 15.4 %    RDW-SD 43.8 37.0 - 54.0 fl    MPV 8.1 6.0 - 12.0 fL    Platelets 157 140 - 450 10*3/mm3    Neutrophil % 51.2 42.7 - 76.0 %    Lymphocyte % 31.2 19.6 - 45.3 %    Monocyte % 16.5 (H) 5.0 - 12.0 %    Eosinophil % 0.2 (L) 0.3 - 6.2 %    Basophil % 0.9 0.0 - 1.5 %    Neutrophils, Absolute 1.70 1.70 - 7.00 10*3/mm3    Lymphocytes, Absolute 1.00 0.70 - 3.10 10*3/mm3    Monocytes, Absolute 0.50 0.10 - 0.90 10*3/mm3    Eosinophils, Absolute 0.00 0.00 - 0.40 10*3/mm3    Basophils, Absolute 0.00 0.00 - 0.20 10*3/mm3    nRBC 0.1 0.0 - 0.2 /100 WBC   POC Lactate    Specimen: Blood   Result Value Ref Range    Lactate 1.5 0.5 - 2.0 mmol/L   ECG 12 Lead   Result Value Ref Range    QT Interval 379 ms     XR  Chest 1 View    Result Date: 2/12/2022  No acute process.  Electronically Signed By-Mahesh Carr MD On:2/12/2022 11:40 AM This report was finalized on [id number] by  Mahesh Carr MD.    CT Chest Pulmonary Embolism    Result Date: 2/12/2022  1. Negative for pulmonary embolus. 2. No focal consolidation or findings of pneumonia. 3. Stable 7 mm right lower lobe pulmonary nodule, unchanged from March 2020. Recommend follow-up in one year. 4. Hepatic steatosis and additional chronic findings above.  Electronically Signed By-Mahesh Carr MD On:2/12/2022 3:41 PM This report was finalized on [id number] by  Mahesh Carr MD.    Medications   sodium chloride 0.9 % flush 10 mL (has no administration in time range)   lactated ringers bolus 1,000 mL (0 mL Intravenous Stopped 2/12/22 1456)   ondansetron (ZOFRAN) injection 8 mg (8 mg Intravenous Given 2/12/22 1115)   methylPREDNISolone sodium succinate (SOLU-Medrol) injection 125 mg (125 mg Intravenous Given 2/12/22 1502)   famotidine (PEPCID) injection 20 mg (20 mg Intravenous Given 2/12/22 1505)   diphenhydrAMINE (BENADRYL) injection 25 mg (25 mg Intravenous Given 2/12/22 1505)   iopamidol (ISOVUE-370) 76 % injection 100 mL (100 mL Intravenous Given 2/12/22 1528)            EKG my interpretation normal sinus rhythm rate 67 normal axis hypertrophy QTC of 400 normal EKG                          The FDA has authorized the emergency use of sotrovimab, which is not an FDA approved drug. Discussions with the patient regarding the risks and benefits of sotrovimab have occurred. The patient recognizes that this is an investigational treatment which may offer significant known and potential benefits and risks, the extent of which are unknown. Information on available alternative treatments and the risks and benefits of those alternatives was discussed. The patient received the “Fact Sheet for Patients Parents and Caregivers”. All questions from the patient were answered to  satisfaction. The patient has the option to accept or refuse treatment with sotrovimab and would like to accept treatment.    Counseling regarding continued self isolation and use of infection control measures according to the CDC guidelines has occurred.             MDM  Number of Diagnoses or Management Options  COVID-19: new and requires workup  Dyspnea, unspecified type: new and requires workup  Weakness: new and requires workup  Diagnosis management comments: Medical decision made.  Patient IV established placed on a monitor with sinus rhythm sats at 97% on room air given a liter lactated Ringer's Zofran 8 mg IV.  She has some nausea.  Underwent a chest x-ray which was unremarkable my review and radiology review.  She had an EKG which showed a sinus rhythm without acute ST elevation or changes reviewed by me.  The patient underwent labs reviewed by me blood sugar 122 patient ALT of 71 AST of 59 alk phos of 157 bilirubin was normal patient had a negative troponin her D-dimer was 1.18 proBNP normal procalcitonin normal white count 3.3 hemoglobin was 12 and the patient had platelets 157,000 lactate was 1.5 she underwent a CT scan after premedication we talked about this pulmonary embolism she was agreeable to this she states she gets some hives with contrast in the past but as long she is premedicated she is okay given Pepcid IV Solu-Medrol and Benadryl she had a CT scan negative for any pulmonary embolism no pneumonia she had a stable 7 mm right lower lobe pulmonary nodule unchanged from March 2020 the patient needs a follow-up 1 year hepatic steatosis was noted which is chronic as well.  Patient repeat exam was resting comfortably and she is in no distress at this time.  Sats were 97% on room air respiratory of 18 and heart rate is in the 60s.  I see no evidence of DVT pulmonary embolism cardiac ischemia no evidence of sepsis no no evidence of pneumonia no evidence of myocardial infarction no evidence of stroke  she looks well at this point does not complete list of all possibilities but I think her symptoms are secondary to COVID she has been sick for 6 days and a prescription she is really out of the window for but she does qualify for antibody therapy we talked about that risk benefits complications alternative treatments and she voiced understanding and was agreeable for this the appropriate paperwork was filled out and faxed and she should expect a phone call from them on Monday.  We talked about what to return for watching oxygen saturation and respiratory status quarantine she was examined appropriate PPE she voiced understanding was discharged home for outpatient management and follow-up.       Amount and/or Complexity of Data Reviewed  Clinical lab tests: reviewed  Tests in the radiology section of CPT®: reviewed  Tests in the medicine section of CPT®: reviewed    Risk of Complications, Morbidity, and/or Mortality  Presenting problems: high  Diagnostic procedures: high  Management options: high    Patient Progress  Patient progress: stable      Final diagnoses:   Dyspnea, unspecified type   Weakness   COVID-19       ED Disposition  ED Disposition     ED Disposition Condition Comment    Discharge Stable           Smita Mayo, APRN  [address] [facility] [address]  [address] [place] [address]  [phone]    In 2 days           Medication List      No changes were made to your prescriptions during this visit.          Steven Mccann MD  02/12/22 1675

## 2022-02-13 LAB — QT INTERVAL: 379 MS

## 2022-02-14 ENCOUNTER — TRANSCRIBE ORDERS (OUTPATIENT)
Dept: ADMINISTRATIVE | Facility: HOSPITAL | Age: 67
End: 2022-02-14

## 2022-02-14 DIAGNOSIS — U07.1 COVID-19: Primary | ICD-10-CM

## 2022-02-14 RX ORDER — SODIUM CHLORIDE 9 MG/ML
30 INJECTION, SOLUTION INTRAVENOUS ONCE
OUTPATIENT
Start: 2022-02-14

## 2022-02-14 RX ORDER — DIPHENHYDRAMINE HYDROCHLORIDE 50 MG/ML
50 INJECTION INTRAMUSCULAR; INTRAVENOUS ONCE AS NEEDED
OUTPATIENT
Start: 2022-02-14

## 2022-02-14 RX ORDER — DIPHENHYDRAMINE HCL 25 MG
50 TABLET ORAL ONCE AS NEEDED
OUTPATIENT
Start: 2022-02-14

## 2022-02-14 RX ORDER — EPINEPHRINE 1 MG/ML
0.3 INJECTION, SOLUTION INTRAMUSCULAR; SUBCUTANEOUS AS NEEDED
OUTPATIENT
Start: 2022-02-14

## 2022-02-14 RX ORDER — METHYLPREDNISOLONE SODIUM SUCCINATE 125 MG/2ML
125 INJECTION, POWDER, LYOPHILIZED, FOR SOLUTION INTRAMUSCULAR; INTRAVENOUS AS NEEDED
OUTPATIENT
Start: 2022-02-14

## 2022-02-17 LAB
BACTERIA SPEC AEROBE CULT: NORMAL
BACTERIA SPEC AEROBE CULT: NORMAL

## 2022-02-18 ENCOUNTER — HOSPITAL ENCOUNTER (OUTPATIENT)
Facility: HOSPITAL | Age: 67
Setting detail: OBSERVATION
Discharge: HOME | End: 2022-02-19
Attending: EMERGENCY MEDICINE | Admitting: INTERNAL MEDICINE

## 2022-02-18 ENCOUNTER — APPOINTMENT (OUTPATIENT)
Dept: GENERAL RADIOLOGY | Facility: HOSPITAL | Age: 67
End: 2022-02-18

## 2022-02-18 ENCOUNTER — APPOINTMENT (OUTPATIENT)
Dept: CT IMAGING | Facility: HOSPITAL | Age: 67
End: 2022-02-18

## 2022-02-18 DIAGNOSIS — U07.1 PNEUMONIA DUE TO COVID-19 VIRUS: Primary | ICD-10-CM

## 2022-02-18 DIAGNOSIS — J12.82 PNEUMONIA DUE TO COVID-19 VIRUS: Primary | ICD-10-CM

## 2022-02-18 PROBLEM — J96.01 ACUTE RESPIRATORY FAILURE WITH HYPOXIA (HCC): Status: ACTIVE | Noted: 2022-02-18

## 2022-02-18 PROBLEM — R79.89 D-DIMER, ELEVATED: Status: ACTIVE | Noted: 2022-02-18

## 2022-02-18 LAB
ALBUMIN SERPL-MCNC: 3.7 G/DL (ref 3.5–5.2)
ALBUMIN/GLOB SERPL: 1.4 G/DL
ALP SERPL-CCNC: 101 U/L (ref 39–117)
ALT SERPL W P-5'-P-CCNC: 25 U/L (ref 1–33)
ANION GAP SERPL CALCULATED.3IONS-SCNC: 8 MMOL/L (ref 5–15)
APTT PPP: 27.9 SECONDS (ref 24–31)
ARTERIAL PATENCY WRIST A: POSITIVE
AST SERPL-CCNC: 26 U/L (ref 1–32)
ATMOSPHERIC PRESS: ABNORMAL MM[HG]
BACTERIA UR QL AUTO: ABNORMAL /HPF
BASE EXCESS BLDA CALC-SCNC: 4 MMOL/L (ref 0–3)
BASOPHILS # BLD AUTO: 0 10*3/MM3 (ref 0–0.2)
BASOPHILS NFR BLD AUTO: 0.4 % (ref 0–1.5)
BDY SITE: ABNORMAL
BILIRUB SERPL-MCNC: 0.3 MG/DL (ref 0–1.2)
BILIRUB UR QL STRIP: NEGATIVE
BUN SERPL-MCNC: 10 MG/DL (ref 8–23)
BUN/CREAT SERPL: 10.6 (ref 7–25)
CALCIUM SPEC-SCNC: 8.7 MG/DL (ref 8.6–10.5)
CHLORIDE SERPL-SCNC: 98 MMOL/L (ref 98–107)
CLARITY UR: CLEAR
CO2 BLDA-SCNC: 30.1 MMOL/L (ref 22–29)
CO2 SERPL-SCNC: 31 MMOL/L (ref 22–29)
COLOR UR: YELLOW
CREAT SERPL-MCNC: 0.94 MG/DL (ref 0.57–1)
CRP SERPL-MCNC: 3.09 MG/DL (ref 0–0.5)
D DIMER PPP FEU-MCNC: 0.98 MG/L (FEU) (ref 0–0.59)
D-LACTATE SERPL-SCNC: 0.9 MMOL/L (ref 0.5–2)
DEPRECATED RDW RBC AUTO: 41.6 FL (ref 37–54)
EOSINOPHIL # BLD AUTO: 0 10*3/MM3 (ref 0–0.4)
EOSINOPHIL NFR BLD AUTO: 0 % (ref 0.3–6.2)
ERYTHROCYTE [DISTWIDTH] IN BLOOD BY AUTOMATED COUNT: 13.2 % (ref 12.3–15.4)
FERRITIN SERPL-MCNC: 259.9 NG/ML (ref 13–150)
GFR SERPL CREATININE-BSD FRML MDRD: 60 ML/MIN/1.73
GLOBULIN UR ELPH-MCNC: 2.6 GM/DL
GLUCOSE BLDC GLUCOMTR-MCNC: 157 MG/DL (ref 70–105)
GLUCOSE BLDC GLUCOMTR-MCNC: 160 MG/DL (ref 70–105)
GLUCOSE BLDC GLUCOMTR-MCNC: 163 MG/DL (ref 70–105)
GLUCOSE SERPL-MCNC: 132 MG/DL (ref 65–99)
GLUCOSE UR STRIP-MCNC: NEGATIVE MG/DL
HBA1C MFR BLD: 6.3 % (ref 3.5–5.6)
HCO3 BLDA-SCNC: 28.8 MMOL/L (ref 21–28)
HCT VFR BLD AUTO: 37.1 % (ref 34–46.6)
HEMODILUTION: NO
HGB BLD-MCNC: 12.2 G/DL (ref 12–15.9)
HGB UR QL STRIP.AUTO: NEGATIVE
HYALINE CASTS UR QL AUTO: ABNORMAL /LPF
INHALED O2 CONCENTRATION: 28 %
INR PPP: 0.97 (ref 0.93–1.1)
KETONES UR QL STRIP: NEGATIVE
LDH SERPL-CCNC: 234 U/L (ref 135–214)
LEUKOCYTE ESTERASE UR QL STRIP.AUTO: NEGATIVE
LIPASE SERPL-CCNC: 15 U/L (ref 13–60)
LYMPHOCYTES # BLD AUTO: 1 10*3/MM3 (ref 0.7–3.1)
LYMPHOCYTES NFR BLD AUTO: 20.1 % (ref 19.6–45.3)
MCH RBC QN AUTO: 28.9 PG (ref 26.6–33)
MCHC RBC AUTO-ENTMCNC: 32.8 G/DL (ref 31.5–35.7)
MCV RBC AUTO: 88.2 FL (ref 79–97)
MODALITY: ABNORMAL
MONOCYTES # BLD AUTO: 0.6 10*3/MM3 (ref 0.1–0.9)
MONOCYTES NFR BLD AUTO: 11.4 % (ref 5–12)
NEUTROPHILS NFR BLD AUTO: 3.4 10*3/MM3 (ref 1.7–7)
NEUTROPHILS NFR BLD AUTO: 68.1 % (ref 42.7–76)
NITRITE UR QL STRIP: NEGATIVE
NRBC BLD AUTO-RTO: 0.1 /100 WBC (ref 0–0.2)
NT-PROBNP SERPL-MCNC: 154.3 PG/ML (ref 0–900)
PCO2 BLDA: 43 MM HG (ref 35–48)
PH BLDA: 7.43 PH UNITS (ref 7.35–7.45)
PH UR STRIP.AUTO: 6.5 [PH] (ref 5–8)
PLATELET # BLD AUTO: 177 10*3/MM3 (ref 140–450)
PMV BLD AUTO: 7.9 FL (ref 6–12)
PO2 BLDA: 72.9 MM HG (ref 83–108)
POTASSIUM SERPL-SCNC: 4.3 MMOL/L (ref 3.5–5.2)
PROCALCITONIN SERPL-MCNC: 0.08 NG/ML (ref 0–0.25)
PROT SERPL-MCNC: 6.3 G/DL (ref 6–8.5)
PROT UR QL STRIP: ABNORMAL
PROTHROMBIN TIME: 10.8 SECONDS (ref 9.6–11.7)
RBC # BLD AUTO: 4.2 10*6/MM3 (ref 3.77–5.28)
RBC # UR STRIP: ABNORMAL /HPF
REF LAB TEST METHOD: ABNORMAL
SAO2 % BLDCOA: 94.8 % (ref 94–98)
SODIUM SERPL-SCNC: 137 MMOL/L (ref 136–145)
SP GR UR STRIP: 1.02 (ref 1–1.03)
SQUAMOUS #/AREA URNS HPF: ABNORMAL /HPF
TROPONIN T SERPL-MCNC: <0.01 NG/ML (ref 0–0.03)
UROBILINOGEN UR QL STRIP: ABNORMAL
WBC # UR STRIP: ABNORMAL /HPF
WBC NRBC COR # BLD: 5 10*3/MM3 (ref 3.4–10.8)
YEAST URNS QL MICRO: ABNORMAL /HPF

## 2022-02-18 PROCEDURE — 87040 BLOOD CULTURE FOR BACTERIA: CPT | Performed by: EMERGENCY MEDICINE

## 2022-02-18 PROCEDURE — 63710000001 INSULIN LISPRO (HUMAN) PER 5 UNITS: Performed by: NURSE PRACTITIONER

## 2022-02-18 PROCEDURE — 71045 X-RAY EXAM CHEST 1 VIEW: CPT

## 2022-02-18 PROCEDURE — 83880 ASSAY OF NATRIURETIC PEPTIDE: CPT | Performed by: EMERGENCY MEDICINE

## 2022-02-18 PROCEDURE — 85379 FIBRIN DEGRADATION QUANT: CPT | Performed by: EMERGENCY MEDICINE

## 2022-02-18 PROCEDURE — 84484 ASSAY OF TROPONIN QUANT: CPT | Performed by: EMERGENCY MEDICINE

## 2022-02-18 PROCEDURE — 85025 COMPLETE CBC W/AUTO DIFF WBC: CPT | Performed by: EMERGENCY MEDICINE

## 2022-02-18 PROCEDURE — 80053 COMPREHEN METABOLIC PANEL: CPT | Performed by: EMERGENCY MEDICINE

## 2022-02-18 PROCEDURE — 74176 CT ABD & PELVIS W/O CONTRAST: CPT

## 2022-02-18 PROCEDURE — 96375 TX/PRO/DX INJ NEW DRUG ADDON: CPT

## 2022-02-18 PROCEDURE — 93005 ELECTROCARDIOGRAM TRACING: CPT | Performed by: NURSE PRACTITIONER

## 2022-02-18 PROCEDURE — 83605 ASSAY OF LACTIC ACID: CPT

## 2022-02-18 PROCEDURE — G0378 HOSPITAL OBSERVATION PER HR: HCPCS

## 2022-02-18 PROCEDURE — 82803 BLOOD GASES ANY COMBINATION: CPT

## 2022-02-18 PROCEDURE — 99220 PR INITIAL OBSERVATION CARE/DAY 70 MINUTES: CPT | Performed by: INTERNAL MEDICINE

## 2022-02-18 PROCEDURE — 96376 TX/PRO/DX INJ SAME DRUG ADON: CPT

## 2022-02-18 PROCEDURE — 83690 ASSAY OF LIPASE: CPT | Performed by: EMERGENCY MEDICINE

## 2022-02-18 PROCEDURE — 86140 C-REACTIVE PROTEIN: CPT | Performed by: EMERGENCY MEDICINE

## 2022-02-18 PROCEDURE — 83615 LACTATE (LD) (LDH) ENZYME: CPT | Performed by: EMERGENCY MEDICINE

## 2022-02-18 PROCEDURE — 82728 ASSAY OF FERRITIN: CPT | Performed by: EMERGENCY MEDICINE

## 2022-02-18 PROCEDURE — 99285 EMERGENCY DEPT VISIT HI MDM: CPT

## 2022-02-18 PROCEDURE — 96372 THER/PROPH/DIAG INJ SC/IM: CPT

## 2022-02-18 PROCEDURE — 81001 URINALYSIS AUTO W/SCOPE: CPT | Performed by: EMERGENCY MEDICINE

## 2022-02-18 PROCEDURE — 84145 PROCALCITONIN (PCT): CPT | Performed by: EMERGENCY MEDICINE

## 2022-02-18 PROCEDURE — 25010000002 DEXAMETHASONE PER 1 MG: Performed by: NURSE PRACTITIONER

## 2022-02-18 PROCEDURE — 85730 THROMBOPLASTIN TIME PARTIAL: CPT | Performed by: EMERGENCY MEDICINE

## 2022-02-18 PROCEDURE — 82962 GLUCOSE BLOOD TEST: CPT

## 2022-02-18 PROCEDURE — 85610 PROTHROMBIN TIME: CPT | Performed by: EMERGENCY MEDICINE

## 2022-02-18 PROCEDURE — 25010000002 ONDANSETRON PER 1 MG: Performed by: INTERNAL MEDICINE

## 2022-02-18 PROCEDURE — 93010 ELECTROCARDIOGRAM REPORT: CPT | Performed by: INTERNAL MEDICINE

## 2022-02-18 PROCEDURE — 25010000002 ENOXAPARIN PER 10 MG: Performed by: EMERGENCY MEDICINE

## 2022-02-18 PROCEDURE — P9612 CATHETERIZE FOR URINE SPEC: HCPCS

## 2022-02-18 PROCEDURE — 83036 HEMOGLOBIN GLYCOSYLATED A1C: CPT | Performed by: NURSE PRACTITIONER

## 2022-02-18 PROCEDURE — 36600 WITHDRAWAL OF ARTERIAL BLOOD: CPT

## 2022-02-18 PROCEDURE — 25010000002 ONDANSETRON PER 1 MG: Performed by: EMERGENCY MEDICINE

## 2022-02-18 PROCEDURE — 96374 THER/PROPH/DIAG INJ IV PUSH: CPT

## 2022-02-18 RX ORDER — ALBUTEROL SULFATE 90 UG/1
2 AEROSOL, METERED RESPIRATORY (INHALATION) EVERY 6 HOURS PRN
Status: DISCONTINUED | OUTPATIENT
Start: 2022-02-18 | End: 2022-02-19 | Stop reason: HOSPADM

## 2022-02-18 RX ORDER — DEXAMETHASONE SODIUM PHOSPHATE 4 MG/ML
6 INJECTION, SOLUTION INTRA-ARTICULAR; INTRALESIONAL; INTRAMUSCULAR; INTRAVENOUS; SOFT TISSUE DAILY
Status: DISCONTINUED | OUTPATIENT
Start: 2022-02-18 | End: 2022-02-19

## 2022-02-18 RX ORDER — IPRATROPIUM BROMIDE AND ALBUTEROL SULFATE 2.5; .5 MG/3ML; MG/3ML
3 SOLUTION RESPIRATORY (INHALATION) EVERY 6 HOURS PRN
Status: DISCONTINUED | OUTPATIENT
Start: 2022-02-18 | End: 2022-02-19 | Stop reason: HOSPADM

## 2022-02-18 RX ORDER — ACETAMINOPHEN 650 MG/1
650 SUPPOSITORY RECTAL EVERY 4 HOURS PRN
Status: DISCONTINUED | OUTPATIENT
Start: 2022-02-18 | End: 2022-02-19 | Stop reason: HOSPADM

## 2022-02-18 RX ORDER — HYDRALAZINE HYDROCHLORIDE 20 MG/ML
10 INJECTION INTRAMUSCULAR; INTRAVENOUS EVERY 6 HOURS PRN
Status: DISCONTINUED | OUTPATIENT
Start: 2022-02-18 | End: 2022-02-19 | Stop reason: HOSPADM

## 2022-02-18 RX ORDER — INSULIN LISPRO 100 [IU]/ML
0-9 INJECTION, SOLUTION INTRAVENOUS; SUBCUTANEOUS
Status: DISCONTINUED | OUTPATIENT
Start: 2022-02-18 | End: 2022-02-19 | Stop reason: HOSPADM

## 2022-02-18 RX ORDER — INSULIN LISPRO 100 [IU]/ML
0-9 INJECTION, SOLUTION INTRAVENOUS; SUBCUTANEOUS AS NEEDED
Status: DISCONTINUED | OUTPATIENT
Start: 2022-02-18 | End: 2022-02-19 | Stop reason: HOSPADM

## 2022-02-18 RX ORDER — BENZONATATE 100 MG/1
100 CAPSULE ORAL 3 TIMES DAILY PRN
Status: DISCONTINUED | OUTPATIENT
Start: 2022-02-18 | End: 2022-02-19 | Stop reason: HOSPADM

## 2022-02-18 RX ORDER — GUAIFENESIN/DEXTROMETHORPHAN 100-10MG/5
20 SYRUP ORAL EVERY 4 HOURS PRN
Status: DISCONTINUED | OUTPATIENT
Start: 2022-02-18 | End: 2022-02-19 | Stop reason: HOSPADM

## 2022-02-18 RX ORDER — OLANZAPINE 10 MG/2ML
1 INJECTION, POWDER, LYOPHILIZED, FOR SOLUTION INTRAMUSCULAR AS NEEDED
Status: DISCONTINUED | OUTPATIENT
Start: 2022-02-18 | End: 2022-02-19 | Stop reason: HOSPADM

## 2022-02-18 RX ORDER — SODIUM CHLORIDE 0.9 % (FLUSH) 0.9 %
10 SYRINGE (ML) INJECTION AS NEEDED
Status: DISCONTINUED | OUTPATIENT
Start: 2022-02-18 | End: 2022-02-19 | Stop reason: HOSPADM

## 2022-02-18 RX ORDER — ACETAMINOPHEN 500 MG
1000 TABLET ORAL ONCE
Status: COMPLETED | OUTPATIENT
Start: 2022-02-18 | End: 2022-02-18

## 2022-02-18 RX ORDER — ONDANSETRON 2 MG/ML
4 INJECTION INTRAMUSCULAR; INTRAVENOUS ONCE
Status: COMPLETED | OUTPATIENT
Start: 2022-02-18 | End: 2022-02-18

## 2022-02-18 RX ORDER — ACETAMINOPHEN 325 MG/1
650 TABLET ORAL EVERY 4 HOURS PRN
Status: DISCONTINUED | OUTPATIENT
Start: 2022-02-18 | End: 2022-02-19 | Stop reason: HOSPADM

## 2022-02-18 RX ORDER — NICOTINE POLACRILEX 4 MG
15 LOZENGE BUCCAL
Status: DISCONTINUED | OUTPATIENT
Start: 2022-02-18 | End: 2022-02-19 | Stop reason: HOSPADM

## 2022-02-18 RX ORDER — ONDANSETRON 2 MG/ML
4 INJECTION INTRAMUSCULAR; INTRAVENOUS EVERY 6 HOURS PRN
Status: DISCONTINUED | OUTPATIENT
Start: 2022-02-18 | End: 2022-02-19 | Stop reason: HOSPADM

## 2022-02-18 RX ORDER — CHOLECALCIFEROL (VITAMIN D3) 125 MCG
10 CAPSULE ORAL NIGHTLY PRN
Status: DISCONTINUED | OUTPATIENT
Start: 2022-02-18 | End: 2022-02-19 | Stop reason: HOSPADM

## 2022-02-18 RX ORDER — DEXTROSE MONOHYDRATE 25 G/50ML
25 INJECTION, SOLUTION INTRAVENOUS
Status: DISCONTINUED | OUTPATIENT
Start: 2022-02-18 | End: 2022-02-19 | Stop reason: HOSPADM

## 2022-02-18 RX ADMIN — INSULIN LISPRO 2 UNITS: 100 INJECTION, SOLUTION INTRAVENOUS; SUBCUTANEOUS at 11:14

## 2022-02-18 RX ADMIN — SODIUM CHLORIDE 500 ML: 9 INJECTION, SOLUTION INTRAVENOUS at 06:56

## 2022-02-18 RX ADMIN — ACETAMINOPHEN 650 MG: 325 TABLET, FILM COATED ORAL at 17:35

## 2022-02-18 RX ADMIN — ONDANSETRON 4 MG: 2 INJECTION INTRAMUSCULAR; INTRAVENOUS at 17:36

## 2022-02-18 RX ADMIN — Medication 10 MG: at 20:14

## 2022-02-18 RX ADMIN — ENOXAPARIN SODIUM 40 MG: 40 INJECTION SUBCUTANEOUS at 17:31

## 2022-02-18 RX ADMIN — ACETAMINOPHEN 1000 MG: 500 TABLET, FILM COATED ORAL at 02:36

## 2022-02-18 RX ADMIN — DEXAMETHASONE SODIUM PHOSPHATE 6 MG: 4 INJECTION, SOLUTION INTRAMUSCULAR; INTRAVENOUS at 09:32

## 2022-02-18 RX ADMIN — ENOXAPARIN SODIUM 40 MG: 40 INJECTION SUBCUTANEOUS at 06:57

## 2022-02-18 RX ADMIN — INSULIN LISPRO 2 UNITS: 100 INJECTION, SOLUTION INTRAVENOUS; SUBCUTANEOUS at 17:31

## 2022-02-18 RX ADMIN — ONDANSETRON 4 MG: 2 INJECTION INTRAMUSCULAR; INTRAVENOUS at 02:35

## 2022-02-18 NOTE — PLAN OF CARE
Goal Outcome Evaluation:     Pt resting, currently on 2L nasal cannula. Reports some SOA and overall not feeling well.

## 2022-02-18 NOTE — H&P
[facility] Medicine Services      Patient Name: Kiki Perdomo  : 1955  MRN: [id number]  Primary Care Physician:  Smita Mayo APRN  Date of admission: 2022      Subjective      Chief Complaint: shortness of air    History of Present Illness: Kiki Perdomo is a 66 y.o. female who presented to [facility] on 2022 complaining of increased shortness of air.  She was diagnosed with COVID-19 on 2022 and was seen here in the emergency department and discharged.  She did not receive remdesivir nor immunoglobulin therapy.  She returns today with a fever of 101.9.  Arterial blood gas showed a PaO2 of 72.9.  She is now stable on 2 L oxygen via nasal cannula.  Troponin, BNP WBC were not elevated.  D-dimer was mildly elevated at 0.98.  It was higher on 2022 and she had a CT of her chest which was negative for PE.  She apparently also complained of nausea vomiting and diarrhea and CT abdomen and pelvis was done which showed minor airspace opacities in the lower lungs consistent with COVID-19 and no acute abnormality in the abdomen and pelvis.  She was given a treatment dose of Lovenox x1.  D-dimer likely elevated due to COVID-19 CT chest may need to be done today at a later time due to recent CT abdomen and pelvis if indicated.  She denies tobacco use, COPD but reports a past medical history of asthma.  She denies CAD or CHF.  Review of records shows a normal echo and a normal stress test in 2019.  She has a past medical history of diabetes mellitus type 2 with a serum glucose today of 132.  She was moderately hypertensive on admission which improved without intervention.  Lactic was 0.9.  She was ordered 6 mg IV dexamethasone daily and supportive care for COVID-19.  She is not in the time window for remdesivir given symptoms were 2022.  She will be admitted for further evaluation and treatment  Review of Systems   Constitutional: Positive  for fever and malaise/fatigue.   HENT: Negative.    Eyes: Negative.    Cardiovascular: Positive for dyspnea on exertion.   Respiratory: Positive for cough and shortness of breath.    Endocrine: Negative.    Hematologic/Lymphatic: Negative.    Skin: Negative.    Musculoskeletal: Positive for myalgias.   Gastrointestinal: Positive for diarrhea, nausea and vomiting.   Genitourinary: Negative.    Neurological: Positive for light-headedness and weakness.   Psychiatric/Behavioral: Negative.    Allergic/Immunologic: Negative.    All other systems reviewed and are negative.      Personal History     Past Medical History:   Diagnosis Date   • Anxiety and depression    • Arthritis     mild   • Chronic back pain    • Chronic fatigue syndrome    • Diabetes (HCC)    • Fibromyalgia    • GERD (gastroesophageal reflux disease)    • Hyperlipidemia    • Hypertension    • Nausea    • Neuropathy    • Restless legs    • Uterine cancer (HCC)        Past Surgical History:   Procedure Laterality Date   • CHOLECYSTECTOMY     • COLONOSCOPY     • ENDOSCOPY N/A 9/7/2021    Procedure: ESOPHAGOGASTRODUODENOSCOPY with dilation (bougie # 50);  Surgeon: Gordon Morales MD;  Location: [facility] ENDOSCOPY;  Service: Gastroenterology;  Laterality: N/A;  Post Op: gastritis, hiatal hernia, esophageal ring   • HYSTERECTOMY     • SIGMOIDOSCOPY N/A 9/7/2021    Procedure: FLEX SIGMOIDOSCOPY with polypectomy and cautery of rectal polyp;  Surgeon: Gordon Morales MD;  Location: [facility] ENDOSCOPY;  Service: Gastroenterology;  Laterality: N/A;  Post Op: rectal polyp       Family History: family history includes Heart disease in her father and mother. Otherwise pertinent FHx was reviewed and not pertinent to current issue.    Social History:  reports that she has never smoked. She has never used smokeless tobacco. She reports that she does not drink alcohol and does not use drugs.    Home Medications:  Prior to Admission Medications     Prescriptions  Last Dose Informant Patient Reported? Taking?    citalopram (CeleXA) 20 MG tablet   Yes No    Take 20 mg by mouth Daily.    fenofibrate 160 MG tablet   Yes No    Take 160 mg by mouth Daily.    gabapentin (NEURONTIN) 100 MG capsule   Yes No    Take 100 mg by mouth 2 (Two) Times a Day.    hydrOXYzine (ATARAX) 50 MG tablet   Yes No    Take 50 mg by mouth At Night As Needed for Itching.    lisinopril (PRINIVIL,ZESTRIL) 20 MG tablet   Yes No    Take 20 mg by mouth Daily.    metFORMIN (GLUCOPHAGE) 500 MG tablet   Yes No    Take 500 mg by mouth 2 (Two) Times a Day With Meals.    omeprazole (priLOSEC) 20 MG capsule   Yes No    Take 20 mg by mouth Daily.    ondansetron (ZOFRAN) 4 MG tablet   Yes No    Take 4 mg by mouth As Needed for Nausea or Vomiting.    oxyCODONE-acetaminophen (PERCOCET)  MG per tablet   Yes No    Take 1 tablet by mouth Every 8 (Eight) Hours As Needed for Moderate Pain .    sertraline (ZOLOFT) 50 MG tablet   Yes No    Take 50 mg by mouth Daily.    simvastatin (ZOCOR) 40 MG tablet   Yes No    Take 40 mg by mouth Every Night.    vitamin D (ERGOCALCIFEROL) 1.25 MG (21810 UT) capsule capsule   Yes No    Take 50,000 Units by mouth 1 (One) Time Per Week. wednesday            Allergies:  Allergies   Allergen Reactions   • Indomethacin Hives   • Iodine Hives       Objective      Vitals:   Temp:  [98 °F (36.7 °C)-101.1 °F (38.4 °C)] 98 °F (36.7 °C)  Heart Rate:  [68-85] 68  Resp:  [18-20] 18  BP: (129-169)/() 129/52    Physical Exam  Vitals reviewed.   Constitutional:       Appearance: Normal appearance. She is obese. She is ill-appearing.   HENT:      Head: Normocephalic and atraumatic.      Right Ear: External ear normal.      Left Ear: External ear normal.      Nose: Nose normal.      Mouth/Throat:      Mouth: Mucous membranes are moist.   Eyes:      Extraocular Movements: Extraocular movements intact.   Cardiovascular:      Rate and Rhythm: Normal rate and regular rhythm.      Pulses: Normal  pulses.      Heart sounds: Normal heart sounds.   Pulmonary:      Breath sounds: Normal breath sounds.   Abdominal:      Palpations: Abdomen is soft.   Genitourinary:     Comments: deferred  Musculoskeletal:         General: Normal range of motion.      Cervical back: Normal range of motion and neck supple.   Skin:     General: Skin is warm and dry.   Neurological:      General: No focal deficit present.      Mental Status: She is alert and oriented to person, place, and time.   Psychiatric:         Behavior: Behavior normal.         Thought Content: Thought content normal.         Judgment: Judgment normal.      Comments: anxious         Result Review    Result Review:  I have personally reviewed the results from the time of this admission to 2/18/2022 06:33 EST and agree with these findings:  [x]  Laboratory  [x]  Microbiology  [x]  Radiology  []  EKG/Telemetry   []  Cardiology/Vascular   []  Pathology  [x]  Old records  []  Other:  Most notable findings include:Covid-19 positive 2/12/22,ABG PO2 72.9,glucose 132,D-Dimer 0.98,wbc troponin and BNP not elevated      CT abd pelvis   .  Minor airspace opacities lower lungs consistent with Covid 19 pneumonia.  2.  No acute abnormality in the abdomen and pelvis within limitations of unenhanced exam.      Assessment/Plan        Active Hospital Problems:  Active Hospital Problems    Diagnosis    • Pneumonia due to COVID-19 virus    • Acute respiratory failure with hypoxia (HCC)    • D-dimer, elevated    • Gastroesophageal reflux disease    • Depression    • Rheumatoid arthritis (HCC)    • Asthma    • Obesity with body mass index 30 or greater    • Hyperlipidemia    • Lumbar radiculopathy    • Chronic fatigue syndrome    • Diabetes mellitus (HCC)    • Hypertension      Plan:    Pneumonia due to COVID-19 virus failed outpatient conservative treatment with acute respiratory failure with hypoxia now stable on 2 L via nasal cannula, 6 mg IV dexamethasone daily supplemental  oxygen to maintain sats at 90 to 92%, DuoNebs every 4 hours as needed shortness of air albuterol INH every 4 hours as needed shortness of air guaifenesin, Tessalon Perles for cough acetaminophen for fever    D-dimer elevated at 0.98 was higher on 2/12/2022 with ED visit and CT at that time showed no PE denies chest pain, given one-time treatment dose of Lovenox in ED, consider CT chest in a few hours given just had CT abdomen and pelvis, repeat D-dimer ordered for a.m. D-dimer likely elevated due to COVID-19 and not PE but cannot rule out, further treatment dosing or VTE prophylaxis not ordered pending decision for CT chest    Diabetes mellitus type 2 with serum glucose 132, home meds unverified at this time, reorder pending verification pharmacy add SSI as needed and Accu-Cheks AC at bedtime A1c in a.m. low concentrated sweet diet    GERD, home meds unverified at this time reorder pending verification pharmacy    Anxiety depression, home meds unverified at this time reorder pending verification pharmacy    Rheumatoid arthritis, home meds unverified at this time reorder pending verification pharmacy    Morbid obesity BMI 49.72 lifestyle management education    Hypertension, was hypertensive on admission now improved without intervention, home meds unverified at this time reorder pending verification from pharmacy monitor BP added 10 mg IV hydralazine every 6 hours as needed systolic blood pressure greater than 160    Lumbar radiculopathy, home meds unverified at this time reorder pending verification from pharmacy    DVT prophylaxis:  Medical DVT prophylaxis orders are present.    CODE STATUS:    Code Status (Patient has no pulse and is not breathing): CPR (Attempt to Resuscitate)  Medical Interventions (Patient has pulse or is breathing): Full Support    Admission Status:  I believe this patient meets observation status.    I discussed the patient's findings and my recommendations with patient.    This patient has  been examined wearing appropriate Personal Protective Equipment. 02/18/22      Signature: Electronically signed by KANDY Persaud, 02/18/22, 6:23 AM EST.  Patient seen and examined agree with above, is a 66-year-old obese white female with multiple medical problems, presented to the ER with increased dyspnea.  She was diagnosed with Covid February 12.  And was seen in the emergency department and discharged.  She did not receive remdesivir or immunoglobulin therapy.  She returned today with fever and worsening dyspnea.  On exam 66-year-old obese white female in bed NAD, dyspnea at rest.  Neck is supple, lungs diminished breath sounds bilaterally, heart regular with normal S1-S2, the abdomen is soft obese nontender non distended, extremities no edema, neurological exam no focal deficit, assessment and plan-1.  Pneumonia due to COVID-19.  Continue DuoNeb, guaifenesin as needed cough medicine, oxygen titration, dexamethasone and Lovenox.  2.  Elevated D-dimer 0.9.  CT chest done on the twelfth did not show any PE.  Continue Lovenox.  Monitor D-dimer.  3.  Type 2 diabetes, regulation sliding scale.  Diabetic diet.  Check A1c.  Electronically signed by Sudeep Hill MD, 02/18/22, 6:28 PM EST.

## 2022-02-18 NOTE — PLAN OF CARE
Goal Outcome Evaluation:           Progress: no change  Outcome Summary: Pt admitted from ER with weakness r/t covid.  V/S stable, O2 95% on 2LNC.

## 2022-02-18 NOTE — ED PROVIDER NOTES
"Subjective   Chief complaint: Patient is a pleasant 66-year-old female.  She has COVID-19 infection currently.  Her  has infection as well.  She has been symptomatic for about 2 weeks.  She tested positive on the 12th.  She states she is not doing any better.  She is having increasing shortness of breath.  She has been nauseous and dizzy.  She has been taking Tylenol at home and a \"dizzy pill\" that her primary doctor called in the other day.  She has been having abdominal pain the last couple days as well.  Increasing fatigue and generalized weakness.  She did come tonight as her shortness of breath and symptoms were worsening significantly.  She is coughing.  Is a dry cough.  She is short of breath.    Context: As above.  She tested Covid positive.    Duration: 2 weeks    Timing: Progressive    Severity:    Associated Symptoms: Negative except as noted above.  Appropriate PPE was used.        PCP:  LMP:          Review of Systems   Constitutional: Positive for fatigue and fever.   HENT: Positive for congestion.    Respiratory: Positive for cough and shortness of breath.    Cardiovascular: Negative for leg swelling.   Gastrointestinal: Positive for abdominal pain and vomiting.   Genitourinary: Negative.    Musculoskeletal: Negative.    Skin: Negative.    Psychiatric/Behavioral: Negative.        Past Medical History:   Diagnosis Date   • Anxiety and depression    • Arthritis     mild   • Chronic back pain    • Chronic fatigue syndrome    • Diabetes (CMS/HCC)    • Fibromyalgia    • GERD (gastroesophageal reflux disease)    • Hyperlipidemia    • Hypertension    • Nausea    • Neuropathy    • Restless legs    • Uterine cancer (CMS/HCC)        Allergies   Allergen Reactions   • Indomethacin Hives   • Iodine Hives       Past Surgical History:   Procedure Laterality Date   • CHOLECYSTECTOMY     • COLONOSCOPY     • ENDOSCOPY N/A 9/7/2021    Procedure: ESOPHAGOGASTRODUODENOSCOPY with dilation (bougie # 50);  Surgeon: " Gordon Morales MD;  Location: [facility] ENDOSCOPY;  Service: Gastroenterology;  Laterality: N/A;  Post Op: gastritis, hiatal hernia, esophageal ring   • HYSTERECTOMY     • SIGMOIDOSCOPY N/A 9/7/2021    Procedure: FLEX SIGMOIDOSCOPY with polypectomy and cautery of rectal polyp;  Surgeon: Gordon Morales MD;  Location: [facility] ENDOSCOPY;  Service: Gastroenterology;  Laterality: N/A;  Post Op: rectal polyp       Family History   Problem Relation Age of Onset   • Heart disease Mother    • Heart disease Father        Social History     Socioeconomic History   • Marital status:    Tobacco Use   • Smoking status: Never Smoker   • Smokeless tobacco: Never Used   Substance and Sexual Activity   • Alcohol use: No   • Drug use: No   • Sexual activity: Defer           Objective   Physical Exam  Vitals and nursing note reviewed.   Constitutional:       Appearance: She is obese. She is ill-appearing.   HENT:      Head: Normocephalic and atraumatic.   Eyes:      Extraocular Movements: Extraocular movements intact.      Pupils: Pupils are equal, round, and reactive to light.   Cardiovascular:      Rate and Rhythm: Normal rate and regular rhythm.      Pulses: Normal pulses.      Heart sounds: Normal heart sounds.   Pulmonary:      Effort: No respiratory distress.      Comments: DeCreased breath sounds bilaterally  Abdominal:      General: Abdomen is protuberant.      Tenderness: There is generalized abdominal tenderness.       Musculoskeletal:         General: Normal range of motion.      Cervical back: Neck supple.   Skin:     General: Skin is warm and dry.      Capillary Refill: Capillary refill takes less than 2 seconds.   Neurological:      General: No focal deficit present.      Mental Status: She is alert and oriented to person, place, and time.   Psychiatric:         Mood and Affect: Mood normal.         Procedures           ED Course      Results for orders placed or performed during the hospital encounter  of 02/18/22   Comprehensive Metabolic Panel    Specimen: Blood   Result Value Ref Range    Glucose 132 (H) 65 - 99 mg/dL    BUN 10 8 - 23 mg/dL    Creatinine 0.94 0.57 - 1.00 mg/dL    Sodium 137 136 - 145 mmol/L    Potassium 4.3 3.5 - 5.2 mmol/L    Chloride 98 98 - 107 mmol/L    CO2 31.0 (H) 22.0 - 29.0 mmol/L    Calcium 8.7 8.6 - 10.5 mg/dL    Total Protein 6.3 6.0 - 8.5 g/dL    Albumin 3.70 3.50 - 5.20 g/dL    ALT (SGPT) 25 1 - 33 U/L    AST (SGOT) 26 1 - 32 U/L    Alkaline Phosphatase 101 39 - 117 U/L    Total Bilirubin 0.3 0.0 - 1.2 mg/dL    eGFR Non African Amer 60 (L) >60 mL/min/1.73    Globulin 2.6 gm/dL    A/G Ratio 1.4 g/dL    BUN/Creatinine Ratio 10.6 7.0 - 25.0    Anion Gap 8.0 5.0 - 15.0 mmol/L   Troponin    Specimen: Blood   Result Value Ref Range    Troponin T <0.010 0.000 - 0.030 ng/mL   BNP    Specimen: Blood   Result Value Ref Range    proBNP 154.3 0.0 - 900.0 pg/mL   D-dimer, Quantitative    Specimen: Blood   Result Value Ref Range    D-Dimer, Quantitative 0.98 (H) 0.00 - 0.59 mg/L (FEU)   Protime-INR    Specimen: Blood   Result Value Ref Range    Protime 10.8 9.6 - 11.7 Seconds    INR 0.97 0.93 - 1.10   aPTT    Specimen: Blood   Result Value Ref Range    PTT 27.9 24.0 - 31.0 seconds   Procalcitonin    Specimen: Blood   Result Value Ref Range    Procalcitonin 0.08 0.00 - 0.25 ng/mL   Urinalysis With Culture If Indicated - Urine, Catheter In/Out    Specimen: Urine, Catheter In/Out   Result Value Ref Range    Color, UA Yellow Yellow, Straw    Appearance, UA Clear Clear    pH, UA 6.5 5.0 - 8.0    Specific Gravity, UA 1.019 1.005 - 1.030    Glucose, UA Negative Negative    Ketones, UA Negative Negative    Bilirubin, UA Negative Negative    Blood, UA Negative Negative    Protein, UA 30 mg/dL (1+) (A) Negative    Leuk Esterase, UA Negative Negative    Nitrite, UA Negative Negative    Urobilinogen, UA 0.2 E.U./dL 0.2 - 1.0 E.U./dL   Lipase    Specimen: Blood   Result Value Ref Range    Lipase 15 13 - 60  U/L   CBC Auto Differential    Specimen: Blood   Result Value Ref Range    WBC 5.00 3.40 - 10.80 10*3/mm3    RBC 4.20 3.77 - 5.28 10*6/mm3    Hemoglobin 12.2 12.0 - 15.9 g/dL    Hematocrit 37.1 34.0 - 46.6 %    MCV 88.2 79.0 - 97.0 fL    MCH 28.9 26.6 - 33.0 pg    MCHC 32.8 31.5 - 35.7 g/dL    RDW 13.2 12.3 - 15.4 %    RDW-SD 41.6 37.0 - 54.0 fl    MPV 7.9 6.0 - 12.0 fL    Platelets 177 140 - 450 10*3/mm3    Neutrophil % 68.1 42.7 - 76.0 %    Lymphocyte % 20.1 19.6 - 45.3 %    Monocyte % 11.4 5.0 - 12.0 %    Eosinophil % 0.0 (L) 0.3 - 6.2 %    Basophil % 0.4 0.0 - 1.5 %    Neutrophils, Absolute 3.40 1.70 - 7.00 10*3/mm3    Lymphocytes, Absolute 1.00 0.70 - 3.10 10*3/mm3    Monocytes, Absolute 0.60 0.10 - 0.90 10*3/mm3    Eosinophils, Absolute 0.00 0.00 - 0.40 10*3/mm3    Basophils, Absolute 0.00 0.00 - 0.20 10*3/mm3    nRBC 0.1 0.0 - 0.2 /100 WBC   Urinalysis, Microscopic Only - Urine, Catheter In/Out    Specimen: Urine, Catheter In/Out   Result Value Ref Range    RBC, UA 0-2 (A) None Seen /HPF    WBC, UA 0-2 (A) None Seen /HPF    Bacteria, UA None Seen None Seen /HPF    Squamous Epithelial Cells, UA 0-2 None Seen, 0-2 /HPF    Yeast, UA Small/1+ Yeast None Seen /HPF    Hyaline Casts, UA 0-2 None Seen /LPF    Methodology Manual Light Microscopy    Blood Gas, Arterial -    Specimen: Arterial Blood   Result Value Ref Range    Site Right Radial     Pelon's Test Positive     pH, Arterial 7.434 7.350 - 7.450 pH units    pCO2, Arterial 43.0 35.0 - 48.0 mm Hg    pO2, Arterial 72.9 (L) 83.0 - 108.0 mm Hg    HCO3, Arterial 28.8 (H) 21.0 - 28.0 mmol/L    Base Excess, Arterial 4.0 (H) 0.0 - 3.0 mmol/L    O2 Saturation, Arterial 94.8 94.0 - 98.0 %    CO2 Content 30.1 (H) 22 - 29 mmol/L    Barometric Pressure for Blood Gas      Modality Cannula     FIO2 28 %    Hemodilution No    Ferritin    Specimen: Blood   Result Value Ref Range    Ferritin 259.90 (H) 13.00 - 150.00 ng/mL   C-reactive Protein    Specimen: Blood   Result Value  Ref Range    C-Reactive Protein 3.09 (H) 0.00 - 0.50 mg/dL   Lactate Dehydrogenase    Specimen: Blood   Result Value Ref Range     (H) 135 - 214 U/L   POC Lactate    Specimen: Blood   Result Value Ref Range    Lactate 0.9 0.5 - 2.0 mmol/L               CT Abdomen Pelvis Without Contrast    Result Date: 2/18/2022   1.  Minor airspace opacities lower lungs consistent with Covid 19 pneumonia. 2.  No acute abnormality in the abdomen and pelvis within limitations of unenhanced exam. Electronically signed by:  Pavel Delgado M.D.  2/18/2022 2:48 AM                                     MDM  Number of Diagnoses or Management Options  Pneumonia due to COVID-19 virus  Diagnosis management comments: Patient did require 2 L of oxygen here.  Saturations did decrease down to around 90.  She is maintaining 94% sats on 2 L.  Decadron was ordered.  She has COVID pneumonia.  Will bring into the hospital for further observation and management.  She has abdominal pain and some nausea secondary to the Covid.  CT scan revealed no emergent findings.  She is allergic to IV dye.  I did not order CT chest will cover for possibility of pulmonary embolism.  However this point time most likely hypoxia secondary to Covid.  D-dimer is just mildly elevated. It is actually lower than it was when she obtain CT chest with contrast last week. We'll follow the D-dimer. Discussed with hospitalist and prophylax.       Amount and/or Complexity of Data Reviewed  Clinical lab tests: reviewed  Tests in the radiology section of CPT®: reviewed  Discussion of test results with the performing providers: yes  Discuss the patient with other providers: yes  Independent visualization of images, tracings, or specimens: yes    Risk of Complications, Morbidity, and/or Mortality  Presenting problems: high  Management options: high    Patient Progress  Patient progress: stable      Final diagnoses:   None     COVID-19 pneumonia  ED Disposition  ED Disposition      None          No follow-up provider specified.       Medication List      No changes were made to your prescriptions during this visit.          Rachid Greenwood,   02/18/22 0517       Rachid Greenwood,   02/18/22 0610

## 2022-02-19 ENCOUNTER — APPOINTMENT (OUTPATIENT)
Dept: CT IMAGING | Facility: HOSPITAL | Age: 67
End: 2022-02-19

## 2022-02-19 ENCOUNTER — READMISSION MANAGEMENT (OUTPATIENT)
Dept: CALL CENTER | Facility: HOSPITAL | Age: 67
End: 2022-02-19

## 2022-02-19 VITALS
TEMPERATURE: 97.8 F | WEIGHT: 293 LBS | SYSTOLIC BLOOD PRESSURE: 116 MMHG | HEART RATE: 75 BPM | OXYGEN SATURATION: 93 % | BODY MASS INDEX: 45.99 KG/M2 | RESPIRATION RATE: 18 BRPM | DIASTOLIC BLOOD PRESSURE: 71 MMHG | HEIGHT: 67 IN

## 2022-02-19 PROBLEM — D89.831 CYTOKINE RELEASE SYNDROME, GRADE 1: Status: ACTIVE | Noted: 2022-02-19

## 2022-02-19 LAB
ALBUMIN SERPL-MCNC: 3.2 G/DL (ref 3.5–5.2)
ALBUMIN/GLOB SERPL: 1.2 G/DL
ALP SERPL-CCNC: 82 U/L (ref 39–117)
ALT SERPL W P-5'-P-CCNC: 20 U/L (ref 1–33)
ANION GAP SERPL CALCULATED.3IONS-SCNC: 9 MMOL/L (ref 5–15)
AST SERPL-CCNC: 23 U/L (ref 1–32)
BASOPHILS # BLD AUTO: 0 10*3/MM3 (ref 0–0.2)
BASOPHILS NFR BLD AUTO: 0.3 % (ref 0–1.5)
BILIRUB SERPL-MCNC: 0.2 MG/DL (ref 0–1.2)
BUN SERPL-MCNC: 10 MG/DL (ref 8–23)
BUN/CREAT SERPL: 13.5 (ref 7–25)
CALCIUM SPEC-SCNC: 8.6 MG/DL (ref 8.6–10.5)
CHLORIDE SERPL-SCNC: 103 MMOL/L (ref 98–107)
CO2 SERPL-SCNC: 28 MMOL/L (ref 22–29)
CREAT SERPL-MCNC: 0.74 MG/DL (ref 0.57–1)
D DIMER PPP FEU-MCNC: 0.76 MG/L (FEU) (ref 0–0.59)
DEPRECATED RDW RBC AUTO: 42 FL (ref 37–54)
EOSINOPHIL # BLD AUTO: 0 10*3/MM3 (ref 0–0.4)
EOSINOPHIL NFR BLD AUTO: 0 % (ref 0.3–6.2)
ERYTHROCYTE [DISTWIDTH] IN BLOOD BY AUTOMATED COUNT: 13.1 % (ref 12.3–15.4)
GFR SERPL CREATININE-BSD FRML MDRD: 79 ML/MIN/1.73
GLOBULIN UR ELPH-MCNC: 2.6 GM/DL
GLUCOSE BLDC GLUCOMTR-MCNC: 133 MG/DL (ref 70–105)
GLUCOSE BLDC GLUCOMTR-MCNC: 188 MG/DL (ref 70–105)
GLUCOSE SERPL-MCNC: 129 MG/DL (ref 65–99)
HCT VFR BLD AUTO: 34.8 % (ref 34–46.6)
HGB BLD-MCNC: 11.3 G/DL (ref 12–15.9)
LYMPHOCYTES # BLD AUTO: 1.2 10*3/MM3 (ref 0.7–3.1)
LYMPHOCYTES NFR BLD AUTO: 21.9 % (ref 19.6–45.3)
MCH RBC QN AUTO: 29.1 PG (ref 26.6–33)
MCHC RBC AUTO-ENTMCNC: 32.5 G/DL (ref 31.5–35.7)
MCV RBC AUTO: 89.4 FL (ref 79–97)
MONOCYTES # BLD AUTO: 0.8 10*3/MM3 (ref 0.1–0.9)
MONOCYTES NFR BLD AUTO: 14.2 % (ref 5–12)
NEUTROPHILS NFR BLD AUTO: 3.5 10*3/MM3 (ref 1.7–7)
NEUTROPHILS NFR BLD AUTO: 63.6 % (ref 42.7–76)
NRBC BLD AUTO-RTO: 0.1 /100 WBC (ref 0–0.2)
PLATELET # BLD AUTO: 174 10*3/MM3 (ref 140–450)
PMV BLD AUTO: 8.4 FL (ref 6–12)
POTASSIUM SERPL-SCNC: 4.5 MMOL/L (ref 3.5–5.2)
PROT SERPL-MCNC: 5.8 G/DL (ref 6–8.5)
RBC # BLD AUTO: 3.89 10*6/MM3 (ref 3.77–5.28)
SODIUM SERPL-SCNC: 140 MMOL/L (ref 136–145)
WBC NRBC COR # BLD: 5.5 10*3/MM3 (ref 3.4–10.8)

## 2022-02-19 PROCEDURE — 85379 FIBRIN DEGRADATION QUANT: CPT | Performed by: NURSE PRACTITIONER

## 2022-02-19 PROCEDURE — 0 IOPAMIDOL PER 1 ML: Performed by: INTERNAL MEDICINE

## 2022-02-19 PROCEDURE — 85025 COMPLETE CBC W/AUTO DIFF WBC: CPT | Performed by: NURSE PRACTITIONER

## 2022-02-19 PROCEDURE — G0378 HOSPITAL OBSERVATION PER HR: HCPCS

## 2022-02-19 PROCEDURE — 82962 GLUCOSE BLOOD TEST: CPT

## 2022-02-19 PROCEDURE — 25010000002 DEXAMETHASONE PER 1 MG: Performed by: NURSE PRACTITIONER

## 2022-02-19 PROCEDURE — 25010000002 ENOXAPARIN PER 10 MG: Performed by: EMERGENCY MEDICINE

## 2022-02-19 PROCEDURE — 96376 TX/PRO/DX INJ SAME DRUG ADON: CPT

## 2022-02-19 PROCEDURE — 99217 PR OBSERVATION CARE DISCHARGE MANAGEMENT: CPT | Performed by: INTERNAL MEDICINE

## 2022-02-19 PROCEDURE — 25010000002 DIPHENHYDRAMINE PER 50 MG: Performed by: INTERNAL MEDICINE

## 2022-02-19 PROCEDURE — 71275 CT ANGIOGRAPHY CHEST: CPT

## 2022-02-19 PROCEDURE — 36415 COLL VENOUS BLD VENIPUNCTURE: CPT | Performed by: NURSE PRACTITIONER

## 2022-02-19 PROCEDURE — 25010000002 METHYLPREDNISOLONE PER 40 MG: Performed by: INTERNAL MEDICINE

## 2022-02-19 PROCEDURE — 96372 THER/PROPH/DIAG INJ SC/IM: CPT

## 2022-02-19 PROCEDURE — 96375 TX/PRO/DX INJ NEW DRUG ADDON: CPT

## 2022-02-19 PROCEDURE — 80053 COMPREHEN METABOLIC PANEL: CPT | Performed by: NURSE PRACTITIONER

## 2022-02-19 PROCEDURE — 94618 PULMONARY STRESS TESTING: CPT

## 2022-02-19 PROCEDURE — 63710000001 INSULIN LISPRO (HUMAN) PER 5 UNITS: Performed by: NURSE PRACTITIONER

## 2022-02-19 RX ORDER — BUDESONIDE AND FORMOTEROL FUMARATE DIHYDRATE 160; 4.5 UG/1; UG/1
2 AEROSOL RESPIRATORY (INHALATION)
Qty: 1 EACH | Refills: 12 | Status: SHIPPED | OUTPATIENT
Start: 2022-02-19 | End: 2022-06-15

## 2022-02-19 RX ORDER — METHYLPREDNISOLONE SODIUM SUCCINATE 40 MG/ML
40 INJECTION, POWDER, LYOPHILIZED, FOR SOLUTION INTRAMUSCULAR; INTRAVENOUS EVERY 4 HOURS
Status: DISCONTINUED | OUTPATIENT
Start: 2022-02-19 | End: 2022-02-19 | Stop reason: HOSPADM

## 2022-02-19 RX ORDER — ALBUTEROL SULFATE 90 UG/1
2 AEROSOL, METERED RESPIRATORY (INHALATION) EVERY 6 HOURS PRN
Qty: 18 G | Refills: 2 | Status: SHIPPED | OUTPATIENT
Start: 2022-02-19 | End: 2022-06-15

## 2022-02-19 RX ORDER — DIPHENHYDRAMINE HYDROCHLORIDE 50 MG/ML
50 INJECTION INTRAMUSCULAR; INTRAVENOUS ONCE
Status: COMPLETED | OUTPATIENT
Start: 2022-02-19 | End: 2022-02-19

## 2022-02-19 RX ORDER — BENZONATATE 100 MG/1
100 CAPSULE ORAL 3 TIMES DAILY PRN
Qty: 60 CAPSULE | Refills: 0 | Status: SHIPPED | OUTPATIENT
Start: 2022-02-19 | End: 2022-08-25

## 2022-02-19 RX ORDER — DEXAMETHASONE 4 MG/1
4 TABLET ORAL 2 TIMES DAILY WITH MEALS
Qty: 5 TABLET | Refills: 0 | Status: SHIPPED | OUTPATIENT
Start: 2022-02-19 | End: 2022-02-24

## 2022-02-19 RX ADMIN — ACETAMINOPHEN 650 MG: 325 TABLET, FILM COATED ORAL at 08:19

## 2022-02-19 RX ADMIN — INSULIN LISPRO 2 UNITS: 100 INJECTION, SOLUTION INTRAVENOUS; SUBCUTANEOUS at 12:37

## 2022-02-19 RX ADMIN — DIPHENHYDRAMINE HYDROCHLORIDE 50 MG: 50 INJECTION, SOLUTION INTRAMUSCULAR; INTRAVENOUS at 12:33

## 2022-02-19 RX ADMIN — IOPAMIDOL 100 ML: 755 INJECTION, SOLUTION INTRAVENOUS at 13:54

## 2022-02-19 RX ADMIN — Medication 10 ML: at 08:06

## 2022-02-19 RX ADMIN — METHYLPREDNISOLONE SODIUM SUCCINATE 40 MG: 40 INJECTION, POWDER, FOR SOLUTION INTRAMUSCULAR; INTRAVENOUS at 12:34

## 2022-02-19 RX ADMIN — ENOXAPARIN SODIUM 40 MG: 40 INJECTION SUBCUTANEOUS at 06:08

## 2022-02-19 RX ADMIN — DEXAMETHASONE SODIUM PHOSPHATE 6 MG: 4 INJECTION, SOLUTION INTRAMUSCULAR; INTRAVENOUS at 08:06

## 2022-02-19 NOTE — DISCHARGE SUMMARY
[facility] Medicine Services  DISCHARGE SUMMARY    Patient Name: Kiki Perdomo  : 1955  MRN: [id number]    Date of Admission: 2022  Discharge Diagnosis: PNA covid  Date of Discharge:  22  Primary Care Physician: Smita Mayo APRN      Presenting Problem:   Pneumonia due to COVID-19 virus [U07.1, J12.82]    Active and Resolved Hospital Problems:  Active Hospital Problems    Diagnosis POA   • Cytokine release syndrome, grade 1 [D89.831] No   • Pneumonia due to COVID-19 virus [U07.1, J12.82] Yes   • Acute respiratory failure with hypoxia (HCC) [J96.01] Yes   • D-dimer, elevated [R79.89] Yes   • Gastroesophageal reflux disease [K21.9] Yes   • Depression [F32.A] Yes   • Rheumatoid arthritis (HCC) [M06.9] Yes   • Asthma [J45.909] Yes   • Obesity with body mass index 30 or greater [E66.9] Yes   • Hyperlipidemia [E78.5] Yes   • Lumbar radiculopathy [M54.16] Yes   • Chronic fatigue syndrome [R53.82] Yes   • Diabetes mellitus (HCC) [E11.9] Yes   • Hypertension [I10] Yes      Resolved Hospital Problems   No resolved problems to display.          Pneumonia due to COVID-19 virus failed outpatient conservative treatment with acute respiratory failure with hypoxia now stable on 2 L via nasal cannula, 6 mg IV dexamethasone daily supplemental oxygen to maintain sats at 90 to 92%, DuoNebs every 4 hours as needed shortness of air albuterol INH every 4 hours as needed shortness of air guaifenesin, Tessalon Perles for cough acetaminophen for fever     D-dimer elevated at 0.98 was higher on 2022 with ED visit and CT at that time showed no PE denies chest pain, given one-time treatment dose of Lovenox in ED, consider CT chest in a few hours given just had CT abdomen and pelvis, repeat D-dimer ordered for a.m. lower, D-dimer likely elevated due to COVID-19 and not PE      Diabetes mellitus type 2 with serum glucose 132, home meds unverified at this time, reorder pending  verification pharmacy add SSI as needed and Accu-Cheks AC at bedtime A1c in a.m. low concentrated sweet diet     GERD, home meds unverified at this time reorder pending verification pharmacy     Anxiety depression, home meds unverified at this time reorder pending verification pharmacy     Rheumatoid arthritis, home meds unverified at this time reorder pending verification pharmacy     Morbid obesity BMI 49.72 lifestyle management education     Hypertension, was hypertensive on admission now improved without intervention, home meds unverified at this time reorder pending verification from pharmacy monitor BP added 10 mg IV hydralazine every 6 hours as needed systolic blood pressure greater than 160     Lumbar radiculopathy, home meds unverified at this time reorder pending verification from pharmacy    Hospital Course     Hospital Course:  History of Present Illness: Kiki Perdomo is a 66 y.o. female who presented to [facility] on 2/18/2022 complaining of increased shortness of air.  She was diagnosed with COVID-19 on February 12, 2022 and was seen here in the emergency department and discharged.  She did not receive remdesivir nor immunoglobulin therapy.  She returns today with a fever of 101.9.  Arterial blood gas showed a PaO2 of 72.9.  She is now stable on 2 L oxygen via nasal cannula.  Troponin, BNP WBC were not elevated.  D-dimer was mildly elevated at 0.98.  It was higher on 2/12/2022 and she had a CT of her chest which was negative for PE.  She apparently also complained of nausea vomiting and diarrhea and CT abdomen and pelvis was done which showed minor airspace opacities in the lower lungs consistent with COVID-19 and no acute abnormality in the abdomen and pelvis.  She was given a treatment dose of Lovenox x1.  D-dimer likely elevated due to COVID-19 CT chest may need to be done today at a later time due to recent CT abdomen and pelvis if indicated.  She denies tobacco use, COPD but  reports a past medical history of asthma.  She denies CAD or CHF.  Review of records shows a normal echo and a normal stress test in 2019.  She has a past medical history of diabetes mellitus type 2 with a serum glucose today of 132.  She was moderately hypertensive on admission which improved without intervention.  Lactic was 0.9.  She was ordered 6 mg IV dexamethasone daily and supportive care for COVID-19.  She is not in the time window for remdesivir given symptoms were 2/6/2022.  She will be admitted for further evaluation and treatment    2/19/22 Doing better today, will dc home, condition on dc stable      DISCHARGE Follow Up Recommendations for labs and diagnostics:follow up with pcp in one week      Reasons For Change In Medications and Indications for New Medications:     START taking:   albuterol sulfate HFA (PROVENTIL HFA;VENTOLIN HFA;PROAIR HFA)      benzonatate (TESSALON)      budesonide-formoterol (Symbicort)      dexamethasone (DECADRON)       Icon medications to stop taking   STOP taking:   hydrOXYzine 50 MG tablet (ATARAX        Day of Discharge     Vital Signs:  Temp:  [98.1 °F (36.7 °C)-98.7 °F (37.1 °C)] 98.1 °F (36.7 °C)  Heart Rate:  [64-71] 71  Resp:  [18-19] 18  BP: (123-166)/(65-77) 145/71  Flow (L/min):  [2] 2    Physical Exam  Vitals and nursing note reviewed.   Constitutional:       General: She is not in acute distress.     Appearance: She is well-developed. She is obese. She is not ill-appearing, toxic-appearing or diaphoretic.   HENT:      Head: Normocephalic and atraumatic.      Nose: Nose normal. No congestion or rhinorrhea.      Mouth/Throat:      Mouth: Mucous membranes are moist.      Pharynx: No oropharyngeal exudate.   Eyes:      General: No scleral icterus.        Right eye: No discharge.         Left eye: No discharge.      Extraocular Movements: Extraocular movements intact.      Conjunctiva/sclera: Conjunctivae normal.      Pupils: Pupils are equal, round, and reactive to  light.   Neck:      Thyroid: No thyromegaly.      Vascular: No carotid bruit or JVD.      Trachea: No tracheal deviation.   Cardiovascular:      Rate and Rhythm: Normal rate and regular rhythm.      Pulses: Normal pulses.      Heart sounds: Normal heart sounds. No murmur heard.  No friction rub. No gallop.    Pulmonary:      Effort: Pulmonary effort is normal. No respiratory distress.      Breath sounds: Normal breath sounds. No stridor. No wheezing, rhonchi or rales.   Chest:      Chest wall: No tenderness.   Abdominal:      General: Bowel sounds are normal. There is no distension.      Palpations: Abdomen is soft. There is no mass.      Tenderness: There is no abdominal tenderness. There is no guarding or rebound.      Hernia: No hernia is present.   Musculoskeletal:         General: No swelling, tenderness, deformity or signs of injury. Normal range of motion.      Cervical back: Normal range of motion and neck supple. No rigidity. No muscular tenderness.      Right lower leg: No edema.      Left lower leg: No edema.   Lymphadenopathy:      Cervical: No cervical adenopathy.   Skin:     General: Skin is warm and dry.      Coloration: Skin is not jaundiced or pale.      Findings: No bruising, erythema or rash.   Neurological:      General: No focal deficit present.      Mental Status: She is alert and oriented to person, place, and time. Mental status is at baseline.      Cranial Nerves: No cranial nerve deficit.      Sensory: No sensory deficit.      Motor: No weakness or abnormal muscle tone.      Coordination: Coordination normal.   Psychiatric:         Mood and Affect: Mood normal.         Behavior: Behavior normal.         Thought Content: Thought content normal.           Pertinent  and/or Most Recent Results     LAB RESULTS:      Lab 02/19/22  0415 02/18/22  0233 02/18/22  0228   WBC 5.50  --  5.00   HEMOGLOBIN 11.3*  --  12.2   HEMATOCRIT 34.8  --  37.1   PLATELETS 174  --  177   NEUTROS ABS 3.50  --  3.40    LYMPHS ABS 1.20  --  1.00   MONOS ABS 0.80  --  0.60   EOS ABS 0.00  --  0.00   MCV 89.4  --  88.2   CRP  --   --  3.09*   PROCALCITONIN  --   --  0.08   LACTATE  --  0.9  --    LDH  --   --  234*   PROTIME  --   --  10.8   APTT  --   --  27.9         Lab 02/19/22 0415 02/18/22 0228   SODIUM 140 137   POTASSIUM 4.5 4.3   CHLORIDE 103 98   CO2 28.0 31.0*   ANION GAP 9.0 8.0   BUN 10 10   CREATININE 0.74 0.94   GLUCOSE 129* 132*   CALCIUM 8.6 8.7   HEMOGLOBIN A1C  --  6.3*         Lab 02/19/22 0415 02/18/22 0228   TOTAL PROTEIN 5.8* 6.3   ALBUMIN 3.20* 3.70   GLOBULIN 2.6 2.6   ALT (SGPT) 20 25   AST (SGOT) 23 26   BILIRUBIN 0.2 0.3   ALK PHOS 82 101   LIPASE  --  15         Lab 02/18/22 0228   PROBNP 154.3   TROPONIN T <0.010   PROTIME 10.8   INR 0.97             Lab 02/18/22 0228   FERRITIN 259.90*         Lab 02/18/22 0240   PH, ARTERIAL 7.434   PCO2, ARTERIAL 43.0   PO2 ART 72.9*   O2 SATURATION ART 94.8   FIO2 28   HCO3 ART 28.8*   BASE EXCESS ART 4.0*     Brief Urine Lab Results  (Last result in the past 365 days)      Color   Clarity   Blood   Leuk Est   Nitrite   Protein   CREAT   Urine HCG        02/18/22 0228 Yellow   Clear   Negative   Negative   Negative   30 mg/dL (1+)               Microbiology Results (last 10 days)     Procedure Component Value - Date/Time    Blood Culture - Blood, Arm, Right [[id number]]  (Normal) Collected: 02/18/22 0937    Lab Status: Preliminary result Specimen: Blood from Arm, Right Updated: 02/19/22 1001     Blood Culture No growth at 24 hours    Blood Culture - Blood, Arm, Right [[id number]]  (Normal) Collected: 02/18/22 0228    Lab Status: Preliminary result Specimen: Blood from Arm, Right Updated: 02/19/22 0245     Blood Culture No growth at 24 hours    Blood Culture - Blood, Arm, Left [[id number]]  (Normal) Collected: 02/12/22 1219    Lab Status: Final result Specimen: Blood from Arm, Left Updated: 02/17/22 1231     Blood Culture No growth at 5 days     COVID-19,CEPHEID/MURIEL,COR/SADIE/PAD/MEGHAN IN-HOUSE(OR EMERGENT/ADD-ON),NP SWAB IN TRANSPORT MEDIA 3-4 HR TAT, RT-PCR - Swab, Nasopharynx [[id number]]  (Abnormal) Collected: 02/12/22 1107    Lab Status: Final result Specimen: Swab from Nasopharynx Updated: 02/12/22 1154     COVID19 Detected    Narrative:      Fact sheet for providers: https://www.fda.gov/media/023797/download     Fact sheet for patients: https://www.fda.gov/media/186101/download  Fact sheet for providers: https://www.fda.gov/media/022664/download     Fact sheet for patients: https://www.fda.gov/media/762407/download    Blood Culture - Blood, Arm, Left [[id number]]  (Normal) Collected: 02/12/22 1107    Lab Status: Final result Specimen: Blood from Arm, Left Updated: 02/17/22 1115     Blood Culture No growth at 5 days    Influenza Antigen, Rapid - Swab, Nasopharynx [[id number]]  (Normal) Collected: 02/12/22 1107    Lab Status: Final result Specimen: Swab from Nasopharynx Updated: 02/12/22 1154     Influenza A PCR Not Detected     Influenza B PCR Not Detected          CT Abdomen Pelvis Without Contrast    Result Date: 2/18/2022  Impression:  1.  Minor airspace opacities lower lungs consistent with Covid 19 pneumonia. 2.  No acute abnormality in the abdomen and pelvis within limitations of unenhanced exam. Electronically signed by:  Pavel Delgado M.D.  2/18/2022 2:48 AM    XR Chest 1 View    Result Date: 2/18/2022  Impression: 1. Mild patchy bibasilar airspace disease which correlates to groundglass opacities seen on abdominal CT most consistent with COVID-19 pneumonia.  Electronically Signed By-Mahesh Carr MD On:2/18/2022 7:27 AM This report was finalized on [id number] by  Mahesh Carr MD.    XR Chest 1 View    Result Date: 2/12/2022  Impression: No acute process.  Electronically Signed By-Mahesh Carr MD On:2/12/2022 11:40 AM This report was finalized on [id number] by  Mahesh Carr MD.    CT Chest Pulmonary Embolism    Result Date:  2/12/2022  Impression: 1. Negative for pulmonary embolus. 2. No focal consolidation or findings of pneumonia. 3. Stable 7 mm right lower lobe pulmonary nodule, unchanged from March 2020. Recommend follow-up in one year. 4. Hepatic steatosis and additional chronic findings above.  Electronically Signed By-Mahesh Carr MD On:2/12/2022 3:41 PM This report was finalized on [id number] by  Mahesh Carr MD.      Results for orders placed during the hospital encounter of 09/20/19    Duplex Carotid Ultrasound CAR    Interpretation Summary  · Proximal right internal carotid artery is normal.  · Mid right internal carotid artery is normal.  · Distal right internal carotid artery is normal.  · All other right side carotid system vessels are normal.  · Proximal left internal carotid artery is normal.  · Mid left internal carotid artery is normal.  · Distal left internal carotid artery is normal.  · All other left side carotid system vessels are normal.      Results for orders placed during the hospital encounter of 09/20/19    Duplex Carotid Ultrasound CAR    Interpretation Summary  · Proximal right internal carotid artery is normal.  · Mid right internal carotid artery is normal.  · Distal right internal carotid artery is normal.  · All other right side carotid system vessels are normal.  · Proximal left internal carotid artery is normal.  · Mid left internal carotid artery is normal.  · Distal left internal carotid artery is normal.  · All other left side carotid system vessels are normal.      Results for orders placed during the hospital encounter of 09/20/19    Transthoracic Echo Complete With Contrast if Necessary Per Protocol    Interpretation Summary  · Left ventricular systolic function is normal.  · Estimated EF = 67%.      Labs Pending at Discharge:  Pending Labs     Order Current Status    Blood Culture - Blood, Arm, Right Preliminary result    Blood Culture - Blood, Arm, Right Preliminary result           Procedures Performed           Consults:   Consults     Date and Time Order Name Status Description    2/18/2022  5:14 AM Hospitalist (on-call MD unless specified)              Discharge Details        Discharge Medications      New Medications      Instructions Start Date   albuterol sulfate  (90 Base) MCG/ACT inhaler  Commonly known as: PROVENTIL HFA;VENTOLIN HFA;PROAIR HFA   2 puffs, Inhalation, Every 6 Hours PRN      benzonatate 100 MG capsule  Commonly known as: TESSALON   100 mg, Oral, 3 Times Daily PRN      budesonide-formoterol 160-4.5 MCG/ACT inhaler  Commonly known as: Symbicort   2 puffs, Inhalation, 2 Times Daily - RT      dexamethasone 4 MG tablet  Commonly known as: DECADRON   4 mg, Oral, 2 Times Daily With Meals         Continue These Medications      Instructions Start Date   citalopram 20 MG tablet  Commonly known as: CeleXA   20 mg, Oral, Daily      gabapentin 100 MG capsule  Commonly known as: NEURONTIN   100 mg, Oral, 2 Times Daily      lisinopril 20 MG tablet  Commonly known as: PRINIVIL,ZESTRIL   20 mg, Oral, Daily      metFORMIN 500 MG tablet  Commonly known as: GLUCOPHAGE   500 mg, Oral, 2 Times Daily With Meals      omeprazole 20 MG capsule  Commonly known as: priLOSEC   20 mg, Oral, Daily      ondansetron 4 MG tablet  Commonly known as: ZOFRAN   4 mg, Oral, As Needed      oxyCODONE-acetaminophen  MG per tablet  Commonly known as: PERCOCET   1 tablet, Oral, Every 8 Hours PRN      simvastatin 40 MG tablet  Commonly known as: ZOCOR   40 mg, Oral, Nightly      vitamin D 1.25 MG (69747 UT) capsule capsule  Commonly known as: ERGOCALCIFEROL   50,000 Units, Oral, Weekly, wednesday          ASK your doctor about these medications      Instructions Start Date   fenofibrate 160 MG tablet   160 mg, Oral, Daily      hydrOXYzine 50 MG tablet  Commonly known as: ATARAX   50 mg, Oral, Nightly PRN             Allergies   Allergen Reactions   • Indomethacin Hives   • Iodine Hives          Discharge Disposition:   Home or Self Care    Diet:  Hospital:  Diet Order   Procedures   • Diet Cardiac, Diabetic/Consistent Carbs; Healthy Heart; Diabetic - Consistent Carb         Discharge Activity:   Activity Instructions     Gradually Increase Activity Until at Pre-Hospitalization Level              CODE STATUS:  Code Status and Medical Interventions:   Ordered at: 02/18/22 0551     Code Status (Patient has no pulse and is not breathing):    CPR (Attempt to Resuscitate)     Medical Interventions (Patient has pulse or is breathing):    Full Support         No future appointments.    Additional Instructions for the Follow-ups that You Need to Schedule     Discharge Follow-up with PCP   As directed       Currently Documented PCP:    Smita Mayo APRN    PCP Phone Number:    [phone]     Follow Up Details: 1 WEEK               Time spent on Discharge including face to face service:  35 minutes    This patient has been examined wearing appropriate Personal Protective Equipment and discussed with RN. 02/19/22      Signature: Electronically signed by Sudeep Hill MD, 02/19/22, 11:27 AM EST.

## 2022-02-19 NOTE — PROGRESS NOTES
Exercise Oximetry    Patient Name:Kiki Perdomo   MRN: [id number]   Date: 02/19/22             ROOM AIR BASELINE   SpO2% 92% room air    Heart Rate    Blood Pressure      EXERCISE ON ROOM AIR SpO2% EXERCISE ON O2 @  LPM SpO2%   1 MINUTE 92 1 MINUTE    2 MINUTES 92 2 MINUTES    3 MINUTES 91 3 MINUTES    4 MINUTES 91 4 MINUTES    5 MINUTES 90 5 MINUTES    6 MINUTES 90 6 MINUTES               Distance Walked  Distance Walked   Dyspnea (Zuleika Scale)   Dyspnea (Zuleika Scale)   Fatigue (Zuleika Scale)   Fatigue (Zuleika Scale)   SpO2% Post Exercise  91% room air  SpO2% Post Exercise   HR Post Exercise   HR Post Exercise   Time to Recovery   Time to Recovery     Comments:

## 2022-02-19 NOTE — PLAN OF CARE
Goal Outcome Evaluation:              Outcome Summary: Pt resting well with no complaints. Currently on 2L NC. Continue to monitor.

## 2022-02-19 NOTE — PLAN OF CARE
Continue to monitor and assess pt. Pt is resting with family at bedside.  Problem: Adult Inpatient Plan of Care  Goal: Plan of Care Review  Outcome: Ongoing, Progressing  Flowsheets  Taken 2/19/2022 0326 by Mary Beaulieu RN  Outcome Summary: Pt resting well with no complaints. Currently on 2L NC. Continue to monitor.  Taken 2/18/2022 1543 by Lizbeth Perez RN  Progress: no change  Goal: Patient-Specific Goal (Individualized)  Outcome: Ongoing, Progressing  Goal: Absence of Hospital-Acquired Illness or Injury  Outcome: Ongoing, Progressing  Intervention: Identify and Manage Fall Risk  Flowsheets (Taken 2/19/2022 0600 by Gayatri Palacio, PCT)  Safety Promotion/Fall Prevention:   safety round/check completed   room organization consistent   assistive device/personal items within reach   clutter free environment maintained   lighting adjusted   nonskid shoes/slippers when out of bed  Intervention: Prevent Skin Injury  Flowsheets  Taken 2/19/2022 0600 by Gayatri Palacio PCT  Body Position: position changed independently  Taken 2/18/2022 1035 by Cathy Bejarano, ANAT  Skin Protection: adhesive use limited  Intervention: Prevent and Manage VTE (venous thromboembolism) Risk  Flowsheets (Taken 2/19/2022 0721)  VTE Prevention/Management: (see MAR) other (see comments)  Intervention: Prevent Infection  Flowsheets (Taken 2/19/2022 0600 by Gayatri Palacio, PCT)  Infection Prevention:   environmental surveillance performed   equipment surfaces disinfected   hand hygiene promoted   personal protective equipment utilized   rest/sleep promoted   single patient room provided  Goal: Optimal Comfort and Wellbeing  Outcome: Ongoing, Progressing  Intervention: Provide Person-Centered Care  Flowsheets (Taken 2/18/2022 1035 by Cathy Bejarano, ANAT)  Trust Relationship/Rapport: care explained  Goal: Readiness for Transition of Care  Outcome: Ongoing, Progressing  Intervention: Mutually Develop Transition Plan  Flowsheets  Taken  2/18/2022 1035 by Cathy Bejarano RN  Transportation Anticipated: car, drives self  Patient/Family Anticipated Services at Transition: none  Patient/Family Anticipates Transition to: home with family  Taken 2/18/2022 1032 by Cathy Bejarano RN  Equipment Currently Used at Home: walker, rolling  Goal: Plan of Care Review  Outcome: Ongoing, Progressing  Flowsheets  Taken 2/19/2022 0326 by Mary Beaulieu RN  Outcome Summary: Pt resting well with no complaints. Currently on 2L NC. Continue to monitor.  Taken 2/18/2022 1543 by Lizbeth Perez RN  Progress: no change  Goal: Patient-Specific Goal (Individualized)  Outcome: Ongoing, Progressing  Goal: Absence of Hospital-Acquired Illness or Injury  Outcome: Ongoing, Progressing  Intervention: Identify and Manage Fall Risk  Flowsheets (Taken 2/19/2022 0600 by Gayatri Palacio PCT)  Safety Promotion/Fall Prevention:   safety round/check completed   room organization consistent   assistive device/personal items within reach   clutter free environment maintained   lighting adjusted   nonskid shoes/slippers when out of bed  Intervention: Prevent Skin Injury  Flowsheets  Taken 2/19/2022 0600 by Gayatri Palacio PCT  Body Position: position changed independently  Taken 2/18/2022 1035 by Cathy Bejarano RN  Skin Protection: adhesive use limited  Intervention: Prevent and Manage VTE (venous thromboembolism) Risk  Flowsheets (Taken 2/19/2022 0721)  VTE Prevention/Management: (see MAR) other (see comments)  Intervention: Prevent Infection  Flowsheets (Taken 2/19/2022 0600 by Gayatri Palacio PCT)  Infection Prevention:   environmental surveillance performed   equipment surfaces disinfected   hand hygiene promoted   personal protective equipment utilized   rest/sleep promoted   single patient room provided  Goal: Optimal Comfort and Wellbeing  Outcome: Ongoing, Progressing  Intervention: Provide Person-Centered Care  Flowsheets (Taken 2/18/2022 1035 by Cathy Bejarano RN)  Trust  Relationship/Rapport: care explained  Goal: Readiness for Transition of Care  Outcome: Ongoing, Progressing  Intervention: Mutually Develop Transition Plan  Flowsheets  Taken 2/18/2022 1035 by Cathy Bejarano, RN  Transportation Anticipated: car, drives self  Patient/Family Anticipated Services at Transition: none  Patient/Family Anticipates Transition to: home with family  Taken 2/18/2022 1032 by Cathy Bejarano, RN  Equipment Currently Used at Home: walker, rolling     Problem: Diabetes Comorbidity  Goal: Blood Glucose Level Within Desired Range  Outcome: Ongoing, Progressing  Intervention: Maintain Glycemic Control  Flowsheets (Taken 2/18/2022 1545 by Lizbeth Perez, RN)  Glycemic Management: blood glucose monitoring     Problem: Breathing Pattern Ineffective  Goal: Effective Breathing Pattern  Outcome: Ongoing, Progressing  Intervention: Promote Improved Breathing Pattern  Flowsheets (Taken 2/19/2022 0721)  Supportive Measures: active listening utilized  Head of Bed (HOB): HOB elevated  Airway/Ventilation Management: calming measures promoted     Problem: Fluid Volume Deficit  Goal: Fluid Balance  Outcome: Ongoing, Progressing  Intervention: Monitor and Manage Hypovolemia  Flowsheets (Taken 2/19/2022 0721)  Fluid/Electrolyte Management:   fluids provided   fluids adjusted     Problem: Fluid Imbalance (Pneumonia)  Goal: Fluid Balance  Outcome: Ongoing, Progressing  Intervention: Monitor and Manage Fluid Balance  Flowsheets (Taken 2/19/2022 0721)  Fluid/Electrolyte Management:   fluids provided   fluids adjusted     Problem: Infection (Pneumonia)  Goal: Resolution of Infection Signs and Symptoms  Outcome: Ongoing, Progressing  Intervention: Prevent Infection Progression  Flowsheets (Taken 2/19/2022 0721)  Fever Reduction/Comfort Measures: lightweight bedding  Isolation Precautions:   contact precautions maintained   droplet precautions maintained     Problem: Respiratory Compromise (Pneumonia)  Goal: Effective  Oxygenation and Ventilation  Outcome: Ongoing, Progressing  Intervention: Promote Airway Secretion Clearance  Flowsheets (Taken 2/18/2022 2020 by Mary Beaulieu RN)  Cough And Deep Breathing: done independently per patient  Intervention: Optimize Oxygenation and Ventilation  Flowsheets (Taken 2/19/2022 0721)  Head of Bed (HOB): HOB elevated  Airway/Ventilation Management: calming measures promoted   Goal Outcome Evaluation:

## 2022-02-19 NOTE — OUTREACH NOTE
Prep Survey      Responses   [facility] discharged from? [facility]   Is LACE score < 7 ? No   Emergency Room discharge w/ pulse ox? No   Eligibility Readm Mgmt   Discharge diagnosis Pneumonia due to COVID-19 virus    Does the patient have one of the following disease processes/diagnoses(primary or secondary)? COVID-19   Does the patient have Home health ordered? No   Is there a DME ordered? No   Prep survey completed? Yes          Yaquelin uDron RN

## 2022-02-20 ENCOUNTER — READMISSION MANAGEMENT (OUTPATIENT)
Dept: CALL CENTER | Facility: HOSPITAL | Age: 67
End: 2022-02-20

## 2022-02-20 LAB — QT INTERVAL: 426 MS

## 2022-02-20 NOTE — OUTREACH NOTE
COVID-19 Week 1 Survey      Responses   [facility] patient discharged from? [facility]   Does the patient have one of the following disease processes/diagnoses(primary or secondary)? COVID-19   COVID-19 underlying condition? None   Call Number Call 1   Week 1 Call successful? No  [Called both numbers listed]   Discharge diagnosis Pneumonia due to COVID-19 virus           Meseret Solorio RN

## 2022-02-21 ENCOUNTER — READMISSION MANAGEMENT (OUTPATIENT)
Dept: CALL CENTER | Facility: HOSPITAL | Age: 67
End: 2022-02-21

## 2022-02-21 NOTE — CASE MANAGEMENT/SOCIAL WORK
Case Management Discharge Note                Selected Continued Care - Discharged on 2/19/2022 Admission date: 2/18/2022 - Discharge disposition: Home or Self Care          Final Discharge Disposition Code: 01 - home or self-care

## 2022-02-21 NOTE — OUTREACH NOTE
COVID-19 Week 1 Survey      Responses   [facility] patient discharged from? [facility]   Does the patient have one of the following disease processes/diagnoses(primary or secondary)? COVID-19   COVID-19 underlying condition? None   Call Number Call 2   Week 1 Call successful? No   Discharge diagnosis Pneumonia due to COVID-19 virus           Martha Patrick RN

## 2022-02-22 ENCOUNTER — READMISSION MANAGEMENT (OUTPATIENT)
Dept: CALL CENTER | Facility: HOSPITAL | Age: 67
End: 2022-02-22

## 2022-02-22 NOTE — OUTREACH NOTE
COVID-19 Week 1 Survey      Responses   [facility] patient discharged from? [facility]   Does the patient have one of the following disease processes/diagnoses(primary or secondary)? COVID-19   COVID-19 underlying condition? None   Call Number Call 3   Week 1 Call successful? Yes   Call start time 1209   Call end time 1212   General alerts for this patient  WAS IN THE HOSPITAL AT THE SAME TIME AND ON THE SAME CALL SCHEDULE   Discharge diagnosis Pneumonia due to COVID-19 virus    Meds reviewed with patient/caregiver? Yes   Is the patient having any side effects they believe may be caused by any medication additions or changes? No   Does the patient have all medications ordered at discharge? No   Prescription comments PATIENT STATES SHE HAS ONE MORE PRESCRIPTION TO , AND THEY ARE GOING THROUGH THE [employer] THROUGH LATER TODAY TO PICK IT UP.    Is the patient taking all medications as directed (includes completed medication regime)? Yes   Does the patient have a primary care provider?  Yes   Comments regarding PCP PCP APPOINTMENT IS 3/7/22   Does the patient have an appointment with their PCP or specialist within 7 days of discharge? Greater than 7 days   What is preventing the patient from scheduling follow up appointments within 7 days of discharge? Earlier appointment not available   Nursing Interventions Verified appointment date/time/provider   Has the patient kept scheduled appointments due by today? N/A   Has home health visited the patient within 72 hours of discharge? N/A   Psychosocial issues? No   Did the patient receive a copy of their discharge instructions? Yes   Did the patient receive a copy of COVID-19 specific instructions? Yes   Nursing interventions Reviewed instructions with patient   What is the patient's perception of their health status since discharge? Improving   Does the patient have any of the following symptoms? Cough,  Shortness of breath  [WEAKNESS AND FATIGUE]    Nursing Interventions Nurse provided patient education   Pulse Ox monitoring None  [PATIENT ENCOURAGED TO GET A PULSE OXIMETER]   Is the patient/caregiver able to teach back steps to recovery at home? Set small, achievable goals for return to baseline health,  Rest and rebuild strength, gradually increase activity,  Make a list of questions for provider's appointment,  Eat a well-balance diet,  Practice good oral hygiene   If the patient is a current smoker, are they able to teach back resources for cessation? Not a smoker   Is the patient/caregiver able to teach back the hierarchy of who to call/visit for symptoms/problems? PCP, Specialist, Home health nurse, Urgent Care, ED, 911 Yes   COVID-19 call completed? Yes          Freida Manzanares LPN

## 2022-02-23 LAB
BACTERIA SPEC AEROBE CULT: NORMAL
BACTERIA SPEC AEROBE CULT: NORMAL

## 2022-02-25 ENCOUNTER — READMISSION MANAGEMENT (OUTPATIENT)
Dept: CALL CENTER | Facility: HOSPITAL | Age: 67
End: 2022-02-25

## 2022-02-25 NOTE — OUTREACH NOTE
COVID-19 Week 2 Survey      Responses   [facility] patient discharged from? [facility]   Does the patient have one of the following disease processes/diagnoses(primary or secondary)? COVID-19   COVID-19 underlying condition? None   Call Number Call 1   COVID-19 Week 2: Call 1 attempt successful? Yes   Call start time 1246   Call end time 1248   Discharge diagnosis Pneumonia due to COVID-19 virus    Meds reviewed with patient/caregiver? Yes   Is the patient taking all medications as directed (includes completed medication regime)? Yes   Comments regarding PCP PCP APPOINTMENT IS 3/7/22   What is the patient's perception of their health status since discharge? Improving   Does the patient have any of the following symptoms? Shortness of breath   COVID-19 call completed? Yes   Wrap up additional comments Pt reports improving slowly. Pt is not requiring 02 at this time.          Odette Hough RN

## 2022-03-04 ENCOUNTER — READMISSION MANAGEMENT (OUTPATIENT)
Dept: CALL CENTER | Facility: HOSPITAL | Age: 67
End: 2022-03-04

## 2022-03-04 NOTE — OUTREACH NOTE
COVID-19 Week 3 Survey      Responses   [facility] patient discharged from? [facility]   Does the patient have one of the following disease processes/diagnoses(primary or secondary)? COVID-19   COVID-19 underlying condition? None   Call Number Call 1   COVID-19 Week 3: Call 1 attempt successful? No   Discharge diagnosis Pneumonia due to COVID-19 virus           Odette Hough RN

## 2022-06-07 ENCOUNTER — HOSPITAL ENCOUNTER (OUTPATIENT)
Dept: INTERVENTIONAL RADIOLOGY/VASCULAR | Facility: HOSPITAL | Age: 67
Discharge: HOME | End: 2022-06-07
Admitting: ORTHOPAEDIC SURGERY

## 2022-06-07 DIAGNOSIS — S32.010A CLOSED WEDGE COMPRESSION FRACTURE OF L1 VERTEBRA, INITIAL ENCOUNTER: ICD-10-CM

## 2022-06-07 PROCEDURE — G0463 HOSPITAL OUTPT CLINIC VISIT: HCPCS

## 2022-06-22 ENCOUNTER — HOSPITAL ENCOUNTER (OUTPATIENT)
Dept: INTERVENTIONAL RADIOLOGY/VASCULAR | Facility: HOSPITAL | Age: 67
Discharge: HOME | End: 2022-06-22
Admitting: PHYSICIAN ASSISTANT

## 2022-06-22 VITALS
HEART RATE: 67 BPM | WEIGHT: 280 LBS | TEMPERATURE: 97.8 F | RESPIRATION RATE: 17 BRPM | OXYGEN SATURATION: 96 % | DIASTOLIC BLOOD PRESSURE: 54 MMHG | BODY MASS INDEX: 46.65 KG/M2 | HEIGHT: 65 IN | SYSTOLIC BLOOD PRESSURE: 108 MMHG

## 2022-06-22 DIAGNOSIS — S22.080G: ICD-10-CM

## 2022-06-22 LAB
APTT PPP: 25.4 SECONDS (ref 24–31)
BASOPHILS # BLD AUTO: 0.1 10*3/MM3 (ref 0–0.2)
BASOPHILS NFR BLD AUTO: 0.6 % (ref 0–1.5)
DEPRECATED RDW RBC AUTO: 45.1 FL (ref 37–54)
EOSINOPHIL # BLD AUTO: 0.1 10*3/MM3 (ref 0–0.4)
EOSINOPHIL NFR BLD AUTO: 0.8 % (ref 0.3–6.2)
ERYTHROCYTE [DISTWIDTH] IN BLOOD BY AUTOMATED COUNT: 14 % (ref 12.3–15.4)
HCT VFR BLD AUTO: 41.9 % (ref 34–46.6)
HGB BLD-MCNC: 13.5 G/DL (ref 12–15.9)
INR PPP: 0.97 (ref 0.93–1.1)
LYMPHOCYTES # BLD AUTO: 3 10*3/MM3 (ref 0.7–3.1)
LYMPHOCYTES NFR BLD AUTO: 28.5 % (ref 19.6–45.3)
MCH RBC QN AUTO: 29.4 PG (ref 26.6–33)
MCHC RBC AUTO-ENTMCNC: 32.3 G/DL (ref 31.5–35.7)
MCV RBC AUTO: 90.9 FL (ref 79–97)
MONOCYTES # BLD AUTO: 0.9 10*3/MM3 (ref 0.1–0.9)
MONOCYTES NFR BLD AUTO: 8.1 % (ref 5–12)
NEUTROPHILS NFR BLD AUTO: 6.5 10*3/MM3 (ref 1.7–7)
NEUTROPHILS NFR BLD AUTO: 62 % (ref 42.7–76)
NRBC BLD AUTO-RTO: 0.1 /100 WBC (ref 0–0.2)
PLATELET # BLD AUTO: 256 10*3/MM3 (ref 140–450)
PMV BLD AUTO: 8.6 FL (ref 6–12)
PROTHROMBIN TIME: 10 SECONDS (ref 9.6–11.7)
RBC # BLD AUTO: 4.61 10*6/MM3 (ref 3.77–5.28)
WBC NRBC COR # BLD: 10.5 10*3/MM3 (ref 3.4–10.8)

## 2022-06-22 PROCEDURE — 99153 MOD SED SAME PHYS/QHP EA: CPT

## 2022-06-22 PROCEDURE — 85730 THROMBOPLASTIN TIME PARTIAL: CPT | Performed by: RADIOLOGY

## 2022-06-22 PROCEDURE — 88307 TISSUE EXAM BY PATHOLOGIST: CPT | Performed by: PHYSICIAN ASSISTANT

## 2022-06-22 PROCEDURE — 25010000002 ONDANSETRON PER 1 MG: Performed by: RADIOLOGY

## 2022-06-22 PROCEDURE — 25010000002 FENTANYL CITRATE (PF) 50 MCG/ML SOLUTION: Performed by: RADIOLOGY

## 2022-06-22 PROCEDURE — 88342 IMHCHEM/IMCYTCHM 1ST ANTB: CPT | Performed by: PHYSICIAN ASSISTANT

## 2022-06-22 PROCEDURE — 88311 DECALCIFY TISSUE: CPT | Performed by: PHYSICIAN ASSISTANT

## 2022-06-22 PROCEDURE — C1713 ANCHOR/SCREW BN/BN,TIS/BN: HCPCS

## 2022-06-22 PROCEDURE — 25010000002 MIDAZOLAM PER 1 MG: Performed by: RADIOLOGY

## 2022-06-22 PROCEDURE — 25010000002 CEFAZOLIN PER 500 MG: Performed by: RADIOLOGY

## 2022-06-22 PROCEDURE — 22514 PERQ VERTEBRAL AUGMENTATION: CPT

## 2022-06-22 PROCEDURE — 85025 COMPLETE CBC W/AUTO DIFF WBC: CPT | Performed by: RADIOLOGY

## 2022-06-22 PROCEDURE — 85610 PROTHROMBIN TIME: CPT | Performed by: RADIOLOGY

## 2022-06-22 PROCEDURE — 25010000002 HYDROMORPHONE 1 MG/ML SOLUTION: Performed by: RADIOLOGY

## 2022-06-22 PROCEDURE — 0 LIDOCAINE 1 % SOLUTION: Performed by: RADIOLOGY

## 2022-06-22 PROCEDURE — 99152 MOD SED SAME PHYS/QHP 5/>YRS: CPT

## 2022-06-22 RX ORDER — SODIUM CHLORIDE 0.9 % (FLUSH) 0.9 %
10 SYRINGE (ML) INJECTION EVERY 12 HOURS SCHEDULED
Status: DISCONTINUED | OUTPATIENT
Start: 2022-06-22 | End: 2022-06-23 | Stop reason: HOSPADM

## 2022-06-22 RX ORDER — HYDROCODONE BITARTRATE AND ACETAMINOPHEN 5; 325 MG/1; MG/1
2 TABLET ORAL EVERY 6 HOURS PRN
Status: DISCONTINUED | OUTPATIENT
Start: 2022-06-22 | End: 2022-06-23 | Stop reason: HOSPADM

## 2022-06-22 RX ORDER — LIDOCAINE HYDROCHLORIDE 10 MG/ML
INJECTION, SOLUTION INFILTRATION; PERINEURAL
Status: COMPLETED | OUTPATIENT
Start: 2022-06-22 | End: 2022-06-22

## 2022-06-22 RX ORDER — ONDANSETRON 2 MG/ML
4 INJECTION INTRAMUSCULAR; INTRAVENOUS EVERY 6 HOURS PRN
Status: DISCONTINUED | OUTPATIENT
Start: 2022-06-22 | End: 2022-06-23 | Stop reason: HOSPADM

## 2022-06-22 RX ORDER — FENTANYL CITRATE 50 UG/ML
INJECTION, SOLUTION INTRAMUSCULAR; INTRAVENOUS
Status: COMPLETED | OUTPATIENT
Start: 2022-06-22 | End: 2022-06-22

## 2022-06-22 RX ORDER — MIDAZOLAM HYDROCHLORIDE 1 MG/ML
INJECTION INTRAMUSCULAR; INTRAVENOUS
Status: COMPLETED | OUTPATIENT
Start: 2022-06-22 | End: 2022-06-22

## 2022-06-22 RX ORDER — HYDROCODONE BITARTRATE AND ACETAMINOPHEN 10; 325 MG/1; MG/1
1 TABLET ORAL EVERY 6 HOURS PRN
Status: DISCONTINUED | OUTPATIENT
Start: 2022-06-22 | End: 2022-06-22

## 2022-06-22 RX ORDER — SODIUM CHLORIDE 9 MG/ML
75 INJECTION, SOLUTION INTRAVENOUS CONTINUOUS
Status: DISCONTINUED | OUTPATIENT
Start: 2022-06-22 | End: 2022-06-23 | Stop reason: HOSPADM

## 2022-06-22 RX ORDER — HYDROCODONE BITARTRATE AND ACETAMINOPHEN 5; 325 MG/1; MG/1
TABLET ORAL
Status: COMPLETED
Start: 2022-06-22 | End: 2022-06-22

## 2022-06-22 RX ORDER — SODIUM CHLORIDE 0.9 % (FLUSH) 0.9 %
10 SYRINGE (ML) INJECTION AS NEEDED
Status: DISCONTINUED | OUTPATIENT
Start: 2022-06-22 | End: 2022-06-23 | Stop reason: HOSPADM

## 2022-06-22 RX ADMIN — SODIUM CHLORIDE 75 ML/HR: 9 INJECTION, SOLUTION INTRAVENOUS at 08:19

## 2022-06-22 RX ADMIN — MIDAZOLAM 0.5 MG: 1 INJECTION INTRAMUSCULAR; INTRAVENOUS at 10:04

## 2022-06-22 RX ADMIN — MIDAZOLAM 0.5 MG: 1 INJECTION INTRAMUSCULAR; INTRAVENOUS at 10:19

## 2022-06-22 RX ADMIN — HYDROCODONE BITARTRATE AND ACETAMINOPHEN 2 TABLET: 5; 325 TABLET ORAL at 12:58

## 2022-06-22 RX ADMIN — FENTANYL CITRATE 50 MCG: 50 INJECTION, SOLUTION INTRAMUSCULAR; INTRAVENOUS at 10:12

## 2022-06-22 RX ADMIN — FENTANYL CITRATE 100 MCG: 50 INJECTION, SOLUTION INTRAMUSCULAR; INTRAVENOUS at 10:35

## 2022-06-22 RX ADMIN — CEFAZOLIN SODIUM 2 G: 1 INJECTION, POWDER, FOR SOLUTION INTRAMUSCULAR; INTRAVENOUS at 09:53

## 2022-06-22 RX ADMIN — FENTANYL CITRATE 50 MCG: 50 INJECTION, SOLUTION INTRAMUSCULAR; INTRAVENOUS at 10:13

## 2022-06-22 RX ADMIN — MIDAZOLAM 1 MG: 1 INJECTION INTRAMUSCULAR; INTRAVENOUS at 10:14

## 2022-06-22 RX ADMIN — ONDANSETRON 4 MG: 2 INJECTION INTRAMUSCULAR; INTRAVENOUS at 08:48

## 2022-06-22 RX ADMIN — MIDAZOLAM 0.5 MG: 1 INJECTION INTRAMUSCULAR; INTRAVENOUS at 10:20

## 2022-06-22 RX ADMIN — HYDROMORPHONE HYDROCHLORIDE 1 MG: 1 INJECTION, SOLUTION INTRAMUSCULAR; INTRAVENOUS; SUBCUTANEOUS at 08:49

## 2022-06-22 RX ADMIN — FENTANYL CITRATE 100 MCG: 50 INJECTION, SOLUTION INTRAMUSCULAR; INTRAVENOUS at 09:52

## 2022-06-22 RX ADMIN — FENTANYL CITRATE 50 MCG: 50 INJECTION, SOLUTION INTRAMUSCULAR; INTRAVENOUS at 10:05

## 2022-06-22 RX ADMIN — MIDAZOLAM 1 MG: 1 INJECTION INTRAMUSCULAR; INTRAVENOUS at 09:55

## 2022-06-22 RX ADMIN — LIDOCAINE HYDROCHLORIDE 13 ML: 10 INJECTION, SOLUTION INFILTRATION; PERINEURAL at 10:08

## 2022-06-22 RX ADMIN — FENTANYL CITRATE 50 MCG: 50 INJECTION, SOLUTION INTRAMUSCULAR; INTRAVENOUS at 10:01

## 2022-06-22 RX ADMIN — MIDAZOLAM 0.5 MG: 1 INJECTION INTRAMUSCULAR; INTRAVENOUS at 10:09

## 2022-06-22 RX ADMIN — Medication 10 ML: at 08:19

## 2022-06-22 NOTE — POST-PROCEDURE NOTE
IR POST OP NOTE    Procedure:L1 Kyphoplasty      Pre Op DX:Osteoporotic compression fracture L1 with intractable pain.       Post Op DX:same      Anesthesia: Local, CS      Findings:See dictation      Complications:None immediate.       Provider Signature: Dr. Jay Mishra

## 2022-06-22 NOTE — NURSING NOTE
0953 Patient assisted to prone position on table.  0957 MD marks lumbar region with xray imaging.  1002 Lumbar region prepped with chlorhexidine and sterile drape when dry.  1012 MD places right trocar and advances with hammer under xray guidance.  1019 MD places left trocar in like manner.  1026 MD obtains bone tissue sample.  1028 MD introduces and inflates balloons bilaterally.  1031 MD injects bone cement through left trocar then right.  1039 MD removes needles.  1042 Punctures dressed with bacitracin. Sterile gauze and tegaderm.  1049 Patient assisted back to stretcher and taken to recovery.

## 2022-06-23 LAB
LAB AP CASE REPORT: NORMAL
LAB AP DIAGNOSIS COMMENT: NORMAL
PATH REPORT.FINAL DX SPEC: NORMAL
PATH REPORT.GROSS SPEC: NORMAL

## 2022-07-30 ENCOUNTER — HOSPITAL ENCOUNTER (EMERGENCY)
Facility: HOSPITAL | Age: 67
Discharge: HOME | End: 2022-07-30
Attending: EMERGENCY MEDICINE | Admitting: EMERGENCY MEDICINE

## 2022-07-30 ENCOUNTER — APPOINTMENT (OUTPATIENT)
Dept: CT IMAGING | Facility: HOSPITAL | Age: 67
End: 2022-07-30

## 2022-07-30 VITALS
BODY MASS INDEX: 46.65 KG/M2 | WEIGHT: 280 LBS | DIASTOLIC BLOOD PRESSURE: 64 MMHG | HEIGHT: 65 IN | SYSTOLIC BLOOD PRESSURE: 100 MMHG | OXYGEN SATURATION: 93 % | HEART RATE: 92 BPM | TEMPERATURE: 98.3 F | RESPIRATION RATE: 20 BRPM

## 2022-07-30 DIAGNOSIS — M54.50 MIDLINE LOW BACK PAIN, UNSPECIFIED CHRONICITY, UNSPECIFIED WHETHER SCIATICA PRESENT: Primary | ICD-10-CM

## 2022-07-30 LAB
ALBUMIN SERPL-MCNC: 3.9 G/DL (ref 3.5–5.2)
ALBUMIN/GLOB SERPL: 1.8 G/DL
ALP SERPL-CCNC: 127 U/L (ref 39–117)
ALT SERPL W P-5'-P-CCNC: 17 U/L (ref 1–33)
ANION GAP SERPL CALCULATED.3IONS-SCNC: 13 MMOL/L (ref 5–15)
AST SERPL-CCNC: 15 U/L (ref 1–32)
BASOPHILS # BLD AUTO: 0 10*3/MM3 (ref 0–0.2)
BASOPHILS NFR BLD AUTO: 0.5 % (ref 0–1.5)
BILIRUB SERPL-MCNC: 0.3 MG/DL (ref 0–1.2)
BILIRUB UR QL STRIP: NEGATIVE
BUN SERPL-MCNC: 19 MG/DL (ref 8–23)
BUN/CREAT SERPL: 16.8 (ref 7–25)
CALCIUM SPEC-SCNC: 9.7 MG/DL (ref 8.6–10.5)
CHLORIDE SERPL-SCNC: 103 MMOL/L (ref 98–107)
CLARITY UR: CLEAR
CO2 SERPL-SCNC: 26 MMOL/L (ref 22–29)
COLOR UR: YELLOW
CREAT SERPL-MCNC: 1.13 MG/DL (ref 0.57–1)
DEPRECATED RDW RBC AUTO: 45.1 FL (ref 37–54)
EGFRCR SERPLBLD CKD-EPI 2021: 54.1 ML/MIN/1.73
EOSINOPHIL # BLD AUTO: 0.2 10*3/MM3 (ref 0–0.4)
EOSINOPHIL NFR BLD AUTO: 1.8 % (ref 0.3–6.2)
ERYTHROCYTE [DISTWIDTH] IN BLOOD BY AUTOMATED COUNT: 13.9 % (ref 12.3–15.4)
GLOBULIN UR ELPH-MCNC: 2.2 GM/DL
GLUCOSE SERPL-MCNC: 133 MG/DL (ref 65–99)
GLUCOSE UR STRIP-MCNC: NEGATIVE MG/DL
HCT VFR BLD AUTO: 39.6 % (ref 34–46.6)
HGB BLD-MCNC: 12.9 G/DL (ref 12–15.9)
HGB UR QL STRIP.AUTO: NEGATIVE
KETONES UR QL STRIP: NEGATIVE
LEUKOCYTE ESTERASE UR QL STRIP.AUTO: NEGATIVE
LIPASE SERPL-CCNC: 13 U/L (ref 13–60)
LYMPHOCYTES # BLD AUTO: 2.3 10*3/MM3 (ref 0.7–3.1)
LYMPHOCYTES NFR BLD AUTO: 27.6 % (ref 19.6–45.3)
MCH RBC QN AUTO: 29.8 PG (ref 26.6–33)
MCHC RBC AUTO-ENTMCNC: 32.7 G/DL (ref 31.5–35.7)
MCV RBC AUTO: 91.2 FL (ref 79–97)
MONOCYTES # BLD AUTO: 0.9 10*3/MM3 (ref 0.1–0.9)
MONOCYTES NFR BLD AUTO: 10.5 % (ref 5–12)
NEUTROPHILS NFR BLD AUTO: 5 10*3/MM3 (ref 1.7–7)
NEUTROPHILS NFR BLD AUTO: 59.6 % (ref 42.7–76)
NITRITE UR QL STRIP: NEGATIVE
NRBC BLD AUTO-RTO: 0 /100 WBC (ref 0–0.2)
PH UR STRIP.AUTO: 6 [PH] (ref 5–8)
PLATELET # BLD AUTO: 220 10*3/MM3 (ref 140–450)
PMV BLD AUTO: 8 FL (ref 6–12)
POTASSIUM SERPL-SCNC: 3.9 MMOL/L (ref 3.5–5.2)
PROT SERPL-MCNC: 6.1 G/DL (ref 6–8.5)
PROT UR QL STRIP: NEGATIVE
RBC # BLD AUTO: 4.34 10*6/MM3 (ref 3.77–5.28)
SODIUM SERPL-SCNC: 142 MMOL/L (ref 136–145)
SP GR UR STRIP: 1.01 (ref 1–1.03)
UROBILINOGEN UR QL STRIP: NORMAL
WBC NRBC COR # BLD: 8.4 10*3/MM3 (ref 3.4–10.8)

## 2022-07-30 PROCEDURE — 85025 COMPLETE CBC W/AUTO DIFF WBC: CPT | Performed by: EMERGENCY MEDICINE

## 2022-07-30 PROCEDURE — 25010000002 ONDANSETRON PER 1 MG: Performed by: EMERGENCY MEDICINE

## 2022-07-30 PROCEDURE — 99283 EMERGENCY DEPT VISIT LOW MDM: CPT

## 2022-07-30 PROCEDURE — 96374 THER/PROPH/DIAG INJ IV PUSH: CPT

## 2022-07-30 PROCEDURE — 83690 ASSAY OF LIPASE: CPT | Performed by: EMERGENCY MEDICINE

## 2022-07-30 PROCEDURE — 74176 CT ABD & PELVIS W/O CONTRAST: CPT

## 2022-07-30 PROCEDURE — 80053 COMPREHEN METABOLIC PANEL: CPT | Performed by: EMERGENCY MEDICINE

## 2022-07-30 PROCEDURE — 81003 URINALYSIS AUTO W/O SCOPE: CPT | Performed by: EMERGENCY MEDICINE

## 2022-07-30 PROCEDURE — 25010000002 MORPHINE PER 10 MG: Performed by: EMERGENCY MEDICINE

## 2022-07-30 PROCEDURE — 96375 TX/PRO/DX INJ NEW DRUG ADDON: CPT

## 2022-07-30 PROCEDURE — 36415 COLL VENOUS BLD VENIPUNCTURE: CPT

## 2022-07-30 RX ORDER — SODIUM CHLORIDE 0.9 % (FLUSH) 0.9 %
10 SYRINGE (ML) INJECTION AS NEEDED
Status: DISCONTINUED | OUTPATIENT
Start: 2022-07-30 | End: 2022-07-30 | Stop reason: HOSPADM

## 2022-07-30 RX ORDER — ONDANSETRON 2 MG/ML
4 INJECTION INTRAMUSCULAR; INTRAVENOUS ONCE
Status: COMPLETED | OUTPATIENT
Start: 2022-07-30 | End: 2022-07-30

## 2022-07-30 RX ADMIN — SODIUM CHLORIDE 500 ML: 900 INJECTION, SOLUTION INTRAVENOUS at 02:01

## 2022-07-30 RX ADMIN — MORPHINE SULFATE 4 MG: 4 INJECTION INTRAVENOUS at 02:14

## 2022-07-30 RX ADMIN — ONDANSETRON 4 MG: 2 INJECTION INTRAMUSCULAR; INTRAVENOUS at 02:14

## 2022-08-01 ENCOUNTER — HOSPITAL ENCOUNTER (EMERGENCY)
Facility: HOSPITAL | Age: 67
Discharge: LEFT BEFORE DISCHARGE | End: 2022-08-01

## 2022-08-25 ENCOUNTER — OFFICE VISIT (OUTPATIENT)
Dept: ORTHOPEDIC SURGERY | Facility: CLINIC | Age: 67
End: 2022-08-25

## 2022-08-25 VITALS — WEIGHT: 286 LBS | BODY MASS INDEX: 47.65 KG/M2 | HEART RATE: 73 BPM | HEIGHT: 65 IN

## 2022-08-25 DIAGNOSIS — M17.12 PRIMARY OSTEOARTHRITIS OF LEFT KNEE: Primary | ICD-10-CM

## 2022-08-25 DIAGNOSIS — S83.272A COMPLEX TEAR OF LATERAL MENISCUS OF LEFT KNEE, UNSPECIFIED WHETHER OLD OR CURRENT TEAR, INITIAL ENCOUNTER: ICD-10-CM

## 2022-08-25 DIAGNOSIS — S83.242A TEAR OF MEDIAL MENISCUS OF LEFT KNEE, UNSPECIFIED TEAR TYPE, UNSPECIFIED WHETHER OLD OR CURRENT TEAR, INITIAL ENCOUNTER: ICD-10-CM

## 2022-08-25 DIAGNOSIS — E66.01 MORBID OBESITY: ICD-10-CM

## 2022-08-25 PROCEDURE — 20610 DRAIN/INJ JOINT/BURSA W/O US: CPT | Performed by: PHYSICIAN ASSISTANT

## 2022-08-25 PROCEDURE — 99203 OFFICE O/P NEW LOW 30 MIN: CPT | Performed by: PHYSICIAN ASSISTANT

## 2022-08-25 RX ORDER — LIDOCAINE HYDROCHLORIDE 10 MG/ML
2 INJECTION, SOLUTION EPIDURAL; INFILTRATION; INTRACAUDAL; PERINEURAL
Status: COMPLETED | OUTPATIENT
Start: 2022-08-25 | End: 2022-08-25

## 2022-08-25 RX ORDER — GABAPENTIN 300 MG/1
300 CAPSULE ORAL NIGHTLY PRN
COMMUNITY
Start: 2022-08-18

## 2022-08-25 RX ORDER — METHYLPREDNISOLONE ACETATE 80 MG/ML
160 INJECTION, SUSPENSION INTRA-ARTICULAR; INTRALESIONAL; INTRAMUSCULAR; SOFT TISSUE
Status: COMPLETED | OUTPATIENT
Start: 2022-08-25 | End: 2022-08-25

## 2022-08-25 RX ORDER — ATORVASTATIN CALCIUM 10 MG/1
10 TABLET, FILM COATED ORAL DAILY
COMMUNITY
Start: 2022-07-12

## 2022-08-25 RX ORDER — MELOXICAM 15 MG/1
15 TABLET ORAL DAILY
Qty: 30 TABLET | Refills: 2 | Status: SHIPPED | OUTPATIENT
Start: 2022-08-25

## 2022-08-25 RX ADMIN — METHYLPREDNISOLONE ACETATE 160 MG: 80 INJECTION, SUSPENSION INTRA-ARTICULAR; INTRALESIONAL; INTRAMUSCULAR; SOFT TISSUE at 09:22

## 2022-08-25 RX ADMIN — LIDOCAINE HYDROCHLORIDE 2 ML: 10 INJECTION, SOLUTION EPIDURAL; INFILTRATION; INTRACAUDAL; PERINEURAL at 09:22

## 2022-08-25 NOTE — PROGRESS NOTES
ORTHOPEDIC VISIT    Referring Provider: Maria Esther  Primary Care Provider: Smita Mayo APRN         Subjective:  Chief Complaint:  Chief Complaint   Patient presents with   • Left Knee - Initial Evaluation     Pain x 9 to 10 months, fell around Easter; had MRI done today at [facility]; report ready       HPI:  Kiki Perdomo is a 67 y.o. female who presents for her initial visit for left knee pain ongoing for the last few years that is increased in the last 7 months.  She denies any initial injury, but does state that she fell around Easter.  She describes both a dull, achy pain, as well as an intermittent sharp, shooting pain, in the general knee area.  She does report radiation of the pain down her leg, also with numbness and tingling.  She does report popping, as well as occasional instability.  Her pain does increase with weightbearing.  She has been using over-the-counter anti-inflammatories, which is not helping with her discomfort.  She denies any previous history of surgery or injections in the knee.  Referred for consultation by Smita Mayo NP.    Past Medical History:   Diagnosis Date   • Anxiety and depression    • Arthritis     mild   • Chronic back pain    • Chronic fatigue syndrome    • Diabetes (HCC)    • Fibromyalgia    • GERD (gastroesophageal reflux disease)    • Hyperlipidemia    • Hypertension    • Nausea    • Neuropathy    • Restless legs    • Uterine cancer (HCC)        Past Surgical History:   Procedure Laterality Date   • CHOLECYSTECTOMY     • COLONOSCOPY     • ENDOSCOPY N/A 9/7/2021    Procedure: ESOPHAGOGASTRODUODENOSCOPY with dilation (bougie # [phone]);  Surgeon: Gordon Morales MD;  Location: [facility] ENDOSCOPY;  Service: Gastroenterology;  Laterality: N/A;  Post Op: gastritis, hiatal hernia, esophageal ring   • HYSTERECTOMY     • SIGMOIDOSCOPY N/A 9/7/2021    Procedure: FLEX SIGMOIDOSCOPY with polypectomy and cautery of rectal polyp;  Surgeon: Gordon Morales MD;   Location: [facility] ENDOSCOPY;  Service: Gastroenterology;  Laterality: N/A;  Post Op: rectal polyp       Family History   Problem Relation Age of Onset   • Heart disease Mother    • Heart disease Father        Social History     Occupational History   • Not on file   Tobacco Use   • Smoking status: Never Smoker   • Smokeless tobacco: Never Used   Substance and Sexual Activity   • Alcohol use: No   • Drug use: No   • Sexual activity: Defer        Medications:    Current Outpatient Medications:   •  atorvastatin (LIPITOR) 10 MG tablet, Take 10 mg by mouth Daily., Disp: , Rfl:   •  citalopram (CeleXA) 20 MG tablet, Take 20 mg by mouth Daily., Disp: , Rfl:   •  gabapentin (NEURONTIN) 300 MG capsule, Take 300 mg by mouth 3 (Three) Times a Day., Disp: , Rfl:   •  lisinopril (PRINIVIL,ZESTRIL) 20 MG tablet, Take 20 mg by mouth Daily., Disp: , Rfl:   •  metFORMIN (GLUCOPHAGE) 500 MG tablet, Take 500 mg by mouth 2 (Two) Times a Day With Meals., Disp: , Rfl:   •  omeprazole (priLOSEC) 20 MG capsule, Take 40 mg by mouth Daily., Disp: , Rfl:   •  ondansetron (ZOFRAN) 4 MG tablet, Take 4 mg by mouth As Needed for Nausea or Vomiting., Disp: , Rfl:   •  oxyCODONE-acetaminophen (PERCOCET)  MG per tablet, Take 1 tablet by mouth Every 8 (Eight) Hours As Needed for Moderate Pain ., Disp: , Rfl:   •  oxyCODONE-acetaminophen (Percocet)  MG per tablet, Take 1 tablet by mouth 4 times daily, Disp: 120 tablet, Rfl: 0  •  vitamin D (ERGOCALCIFEROL) 1.25 MG (36201 UT) capsule capsule, Take 50,000 Units by mouth 1 (One) Time Per Week. wednesday, Disp: , Rfl:   •  meloxicam (MOBIC) 15 MG tablet, Take 1 tablet by mouth Daily. Prn joint pain, Disp: 30 tablet, Rfl: 2    Allergies:  Allergies   Allergen Reactions   • Indomethacin Hives   • Iodine Hives         Review of Systems:  Gen -no fever, chills , sweats, headache   Eyes - no irritation or discharge   ENT -  no ear pain , runny nose , sore throat , difficulty swallowing   Resp - no  "cough , congestion , excessive expectoration   CVS - no chest pain , palpitations.   Abd - no pain , nausea , vomiting , diarrhea   Skin - no rash , lesions.   Neuro - no dizziness    Please see HPI for any other pertinent positives.  All other systems were reviewed and are negative.       Objective   Objective:    Pulse 73   Ht 165.1 cm (65\")   Wt 130 kg (286 lb)   BMI 47.59 kg/m²     Physical Examination:  Alert, oriented, morbidly obese individual in no acute distress, currently in a motorized wheelchair  Exam is limited due to body habitus  Left lower extremity shows no erythema, rashes, or open skin lesions. There is a mild amount of swelling. It is grossly well aligned, and the patient is neurovascularly intact distally. The knee is stable to varus and valgus stress, there is no patellar maltracking or crepitus noted, and plantar and dorsiflexion is 5/5. There is moderate tenderness to palpation generally about the knee and lower leg and with range of motion, which is about 0-90.  Positive Karis's.           Imaging:  xrays obtained today  left Knee X-Ray    Date of exam: 8/25/2022    Indication: Left knee pain    AP, Lateral, McNab views    Findings: Shows moderate tricompartmental DJD.  There is subchondral sclerosis, subchondral cysts, and osteophytosis present. and No fractures or dislocations are appreciated    decreased joint spaces    Hardware appropriately positioned not applicable    yes prior studies available for comparison.    This patient's x-ray report was graded according to the Kellgren and Krishna classification.  This took into account the joint space narrowing, osteophyte formation, sclerosis of the distal femur/proximal tibia along with deformity of those bones.  The findings were indicative of K L grade 3.    X-RAY was ordered and reviewed by NERIS Acosta    MRI KNEE WITHOUT CONTRAST LEFT    Date of Exam: 8/25/2022 6:47 EDT    Indication: Left knee pain.    Comparison: " Knee radiograph 11/28/2020 [facility].    Technique: Multiplanar multisequence images of the knee were performed according to routine knee MRI protocol.    FINDINGS:  Osseous Structures and Intra-Articular  Bone marrow signal intensity is normal. No osseous contusions or fractures. Moderate tricompartment joint space narrowing. Moderate size joint effusion with mild synovitis. Small intra-articular body in the anterior joint space measures about 5 mm..    Ligaments  The anterior cruciate ligament and posterior cruciate ligaments are intact. Mucoid degeneration anterior cruciate ligament. Medial collateral ligament and lateral collateral ligament complex are intact.    Menisci  There is a radial tear of the body of the medial meniscus. There is a horizontal tear along the superior articular surface of the posterior horn of the medial meniscus.  The anterior horn and body of the lateral meniscus have diffuse tearing. There is a mildly displaced flap-type tear of the anterior horn with small fragments seen superior to the lateral gutter measuring about 4 mm. There are no posterior horn lateral meniscal tears.    Extensor compartment  Patella has anatomic position. Extensor mechanism is intact.    Cartilage  There is diffuse narrowing of cartilage throughout all 3 compartments. There is full-thickness cartilage loss in the medial compartment. There is a moderate size osteophyte along the posterior medial femoral condyle with cartilage loss and subchondral edema. Diffuse thinning cartilage of the lateral compartment.    Soft tissues  No soft tissue masses. There is moderate size Baker cyst measuring 4.4 x 2.6 x 7.2 cm with evidence of recent partial rupture.    Miscellaneous  Iliotibial band is normal. Popliteus muscle and tendon are normal in appearance.    IMPRESSION:  1. Moderate tricompartment arthritis the knee with moderate size joint effusion and small ossified intra-articular body anteriorly.  2.  Complex, displaced tearing of the anterior horn and body lateral meniscus.  3. Radial tear body medial meniscus with horizontal tear superior to the undersurface posterior horn medial meniscus.  4. Most significant full-thickness cartilage loss the medial compartment.  5. Moderate size Baker cyst with evidence of recent partial rupture.  6. Mucoid degeneration anterior cruciate ligament.    Electronically Signed: Sheryl Cruz MD 8/25/2022 8:38 EDT          Assessment:  1. Primary osteoarthritis of left knee    2. Complex tear of lateral meniscus of left knee, unspecified whether old or current tear, initial encounter    3. Tear of medial meniscus of left knee, unspecified tear type, unspecified whether old or current tear, initial encounter    4. Morbid obesity (HCC)                 Plan:  I am recommending conservative treatment at this time.  If she fails conservative treatment management, she can discuss with Dr. Stahl if she is a candidate for surgical procedure.  Weight loss is highly recommended.  I have given her some home exercises to begin.  I have asked her to stop all over-the-counter anti-inflammatories, and we will try meloxicam.  She will be fitted for hinged knee brace today to help with stability and fall prevention.  Intra-articular steroid injection today, risks and benefits were discussed and postinjection instructions were given.  If there is no improvement in her symptoms, she should follow-up with Dr. Stahl in 6 weeks.  She should call with any questions or concerns.  Natural history and expected course discussed. Questions answered.  Educational materials distributed.  Rest, ice, compression, and elevation (RICE) therapy.  NSAIDs per medication orders.  OTC analgesics as needed.  Arthrocentesis. See procedure note.  cortisone injections  viscosupplementation  physical therapy  bracing  weight loss  activtiy modification  assistive devices  - Large Joint Arthrocentesis: L knee on 8/25/2022 9:22  "AM  Indications: pain  Details: 25 G needle, anterolateral approach  Medications: 2 mL lidocaine PF 1% 1 %; 160 mg methylPREDNISolone acetate 80 MG/ML  Outcome: tolerated well, no immediate complications  Procedure, treatment alternatives, risks and benefits explained, specific risks discussed. Consent was given by the patient. Immediately prior to procedure a time out was called to verify the correct patient, procedure, equipment, support staff and site/side marked as required. Patient was prepped and draped in the usual sterile fashion.                    NERIS Acosta  08/25/22  09:10 EDT    \"Please note that portions of this note were completed with a voice recognition program\".   "

## 2023-01-27 ENCOUNTER — TRANSCRIBE ORDERS (OUTPATIENT)
Dept: ADMINISTRATIVE | Facility: HOSPITAL | Age: 68
End: 2023-01-27
Payer: MEDICARE

## 2023-01-27 ENCOUNTER — OFFICE (AMBULATORY)
Dept: URBAN - METROPOLITAN AREA CLINIC 64 | Facility: CLINIC | Age: 68
End: 2023-01-27

## 2023-01-27 VITALS
SYSTOLIC BLOOD PRESSURE: 124 MMHG | HEART RATE: 66 BPM | DIASTOLIC BLOOD PRESSURE: 70 MMHG | HEIGHT: 65 IN | WEIGHT: 293 LBS

## 2023-01-27 DIAGNOSIS — R10.84 GENERALIZED ABDOMINAL PAIN: ICD-10-CM

## 2023-01-27 DIAGNOSIS — E11.9 TYPE 2 DIABETES MELLITUS WITHOUT COMPLICATIONS: ICD-10-CM

## 2023-01-27 DIAGNOSIS — K60.2 ANAL FISSURE, UNSPECIFIED: ICD-10-CM

## 2023-01-27 DIAGNOSIS — K59.00 CONSTIPATION, UNSPECIFIED: ICD-10-CM

## 2023-01-27 DIAGNOSIS — R68.81 EARLY SATIETY: ICD-10-CM

## 2023-01-27 DIAGNOSIS — K31.84 GASTROPARESIS: ICD-10-CM

## 2023-01-27 DIAGNOSIS — R14.0 ABDOMINAL DISTENSION (GASEOUS): ICD-10-CM

## 2023-01-27 DIAGNOSIS — K52.89 HELICOBACTER PYLORI COLITIS: Primary | ICD-10-CM

## 2023-01-27 DIAGNOSIS — B96.81 HELICOBACTER PYLORI COLITIS: Primary | ICD-10-CM

## 2023-01-27 DIAGNOSIS — R13.10 DYSPHAGIA, UNSPECIFIED: ICD-10-CM

## 2023-01-27 DIAGNOSIS — B96.81 HELICOBACTER PYLORI [H. PYLORI] AS THE CAUSE OF DISEASES CLA: ICD-10-CM

## 2023-01-27 DIAGNOSIS — Z90.49 ACQUIRED ABSENCE OF OTHER SPECIFIED PARTS OF DIGESTIVE TRACT: ICD-10-CM

## 2023-01-27 DIAGNOSIS — F11.20 OPIOID DEPENDENCE, UNCOMPLICATED: ICD-10-CM

## 2023-01-27 PROCEDURE — 99214 OFFICE O/P EST MOD 30 MIN: CPT | Performed by: NURSE PRACTITIONER

## 2023-01-27 RX ORDER — OMEPRAZOLE 40 MG/1
80 CAPSULE, DELAYED RELEASE ORAL
Qty: 180 | Refills: 11 | Status: COMPLETED
Start: 2023-01-27 | End: 2023-02-22

## 2023-02-07 NOTE — PROGRESS NOTES
EMG and Nerve Conduction Studies    The complete report includes the data sheets.     Date of Study: 2/13/23    Referring Provider: GEOFFREY CARCAMO DO        History:    BUE-PAIN/NUMBNESS    Results:    Prior to starting the procedure, the patient's identity was verified, pertinent available records were reviewed, the nature of the procedure was explained, the appropriate site of the exam were confirmed directly with the patient, and a pre-procedure pause was performed for final verification of all the above.    1.  The left median sensory distal latency is just slightly prolonged above the upper limit of normal.  The left median motor distal latency is mildly prolonged.  The left median forearm conduction velocity was normal.    2.  The left ulnar sensory distal latency is prolonged with a very low amplitude sensory nerve action potential.  The left ulnar motor distal latency is mildly prolonged.  The amplitude of the compound muscle action potential is reduced.  The conduction velocity was below normal in both below elbow and to slightly greater extent in the across elbow segment.    3.  EMG of the left flexor carpi ulnaris abductor digit he minimi first dorsal interosseous muscles showed some increased insertional activity and decreased recruitment.  The abductor pollicis brevis was essentially normal.    4.  The right median sensory distal latency was normal.  The right median motor distal latency is mildly prolonged with a just slightly reduced forearm conduction velocity.    5.  The right ulnar sensory distal latency and amplitude of the sensory nerve action potential was normal.  The right ulnar motor distal latency was normal and the conduction velocity both below and across the elbow was normal.    6.  EMG of the muscles examined in the right upper extremity and the C5-T1 myotomes was normal.    Impression:    This is an abnormal study.  There is evidence of mild left median neuropathy at the wrist.   There is  evidence of probable left ulnar neuropathy though the exact localization of a focal neuropathy was not determined with certainty as the ulnar conduction velocity was slow both below and across elbow segments.  There were neurogenic changes in the ulnar innervated muscles in the C8 myotome but not the median innervated abductor pollicis brevis.  As the results are somewhat inconclusive clinical correlation and correlation with imaging studies regarding possible lower trunk brachial plexopathy or C8-T1 radiculopathy is recommended.      Examination of the right upper extremity was essentially normal.  Electronically signed by :    Joseph Seipel, M.D.  February 7, 2023

## 2023-02-13 ENCOUNTER — PROCEDURE VISIT (OUTPATIENT)
Dept: NEUROLOGY | Facility: CLINIC | Age: 68
End: 2023-02-13
Payer: MEDICARE

## 2023-02-13 VITALS
WEIGHT: 286 LBS | HEIGHT: 65 IN | SYSTOLIC BLOOD PRESSURE: 160 MMHG | DIASTOLIC BLOOD PRESSURE: 78 MMHG | HEART RATE: 78 BPM | BODY MASS INDEX: 47.65 KG/M2 | TEMPERATURE: 97.6 F

## 2023-02-13 DIAGNOSIS — M79.602 PARESTHESIA AND PAIN OF BOTH UPPER EXTREMITIES: Primary | ICD-10-CM

## 2023-02-13 DIAGNOSIS — M79.601 PARESTHESIA AND PAIN OF BOTH UPPER EXTREMITIES: Primary | ICD-10-CM

## 2023-02-13 DIAGNOSIS — R20.2 PARESTHESIA AND PAIN OF BOTH UPPER EXTREMITIES: Primary | ICD-10-CM

## 2023-02-13 PROCEDURE — 95886 MUSC TEST DONE W/N TEST COMP: CPT | Performed by: PSYCHIATRY & NEUROLOGY

## 2023-02-13 PROCEDURE — 95910 NRV CNDJ TEST 7-8 STUDIES: CPT | Performed by: PSYCHIATRY & NEUROLOGY

## 2023-02-14 PROBLEM — E78.00 PURE HYPERCHOLESTEROLEMIA: Status: ACTIVE | Noted: 2023-02-14

## 2023-02-14 PROBLEM — R52 PAIN, UNSPECIFIED: Status: ACTIVE | Noted: 2023-02-14

## 2023-02-14 RX ORDER — ONDANSETRON 4 MG/1
4 TABLET, ORALLY DISINTEGRATING ORAL EVERY 8 HOURS PRN
COMMUNITY
Start: 2023-01-06

## 2023-02-14 RX ORDER — TOPIRAMATE 25 MG/1
1 TABLET ORAL AS NEEDED
COMMUNITY
Start: 2022-11-14

## 2023-02-14 RX ORDER — OMEPRAZOLE 40 MG/1
40 CAPSULE, DELAYED RELEASE ORAL NIGHTLY
COMMUNITY
Start: 2023-01-27

## 2023-02-14 RX ORDER — BUTALBITAL, ACETAMINOPHEN AND CAFFEINE 50; 325; 40 MG/1; MG/1; MG/1
TABLET ORAL
COMMUNITY
Start: 2022-12-27

## 2023-02-22 ENCOUNTER — ANESTHESIA EVENT (OUTPATIENT)
Dept: GASTROENTEROLOGY | Facility: HOSPITAL | Age: 68
End: 2023-02-22
Payer: MEDICARE

## 2023-02-22 ENCOUNTER — ON CAMPUS - OUTPATIENT (AMBULATORY)
Dept: URBAN - METROPOLITAN AREA HOSPITAL 85 | Facility: HOSPITAL | Age: 68
End: 2023-02-22

## 2023-02-22 ENCOUNTER — HOSPITAL ENCOUNTER (OUTPATIENT)
Facility: HOSPITAL | Age: 68
Setting detail: HOSPITAL OUTPATIENT SURGERY
Discharge: HOME | End: 2023-02-22
Attending: INTERNAL MEDICINE | Admitting: INTERNAL MEDICINE
Payer: MEDICARE

## 2023-02-22 ENCOUNTER — ANESTHESIA (OUTPATIENT)
Dept: GASTROENTEROLOGY | Facility: HOSPITAL | Age: 68
End: 2023-02-22
Payer: MEDICARE

## 2023-02-22 VITALS
OXYGEN SATURATION: 95 % | BODY MASS INDEX: 48.82 KG/M2 | SYSTOLIC BLOOD PRESSURE: 170 MMHG | TEMPERATURE: 98.2 F | HEIGHT: 65 IN | HEART RATE: 85 BPM | RESPIRATION RATE: 16 BRPM | DIASTOLIC BLOOD PRESSURE: 95 MMHG | WEIGHT: 293 LBS

## 2023-02-22 DIAGNOSIS — R68.81 EARLY SATIETY: ICD-10-CM

## 2023-02-22 DIAGNOSIS — K31.84 GASTROPARESIS: ICD-10-CM

## 2023-02-22 DIAGNOSIS — R14.0 BLOATING: ICD-10-CM

## 2023-02-22 DIAGNOSIS — R10.9 ABDOMINAL PAIN: ICD-10-CM

## 2023-02-22 DIAGNOSIS — K44.9 DIAPHRAGMATIC HERNIA WITHOUT OBSTRUCTION OR GANGRENE: ICD-10-CM

## 2023-02-22 DIAGNOSIS — K29.50 UNSPECIFIED CHRONIC GASTRITIS WITHOUT BLEEDING: ICD-10-CM

## 2023-02-22 DIAGNOSIS — A04.8 H. PYLORI INFECTION: ICD-10-CM

## 2023-02-22 DIAGNOSIS — K21.00 GASTRO-ESOPHAGEAL REFLUX DISEASE WITH ESOPHAGITIS, WITHOUT B: ICD-10-CM

## 2023-02-22 PROCEDURE — 88305 TISSUE EXAM BY PATHOLOGIST: CPT | Performed by: INTERNAL MEDICINE

## 2023-02-22 PROCEDURE — 43239 EGD BIOPSY SINGLE/MULTIPLE: CPT | Performed by: INTERNAL MEDICINE

## 2023-02-22 PROCEDURE — 93005 ELECTROCARDIOGRAM TRACING: CPT | Performed by: INTERNAL MEDICINE

## 2023-02-22 PROCEDURE — 93010 ELECTROCARDIOGRAM REPORT: CPT | Performed by: INTERNAL MEDICINE

## 2023-02-22 PROCEDURE — 25010000002 PROPOFOL 200 MG/20ML EMULSION: Performed by: ANESTHESIOLOGIST ASSISTANT

## 2023-02-22 PROCEDURE — 88342 IMHCHEM/IMCYTCHM 1ST ANTB: CPT | Performed by: INTERNAL MEDICINE

## 2023-02-22 RX ORDER — SODIUM CHLORIDE 9 MG/ML
INJECTION, SOLUTION INTRAVENOUS CONTINUOUS PRN
Status: DISCONTINUED | OUTPATIENT
Start: 2023-02-22 | End: 2023-02-22 | Stop reason: SURG

## 2023-02-22 RX ORDER — SODIUM CHLORIDE 0.9 % (FLUSH) 0.9 %
3 SYRINGE (ML) INJECTION EVERY 12 HOURS SCHEDULED
Status: DISCONTINUED | OUTPATIENT
Start: 2023-02-22 | End: 2023-02-22 | Stop reason: HOSPADM

## 2023-02-22 RX ORDER — LIDOCAINE HYDROCHLORIDE 20 MG/ML
INJECTION, SOLUTION INFILTRATION; PERINEURAL AS NEEDED
Status: DISCONTINUED | OUTPATIENT
Start: 2023-02-22 | End: 2023-02-22 | Stop reason: SURG

## 2023-02-22 RX ORDER — SODIUM CHLORIDE 0.9 % (FLUSH) 0.9 %
3-10 SYRINGE (ML) INJECTION AS NEEDED
Status: DISCONTINUED | OUTPATIENT
Start: 2023-02-22 | End: 2023-02-22 | Stop reason: HOSPADM

## 2023-02-22 RX ORDER — SODIUM CHLORIDE 9 MG/ML
40 INJECTION, SOLUTION INTRAVENOUS AS NEEDED
Status: DISCONTINUED | OUTPATIENT
Start: 2023-02-22 | End: 2023-02-22 | Stop reason: HOSPADM

## 2023-02-22 RX ORDER — PROPOFOL 10 MG/ML
INJECTION, EMULSION INTRAVENOUS AS NEEDED
Status: DISCONTINUED | OUTPATIENT
Start: 2023-02-22 | End: 2023-02-22 | Stop reason: SURG

## 2023-02-22 RX ADMIN — PROPOFOL 130 MG: 10 INJECTION, EMULSION INTRAVENOUS at 12:48

## 2023-02-22 RX ADMIN — SODIUM CHLORIDE: 0.9 INJECTION, SOLUTION INTRAVENOUS at 12:43

## 2023-02-22 RX ADMIN — LIDOCAINE HYDROCHLORIDE 50 MG: 20 INJECTION, SOLUTION INFILTRATION; PERINEURAL at 12:47

## 2023-02-22 RX ADMIN — SODIUM CHLORIDE 40 ML: 9 INJECTION, SOLUTION INTRAVENOUS at 11:12

## 2023-02-22 NOTE — ANESTHESIA POSTPROCEDURE EVALUATION
Patient: Kiki Perdomo    Procedure Summary     Date: 02/22/23 Room / Location: [facility] ENDOSCOPY 1 / [facility] ENDOSCOPY    Anesthesia Start: 1243 Anesthesia Stop: 1257    Procedure: ESOPHAGOGASTRODUODENOSCOPY with gastric biopsy's Diagnosis:       H. pylori infection      Abdominal pain      Gastroparesis      Bloating      (H. pylori infection [A04.8])      (Abdominal pain [R10.9])      (Gastroparesis [K31.84])      (Bloating [R14.0])    Surgeons: Gordon Morales MD Provider: Flaco Dozier MD    Anesthesia Type: general ASA Status: 3          Anesthesia Type: general    Vitals  Vitals Value Taken Time   /95 02/22/23 1340   Temp     Pulse 85 02/22/23 1340   Resp 16 02/22/23 1340   SpO2 95 % 02/22/23 1340           Post Anesthesia Care and Evaluation    Patient location during evaluation: PACU  Patient participation: complete - patient participated  Level of consciousness: awake  Pain scale: See nurse's notes for pain score.  Pain management: adequate    Airway patency: patent  Anesthetic complications: No anesthetic complications  PONV Status: none  Cardiovascular status: acceptable  Respiratory status: acceptable and spontaneous ventilation  Hydration status: acceptable    Comments: Patient seen and examined postoperatively; vital signs stable; SpO2 greater than or equal to 90%; cardiopulmonary status stable; nausea/vomiting adequately controlled; pain adequately controlled; no apparent anesthesia complications; patient discharged from anesthesia care when discharge criteria were met

## 2023-02-22 NOTE — H&P
GI PREOPERATIVE HISTORY AND PHYSICAL:    Referring Provider:    Smita Mayo APRN    Chief complaint: Early satiety, gastroesophageal reflux disease    Subjective .     History of present illness:      Kiki Perdomo is a 67 y.o. female who presents today for Procedure(s):  ESOPHAGOGASTRODUODENOSCOPY for the indications listed below.     Early satiety, GERD gastroesophageal reflux disease    The updated Patient Profile was reviewed prior to the procedure, in conjunction with the Physical Exam, including medical conditions, surgical procedures, medications, allergies, family history and social history.     Pre-operatively, I reviewed the indication(s) for the procedure, the risks of the procedure [including but not limited to: unexpected bleeding possibly requiring hospitalization and/or unplanned repeat procedures, perforation possibly requiring surgical treatment, missed lesions and complications of sedation/MAC (also explained by anesthesia staff)].     I have evaluated the patient for risks associated with the planned anesthesia and the procedure to be performed and find the patient an acceptable candidate for IV sedation.    Multiple opportunities were provided for any questions or concerns, and all questions were answered satisfactorily before any anesthesia was administered. We will proceed with the planned procedure.    Past Medical History:  Past Medical History:   Diagnosis Date   • Anxiety and depression    • Arthritis     mild   • Chronic back pain    • Chronic fatigue syndrome    • Diabetes (HCC)    • Fibromyalgia    • GERD (gastroesophageal reflux disease)    • Hyperlipidemia    • Hypertension    • Nausea    • Neuropathy    • Restless legs    • Uterine cancer (HCC)        Past Surgical History:  Past Surgical History:   Procedure Laterality Date   • CHOLECYSTECTOMY     • COLONOSCOPY     • ENDOSCOPY N/A 9/7/2021    Procedure: ESOPHAGOGASTRODUODENOSCOPY with dilation (bougie # 50);  Surgeon:  "Gordon Morales MD;  Location: [facility] ENDOSCOPY;  Service: Gastroenterology;  Laterality: N/A;  Post Op: gastritis, hiatal hernia, esophageal ring   • HYSTERECTOMY     • SIGMOIDOSCOPY N/A 9/7/2021    Procedure: FLEX SIGMOIDOSCOPY with polypectomy and cautery of rectal polyp;  Surgeon: Gordon Morales MD;  Location: [facility] ENDOSCOPY;  Service: Gastroenterology;  Laterality: N/A;  Post Op: rectal polyp       Social History:  Social History     Tobacco Use   • Smoking status: Never   • Smokeless tobacco: Never   Vaping Use   • Vaping Use: Never used   Substance Use Topics   • Alcohol use: No   • Drug use: No       Family History:  Family History   Problem Relation Age of Onset   • Heart disease Mother    • Heart disease Father        Medications:  No medications prior to admission.       Scheduled Meds:  Continuous Infusions:No current facility-administered medications for this encounter.    PRN Meds:.    ALLERGIES:  Indomethacin and Iodine    ROS:  The following systems were reviewed and negative;   Constitution:  No fevers, chills, no unintentional weight loss  Skin: no rash, no jaundice  Eyes:  No blurry vision, no eye pain  HENT:  No change in hearing or smell  Resp:  No dyspnea or cough  CV:  No chest pain or palpitations  :  No dysuria, hematuria  Musculoskeletal:  No leg cramps or arthralgias  Neuro:  No tremor, no numbness  Psych:  No depression or confsuion    Objective     Vital Signs:   Vitals:    02/15/23 1044   Weight: 130 kg (286 lb)   Height: 165.1 cm (65\")       Physical Exam:       General Appearance:    Awake and alert, in no acute distress   Head:    Normocephalic, without obvious abnormality, atraumatic   Throat:   No oral lesions, no thrush, oral mucosa moist   Lungs  Cardiac:  Abdomen:  Extremities:     Respirations regular, even and unlabored    Regular rate and rhythm, no murmur, gallop, rub    Non-distended, good bowel sounds, non tender, no masses     No edema, pulses 2+ "   Skin:   No rash, no jaundice       Results Review:  Lab Results (last 24 hours)     ** No results found for the last 24 hours. **          Imaging Results (Last 24 Hours)     ** No results found for the last 24 hours. **           I reviewed the patient's labs and imaging.    ASSESSMENT AND PLAN:  Early satiety, gastroesophageal reflux disease    Active Problems:    * No active hospital problems. *       Procedure(s):  ESOPHAGOGASTRODUODENOSCOPY      I discussed the patients findings and my recommendations with the patient.    Gordon Morales MD  02/22/23  07:07 EST

## 2023-02-22 NOTE — OP NOTE
ESOPHAGOGASTRODUODENOSCOPY Procedure Report    Patient Name:  Kiki Perdomo  YOB: 1955    Date of Surgery:  2/22/2023     Pre-Op Diagnosis:  1.  Early satiety  2.  Gastroesophageal reflux disease    Post-Op Diagnosis:  1.  Grade a erosive esophagitis  2.  Nonerosive gastritis  3.  Small hiatal hernia       Procedure/CPT® Codes:      Procedure(s):  ESOPHAGOGASTRODUODENOSCOPY with gastric biopsy's    Staff:  Surgeon(s):  Gordon Morales MD      Anesthesia: Monitored Anesthesia Care    Description of Procedure:  A description of the procedure as well as risks, benefits and alternative methods were explained to the patient who voiced understanding and signed the corresponding consent form. A physical exam was performed and vital signs were monitored throughout the procedure.    After informed consent was obtained, the patient was placed in the left lateral decubitus position and sedated as above.  The Olympus video gastroscope was inserted into the oropharynx and the esophagus was intubated without difficulty.  Examination of the esophagus, stomach, pylorus, duodenal bulb, and second portion of the duodenum was performed without difficulty. The scope was then retroflexed and the fundus was visualized. The procedure was not difficult and there were no immediate complications.  There was no blood loss.    Findings:  Esophagus: Small erosion of the GE junction consistent with grade a erosive esophagitis.  Stomach: Small hiatal hernia noted.  Patchy erythema in the antrum and body of the stomach consistent with nonerosive gastritis.  Duodenum: Normal    Impression:  1.  Erosive esophagitis  2.  Hiatal hernia  3.  Nonerosive gastritis    Recommendations:  1.  Begin esomeprazole 40 mg every morning  2.  Add famotidine 40 mg at bedtime  3.  Trial of azithromycin 250 mg at bedtime for 10 days  4.  Follow-up in office as scheduled      Gordon Morales MD     Date: 2/22/2023    Time: 12:56  EST      .

## 2023-02-22 NOTE — DISCHARGE INSTRUCTIONS
A responsible adult should stay with you and you should rest quietly for the rest of the day.    Do not drink alcohol, drive, operate any heavy machinery or power tools or make any legal/important decisions for the next 24 hours.     Progress your diet as tolerated.  If you begin to experience severe pain, increased shortness of breath, racing heartbeat or a fever above 101 F, seek immediate medical attention.     Follow up with MD as instructed. Call office for results in 3 to 5 days if needed.     Impression:  1.  Erosive esophagitis  2.  Hiatal hernia  3.  Nonerosive gastritis     Recommendations:  1.  Begin esomeprazole 40 mg every morning  2.  Add famotidine 40 mg at bedtime  3.  Trial of azithromycin 250 mg at bedtime for 10 days  4.  Follow-up in office as scheduled

## 2023-02-22 NOTE — ANESTHESIA PREPROCEDURE EVALUATION
Anesthesia Evaluation     Patient summary reviewed and Nursing notes reviewed   no history of anesthetic complications:  NPO Solid Status: > 6 hours  NPO Liquid Status: > 6 hours           Airway   Mallampati: II  TM distance: >3 FB  Neck ROM: full  No difficulty expected  Dental - normal exam   (+) upper dentures        Pulmonary - normal exam    breath sounds clear to auscultation  (+) asthma,shortness of breath,   Cardiovascular - normal exam    ECG reviewed  Rhythm: regular  Rate: normal    (+) hypertension, hyperlipidemia,       Neuro/Psych  (+) numbness, psychiatric history Depression,    GI/Hepatic/Renal/Endo    (+) morbid obesity, GERD,  diabetes mellitus,     Musculoskeletal     Abdominal  - normal exam    Abdomen: soft.  Bowel sounds: normal.   Substance History - negative use     OB/GYN negative ob/gyn ROS         Other   arthritis,    history of cancer                      Anesthesia Plan    ASA 3     general     intravenous induction     Anesthetic plan, risks, benefits, and alternatives have been provided, discussed and informed consent has been obtained with: patient.    Use of blood products discussed with patient .   Plan discussed with CRNA.

## 2023-02-24 LAB
LAB AP CASE REPORT: NORMAL
PATH REPORT.FINAL DX SPEC: NORMAL
PATH REPORT.GROSS SPEC: NORMAL

## 2023-02-25 LAB — QT INTERVAL: 400 MS

## 2023-02-27 ENCOUNTER — TELEPHONE (OUTPATIENT)
Dept: OTHER | Facility: OTHER | Age: 68
End: 2023-02-27

## 2023-02-27 NOTE — TELEPHONE ENCOUNTER
Caller: Kiki Perdomo    Relationship to patient: Self    Best call back number: [phone]    Patient is needing: PATIENT NEEDS TO RESCHEDULE GASTRO EMPTY

## 2023-03-03 ENCOUNTER — OFFICE (AMBULATORY)
Dept: URBAN - METROPOLITAN AREA CLINIC 64 | Facility: CLINIC | Age: 68
End: 2023-03-03

## 2023-03-03 VITALS
SYSTOLIC BLOOD PRESSURE: 157 MMHG | HEART RATE: 66 BPM | DIASTOLIC BLOOD PRESSURE: 82 MMHG | HEIGHT: 65 IN | WEIGHT: 293 LBS

## 2023-03-03 DIAGNOSIS — K21.9 GASTRO-ESOPHAGEAL REFLUX DISEASE WITHOUT ESOPHAGITIS: ICD-10-CM

## 2023-03-03 DIAGNOSIS — K59.00 CONSTIPATION, UNSPECIFIED: ICD-10-CM

## 2023-03-03 DIAGNOSIS — K31.84 GASTROPARESIS: ICD-10-CM

## 2023-03-03 PROCEDURE — 99213 OFFICE O/P EST LOW 20 MIN: CPT | Performed by: NURSE PRACTITIONER

## 2023-03-17 ENCOUNTER — TELEPHONE (OUTPATIENT)
Dept: GASTROENTEROLOGY | Facility: CLINIC | Age: 68
End: 2023-03-17
Payer: MEDICARE

## 2023-05-29 PROCEDURE — 82948 REAGENT STRIP/BLOOD GLUCOSE: CPT

## 2023-05-29 PROCEDURE — 87086 URINE CULTURE/COLONY COUNT: CPT | Performed by: NURSE PRACTITIONER

## 2023-06-02 ENCOUNTER — APPOINTMENT (OUTPATIENT)
Dept: CT IMAGING | Facility: HOSPITAL | Age: 68
End: 2023-06-02
Payer: MEDICARE

## 2023-06-02 ENCOUNTER — HOSPITAL ENCOUNTER (EMERGENCY)
Facility: HOSPITAL | Age: 68
Discharge: HOME | End: 2023-06-02
Payer: MEDICARE

## 2023-06-02 VITALS
DIASTOLIC BLOOD PRESSURE: 85 MMHG | BODY MASS INDEX: 50.02 KG/M2 | HEIGHT: 64 IN | WEIGHT: 293 LBS | SYSTOLIC BLOOD PRESSURE: 165 MMHG | HEART RATE: 79 BPM | OXYGEN SATURATION: 91 % | RESPIRATION RATE: 18 BRPM | TEMPERATURE: 98.2 F

## 2023-06-02 DIAGNOSIS — R39.15 URINARY URGENCY: ICD-10-CM

## 2023-06-02 DIAGNOSIS — R10.30 LOWER ABDOMINAL PAIN: Primary | ICD-10-CM

## 2023-06-02 LAB
ALBUMIN SERPL-MCNC: 4.2 G/DL (ref 3.5–5.2)
ALBUMIN/GLOB SERPL: 1.8 G/DL
ALP SERPL-CCNC: 111 U/L (ref 39–117)
ALT SERPL W P-5'-P-CCNC: 10 U/L (ref 1–33)
ANION GAP SERPL CALCULATED.3IONS-SCNC: 12 MMOL/L (ref 5–15)
AST SERPL-CCNC: 14 U/L (ref 1–32)
BASOPHILS # BLD AUTO: 0 10*3/MM3 (ref 0–0.2)
BASOPHILS NFR BLD AUTO: 0.3 % (ref 0–1.5)
BILIRUB SERPL-MCNC: 0.4 MG/DL (ref 0–1.2)
BILIRUB UR QL STRIP: NEGATIVE
BUN SERPL-MCNC: 13 MG/DL (ref 8–23)
BUN/CREAT SERPL: 14.3 (ref 7–25)
CALCIUM SPEC-SCNC: 9.5 MG/DL (ref 8.6–10.5)
CHLORIDE SERPL-SCNC: 99 MMOL/L (ref 98–107)
CLARITY UR: CLEAR
CO2 SERPL-SCNC: 27 MMOL/L (ref 22–29)
COLOR UR: ABNORMAL
CREAT SERPL-MCNC: 0.91 MG/DL (ref 0.57–1)
DEPRECATED RDW RBC AUTO: 45.9 FL (ref 37–54)
EGFRCR SERPLBLD CKD-EPI 2021: 69.7 ML/MIN/1.73
EOSINOPHIL # BLD AUTO: 0.1 10*3/MM3 (ref 0–0.4)
EOSINOPHIL NFR BLD AUTO: 0.8 % (ref 0.3–6.2)
ERYTHROCYTE [DISTWIDTH] IN BLOOD BY AUTOMATED COUNT: 13.2 % (ref 12.3–15.4)
GLOBULIN UR ELPH-MCNC: 2.3 GM/DL
GLUCOSE SERPL-MCNC: 153 MG/DL (ref 65–99)
GLUCOSE UR STRIP-MCNC: NEGATIVE MG/DL
HCT VFR BLD AUTO: 39 % (ref 34–46.6)
HGB BLD-MCNC: 13 G/DL (ref 12–15.9)
HGB UR QL STRIP.AUTO: NEGATIVE
HOLD SPECIMEN: NORMAL
HOLD SPECIMEN: NORMAL
KETONES UR QL STRIP: ABNORMAL
LEUKOCYTE ESTERASE UR QL STRIP.AUTO: NEGATIVE
LYMPHOCYTES # BLD AUTO: 0.3 10*3/MM3 (ref 0.7–3.1)
LYMPHOCYTES NFR BLD AUTO: 3.6 % (ref 19.6–45.3)
MCH RBC QN AUTO: 31.2 PG (ref 26.6–33)
MCHC RBC AUTO-ENTMCNC: 33.5 G/DL (ref 31.5–35.7)
MCV RBC AUTO: 93.2 FL (ref 79–97)
MONOCYTES # BLD AUTO: 0.5 10*3/MM3 (ref 0.1–0.9)
MONOCYTES NFR BLD AUTO: 5.8 % (ref 5–12)
NEUTROPHILS NFR BLD AUTO: 8.1 10*3/MM3 (ref 1.7–7)
NEUTROPHILS NFR BLD AUTO: 89.5 % (ref 42.7–76)
NITRITE UR QL STRIP: NEGATIVE
NRBC BLD AUTO-RTO: 0 /100 WBC (ref 0–0.2)
PH UR STRIP.AUTO: 7.5 [PH] (ref 5–8)
PLATELET # BLD AUTO: 200 10*3/MM3 (ref 140–450)
PMV BLD AUTO: 8.3 FL (ref 6–12)
POTASSIUM SERPL-SCNC: 4.7 MMOL/L (ref 3.5–5.2)
PROT SERPL-MCNC: 6.5 G/DL (ref 6–8.5)
PROT UR QL STRIP: NEGATIVE
RBC # BLD AUTO: 4.18 10*6/MM3 (ref 3.77–5.28)
SODIUM SERPL-SCNC: 138 MMOL/L (ref 136–145)
SP GR UR STRIP: 1.02 (ref 1–1.03)
UROBILINOGEN UR QL STRIP: ABNORMAL
WBC NRBC COR # BLD: 9 10*3/MM3 (ref 3.4–10.8)
WHOLE BLOOD HOLD COAG: NORMAL
WHOLE BLOOD HOLD SPECIMEN: NORMAL

## 2023-06-02 PROCEDURE — 96375 TX/PRO/DX INJ NEW DRUG ADDON: CPT

## 2023-06-02 PROCEDURE — 85025 COMPLETE CBC W/AUTO DIFF WBC: CPT | Performed by: NURSE PRACTITIONER

## 2023-06-02 PROCEDURE — 25010000002 ONDANSETRON PER 1 MG: Performed by: NURSE PRACTITIONER

## 2023-06-02 PROCEDURE — 96374 THER/PROPH/DIAG INJ IV PUSH: CPT

## 2023-06-02 PROCEDURE — 25010000002 MORPHINE PER 10 MG: Performed by: NURSE PRACTITIONER

## 2023-06-02 PROCEDURE — 80053 COMPREHEN METABOLIC PANEL: CPT | Performed by: NURSE PRACTITIONER

## 2023-06-02 PROCEDURE — 74176 CT ABD & PELVIS W/O CONTRAST: CPT

## 2023-06-02 PROCEDURE — 81003 URINALYSIS AUTO W/O SCOPE: CPT | Performed by: NURSE PRACTITIONER

## 2023-06-02 PROCEDURE — 99284 EMERGENCY DEPT VISIT MOD MDM: CPT

## 2023-06-02 RX ORDER — ONDANSETRON 2 MG/ML
4 INJECTION INTRAMUSCULAR; INTRAVENOUS ONCE
Status: COMPLETED | OUTPATIENT
Start: 2023-06-02 | End: 2023-06-02

## 2023-06-02 RX ORDER — ONDANSETRON 4 MG/1
4 TABLET, ORALLY DISINTEGRATING ORAL EVERY 8 HOURS PRN
Qty: 8 TABLET | Refills: 0 | Status: SHIPPED | OUTPATIENT
Start: 2023-06-02

## 2023-06-02 RX ORDER — SODIUM CHLORIDE 0.9 % (FLUSH) 0.9 %
10 SYRINGE (ML) INJECTION AS NEEDED
Status: DISCONTINUED | OUTPATIENT
Start: 2023-06-02 | End: 2023-06-02 | Stop reason: HOSPADM

## 2023-06-02 RX ORDER — ACETAMINOPHEN 500 MG
1000 TABLET ORAL ONCE
Status: COMPLETED | OUTPATIENT
Start: 2023-06-02 | End: 2023-06-02

## 2023-06-02 RX ORDER — PHENAZOPYRIDINE HYDROCHLORIDE 200 MG/1
200 TABLET, FILM COATED ORAL 3 TIMES DAILY PRN
Qty: 6 TABLET | Refills: 0 | Status: SHIPPED | OUTPATIENT
Start: 2023-06-02

## 2023-06-02 RX ADMIN — SODIUM CHLORIDE 1000 ML: 9 INJECTION, SOLUTION INTRAVENOUS at 07:02

## 2023-06-02 RX ADMIN — MORPHINE SULFATE 4 MG: 4 INJECTION INTRAVENOUS at 07:01

## 2023-06-02 RX ADMIN — ACETAMINOPHEN 1000 MG: 500 TABLET, FILM COATED ORAL at 09:05

## 2023-06-02 RX ADMIN — ONDANSETRON 4 MG: 2 INJECTION INTRAMUSCULAR; INTRAVENOUS at 07:02

## 2023-06-02 NOTE — ED PROVIDER NOTES
Subjective   History of Present Illness  Patient is a 67-year-old obese white female who presents today with complaints of lower abdominal discomfort accompanied by dysuria frequency and urgency.  She states she also has some pain in her right lower back.  She states she went to the urgent care center 4 days ago for the symptoms because she thought she had a UTI.  She states her urinary did not suggest that.  She followed up with her primary care provider 2 days ago and was placed on antibiotics.  States her pain has not gotten better.  It is constant, waxes and wanes and feels similar to the pain she had with kidney stones in the past.  She reports nausea but denies vomiting.  No diarrhea.  No fever or chills.  She states she feels dehydrated.        Review of Systems   Constitutional: Negative for chills and fever.   Respiratory: Negative for shortness of breath.    Cardiovascular: Negative for chest pain.   Gastrointestinal: Positive for abdominal pain and nausea. Negative for constipation, diarrhea and vomiting.   Genitourinary: Positive for flank pain, frequency and urgency. Negative for decreased urine volume, difficulty urinating, vaginal bleeding and vaginal discharge.   Skin: Negative for rash.       Past Medical History:   Diagnosis Date   • Anxiety and depression    • Arthritis     mild   • Chronic back pain    • Chronic fatigue syndrome    • Diabetes    • Fibromyalgia    • GERD (gastroesophageal reflux disease)    • Hyperlipidemia    • Hypertension    • Nausea    • Neuropathy    • Restless legs    • Uterine cancer        Allergies   Allergen Reactions   • Indomethacin Hives   • Iodine Hives       Past Surgical History:   Procedure Laterality Date   • CHOLECYSTECTOMY     • COLONOSCOPY     • ENDOSCOPY N/A 9/7/2021    Procedure: ESOPHAGOGASTRODUODENOSCOPY with dilation (bougie # 50);  Surgeon: Gordon Morales MD;  Location: [facility] ENDOSCOPY;  Service: Gastroenterology;  Laterality: N/A;  Post Op:  gastritis, hiatal hernia, esophageal ring   • ENDOSCOPY N/A 2/22/2023    Procedure: ESOPHAGOGASTRODUODENOSCOPY with gastric biopsy's;  Surgeon: Gordon Morales MD;  Location: [facility] ENDOSCOPY;  Service: Gastroenterology;  Laterality: N/A;  gastritis   • HYSTERECTOMY     • SIGMOIDOSCOPY N/A 9/7/2021    Procedure: FLEX SIGMOIDOSCOPY with polypectomy and cautery of rectal polyp;  Surgeon: Gordon Morales MD;  Location: [facility] ENDOSCOPY;  Service: Gastroenterology;  Laterality: N/A;  Post Op: rectal polyp       Family History   Problem Relation Age of Onset   • Heart disease Mother    • Heart disease Father        Social History     Socioeconomic History   • Marital status:    Tobacco Use   • Smoking status: Never     Passive exposure: Current   • Smokeless tobacco: Never   Vaping Use   • Vaping Use: Never used   Substance and Sexual Activity   • Alcohol use: No   • Drug use: Never   • Sexual activity: Defer           Objective   Physical Exam  Vital signs and triage nurse note reviewed.  Constitutional: Awake, alert; well-developed and well-nourished. No acute distress is noted.  Obese.  HEENT: Normocephalic, atraumatic; pupils are PERRL with intact EOM; oropharynx is pink and moist without exudate or erythema.  No drooling or pooling of oral secretions.  Neck: Supple, full range of motion without pain; no cervical lymphadenopathy. Normal phonation.  Cardiovascular: Regular rate and rhythm, normal S1-S2.  No murmur noted.  Pulmonary: Respiratory effort regular nonlabored, breath sounds clear to auscultation all fields.  Abdomen: Soft, mildly over the lower abdomen tender, nondistended with normoactive bowel sounds; no rebound or guarding.  Mild right CVAT.  No rash.  Musculoskeletal: Independent range of motion of all extremities with no palpable tenderness or edema.  Neuro: Alert oriented x3, speech is clear and appropriate, GCS 15.    Skin: Flesh tone, warm, dry, intact; no erythematous or  Electrodesiccation Text: The wound bed was treated with electrodesiccation after the biopsy was performed. petechial rash or lesion.      Procedures           ED Course      Labs Reviewed   COMPREHENSIVE METABOLIC PANEL - Abnormal; Notable for the following components:       Result Value    Glucose 153 (*)     All other components within normal limits    Narrative:     GFR Normal >60  Chronic Kidney Disease <60  Kidney Failure <15     URINALYSIS W/ MICROSCOPIC IF INDICATED (NO CULTURE) - Abnormal; Notable for the following components:    Color, UA Dark Yellow (*)     Ketones, UA Trace (*)     All other components within normal limits    Narrative:     Urine microscopic not indicated.   CBC WITH AUTO DIFFERENTIAL - Abnormal; Notable for the following components:    Neutrophil % 89.5 (*)     Lymphocyte % 3.6 (*)     Neutrophils, Absolute 8.10 (*)     Lymphocytes, Absolute 0.30 (*)     All other components within normal limits   RAINBOW DRAW    Narrative:     The following orders were created for panel order [place] Draw.  Procedure                               Abnormality         Status                     ---------                               -----------         ------                     Green Top (Gel)[[id number]]                                  Final result               Lavender Top[[id number]]                                     Final result               Gold Top - SST[[id number]]                                   Final result               Light Blue Top[[id number]]                                   Final result                 Please view results for these tests on the individual orders.   GREEN TOP   LAVENDER TOP   GOLD TOP - SST   LIGHT BLUE TOP   CBC AND DIFFERENTIAL    Narrative:     The following orders were created for panel order CBC & Differential.  Procedure                               Abnormality         Status                     ---------                               -----------         ------                     CBC Auto Differential[[id number]]        Abnormal            Final result                  Please view results for these tests on the individual orders.     CT Abdomen Pelvis Without Contrast    Result Date: 6/2/2023  Impression: 1. No acute abnormality in the abdomen or pelvis. 2. Normal appendix. 3. Prior cholecystectomy. 4. Additional chronic findings above. Electronically Signed: Mahesh Cynthiar  6/2/2023 8:20 AM EDT  Workstation ID: [id number]    Medications   sodium chloride 0.9 % flush 10 mL (has no administration in time range)   sodium chloride 0.9 % bolus 1,000 mL (0 mL Intravenous Stopped 6/2/23 0909)   ondansetron (ZOFRAN) injection 4 mg (4 mg Intravenous Given 6/2/23 0702)   morphine injection 4 mg (4 mg Intravenous Given 6/2/23 0701)   acetaminophen (TYLENOL) tablet 1,000 mg (1,000 mg Oral Given 6/2/23 0905)                                          Medical Decision Making  Patient presents today from home with complaints of lower abdominal pain, right flank pain some urgency and frequency. She states she feels dehydrated.    Patient had above exam and evaluation.  IV was established.  Labs and CT were obtained.  She was given a liter of IV fluids as well as morphine and Zofran for pain and nausea.    Work-up: CBC and metabolic panel are grossly unremarkable.  Urinalysis shows trace ketones, otherwise unremarkable.  CT reviewed by me interpreted by the radiologist shows no acute findings.    On examination patient resting quietly and in no distress.  She complains of a headache.  She was given Tylenol.  She remains well-appearing and hemodynamically stable.  She is afebrile.    Findings were discussed with her.  She will be discharged home with prescription for Pyridium and Zofran.  She instructed to follow-up with urology and her primary care provider.  She was given warning signs for prompt return of voiced understanding.    Amount and/or Complexity of Data Reviewed  Labs: ordered.  Radiology: ordered.      Risk  Prescription drug management.          Final diagnoses:   Lower abdominal pain  Curettage Text: The wound bed was treated with curettage after the biopsy was performed.   Urinary urgency       ED Disposition  ED Disposition     ED Disposition   Discharge    Condition   Stable    Comment   --             Smita Mayo, APRN  [address] [place] [address] [phone]          [facility]  [address] [facility] [address] [phone]             Medication List      New Prescriptions    phenazopyridine 200 MG tablet  Commonly known as: PYRIDIUM  Take 1 tablet by mouth 3 (Three) Times a Day As Needed for Bladder Spasms.        Changed    ondansetron ODT 4 MG disintegrating tablet  Commonly known as: ZOFRAN-ODT  Place 1 tablet on the tongue Every 8 (Eight) Hours As Needed for Nausea.  What changed: reasons to take this           Where to Get Your Medications      These medications were sent to [id number] - [place], [place] - [address] - [phone] PH - [phone] FX  [address] [place] [place] [address]    Phone: [phone]   · ondansetron ODT 4 MG disintegrating tablet  · phenazopyridine 200 MG tablet          Grisel Guaman, KANDY  06/02/23 7296     Dressing: bandage Billing Type: Third-Party Bill Biopsy Type: H and E Electrodesiccation And Curettage Text: The wound bed was treated with electrodesiccation and curettage after the biopsy was performed. Cryotherapy Text: The wound bed was treated with cryotherapy after the biopsy was performed. Additional Anesthesia Volume In Cc (Will Not Render If 0): 0 Silver Nitrate Text: The wound bed was treated with silver nitrate after the biopsy was performed. Consent: Written consent was obtained and risks were reviewed including but not limited to scarring, infection, bleeding, scabbing, incomplete removal, nerve damage and allergy to anesthesia. Hemostasis: Drysol Was A Bandage Applied: Yes Destruction After The Procedure: No Type Of Destruction Used: Curettage Wound Care: Petrolatum Detail Level: Detailed Path Notes (To The Dermatopathologist): Recurring Depth Of Biopsy: dermis Biopsy Method: Dermablade Anesthesia Volume In Cc (Will Not Render If 0): 0.5 Notification Instructions: Patient will be notified of biopsy results. However, patient instructed to call the office if not contacted within 2 weeks. Post-Care Instructions: I reviewed with the patient in detail post-care instructions. Patient is to keep the biopsy site dry overnight, and then apply bacitracin twice daily until healed. Patient may apply hydrogen peroxide soaks to remove any crusting.

## 2023-06-02 NOTE — DISCHARGE INSTRUCTIONS
Take medication as prescribed.  Continue current home medications.  Drink plenty of fluids.  Close follow-up with your primary care provider and or neurology especially if your symptoms persist.  Return for new or worsening symptoms.

## 2023-06-02 NOTE — ED NOTES
"Pt is from home. EMS states that pt went to [facility] on Monday for what she thought was a UTI, but reports UA was negative. Pt returned to [facility] on Wednesday for what she thought was mold exposure, at that time pt was placed on antibiotic treatment. Pt presents today with pain in the upper abdomen and states she has a history of kidney stones and \"this is what it feels like\". EMS reports pt has a history of chronic pain syndrome and chronic fatigue.   "

## 2023-08-31 ENCOUNTER — OFFICE (AMBULATORY)
Dept: URBAN - METROPOLITAN AREA CLINIC 64 | Facility: CLINIC | Age: 68
End: 2023-08-31

## 2023-08-31 VITALS
HEIGHT: 65 IN | HEART RATE: 65 BPM | SYSTOLIC BLOOD PRESSURE: 148 MMHG | DIASTOLIC BLOOD PRESSURE: 85 MMHG | WEIGHT: 293 LBS

## 2023-08-31 DIAGNOSIS — Z09 ENCOUNTER FOR FOLLOW-UP EXAMINATION AFTER COMPLETED TREATMEN: ICD-10-CM

## 2023-08-31 DIAGNOSIS — K31.84 GASTROPARESIS: ICD-10-CM

## 2023-08-31 DIAGNOSIS — K21.9 GASTRO-ESOPHAGEAL REFLUX DISEASE WITHOUT ESOPHAGITIS: ICD-10-CM

## 2023-08-31 PROCEDURE — 99214 OFFICE O/P EST MOD 30 MIN: CPT | Performed by: INTERNAL MEDICINE

## 2023-08-31 RX ORDER — SUCRALFATE 1 G/1
2 TABLET ORAL
Qty: 60 | Refills: 11 | Status: COMPLETED
Start: 2023-08-31 | End: 2024-04-23

## 2023-10-19 ENCOUNTER — APPOINTMENT (OUTPATIENT)
Dept: GENERAL RADIOLOGY | Facility: HOSPITAL | Age: 68
End: 2023-10-19
Payer: MEDICARE

## 2023-10-19 ENCOUNTER — HOSPITAL ENCOUNTER (OUTPATIENT)
Facility: HOSPITAL | Age: 68
Setting detail: OBSERVATION
Discharge: HOME | End: 2023-10-21
Attending: EMERGENCY MEDICINE | Admitting: EMERGENCY MEDICINE
Payer: MEDICARE

## 2023-10-19 DIAGNOSIS — I48.91 ATRIAL FIBRILLATION, UNSPECIFIED TYPE: ICD-10-CM

## 2023-10-19 DIAGNOSIS — R06.02 SHORTNESS OF BREATH: Primary | ICD-10-CM

## 2023-10-19 LAB
ALBUMIN SERPL-MCNC: 4.1 G/DL (ref 3.5–5.2)
ALBUMIN/GLOB SERPL: 1.8 G/DL
ALP SERPL-CCNC: 108 U/L (ref 39–117)
ALT SERPL W P-5'-P-CCNC: 10 U/L (ref 1–33)
ANION GAP SERPL CALCULATED.3IONS-SCNC: 11 MMOL/L (ref 5–15)
AST SERPL-CCNC: 14 U/L (ref 1–32)
B PARAPERT DNA SPEC QL NAA+PROBE: NOT DETECTED
B PERT DNA SPEC QL NAA+PROBE: NOT DETECTED
BASOPHILS # BLD AUTO: 0.1 10*3/MM3 (ref 0–0.2)
BASOPHILS NFR BLD AUTO: 0.7 % (ref 0–1.5)
BILIRUB SERPL-MCNC: 0.2 MG/DL (ref 0–1.2)
BUN SERPL-MCNC: 13 MG/DL (ref 8–23)
BUN/CREAT SERPL: 15.3 (ref 7–25)
C PNEUM DNA NPH QL NAA+NON-PROBE: NOT DETECTED
CALCIUM SPEC-SCNC: 9.4 MG/DL (ref 8.6–10.5)
CHLORIDE SERPL-SCNC: 102 MMOL/L (ref 98–107)
CLUMPED PLATELETS: PRESENT
CO2 SERPL-SCNC: 29 MMOL/L (ref 22–29)
CREAT SERPL-MCNC: 0.85 MG/DL (ref 0.57–1)
DEPRECATED RDW RBC AUTO: 45.1 FL (ref 37–54)
EGFRCR SERPLBLD CKD-EPI 2021: 75.2 ML/MIN/1.73
EOSINOPHIL # BLD AUTO: 0.1 10*3/MM3 (ref 0–0.4)
EOSINOPHIL NFR BLD AUTO: 0.8 % (ref 0.3–6.2)
ERYTHROCYTE [DISTWIDTH] IN BLOOD BY AUTOMATED COUNT: 13.6 % (ref 12.3–15.4)
FLUAV SUBTYP SPEC NAA+PROBE: NOT DETECTED
FLUBV RNA ISLT QL NAA+PROBE: NOT DETECTED
GLOBULIN UR ELPH-MCNC: 2.3 GM/DL
GLUCOSE BLDC GLUCOMTR-MCNC: 110 MG/DL (ref 70–105)
GLUCOSE BLDC GLUCOMTR-MCNC: 149 MG/DL (ref 70–105)
GLUCOSE SERPL-MCNC: 112 MG/DL (ref 65–99)
HADV DNA SPEC NAA+PROBE: NOT DETECTED
HCOV 229E RNA SPEC QL NAA+PROBE: NOT DETECTED
HCOV HKU1 RNA SPEC QL NAA+PROBE: NOT DETECTED
HCOV NL63 RNA SPEC QL NAA+PROBE: NOT DETECTED
HCOV OC43 RNA SPEC QL NAA+PROBE: NOT DETECTED
HCT VFR BLD AUTO: 42.2 % (ref 34–46.6)
HGB BLD-MCNC: 13.6 G/DL (ref 12–15.9)
HMPV RNA NPH QL NAA+NON-PROBE: NOT DETECTED
HOLD SPECIMEN: NORMAL
HPIV1 RNA ISLT QL NAA+PROBE: NOT DETECTED
HPIV2 RNA SPEC QL NAA+PROBE: NOT DETECTED
HPIV3 RNA NPH QL NAA+PROBE: NOT DETECTED
HPIV4 P GENE NPH QL NAA+PROBE: NOT DETECTED
LYMPHOCYTES # BLD AUTO: 2.7 10*3/MM3 (ref 0.7–3.1)
LYMPHOCYTES NFR BLD AUTO: 30.2 % (ref 19.6–45.3)
M PNEUMO IGG SER IA-ACNC: NOT DETECTED
MAGNESIUM SERPL-MCNC: 1.6 MG/DL (ref 1.6–2.4)
MCH RBC QN AUTO: 30.8 PG (ref 26.6–33)
MCHC RBC AUTO-ENTMCNC: 32.3 G/DL (ref 31.5–35.7)
MCV RBC AUTO: 95.2 FL (ref 79–97)
MONOCYTES # BLD AUTO: 0.7 10*3/MM3 (ref 0.1–0.9)
MONOCYTES NFR BLD AUTO: 8 % (ref 5–12)
NEUTROPHILS NFR BLD AUTO: 5.4 10*3/MM3 (ref 1.7–7)
NEUTROPHILS NFR BLD AUTO: 60.3 % (ref 42.7–76)
NRBC BLD AUTO-RTO: 0.7 /100 WBC (ref 0–0.2)
NT-PROBNP SERPL-MCNC: 801.9 PG/ML (ref 0–900)
PLATELET # BLD AUTO: 237 10*3/MM3 (ref 140–450)
PMV BLD AUTO: 9.6 FL (ref 6–12)
POTASSIUM SERPL-SCNC: 3.8 MMOL/L (ref 3.5–5.2)
PROCALCITONIN SERPL-MCNC: 0.03 NG/ML (ref 0–0.25)
PROT SERPL-MCNC: 6.4 G/DL (ref 6–8.5)
RBC # BLD AUTO: 4.43 10*6/MM3 (ref 3.77–5.28)
RBC MORPH BLD: NORMAL
RHINOVIRUS RNA SPEC NAA+PROBE: NOT DETECTED
RSV RNA NPH QL NAA+NON-PROBE: NOT DETECTED
SARS-COV-2 RNA NPH QL NAA+NON-PROBE: NOT DETECTED
SODIUM SERPL-SCNC: 142 MMOL/L (ref 136–145)
TROPONIN T SERPL HS-MCNC: 13 NG/L
WBC MORPH BLD: NORMAL
WBC NRBC COR # BLD: 8.9 10*3/MM3 (ref 3.4–10.8)
WHOLE BLOOD HOLD COAG: NORMAL

## 2023-10-19 PROCEDURE — 25010000002 ONDANSETRON PER 1 MG: Performed by: NURSE PRACTITIONER

## 2023-10-19 PROCEDURE — 85007 BL SMEAR W/DIFF WBC COUNT: CPT | Performed by: NURSE PRACTITIONER

## 2023-10-19 PROCEDURE — G0378 HOSPITAL OBSERVATION PER HR: HCPCS

## 2023-10-19 PROCEDURE — 96374 THER/PROPH/DIAG INJ IV PUSH: CPT

## 2023-10-19 PROCEDURE — 83880 ASSAY OF NATRIURETIC PEPTIDE: CPT | Performed by: EMERGENCY MEDICINE

## 2023-10-19 PROCEDURE — 84145 PROCALCITONIN (PCT): CPT | Performed by: NURSE PRACTITIONER

## 2023-10-19 PROCEDURE — 0202U NFCT DS 22 TRGT SARS-COV-2: CPT | Performed by: NURSE PRACTITIONER

## 2023-10-19 PROCEDURE — 83735 ASSAY OF MAGNESIUM: CPT | Performed by: EMERGENCY MEDICINE

## 2023-10-19 PROCEDURE — 82948 REAGENT STRIP/BLOOD GLUCOSE: CPT

## 2023-10-19 PROCEDURE — 93005 ELECTROCARDIOGRAM TRACING: CPT

## 2023-10-19 PROCEDURE — 36415 COLL VENOUS BLD VENIPUNCTURE: CPT

## 2023-10-19 PROCEDURE — 93005 ELECTROCARDIOGRAM TRACING: CPT | Performed by: EMERGENCY MEDICINE

## 2023-10-19 PROCEDURE — 84484 ASSAY OF TROPONIN QUANT: CPT | Performed by: EMERGENCY MEDICINE

## 2023-10-19 PROCEDURE — 85025 COMPLETE CBC W/AUTO DIFF WBC: CPT | Performed by: NURSE PRACTITIONER

## 2023-10-19 PROCEDURE — 25010000002 ENOXAPARIN PER 10 MG: Performed by: EMERGENCY MEDICINE

## 2023-10-19 PROCEDURE — 96372 THER/PROPH/DIAG INJ SC/IM: CPT

## 2023-10-19 PROCEDURE — 99284 EMERGENCY DEPT VISIT MOD MDM: CPT

## 2023-10-19 PROCEDURE — 80053 COMPREHEN METABOLIC PANEL: CPT | Performed by: NURSE PRACTITIONER

## 2023-10-19 PROCEDURE — 71046 X-RAY EXAM CHEST 2 VIEWS: CPT

## 2023-10-19 RX ORDER — INSULIN LISPRO 100 [IU]/ML
2-7 INJECTION, SOLUTION INTRAVENOUS; SUBCUTANEOUS
Status: DISCONTINUED | OUTPATIENT
Start: 2023-10-19 | End: 2023-10-21 | Stop reason: HOSPADM

## 2023-10-19 RX ORDER — VIBEGRON 75 MG/1
75 TABLET, FILM COATED ORAL DAILY
COMMUNITY

## 2023-10-19 RX ORDER — DEXTROSE MONOHYDRATE 25 G/50ML
25 INJECTION, SOLUTION INTRAVENOUS
Status: DISCONTINUED | OUTPATIENT
Start: 2023-10-19 | End: 2023-10-21 | Stop reason: HOSPADM

## 2023-10-19 RX ORDER — ATORVASTATIN CALCIUM 10 MG/1
10 TABLET, FILM COATED ORAL DAILY
Status: DISCONTINUED | OUTPATIENT
Start: 2023-10-20 | End: 2023-10-21 | Stop reason: HOSPADM

## 2023-10-19 RX ORDER — ESOMEPRAZOLE MAGNESIUM 40 MG/1
40 CAPSULE, DELAYED RELEASE ORAL
COMMUNITY

## 2023-10-19 RX ORDER — LISINOPRIL 20 MG/1
20 TABLET ORAL DAILY
Status: DISCONTINUED | OUTPATIENT
Start: 2023-10-19 | End: 2023-10-21 | Stop reason: HOSPADM

## 2023-10-19 RX ORDER — IBUPROFEN 600 MG/1
1 TABLET ORAL
Status: DISCONTINUED | OUTPATIENT
Start: 2023-10-19 | End: 2023-10-21 | Stop reason: HOSPADM

## 2023-10-19 RX ORDER — OXYCODONE HYDROCHLORIDE 5 MG/1
10 TABLET ORAL EVERY 6 HOURS PRN
Status: DISCONTINUED | OUTPATIENT
Start: 2023-10-19 | End: 2023-10-20

## 2023-10-19 RX ORDER — SODIUM CHLORIDE 0.9 % (FLUSH) 0.9 %
10 SYRINGE (ML) INJECTION AS NEEDED
Status: DISCONTINUED | OUTPATIENT
Start: 2023-10-19 | End: 2023-10-21 | Stop reason: HOSPADM

## 2023-10-19 RX ORDER — NICOTINE POLACRILEX 4 MG
15 LOZENGE BUCCAL
Status: DISCONTINUED | OUTPATIENT
Start: 2023-10-19 | End: 2023-10-21 | Stop reason: HOSPADM

## 2023-10-19 RX ORDER — ENOXAPARIN SODIUM 150 MG/ML
1 INJECTION SUBCUTANEOUS ONCE
Status: COMPLETED | OUTPATIENT
Start: 2023-10-19 | End: 2023-10-19

## 2023-10-19 RX ORDER — ONDANSETRON 2 MG/ML
4 INJECTION INTRAMUSCULAR; INTRAVENOUS EVERY 8 HOURS PRN
Status: DISCONTINUED | OUTPATIENT
Start: 2023-10-19 | End: 2023-10-21 | Stop reason: HOSPADM

## 2023-10-19 RX ORDER — PANTOPRAZOLE SODIUM 40 MG/1
40 TABLET, DELAYED RELEASE ORAL
Status: DISCONTINUED | OUTPATIENT
Start: 2023-10-20 | End: 2023-10-21 | Stop reason: HOSPADM

## 2023-10-19 RX ORDER — SODIUM CHLORIDE 9 MG/ML
40 INJECTION, SOLUTION INTRAVENOUS AS NEEDED
Status: DISCONTINUED | OUTPATIENT
Start: 2023-10-19 | End: 2023-10-21 | Stop reason: HOSPADM

## 2023-10-19 RX ORDER — CITALOPRAM 20 MG/1
20 TABLET ORAL DAILY
Status: DISCONTINUED | OUTPATIENT
Start: 2023-10-20 | End: 2023-10-21 | Stop reason: HOSPADM

## 2023-10-19 RX ORDER — ENOXAPARIN SODIUM 150 MG/ML
1 INJECTION SUBCUTANEOUS EVERY 12 HOURS
Status: DISCONTINUED | OUTPATIENT
Start: 2023-10-20 | End: 2023-10-20

## 2023-10-19 RX ORDER — OXYCODONE HYDROCHLORIDE 5 MG/1
10 TABLET ORAL ONCE
Status: COMPLETED | OUTPATIENT
Start: 2023-10-19 | End: 2023-10-19

## 2023-10-19 RX ORDER — SODIUM CHLORIDE 0.9 % (FLUSH) 0.9 %
10 SYRINGE (ML) INJECTION EVERY 12 HOURS SCHEDULED
Status: DISCONTINUED | OUTPATIENT
Start: 2023-10-19 | End: 2023-10-21 | Stop reason: HOSPADM

## 2023-10-19 RX ADMIN — ONDANSETRON 4 MG: 2 INJECTION INTRAMUSCULAR; INTRAVENOUS at 22:27

## 2023-10-19 RX ADMIN — LISINOPRIL 20 MG: 20 TABLET ORAL at 19:06

## 2023-10-19 RX ADMIN — OXYCODONE HYDROCHLORIDE 10 MG: 5 TABLET ORAL at 15:59

## 2023-10-19 RX ADMIN — Medication 10 ML: at 20:45

## 2023-10-19 RX ADMIN — OXYCODONE HYDROCHLORIDE 10 MG: 5 TABLET ORAL at 20:45

## 2023-10-19 RX ADMIN — ENOXAPARIN SODIUM 135 MG: 150 INJECTION SUBCUTANEOUS at 16:00

## 2023-10-19 NOTE — ED PROVIDER NOTES
Subjective   History of Present Illness  Chief complaint shortness of breath    History of present illness 68-year-old female with increasing shortness of breath last 2 to 3 weeks.  Worse with exertion better with rest.  No chest pain neck arm jaw pain no fever chills sweats cough congestion no leg pain or swelling.  Patient went to the doctor's office today was found to be in A-fib and sent to the ER.  She denies any palpitations dizziness or syncope.  No recent long car ride plane ride immobilization or foreign travels or antibiotic use.  No recent flus viruses or vaccinations.      Review of Systems   Constitutional:  Negative for chills and fever.   Respiratory:  Positive for shortness of breath. Negative for chest tightness.    Cardiovascular:  Negative for chest pain, palpitations and leg swelling.   Gastrointestinal:  Negative for abdominal pain and vomiting.   Genitourinary:  Negative for difficulty urinating.   Neurological:  Negative for dizziness.       Past Medical History:   Diagnosis Date    Anxiety and depression     Arthritis     mild    Chronic back pain     Chronic fatigue syndrome     Diabetes     Fibromyalgia     GERD (gastroesophageal reflux disease)     Hyperlipidemia     Hypertension     Nausea     Neuropathy     Restless legs     Uterine cancer        Allergies   Allergen Reactions    Indomethacin Hives    Iodine Hives       Past Surgical History:   Procedure Laterality Date    CHOLECYSTECTOMY      COLONOSCOPY      ENDOSCOPY N/A 9/7/2021    Procedure: ESOPHAGOGASTRODUODENOSCOPY with dilation (bougie # [phone]);  Surgeon: Gordon Morales MD;  Location: [facility] ENDOSCOPY;  Service: Gastroenterology;  Laterality: N/A;  Post Op: gastritis, hiatal hernia, esophageal ring    ENDOSCOPY N/A 2/22/2023    Procedure: ESOPHAGOGASTRODUODENOSCOPY with gastric biopsy's;  Surgeon: Gordon Morales MD;  Location: [facility] ENDOSCOPY;  Service: Gastroenterology;  Laterality: N/A;  gastritis    HYSTERECTOMY       SIGMOIDOSCOPY N/A 9/7/2021    Procedure: FLEX SIGMOIDOSCOPY with polypectomy and cautery of rectal polyp;  Surgeon: Gordon Morales MD;  Location: [facility] ENDOSCOPY;  Service: Gastroenterology;  Laterality: N/A;  Post Op: rectal polyp       Family History   Problem Relation Age of Onset    Heart disease Mother     Heart disease Father        Social History     Socioeconomic History    Marital status:    Tobacco Use    Smoking status: Never     Passive exposure: Current    Smokeless tobacco: Never   Vaping Use    Vaping Use: Never used   Substance and Sexual Activity    Alcohol use: No    Drug use: Never    Sexual activity: Defer     Prior to Admission medications    Medication Sig Start Date End Date Taking? Authorizing Provider   atorvastatin (LIPITOR) 10 MG tablet Take 1 tablet by mouth Daily. 7/12/22   Tierra Willett MD   butalbital-acetaminophen-caffeine (FIORICET, ESGIC) -40 MG per tablet TAKE 1 TO 2 TABLETS BY MOUTH EVERY 4 TO 6 HOURS AS NEEDED 12/27/22   Tierra Willett MD   citalopram (CeleXA) 20 MG tablet Take 1 tablet by mouth Daily. 11/16/12   Tierra Willett MD   gabapentin (NEURONTIN) 300 MG capsule Take 1 capsule by mouth At Night As Needed. 8/18/22   Tierra Willett MD   lisinopril (PRINIVIL,ZESTRIL) 20 MG tablet Take 1 tablet by mouth Daily.    Tierra Willett MD   meloxicam (MOBIC) 15 MG tablet Take 1 tablet by mouth Daily. Prn joint pain 8/25/22   Hattie Rogers PA   metFORMIN (GLUCOPHAGE) 500 MG tablet Take 1 tablet by mouth 2 (Two) Times a Day With Meals.    Tierra Willett MD   omeprazole (priLOSEC) 40 MG capsule Take 1 capsule by mouth Every Night. 1/27/23   Tierra Willett MD   ondansetron ODT (ZOFRAN-ODT) 4 MG disintegrating tablet Place 1 tablet on the tongue Every 8 (Eight) Hours As Needed for Nausea. 6/2/23   Grisel Guaman APRN   oxyCODONE-acetaminophen (Percocet)  MG per tablet Take 1 tablet by mouth 4 times  daily 7/29/22      phenazopyridine (PYRIDIUM) 200 MG tablet Take 1 tablet by mouth 3 (Three) Times a Day As Needed for Bladder Spasms. 6/2/23   Grisel Guaman APRN   tiZANidine (ZANAFLEX) 2 MG tablet Take 1 tablet by mouth 3 (Three) Times a Day. 5/21/23   Tierra Willett MD   topiramate (TOPAMAX) 25 MG tablet Take 1 tablet by mouth As Needed. 11/14/22   Tierra Willett MD   vitamin D (ERGOCALCIFEROL) 1.25 MG (10042 UT) capsule capsule Take 1 capsule by mouth 1 (One) Time Per Week. wednesday    Provider, MD Tierra          Objective   Physical Exam  Constitutional is a 68-year-old awake alert triage vital signs reviewed nontoxic-appearing HEENT extraocular muscles are intact pupils equal round reactive neck supple no adenopathy no JV no bruits lungs clear no retraction heart irregular without murmur rub abdomen soft nontender good bowel sounds no pulsatile masses extremities pulses equal upper and lower extremities no edema cords or Homans' sign no evidence of DVT skin warm and dry without rashes neurologic awake alert and follows commands motor strength normal without focal weakness.  Procedures           ED Course      Results for orders placed or performed during the hospital encounter of 10/19/23   Respiratory Panel PCR w/COVID-19(SARS-CoV-2) LINDA/ZULEIMA/SADIE/PAD/COR/MAD/DON In-House, NP Swab in UTM/VTM, 3-4 HR TAT - Swab, Nasopharynx    Specimen: Nasopharynx; Swab   Result Value Ref Range    ADENOVIRUS, PCR Not Detected Not Detected    Coronavirus 229E Not Detected Not Detected    Coronavirus HKU1 Not Detected Not Detected    Coronavirus NL63 Not Detected Not Detected    Coronavirus OC43 Not Detected Not Detected    COVID19 Not Detected Not Detected - Ref. Range    Human Metapneumovirus Not Detected Not Detected    Human Rhinovirus/Enterovirus Not Detected Not Detected    Influenza A PCR Not Detected Not Detected    Influenza B PCR Not Detected Not Detected    Parainfluenza Virus 1 Not Detected Not  Detected    Parainfluenza Virus 2 Not Detected Not Detected    Parainfluenza Virus 3 Not Detected Not Detected    Parainfluenza Virus 4 Not Detected Not Detected    RSV, PCR Not Detected Not Detected    Bordetella pertussis pcr Not Detected Not Detected    Bordetella parapertussis PCR Not Detected Not Detected    Chlamydophila pneumoniae PCR Not Detected Not Detected    Mycoplasma pneumo by PCR Not Detected Not Detected   Comprehensive Metabolic Panel    Specimen: Blood   Result Value Ref Range    Glucose 112 (H) 65 - 99 mg/dL    BUN 13 8 - 23 mg/dL    Creatinine 0.85 0.57 - 1.00 mg/dL    Sodium 142 136 - 145 mmol/L    Potassium 3.8 3.5 - 5.2 mmol/L    Chloride 102 98 - 107 mmol/L    CO2 29.0 22.0 - 29.0 mmol/L    Calcium 9.4 8.6 - 10.5 mg/dL    Total Protein 6.4 6.0 - 8.5 g/dL    Albumin 4.1 3.5 - 5.2 g/dL    ALT (SGPT) 10 1 - 33 U/L    AST (SGOT) 14 1 - 32 U/L    Alkaline Phosphatase 108 39 - 117 U/L    Total Bilirubin 0.2 0.0 - 1.2 mg/dL    Globulin 2.3 gm/dL    A/G Ratio 1.8 g/dL    BUN/Creatinine Ratio 15.3 7.0 - 25.0    Anion Gap 11.0 5.0 - 15.0 mmol/L    eGFR 75.2 >60.0 mL/min/1.73   Procalcitonin    Specimen: Blood   Result Value Ref Range    Procalcitonin 0.03 0.00 - 0.25 ng/mL   CBC Auto Differential    Specimen: Blood   Result Value Ref Range    WBC 8.90 3.40 - 10.80 10*3/mm3    RBC 4.43 3.77 - 5.28 10*6/mm3    Hemoglobin 13.6 12.0 - 15.9 g/dL    Hematocrit 42.2 34.0 - 46.6 %    MCV 95.2 79.0 - 97.0 fL    MCH 30.8 26.6 - 33.0 pg    MCHC 32.3 31.5 - 35.7 g/dL    RDW 13.6 12.3 - 15.4 %    RDW-SD 45.1 37.0 - 54.0 fl    MPV 9.6 6.0 - 12.0 fL    Platelets 237 140 - 450 10*3/mm3    Neutrophil % 60.3 42.7 - 76.0 %    Lymphocyte % 30.2 19.6 - 45.3 %    Monocyte % 8.0 5.0 - 12.0 %    Eosinophil % 0.8 0.3 - 6.2 %    Basophil % 0.7 0.0 - 1.5 %    Neutrophils, Absolute 5.40 1.70 - 7.00 10*3/mm3    Lymphocytes, Absolute 2.70 0.70 - 3.10 10*3/mm3    Monocytes, Absolute 0.70 0.10 - 0.90 10*3/mm3    Eosinophils, Absolute  0.10 0.00 - 0.40 10*3/mm3    Basophils, Absolute 0.10 0.00 - 0.20 10*3/mm3    nRBC 0.7 (H) 0.0 - 0.2 /100 WBC   Scan Slide    Specimen: Blood   Result Value Ref Range    RBC Morphology Normal Normal    WBC Morphology Normal Normal    Clumped Platelets Present None Seen   Single High Sensitivity Troponin T    Specimen: Blood   Result Value Ref Range    HS Troponin T 13 (H) <10 ng/L   BNP    Specimen: Blood   Result Value Ref Range    proBNP 801.9 0.0 - 900.0 pg/mL   Magnesium    Specimen: Blood   Result Value Ref Range    Magnesium 1.6 1.6 - 2.4 mg/dL   ECG 12 Lead Dyspnea   Result Value Ref Range    QT Interval 363 ms    QTC Interval 461 ms   Gold Top - SST   Result Value Ref Range    Extra Tube Hold for add-ons.    Light Blue Top   Result Value Ref Range    Extra Tube Hold for add-ons.      XR Chest 2 View    Result Date: 10/19/2023  Impression: No active cardiopulmonary disease Electronically Signed: Mundo Aviles  10/19/2023 1:15 PM EDT  Workstation ID: [id number]   Medications   oxyCODONE (ROXICODONE) immediate release tablet 10 mg (10 mg Oral Given 10/19/23 1559)   Enoxaparin Sodium (LOVENOX) syringe 135 mg (135 mg Subcutaneous Given 10/19/23 1600)          EKG my interpretation atrial fibrillation rate of 95 left axis deviation QTc of 461 no hypertrophy no acute ST elevation nonspecific ST changes noted diffusely abnormal EKG unchanged from 2/22/2023 1 was in sinus rhythm.                                  Medical Decision Making  Medical decision making IV established monitor placed my review shows atrial fibrillation with controlled rate in the 90 range.  The patient had an EKG my interpretation shows an atrial fibrillation rate 95 left axis deviation QTc of 460 once an abnormal EKG and is changed from previous on 2/22/2023 on his sinus rhythm at that time.  Labs obtained independent reviewed by me comprehensive metabolic profile unremarkable CBC unremarkable troponin was 13 magnesium 1.6 proBNP normal.  Chest  x-ray obtained independent reviewed by me I do not see pneumonia pneumothorax or acute findings radiology unremarkable.  Patient repeat exam resting comfortably I see no evidence of acute ST elevation myocardial infarction DVT pulmonary embolism aortic dissection pericardial effusion myocarditis pericarditis or infection based on the history and physical and clinical findings although this does not rule out all causes.  Patient's heart rates in the 80s looks like she has some new onset A-fib at a controlled rate.  The patient will need further work-up and cardiac consultation I talked to provider in observation we discussed the case cardiac consultation placed stable otherwise unremarkable ER course patient agreeable    Problems Addressed:  Atrial fibrillation, unspecified type: complicated acute illness or injury  Shortness of breath: complicated acute illness or injury    Amount and/or Complexity of Data Reviewed  External Data Reviewed: ECG.  Labs: ordered. Decision-making details documented in ED Course.  Radiology: ordered and independent interpretation performed. Decision-making details documented in ED Course.  ECG/medicine tests: ordered and independent interpretation performed. Decision-making details documented in ED Course.    Risk  Decision regarding hospitalization.        Final diagnoses:   Shortness of breath   Atrial fibrillation, unspecified type       ED Disposition  ED Disposition       ED Disposition   Decision to Admit    Condition   --    Comment   --               No follow-up provider specified.       Medication List      No changes were made to your prescriptions during this visit.            Steven Mccann MD  10/19/23 0971

## 2023-10-19 NOTE — ED NOTES
Shortness of breath with activity.  Settles down with rest.  Has been going on for a couple weeks.  Denies any swelling in legs or ankles.  No chest pain.

## 2023-10-20 LAB
ANION GAP SERPL CALCULATED.3IONS-SCNC: 9 MMOL/L (ref 5–15)
BASOPHILS # BLD AUTO: 0.1 10*3/MM3 (ref 0–0.2)
BASOPHILS NFR BLD AUTO: 0.7 % (ref 0–1.5)
BUN SERPL-MCNC: 13 MG/DL (ref 8–23)
BUN/CREAT SERPL: 14.6 (ref 7–25)
CALCIUM SPEC-SCNC: 9.6 MG/DL (ref 8.6–10.5)
CHLORIDE SERPL-SCNC: 104 MMOL/L (ref 98–107)
CO2 SERPL-SCNC: 30 MMOL/L (ref 22–29)
CREAT SERPL-MCNC: 0.89 MG/DL (ref 0.57–1)
DEPRECATED RDW RBC AUTO: 45.9 FL (ref 37–54)
EGFRCR SERPLBLD CKD-EPI 2021: 71.2 ML/MIN/1.73
EOSINOPHIL # BLD AUTO: 0.1 10*3/MM3 (ref 0–0.4)
EOSINOPHIL NFR BLD AUTO: 1 % (ref 0.3–6.2)
ERYTHROCYTE [DISTWIDTH] IN BLOOD BY AUTOMATED COUNT: 13.8 % (ref 12.3–15.4)
GLUCOSE BLDC GLUCOMTR-MCNC: 111 MG/DL (ref 70–105)
GLUCOSE BLDC GLUCOMTR-MCNC: 128 MG/DL (ref 70–105)
GLUCOSE BLDC GLUCOMTR-MCNC: 137 MG/DL (ref 70–105)
GLUCOSE BLDC GLUCOMTR-MCNC: 98 MG/DL (ref 70–105)
GLUCOSE SERPL-MCNC: 113 MG/DL (ref 65–99)
HCT VFR BLD AUTO: 42.1 % (ref 34–46.6)
HGB BLD-MCNC: 13.8 G/DL (ref 12–15.9)
INR PPP: 0.98 (ref 0.93–1.1)
LYMPHOCYTES # BLD AUTO: 3.4 10*3/MM3 (ref 0.7–3.1)
LYMPHOCYTES NFR BLD AUTO: 43 % (ref 19.6–45.3)
MCH RBC QN AUTO: 31.3 PG (ref 26.6–33)
MCHC RBC AUTO-ENTMCNC: 32.7 G/DL (ref 31.5–35.7)
MCV RBC AUTO: 95.7 FL (ref 79–97)
MONOCYTES # BLD AUTO: 0.6 10*3/MM3 (ref 0.1–0.9)
MONOCYTES NFR BLD AUTO: 7.3 % (ref 5–12)
NEUTROPHILS NFR BLD AUTO: 3.8 10*3/MM3 (ref 1.7–7)
NEUTROPHILS NFR BLD AUTO: 48 % (ref 42.7–76)
NRBC BLD AUTO-RTO: 0 /100 WBC (ref 0–0.2)
PLATELET # BLD AUTO: 211 10*3/MM3 (ref 140–450)
PMV BLD AUTO: 9.4 FL (ref 6–12)
POTASSIUM SERPL-SCNC: 4.4 MMOL/L (ref 3.5–5.2)
PROTHROMBIN TIME: 10.7 SECONDS (ref 9.6–11.7)
QT INTERVAL: 363 MS
QTC INTERVAL: 461 MS
RBC # BLD AUTO: 4.4 10*6/MM3 (ref 3.77–5.28)
SODIUM SERPL-SCNC: 143 MMOL/L (ref 136–145)
TROPONIN T SERPL HS-MCNC: 20 NG/L
WBC NRBC COR # BLD: 8 10*3/MM3 (ref 3.4–10.8)

## 2023-10-20 PROCEDURE — 99204 OFFICE O/P NEW MOD 45 MIN: CPT | Performed by: INTERNAL MEDICINE

## 2023-10-20 PROCEDURE — 25010000002 ENOXAPARIN PER 10 MG: Performed by: PHYSICIAN ASSISTANT

## 2023-10-20 PROCEDURE — 80048 BASIC METABOLIC PNL TOTAL CA: CPT | Performed by: PHYSICIAN ASSISTANT

## 2023-10-20 PROCEDURE — 97161 PT EVAL LOW COMPLEX 20 MIN: CPT

## 2023-10-20 PROCEDURE — 82948 REAGENT STRIP/BLOOD GLUCOSE: CPT

## 2023-10-20 PROCEDURE — 85610 PROTHROMBIN TIME: CPT | Performed by: PHYSICIAN ASSISTANT

## 2023-10-20 PROCEDURE — 63710000001 DIPHENHYDRAMINE PER 50 MG: Performed by: PHYSICIAN ASSISTANT

## 2023-10-20 PROCEDURE — G0378 HOSPITAL OBSERVATION PER HR: HCPCS

## 2023-10-20 PROCEDURE — 96372 THER/PROPH/DIAG INJ SC/IM: CPT

## 2023-10-20 PROCEDURE — 85025 COMPLETE CBC W/AUTO DIFF WBC: CPT | Performed by: PHYSICIAN ASSISTANT

## 2023-10-20 PROCEDURE — 84484 ASSAY OF TROPONIN QUANT: CPT | Performed by: PHYSICIAN ASSISTANT

## 2023-10-20 RX ORDER — AMIODARONE HYDROCHLORIDE 200 MG/1
200 TABLET ORAL
Status: DISCONTINUED | OUTPATIENT
Start: 2023-10-20 | End: 2023-10-21 | Stop reason: HOSPADM

## 2023-10-20 RX ORDER — OXYCODONE HYDROCHLORIDE 5 MG/1
10 TABLET ORAL EVERY 6 HOURS PRN
Status: DISCONTINUED | OUTPATIENT
Start: 2023-10-20 | End: 2023-10-21 | Stop reason: HOSPADM

## 2023-10-20 RX ORDER — METOPROLOL SUCCINATE 25 MG/1
12.5 TABLET, EXTENDED RELEASE ORAL
Status: DISCONTINUED | OUTPATIENT
Start: 2023-10-20 | End: 2023-10-21 | Stop reason: HOSPADM

## 2023-10-20 RX ORDER — DIPHENHYDRAMINE HCL 25 MG
25 CAPSULE ORAL ONCE
Status: COMPLETED | OUTPATIENT
Start: 2023-10-20 | End: 2023-10-20

## 2023-10-20 RX ADMIN — METOPROLOL SUCCINATE 12.5 MG: 25 TABLET, EXTENDED RELEASE ORAL at 12:38

## 2023-10-20 RX ADMIN — ENOXAPARIN SODIUM 135 MG: 150 INJECTION SUBCUTANEOUS at 16:22

## 2023-10-20 RX ADMIN — CITALOPRAM HYDROBROMIDE 20 MG: 20 TABLET ORAL at 08:27

## 2023-10-20 RX ADMIN — OXYCODONE HYDROCHLORIDE 10 MG: 5 TABLET ORAL at 23:01

## 2023-10-20 RX ADMIN — OXYCODONE HYDROCHLORIDE 10 MG: 5 TABLET ORAL at 16:22

## 2023-10-20 RX ADMIN — OXYCODONE HYDROCHLORIDE 10 MG: 5 TABLET ORAL at 03:00

## 2023-10-20 RX ADMIN — OXYCODONE HYDROCHLORIDE 10 MG: 5 TABLET ORAL at 10:17

## 2023-10-20 RX ADMIN — ENOXAPARIN SODIUM 135 MG: 150 INJECTION SUBCUTANEOUS at 03:04

## 2023-10-20 RX ADMIN — PANTOPRAZOLE SODIUM 40 MG: 40 TABLET, DELAYED RELEASE ORAL at 05:52

## 2023-10-20 RX ADMIN — AMIODARONE HYDROCHLORIDE 200 MG: 200 TABLET ORAL at 18:08

## 2023-10-20 RX ADMIN — Medication 10 ML: at 20:50

## 2023-10-20 RX ADMIN — RIVAROXABAN 20 MG: 20 TABLET, FILM COATED ORAL at 23:01

## 2023-10-20 RX ADMIN — ATORVASTATIN CALCIUM 10 MG: 10 TABLET, FILM COATED ORAL at 08:27

## 2023-10-20 RX ADMIN — DIPHENHYDRAMINE HYDROCHLORIDE 25 MG: 25 CAPSULE ORAL at 20:50

## 2023-10-20 RX ADMIN — Medication 10 ML: at 08:27

## 2023-10-20 RX ADMIN — LISINOPRIL 20 MG: 20 TABLET ORAL at 08:27

## 2023-10-20 NOTE — PLAN OF CARE
Goal Outcome Evaluation:  Plan of Care Reviewed With: patient           Outcome Evaluation: Kiki Perdomo is a 68 y.o. female who presents with SOA x 2-3 weeks worsening with exertion. Pt was seen by primary MD and found to have Afib and was sent to ED.   PMH: HTN, HLD, DM, Alpha Gal syndrome, uterine CA, DDD. At baseline, pt lives with her  in a [facility] with a ramped entrance. She typically ambulates with a rollator but also has a scooter at home as well.  Pt reports hx of 1 fall in past 6 months at camp site when feet became tangled/tripped.  At time of evaluation she was A&O x 4 with c/o chronic LBP 9/10, reporting that pain meds are due soon but she is able to work with us at this time.  Pt not wearing O2 with sats 93-97% on RA.  Pt demonstrates independence with bed mobility, transfers and ambulation with rollator.  Pt's HR  is 74-81 throughout with mild SOA but no O2 desat.  Pt demonstrates good body mechanics/log roll for bed mobility for back safety.  PT educated in good body mechanics while standing at sink to do dishes. Pt verbalized understandin. Pt appears at baseline. No further PT needs at this time.      Anticipated Discharge Disposition (PT): home

## 2023-10-20 NOTE — CONSULTS
Referring Provider: Steven Mccann MD    Reason for Consultation:      Atrial Fibrillation  SOA      Patient Care Team:  Smita Mayo APRN as PCP - General (Nurse Practitioner)      SUBJECTIVE     Chief Complaint: Shortness of breath    History of present illness:  Kiki Perdomo is a 68 y.o. female with a history of hypertension, dyslipidemia and diabetes who presented to [facility] with complaint of shortness of breath.    Patient has recently been seen by her primary care provider with complaints of progressive activity intolerance, shortness of breath, fatigue.  She has been undergoing outpatient work-up.  She was recently diagnosed with alpha gal syndrome.    She was seen by her primary care provider yesterday and was found to be in atrial fibrillation which is a new finding for the patient.  She denies any prior history of atrial fibrillation.  She has been unaware of feeling her heart racing.    She denies chest pain but does complain of shortness of breath and activity intolerance.  She denies recent swelling, PND, orthopnea or near syncope.    Patient was previously seen during a hospitalization back in 2019 where she presented with episodes of dizziness and lightheadedness and syncope.  She was found to be profoundly orthostatic.    She is known to have a history of uterine cancer, kidney stones, dyslipidemia, diabetes, hypertension, degenerative disc disease.    EKG in the emergency room demonstrated atrial fibrillation with controlled ventricular rates.High-sensitivity troponin was obtained results were 13, 20 proBNP was 800 magnesium level was 1.6        Review of systems:    Constitutional: No weakness, fatigue, fever, rigors, chills   Eyes: No vision changes, eye pain   ENT/oropharynx: No difficulty swallowing, sore throat, epistaxis, changes in hearing   Cardiovascular: No chest pain, chest tightness, palpitations, paroxysmal nocturnal dyspnea, orthopnea, diaphoresis,  dizziness / syncopal episode   Respiratory: Complains of shortness of breath, dyspnea on exertion.  Denies cough, wheezing, hemoptysis   Gastrointestinal: No abdominal pain, nausea, vomiting, diarrhea, bloody stools   Genitourinary: No hematuria, dysuria   Neurological: No headache, tremors, numbness, one-sided weakness    Musculoskeletal: No cramps, myalgias, joint pain, joint swelling   Integument: No rash, edema        Personal History:      Past Medical History:   Diagnosis Date    Anxiety and depression     Arthritis     mild    Chronic back pain     Chronic fatigue syndrome     Diabetes     Fibromyalgia     GERD (gastroesophageal reflux disease)     Hyperlipidemia     Hypertension     Nausea     Neuropathy     Restless legs     Uterine cancer        Past Surgical History:   Procedure Laterality Date    CHOLECYSTECTOMY      COLONOSCOPY      ENDOSCOPY N/A 9/7/2021    Procedure: ESOPHAGOGASTRODUODENOSCOPY with dilation (bougie # [phone]);  Surgeon: Gordon Morales MD;  Location: [facility] ENDOSCOPY;  Service: Gastroenterology;  Laterality: N/A;  Post Op: gastritis, hiatal hernia, esophageal ring    ENDOSCOPY N/A 2/22/2023    Procedure: ESOPHAGOGASTRODUODENOSCOPY with gastric biopsy's;  Surgeon: Gordon Morales MD;  Location: [facility] ENDOSCOPY;  Service: Gastroenterology;  Laterality: N/A;  gastritis    HYSTERECTOMY      SIGMOIDOSCOPY N/A 9/7/2021    Procedure: FLEX SIGMOIDOSCOPY with polypectomy and cautery of rectal polyp;  Surgeon: Gordon Morales MD;  Location: [facility] ENDOSCOPY;  Service: Gastroenterology;  Laterality: N/A;  Post Op: rectal polyp       Family History   Problem Relation Age of Onset    Heart disease Mother     Heart disease Father        Social History     Tobacco Use    Smoking status: Never     Passive exposure: Current    Smokeless tobacco: Never   Vaping Use    Vaping Use: Never used   Substance Use Topics    Alcohol use: No    Drug use: Never        Home meds:  Prior to  Admission medications    Medication Sig Start Date End Date Taking? Authorizing Provider   atorvastatin (LIPITOR) 40 MG tablet Take 1 tablet by mouth Daily. 7/12/22  Yes Tierra Willett MD   butalbital-acetaminophen-caffeine (FIORICET, ESGIC) -40 MG per tablet Take 1 tablet by mouth Every 4 (Four) Hours As Needed for Migraine. 12/27/22  Yes Tierra Willett MD   citalopram (CeleXA) 20 MG tablet Take 1 tablet by mouth Daily. 11/16/12  Yes Tierra Willett MD   esomeprazole (nexIUM) 40 MG capsule Take 1 capsule by mouth Every Morning Before Breakfast.   Yes Tierra Willett MD   gabapentin (NEURONTIN) 300 MG capsule Take 1 capsule by mouth 3 (Three) Times a Day. 8/18/22  Yes Tierra Willett MD   lisinopril (PRINIVIL,ZESTRIL) 40 MG tablet Take 1 tablet by mouth Daily.   Yes Tierra Willett MD   NON FORMULARY Pt gets samples of diabetic med, unsure of name   Yes Tierra Willett MD   ondansetron ODT (ZOFRAN-ODT) 4 MG disintegrating tablet Place 1 tablet on the tongue Every 8 (Eight) Hours As Needed for Nausea. 6/2/23  Yes Grisel Guaman APRN   oxyCODONE-acetaminophen (Percocet)  MG per tablet Take 1 tablet by mouth 4 times daily 7/29/22  Yes    tiZANidine (ZANAFLEX) 2 MG tablet Take 1 tablet by mouth 3 (Three) Times a Day. 5/21/23  Yes Tierra Willett MD   Vibegron (Gemtesa) 75 MG tablet Take 1 tablet by mouth Daily.   Yes Tierra Willett MD   vitamin D (ERGOCALCIFEROL) 1.25 MG (18040 UT) capsule capsule Take 1 capsule by mouth 1 (One) Time Per Week. wednesday   Yes Tierra Willett MD       Allergies:     Indomethacin and Iodine    Scheduled Meds:atorvastatin, 10 mg, Oral, Daily  citalopram, 20 mg, Oral, Daily  enoxaparin, 1 mg/kg, Subcutaneous, Q12H  insulin lispro, 2-7 Units, Subcutaneous, 4x Daily AC & at Bedtime  lisinopril, 20 mg, Oral, Daily  pantoprazole, 40 mg, Oral, Q AM  sodium chloride, 10 mL, Intravenous, Q12H      Continuous Infusions:  "  PRN Meds:  dextrose    dextrose    glucagon (human recombinant)    ondansetron    oxyCODONE    sodium chloride    sodium chloride      OBJECTIVE    Vital Signs  Vitals:    10/19/23 1854 10/19/23 2221 10/20/23 0215 10/20/23 0617   BP: 154/76 162/92 178/91 143/81   BP Location: Right arm Right arm Right arm Right arm   Patient Position: Lying Lying Lying Lying   Pulse:  83 92 70   Resp: 20 20 16 16   Temp: 98.3 °F (36.8 °C) 97.5 °F (36.4 °C) 98.3 °F (36.8 °C) 98 °F (36.7 °C)   TempSrc: Oral Oral Oral Oral   SpO2:  96% 97% 98%   Weight:       Height:           Flowsheet Rows      Flowsheet Row First Filed Value   Admission Height 165.1 cm (65\") Documented at 10/19/2023 1229   Admission Weight 127 kg (280 lb) Documented at 10/19/2023 1229              Intake/Output Summary (Last 24 hours) at 10/20/2023 0840  Last data filed at 10/20/2023 0828  Gross per 24 hour   Intake 20 ml   Output --   Net 20 ml        Telemetry: Atrial fibrillation with controlled ventricular rates    Physical Exam:  The patient is alert, oriented and in no distress.  Vital signs as noted above.  Head and neck revealed no carotid bruits or jugular venous distention.  No thyromegaly or lymphadenopathy is present  Lungs clear.  No wheezing.  Breath sounds are normal bilaterally.  Heart: Normal first and second heart sounds. No murmur.  No precordial rub is present.  No gallop is present.  Abdomen: Soft and nontender.  No organomegaly is present.  Extremities with good peripheral pulses without any pedal edema.  Skin: Warm and dry.  Musculoskeletal system is grossly normal.  CNS grossly normal.       Results Review:  I have personally reviewed the results from the time of this admission to 10/20/2023 08:40 EDT and agree with these findings:  [x]  Laboratory  []  Microbiology  []  Radiology  [x]  EKG/Telemetry   [x]  Cardiology/Vascular   []  Pathology  []  Old records  []  Other:    Most notable findings include:     Lab Results (last 24 hours)  "      Procedure Component Value Units Date/Time    POC Glucose Once [[id number]]  (Abnormal) Collected: 10/20/23 0733    Specimen: Blood Updated: 10/20/23 0735     Glucose 111 mg/dL      Comment: Serial Number: [id number]:  [id number]       Basic Metabolic Panel [[id number]]  (Abnormal) Collected: 10/20/23 0311    Specimen: Blood Updated: 10/20/23 0428     Glucose 113 mg/dL      BUN 13 mg/dL      Creatinine 0.89 mg/dL      Sodium 143 mmol/L      Potassium 4.4 mmol/L      Chloride 104 mmol/L      CO2 30.0 mmol/L      Calcium 9.6 mg/dL      BUN/Creatinine Ratio 14.6     Anion Gap 9.0 mmol/L      eGFR 71.2 mL/min/1.73     Narrative:      GFR Normal >60  Chronic Kidney Disease <60  Kidney Failure <15      Protime-INR [[id number]]  (Normal) Collected: 10/20/23 0311    Specimen: Blood Updated: 10/20/23 0408     Protime 10.7 Seconds      INR 0.98    CBC & Differential [[id number]]  (Abnormal) Collected: 10/20/23 0311    Specimen: Blood Updated: 10/20/23 0402    Narrative:      The following orders were created for panel order CBC & Differential.  Procedure                               Abnormality         Status                     ---------                               -----------         ------                     CBC Auto Differential[[id number]]        Abnormal            Final result                 Please view results for these tests on the individual orders.    CBC Auto Differential [[id number]]  (Abnormal) Collected: 10/20/23 0311    Specimen: Blood Updated: 10/20/23 0402     WBC 8.00 10*3/mm3      RBC 4.40 10*6/mm3      Hemoglobin 13.8 g/dL      Hematocrit 42.1 %      MCV 95.7 fL      MCH 31.3 pg      MCHC 32.7 g/dL      RDW 13.8 %      RDW-SD 45.9 fl      MPV 9.4 fL      Platelets 211 10*3/mm3      Neutrophil % 48.0 %      Lymphocyte % 43.0 %      Monocyte % 7.3 %      Eosinophil % 1.0 %      Basophil % 0.7 %      Neutrophils, Absolute 3.80 10*3/mm3      Lymphocytes, Absolute 3.40 10*3/mm3      Monocytes,  Absolute 0.60 10*3/mm3      Eosinophils, Absolute 0.10 10*3/mm3      Basophils, Absolute 0.10 10*3/mm3      nRBC 0.0 /100 WBC     POC Glucose Once [[id number]]  (Abnormal) Collected: 10/19/23 2025    Specimen: Blood Updated: 10/19/23 2027     Glucose 149 mg/dL      Comment: Serial Number: [id number]:  [id number]       POC Glucose Once [[id number]]  (Abnormal) Collected: 10/19/23 1851    Specimen: Blood Updated: 10/19/23 1854     Glucose 110 mg/dL      Comment: Serial Number: [id number]:  [id number]       Comprehensive Metabolic Panel [[id number]]  (Abnormal) Collected: 10/19/23 1432    Specimen: Blood Updated: 10/19/23 1507     Glucose 112 mg/dL      BUN 13 mg/dL      Creatinine 0.85 mg/dL      Sodium 142 mmol/L      Potassium 3.8 mmol/L      Chloride 102 mmol/L      CO2 29.0 mmol/L      Calcium 9.4 mg/dL      Total Protein 6.4 g/dL      Albumin 4.1 g/dL      ALT (SGPT) 10 U/L      AST (SGOT) 14 U/L      Alkaline Phosphatase 108 U/L      Total Bilirubin 0.2 mg/dL      Globulin 2.3 gm/dL      A/G Ratio 1.8 g/dL      BUN/Creatinine Ratio 15.3     Anion Gap 11.0 mmol/L      eGFR 75.2 mL/min/1.73     Narrative:      GFR Normal >60  Chronic Kidney Disease <60  Kidney Failure <15      Single High Sensitivity Troponin T [[id number]]  (Abnormal) Collected: 10/19/23 1432    Specimen: Blood Updated: 10/19/23 1507     HS Troponin T 13 ng/L     Narrative:      High Sensitive Troponin T Reference Range:  <10.0 ng/L- Negative Female for AMI  <15.0 ng/L- Negative Male for AMI  >=10 - Abnormal Female indicating possible myocardial injury.  >=15 - Abnormal Male indicating possible myocardial injury.   Clinicians would have to utilize clinical acumen, EKG, Troponin, and serial changes to determine if it is an Acute Myocardial Infarction or myocardial injury due to an underlying chronic condition.         Magnesium [[id number]]  (Normal) Collected: 10/19/23 1432    Specimen: Blood Updated: 10/19/23 1507     Magnesium 1.6 mg/dL      Extra Tubes [[id number]] Collected: 10/19/23 1254    Specimen: Blood, Venous Line Updated: 10/19/23 1401    Narrative:      The following orders were created for panel order Extra Tubes.  Procedure                               Abnormality         Status                     ---------                               -----------         ------                     Gold Top - SST[[id number]]                                   Final result               Light Blue Top[[id number]]                                   Final result                 Please view results for these tests on the individual orders.    Gold Top - SST [[id number]] Collected: 10/19/23 1254    Specimen: Blood Updated: 10/19/23 1401     Extra Tube Hold for add-ons.     Comment: Auto resulted.       Light Blue Top [[id number]] Collected: 10/19/23 1254    Specimen: Blood Updated: 10/19/23 1401     Extra Tube Hold for add-ons.     Comment: Auto resulted       BNP [[id number]]  (Normal) Collected: 10/19/23 1254    Specimen: Blood Updated: 10/19/23 1357     proBNP 801.9 pg/mL     Narrative:      This assay is used as an aid in the diagnosis of individuals suspected of having heart failure. It can be used as an aid in the diagnosis of acute decompensated heart failure (ADHF) in patients presenting with signs and symptoms of ADHF to the emergency department (ED). In addition, NT-proBNP of <300 pg/mL indicates ADHF is not likely.    Age Range Result Interpretation  NT-proBNP Concentration (pg/mL:      <50             Positive            >450                   Gray                 300-450                    Negative             <300    50-75           Positive            >900                  Gray                300-900                  Negative            <300      >75             Positive            >1800                  Gray                300-1800                  Negative            <300    Respiratory Panel PCR w/COVID-19(SARS-CoV-2) LINDA/ZULEIMA/SADIE/PAD/COR/MAD/DON  In-House, NP Swab in [facility]3], 3-4 HR TAT - Swab, Nasopharynx [[id number]]  (Normal) Collected: 10/19/23 1255    Specimen: Swab from Nasopharynx Updated: 10/19/23 1350     ADENOVIRUS, PCR Not Detected     Coronavirus 229E Not Detected     Coronavirus HKU1 Not Detected     Coronavirus NL63 Not Detected     Coronavirus OC43 Not Detected     COVID19 Not Detected     Human Metapneumovirus Not Detected     Human Rhinovirus/Enterovirus Not Detected     Influenza A PCR Not Detected     Influenza B PCR Not Detected     Parainfluenza Virus 1 Not Detected     Parainfluenza Virus 2 Not Detected     Parainfluenza Virus 3 Not Detected     Parainfluenza Virus 4 Not Detected     RSV, PCR Not Detected     Bordetella pertussis pcr Not Detected     Bordetella parapertussis PCR Not Detected     Chlamydophila pneumoniae PCR Not Detected     Mycoplasma pneumo by PCR Not Detected    Narrative:      In the setting of a positive respiratory panel with a viral infection PLUS a negative procalcitonin without other underlying concern for bacterial infection, consider observing off antibiotics or discontinuation of antibiotics and continue supportive care. If the respiratory panel is positive for atypical bacterial infection (Bordetella pertussis, Chlamydophila pneumoniae, or Mycoplasma pneumoniae), consider antibiotic de-escalation to target atypical bacterial infection.    Procalcitonin [[id number]]  (Normal) Collected: 10/19/23 1254    Specimen: Blood Updated: 10/19/23 1341     Procalcitonin 0.03 ng/mL     Narrative:      As a Marker for Sepsis (Non-Neonates):    1. <0.5 ng/mL represents a low risk of severe sepsis and/or septic shock.  2. >2 ng/mL represents a high risk of severe sepsis and/or septic shock.    As a Marker for Lower Respiratory Tract Infections that require antibiotic therapy:    PCT on Admission    Antibiotic Therapy       6-12 Hrs later    >0.5                Strongly Recommended  >0.25 - <0.5        Recommended   0.1 - 0.25  "         Discouraged              Remeasure/reassess PCT  <0.1                Strongly Discouraged     Remeasure/reassess PCT    As 28 day mortality risk marker: \"Change in Procalcitonin Result\" (>80% or <=80%) if Day 0 (or Day 1) and Day 4 values are available. Refer to http://[email or web address]-pct-[email or web address]    Change in PCT <=80%  A decrease of PCT levels below or equal to 80% defines a positive change in PCT test result representing a higher risk for 28-day all-cause mortality of patients diagnosed with severe sepsis for septic shock.    Change in PCT >80%  A decrease of PCT levels of more than 80% defines a negative change in PCT result representing a lower risk for 28-day all-cause mortality of patients diagnosed with severe sepsis or septic shock.       CBC & Differential [[id number]]  (Abnormal) Collected: 10/19/23 1254    Specimen: Blood Updated: 10/19/23 1335    Narrative:      The following orders were created for panel order CBC & Differential.  Procedure                               Abnormality         Status                     ---------                               -----------         ------                     CBC Auto Differential[[id number]]        Abnormal            Final result               Scan Slide[[id number]]                                       Final result                 Please view results for these tests on the individual orders.    CBC Auto Differential [[id number]]  (Abnormal) Collected: 10/19/23 1254    Specimen: Blood Updated: 10/19/23 1335     WBC 8.90 10*3/mm3      RBC 4.43 10*6/mm3      Hemoglobin 13.6 g/dL      Hematocrit 42.2 %      MCV 95.2 fL      MCH 30.8 pg      MCHC 32.3 g/dL      RDW 13.6 %      RDW-SD 45.1 fl      MPV 9.6 fL      Platelets 237 10*3/mm3      Comment: Slide Reviewed          Neutrophil % 60.3 %      Lymphocyte % 30.2 %      Monocyte % 8.0 %      Eosinophil % 0.8 %      Basophil % 0.7 %      Neutrophils, Absolute 5.40 10*3/mm3      Lymphocytes, Absolute 2.70 " 10*3/mm3      Monocytes, Absolute 0.70 10*3/mm3      Eosinophils, Absolute 0.10 10*3/mm3      Basophils, Absolute 0.10 10*3/mm3      nRBC 0.7 /100 WBC     Scan Slide [[id number]] Collected: 10/19/23 1254    Specimen: Blood Updated: 10/19/23 1335     RBC Morphology Normal     WBC Morphology Normal     Clumped Platelets Present            Imaging Results (Last 24 Hours)       Procedure Component Value Units Date/Time    XR Chest 2 View [[id number]] Collected: 10/19/23 1313     Updated: 10/19/23 1317    Narrative:      XR CHEST 2 VW    Date of Exam: 10/19/2023 1:01 PM EDT    Indication: soa    Comparison: 2/18/2022    Findings:  Heart size and pulmonary vasculature are within normal limits. No suspicious infiltrate or edema. Costophrenic angle sharp      Impression:      Impression:  No active cardiopulmonary disease       Electronically Signed: Mundo Aviles    10/19/2023 1:15 PM EDT    Workstation ID: [id number]            LAB RESULTS (LAST 7 DAYS)    CBC  Results from last 7 days   Lab Units 10/20/23  0311 10/19/23  1254   WBC 10*3/mm3 8.00 8.90   RBC 10*6/mm3 4.40 4.43   HEMOGLOBIN g/dL 13.8 13.6   HEMATOCRIT % 42.1 42.2   MCV fL 95.7 95.2   PLATELETS 10*3/mm3 211 237       BMP  Results from last 7 days   Lab Units 10/20/23  0311 10/19/23  1432   SODIUM mmol/L 143 142   POTASSIUM mmol/L 4.4 3.8   CHLORIDE mmol/L 104 102   CO2 mmol/L 30.0* 29.0   BUN mg/dL 13 13   CREATININE mg/dL 0.89 0.85   GLUCOSE mg/dL 113* 112*   MAGNESIUM mg/dL  --  1.6       CMP   Results from last 7 days   Lab Units 10/20/23  0311 10/19/23  1432   SODIUM mmol/L 143 142   POTASSIUM mmol/L 4.4 3.8   CHLORIDE mmol/L 104 102   CO2 mmol/L 30.0* 29.0   BUN mg/dL 13 13   CREATININE mg/dL 0.89 0.85   GLUCOSE mg/dL 113* 112*   ALBUMIN g/dL  --  4.1   BILIRUBIN mg/dL  --  0.2   ALK PHOS U/L  --  108   AST (SGOT) U/L  --  14   ALT (SGPT) U/L  --  10       BNP        TROPONIN  Results from last 7 days   Lab Units 10/19/23  1432   HSTROP T ng/L 13*        CoAg  Results from last 7 days   Lab Units 10/20/23  0311   INR  0.98       Creatinine Clearance  Estimated Creatinine Clearance: 82.3 mL/min (by C-G formula based on SCr of 0.89 mg/dL).    ABG          Radiology  XR Chest 2 View    Result Date: 10/19/2023  Impression: No active cardiopulmonary disease Electronically Signed: Mundo Aviles  10/19/2023 1:15 PM EDT  Workstation ID: [id number]       EKG  I personally viewed and interpreted the patient's EKG/Telemetry data:  ECG 12 Lead Dyspnea   Final Result   HEART RATE= 97  bpm   RR Interval= 621  ms   GA Interval=   ms   P Horizontal Axis=   deg   P Front Axis=   deg   QRSD Interval= 87  ms   QT Interval= 363  ms   QTcB= 461  ms   QRS Axis= -31  deg   T Wave Axis= 32  deg   - ABNORMAL ECG -   Atrial fibrillation   Left axis deviation   Consider anterior infarct   When compared with ECG of 22-Feb-2023 13:38:43,   Significant change in rhythm: previously sinus   Electronically Signed By: Steven Mccann (SADIE) 20-Oct-2023 07:30:01   Date and Time of Study: 2023-10-19 12:41:43      SCANNED - TELEMETRY     Final Result      SCANNED - TELEMETRY     Final Result      SCANNED - TELEMETRY     Final Result                  Echocardiogram:    Results for orders placed during the hospital encounter of 09/20/19    Transthoracic Echo Complete With Contrast if Necessary Per Protocol    Interpretation Summary  · Left ventricular systolic function is normal.  · Estimated EF = 67%.        Stress Test:  Results for orders placed during the hospital encounter of 09/20/19    Stress Test With Myocardial Perfusion One Day    Interpretation Summary  · Findings consistent with a normal ECG stress test.  · Breast attenuation artifact is present.  · Left ventricular ejection fraction is normal (Calculated EF = 66%).  · Myocardial perfusion imaging indicates a small-sized infarct located in the inferior wall with no significant ischemia noted.  · Impressions are consistent with a low risk  study.  · This is abnormal stress test with small inferior fixed defect consistent with small myocardial infarction or attenuation artifact. No ischemia noted. Left ventricular function was preserved. No wall motion abnormality noted. Clinical correlation recommended. Further recommendation as per ordering physician..    Stress portion of this exam was supervised by: Meagan Martin NP        Cardiac Catheterization:  No results found for this or any previous visit.    [id number] SCORE   No data recorded     Other:      ASSESSMENT & PLAN:    Principal Problem:    Shortness of breath    Assessment:    Atrial Fibrillation:     -Duration unknown     -Ventricular rates stable     -CHADS/VASC =4  SOA  Prior Hx Orthostatic Hypotension  Hypertension  DM  Dyslipidemia      Plan:  Echocardiogram   Lovenox has been started but will need to transition to oral a/c prior to d/c  Recommend op evaluation for sleep study   Check orthostatic VS  Recommend non invasive ischemic evallation but patient declines.   Will start low dose beta blcoker  If BP trends low will hold lisinopril  Additional recommendations per Dr. Gudino       Patient is seen and examined and findings are verified.  All data is reviewed by me personally.  Assessment and plan formulated by APC was done after discussion with attending.  I spent more than 50% of time in taking care of the patient.    Patient is admitted with shortness of breath and palpitation and is noted to be in atrial fibrillation    Hemodynamics are stable heart rate is reasonably controlled    Normal S1 and S2.  No pericardial rub or murmur, exam is benign    I would start Xarelto.  I would add amiodarone.  After 6 weeks of anticoagulation I would proceed with cardioversion.    Electronically signed by Frederick Gudino MD, 10/20/23, 5:00 PM EDT.        KANDY Baxter  10/20/23  08:40 EDT

## 2023-10-20 NOTE — THERAPY EVALUATION
Patient Name: Kiki Perdmoo  : 1955    MRN: [id number]                              Today's Date: 10/20/2023       Admit Date: 10/19/2023    Visit Dx:     ICD-10-CM ICD-9-CM   1. Shortness of breath  R06.02 786.05   2. Atrial fibrillation, unspecified type  I48.91 427.31     Patient Active Problem List   Diagnosis    Influenza B    Asthma    Chronic fatigue syndrome    Constipation    Diabetes mellitus    Gastroesophageal reflux disease    Hyperlipidemia    Hypertension    Lumbar radiculopathy    Depression    Obesity with body mass index 30 or greater    Rheumatoid arthritis    Uncontrolled type 2 diabetes mellitus    Dyspnea    Clinical diagnosis of COVID-19    Pneumonia due to COVID-19 virus    Acute respiratory failure with hypoxia    D-dimer, elevated    Cytokine release syndrome, grade 1    Primary osteoarthritis of left knee    Complex tear of lateral meniscus of left knee    Tear of medial meniscus of left knee    Morbid obesity    Pain, unspecified    Pure hypercholesterolemia    Shortness of breath     Past Medical History:   Diagnosis Date    Anxiety and depression     Arthritis     mild    Chronic back pain     Chronic fatigue syndrome     Diabetes     Fibromyalgia     GERD (gastroesophageal reflux disease)     Hyperlipidemia     Hypertension     Nausea     Neuropathy     Restless legs     Uterine cancer      Past Surgical History:   Procedure Laterality Date    CHOLECYSTECTOMY      COLONOSCOPY      ENDOSCOPY N/A 2021    Procedure: ESOPHAGOGASTRODUODENOSCOPY with dilation (bougie # 50);  Surgeon: Gordon Morales MD;  Location: [facility] ENDOSCOPY;  Service: Gastroenterology;  Laterality: N/A;  Post Op: gastritis, hiatal hernia, esophageal ring    ENDOSCOPY N/A 2023    Procedure: ESOPHAGOGASTRODUODENOSCOPY with gastric biopsy's;  Surgeon: Gordon Moarles MD;  Location: [facility] ENDOSCOPY;  Service: Gastroenterology;  Laterality: N/A;  gastritis    HYSTERECTOMY       SIGMOIDOSCOPY N/A 9/7/2021    Procedure: FLEX SIGMOIDOSCOPY with polypectomy and cautery of rectal polyp;  Surgeon: Gordon Morales MD;  Location: [facility] ENDOSCOPY;  Service: Gastroenterology;  Laterality: N/A;  Post Op: rectal polyp      General Information       Row Name 10/20/23 1544          Physical Therapy Time and Intention    Document Type evaluation  -CL     Mode of Treatment individual therapy;physical therapy  -CL       Row Name 10/20/23 1544          General Information    Patient Profile Reviewed yes  -CL     Prior Level of Function independent:;all household mobility;ADL's;community mobility  -CL     Existing Precautions/Restrictions fall  -CL     Barriers to Rehab none identified  -CL       Row Name 10/20/23 1544          Living Environment    People in Home spouse  -CL       Row Name 10/20/23 1544          Home Main Entrance    Number of Stairs, Main Entrance none  ramp  -CL       Row Name 10/20/23 1544          Stairs Within Home, Primary    Number of Stairs, Within Home, Primary none  -CL       Row Name 10/20/23 1544          Cognition    Orientation Status (Cognition) oriented x 4  -CL       Row Name 10/20/23 1544          Safety Issues, Functional Mobility    Impairments Affecting Function (Mobility) pain  -CL     Comment, Safety Issues/Impairments (Mobility) chronic LBP  -CL               User Key  (r) = Recorded By, (t) = Taken By, (c) = Cosigned By      Initials Name Provider Type    CL Rupali Hernandez PT Physical Therapist                   Mobility       Row Name 10/20/23 1545          Bed Mobility    Bed Mobility supine-sit-supine  -CL     Supine-Sit-Supine [place] (Bed Mobility) independent  -CL       Row Name 10/20/23 1545          Sit-Stand Transfer    Sit-Stand [place] (Transfers) independent  -CL       Row Name 10/20/23 1545          Gait/Stairs (Locomotion)    [place] Level (Gait) modified independence  -CL     Assistive Device (Gait) walker, 4-wheeled  -CL      Distance in Feet (Gait) 90'  -CL               User Key  (r) = Recorded By, (t) = Taken By, (c) = Cosigned By      Initials Name Provider Type    Rupali Gil, PT Physical Therapist                   Obj/Interventions       Row Name 10/20/23 1546          Range of Motion Comprehensive    General Range of Motion bilateral lower extremity ROM WFL  -CL       Row Name 10/20/23 1546          Strength Comprehensive (MMT)    General Manual Muscle Testing (MMT) Assessment no strength deficits identified  -CL       Row Name 10/20/23 1546          Balance    Balance Assessment sitting static balance;sitting dynamic balance;standing static balance;standing dynamic balance  -CL     Static Sitting Balance independent  -CL     Dynamic Sitting Balance independent  -CL     Position, Sitting Balance sitting edge of bed  -CL     Static Standing Balance independent  -CL     Dynamic Standing Balance independent  -CL               User Key  (r) = Recorded By, (t) = Taken By, (c) = Cosigned By      Initials Name Provider Type    Rupali Gil, PT Physical Therapist                   Goals/Plan    No documentation.                  Clinical Impression       Row Name 10/20/23 1546          Pain    Pretreatment Pain Rating 9/10  -CL     Posttreatment Pain Rating 9/10  -CL     Pain Location - back  -CL     Pain Intervention(s) Repositioned;Ambulation/increased activity  -CL       Row Name 10/20/23 1546          Plan of Care Review    Plan of Care Reviewed With patient  -CL     Outcome Evaluation Kiki Perdomo is a 68 y.o. female who presents with SOA x 2-3 weeks worsening with exertion. Pt was seen by primary MD and found to have Afib and was sent to ED.   PMH: HTN, HLD, DM, Alpha Gal syndrome, uterine CA, DDD. At baseline, pt lives with her  in a [facility] with a ramped entrance. She typically ambulates with a rollator but also has a scooter at home as well.  Pt reports hx of 1 fall in past 6 months at camp site  when feet became tangled/tripped.  At time of evaluation she was A&O x 4 with c/o chronic LBP 9/10, reporting that pain meds are due soon but she is able to work with us at this time.  Pt not wearing O2 with sats 93-97% on RA.  Pt demonstrates independence with bed mobility, transfers and ambulation with rollator.  Pt's HR  is 74-81 throughout with mild SOA but no O2 desat.  Pt demonstrates good body mechanics/log roll for bed mobility for back safety.  PT educated in good body mechanics while standing at sink to do dishes. Pt verbalized understandin. Pt appears at baseline. No further PT needs at this time.  -CL       Row Name 10/20/23 1546          Therapy Assessment/Plan (PT)    Criteria for Skilled Interventions Met (PT) no problems identified which require skilled intervention  -CL     Therapy Frequency (PT) evaluation only  -CL       Row Name 10/20/23 1546          Vital Signs    Pre Systolic BP Rehab 0.31  -CL     Pretreatment Heart Rate (beats/min) 81  -CL     Intratreatment Heart Rate (beats/min) 74  -CL     Posttreatment Heart Rate (beats/min) 75  -CL     Pre SpO2 (%) 93  -CL     O2 Delivery Pre Treatment room air  -CL     Intra SpO2 (%) 96  -CL     O2 Delivery Intra Treatment room air  -CL     Post SpO2 (%) 97  -CL     O2 Delivery Post Treatment room air  -CL     Pre Patient Position Supine  -CL     Intra Patient Position Standing  -CL     Post Patient Position Supine  -CL       Row Name 10/20/23 1546          Positioning and Restraints    Pre-Treatment Position in bed  -CL     Post Treatment Position bed  -CL     In Bed notified nsg;supine;call light within reach;encouraged to call for assist  -CL               User Key  (r) = Recorded By, (t) = Taken By, (c) = Cosigned By      Initials Name Provider Type    CL Rupali Hernandez, PT Physical Therapist                   Outcome Measures       Row Name 10/20/23 9651 10/20/23 8614       How much help from another person do you currently need...    Turning  from your back to your side while in flat bed without using bedrails? 4  -CL 4  -EB    Moving from lying on back to sitting on the side of a flat bed without bedrails? 4  -CL 4  -EB    Moving to and from a bed to a chair (including a wheelchair)? 4  -CL 4  -EB    Standing up from a chair using your arms (e.g., wheelchair, bedside chair)? 4  -CL 3  -EB    Climbing 3-5 steps with a railing? 3  -CL 3  -EB    To walk in hospital room? 4  -CL 3  -EB    AM-PAC 6 Clicks Score (PT) 23  -CL 21  -EB    Highest level of mobility 7 --> Walked 25 feet or more  -CL 6 --> Walked 10 steps or more  -EB      Row Name 10/20/23 0500          How much help from another person do you currently need...    Turning from your back to your side while in flat bed without using bedrails? 4  -KH     Moving from lying on back to sitting on the side of a flat bed without bedrails? 4  -KH     Moving to and from a bed to a chair (including a wheelchair)? 4  -KH     Standing up from a chair using your arms (e.g., wheelchair, bedside chair)? 3  -KH     Climbing 3-5 steps with a railing? 3  -KH     To walk in hospital room? 3  -KH     AM-PAC 6 Clicks Score (PT) 21  -KH     Highest level of mobility 6 --> Walked 10 steps or more  -               User Key  (r) = Recorded By, (t) = Taken By, (c) = Cosigned By      Initials Name Provider Type    Odette Winslow, RN Registered Nurse    Rupali Gil, PT Physical Therapist    Clary Thomas RN Registered Nurse                                 Physical Therapy Education       Title: PT OT SLP Therapies (Done)       Topic: Physical Therapy (Done)       Point: Mobility training (Done)       Learning Progress Summary             Patient Acceptance, E,TB, VU by  at 10/20/2023 1552                         Point: Body mechanics (Done)       Learning Progress Summary             Patient Acceptance, E,TB, VU by CL at 10/20/2023 1552                                         User Key       Initials  Effective Dates Name Provider Type Discipline     03/02/22 -  Rupali Hernandez, PT Physical Therapist PT                  PT Recommendation and Plan     Plan of Care Reviewed With: patient  Outcome Evaluation: Kiki Perdomo is a 68 y.o. female who presents with SOA x 2-3 weeks worsening with exertion. Pt was seen by primary MD and found to have Afib and was sent to ED.   PMH: HTN, HLD, DM, Alpha Gal syndrome, uterine CA, DDD. At baseline, pt lives with her  in a [place] with a ramped entrance. She typically ambulates with a rollator but also has a scooter at home as well.  Pt reports hx of 1 fall in past 6 months at camp site when feet became tangled/tripped.  At time of evaluation she was A&O x 4 with c/o chronic LBP 9/10, reporting that pain meds are due soon but she is able to work with us at this time.  Pt not wearing O2 with sats 93-97% on RA.  Pt demonstrates independence with bed mobility, transfers and ambulation with rollator.  Pt's HR  is 74-81 throughout with mild SOA but no O2 desat.  Pt demonstrates good body mechanics/log roll for bed mobility for back safety.  PT educated in good body mechanics while standing at sink to do dishes. Pt verbalized understandin. Pt appears at baseline. No further PT needs at this time.     Time Calculation:   PT Evaluation Complexity  History, PT Evaluation Complexity: 3 or more personal factors and/or comorbidities  Examination of Body Systems (PT Eval Complexity): total of 3 or more elements  Clinical Presentation (PT Evaluation Complexity): evolving  Clinical Decision Making (PT Evaluation Complexity): low complexity  Overall Complexity (PT Evaluation Complexity): low complexity     PT Charges       Row Name 10/20/23 1554             Time Calculation    Start Time 1524  -CL      Stop Time 1540  -CL      Time Calculation (min) 16 min  -CL      PT Received On 10/20/23  -CL         Time Calculation- PT    Total Timed Code Minutes- PT 0 minute(s)  -CL                 User Key  (r) = Recorded By, (t) = Taken By, (c) = Cosigned By      Initials Name Provider Type    CL Rupali Hernandez, PT Physical Therapist                  Therapy Charges for Today       Code Description Service Date Service Provider Modifiers Qty    [id number]  PT EVAL LOW COMPLEXITY 4 10/20/2023 Rupali Hernandez, PT GP 1            PT G-Codes  AM-PAC 6 Clicks Score (PT): 23  PT Discharge Summary  Anticipated Discharge Disposition (PT): home  Reason for Discharge: At baseline function    Rupali Hernandez, PT  10/20/2023

## 2023-10-20 NOTE — CASE MANAGEMENT/SOCIAL WORK
Discharge Planning Assessment  [facility]     Patient Name: Kiki Perdomo  MRN: [id number]  Today's Date: 10/20/2023    Admit Date: 10/19/2023    Plan: Return home with spouse   Discharge Needs Assessment       Row Name 10/20/23 1556       Living Environment    People in Home spouse    Name(s) of People in Home Alexys, magaly    Current Living Arrangements home    Primary Care Provided by self    Provides Primary Care For no one, unable/limited ability to care for self    Family Caregiver if Needed spouse    Family Caregiver Names Alexys, spouse    Quality of Family Relationships helpful;supportive    Able to Return to Prior Arrangements yes       Resource/Environmental Concerns    Transportation Concerns none       Transition Planning    Patient/Family Anticipates Transition to home with family    Patient/Family Anticipated Services at Transition none    Transportation Anticipated family or friend will provide       Discharge Needs Assessment    Readmission Within the Last 30 Days no previous admission in last 30 days    Equipment Currently Used at Home walker, rolling;rollator    Concerns to be Addressed no discharge needs identified    Anticipated Changes Related to Illness none    Equipment Needed After Discharge none                   Discharge Plan       Row Name 10/20/23 4724       Plan    Plan Return home with spouse    Patient/Family in Agreement with Plan yes    Plan Comments Barriers: Shilpa LIMA met with Mrs. Perdomo who states she lives at home with her spouse and they help each other as needed. Her only equipment is a rollator walker. Veified PCP and Pharmacy. She denies  issues obtaining meds or transportation.                  Demographic Summary       Row Name 10/20/23 6209       General Information    Admission Type observation    Arrived From emergency department    Referral Source admission list    Reason for Consult discharge planning    Preferred Language English       Contact Information     Permission Granted to Share Info With                    Functional Status       Row Name 10/20/23 1555       Functional Status    Usual Activity Tolerance good    Current Activity Tolerance moderate       Functional Status, IADL    Medications independent    Meal Preparation assistive person    Housekeeping assistive person    Laundry assistive person    Shopping assistive person    IADL Comments independent                  Maintained distance greater than six feet and spent less than 15 minutes in the room.     Kathy DRAPER,RN Case Manager  [facility]  Phone: desk- [phone] cell- [phone]

## 2023-10-20 NOTE — H&P
[facility] H&P    Patient Name: Kiki Perdomo  : 1955  MRN: [id number]  Primary Care Physician: Smita Mayo APRN  Date of admission: 10/19/2023     Patient Care Team:  Smita Mayo APRN as PCP - General (Nurse Practitioner)          Subjective   History Present Illness     Chief Complaint:   Chief Complaint   Patient presents with    Shortness of Breath     Shortness of breath    History of Present Illness    68-year-old female with increasing shortness of breath last 2 to 3 weeks.  Worse with exertion better with rest.  No chest pain neck arm jaw pain no fever chills sweats cough congestion no leg pain or swelling.  Patient went to the doctor's office today was found to be in A-fib and sent to the ER.  She denies any palpitations dizziness or syncope.  No recent long car ride plane ride immobilization or foreign travels or antibiotic use.  No recent flus viruses or vaccinations.     Observation 10/20/23  Pt concurs with er hpi. Cardiology consulted,pt started on lovenox.       Review of Systems   Cardiovascular:  Positive for dyspnea on exertion. Negative for chest pain and palpitations.   Respiratory:  Positive for shortness of breath.              Personal History     Past Medical History:   Past Medical History:   Diagnosis Date    Anxiety and depression     Arthritis     mild    Chronic back pain     Chronic fatigue syndrome     Diabetes     Fibromyalgia     GERD (gastroesophageal reflux disease)     Hyperlipidemia     Hypertension     Nausea     Neuropathy     Restless legs     Uterine cancer        Surgical History:      Past Surgical History:   Procedure Laterality Date    CHOLECYSTECTOMY      COLONOSCOPY      ENDOSCOPY N/A 2021    Procedure: ESOPHAGOGASTRODUODENOSCOPY with dilation (bougie # [phone]);  Surgeon: Gordon Morales MD;  Location: [facility] ENDOSCOPY;  Service: Gastroenterology;  Laterality: N/A;  Post Op: gastritis, hiatal hernia, esophageal ring     ENDOSCOPY N/A 2/22/2023    Procedure: ESOPHAGOGASTRODUODENOSCOPY with gastric biopsy's;  Surgeon: Gordon Morales MD;  Location: [facility] ENDOSCOPY;  Service: Gastroenterology;  Laterality: N/A;  gastritis    HYSTERECTOMY      SIGMOIDOSCOPY N/A 9/7/2021    Procedure: FLEX SIGMOIDOSCOPY with polypectomy and cautery of rectal polyp;  Surgeon: Gordon Morales MD;  Location: [facility] ENDOSCOPY;  Service: Gastroenterology;  Laterality: N/A;  Post Op: rectal polyp           Family History: family history includes Heart disease in her father and mother. Otherwise pertinent FHx was reviewed and unremarkable.     Social History:  reports that she has never smoked. She has been exposed to tobacco smoke. She has never used smokeless tobacco. She reports that she does not drink alcohol and does not use drugs.      Medications:  Prior to Admission medications    Medication Sig Start Date End Date Taking? Authorizing Provider   atorvastatin (LIPITOR) 40 MG tablet Take 1 tablet by mouth Daily. 7/12/22  Yes Tierra Willett MD   butalbital-acetaminophen-caffeine (FIORICET, ESGIC) -40 MG per tablet Take 1 tablet by mouth Every 4 (Four) Hours As Needed for Migraine. 12/27/22  Yes Tierra Willett MD   citalopram (CeleXA) 20 MG tablet Take 1 tablet by mouth Daily. 11/16/12  Yes Tierra Willett MD   esomeprazole (nexIUM) 40 MG capsule Take 1 capsule by mouth Every Morning Before Breakfast.   Yes Tierra Willett MD   gabapentin (NEURONTIN) 300 MG capsule Take 1 capsule by mouth 3 (Three) Times a Day. 8/18/22  Yes Tierra Willett MD   lisinopril (PRINIVIL,ZESTRIL) 40 MG tablet Take 1 tablet by mouth Daily.   Yes Tierra Willett MD   NON FORMULARY Pt gets samples of diabetic med, unsure of name   Yes Tierra Willett MD   ondansetron ODT (ZOFRAN-ODT) 4 MG disintegrating tablet Place 1 tablet on the tongue Every 8 (Eight) Hours As Needed for Nausea. 6/2/23  Yes Grisel Guaman APRN    oxyCODONE-acetaminophen (Percocet)  MG per tablet Take 1 tablet by mouth 4 times daily 7/29/22  Yes    tiZANidine (ZANAFLEX) 2 MG tablet Take 1 tablet by mouth 3 (Three) Times a Day. 5/21/23  Yes Provider, MD Tierra   Vibegron (Gemtesa) 75 MG tablet Take 1 tablet by mouth Daily.   Yes Provider, MD Tierra   vitamin D (ERGOCALCIFEROL) 1.25 MG (86063 UT) capsule capsule Take 1 capsule by mouth 1 (One) Time Per Week. wednesday   Yes Provider, MD Tierra       Allergies:    Allergies   Allergen Reactions    Indomethacin Hives    Iodine Hives       Objective   Objective     Vital Signs  Temp:  [97.5 °F (36.4 °C)-98.3 °F (36.8 °C)] 98 °F (36.7 °C)  Heart Rate:  [70-94] 70  Resp:  [14-20] 16  BP: (138-209)/() 143/81  SpO2:  [95 %-98 %] 98 %  on  Flow (L/min):  [2] 2;   Device (Oxygen Therapy): nasal cannula  Body mass index is 46.59 kg/m².    Physical Exam  Constitutional:       Appearance: Normal appearance. She is obese.   Cardiovascular:      Rate and Rhythm: Normal rate. Rhythm irregular.      Pulses: Normal pulses.      Heart sounds: Normal heart sounds.   Pulmonary:      Effort: Pulmonary effort is normal.      Breath sounds: Normal breath sounds.   Skin:     General: Skin is warm and dry.   Neurological:      General: No focal deficit present.      Mental Status: She is alert and oriented to person, place, and time.   Psychiatric:         Mood and Affect: Mood normal.         Behavior: Behavior normal.           Results Review:  I have personally reviewed most recent cardiac tracings, lab results, and radiology images and interpretations and agree with findings, most notably: cbc, cmp, bnp, inr, procal, rpp, chest xray, ekg.    Results from last 7 days   Lab Units 10/20/23  0311   WBC 10*3/mm3 8.00   HEMOGLOBIN g/dL 13.8   HEMATOCRIT % 42.1   PLATELETS 10*3/mm3 211   INR  0.98     Results from last 7 days   Lab Units 10/20/23  0311 10/19/23  1432 10/19/23  1432 10/19/23  1254   SODIUM  mmol/L 143   < > 142  --    POTASSIUM mmol/L 4.4   < > 3.8  --    CHLORIDE mmol/L 104   < > 102  --    CO2 mmol/L 30.0*   < > 29.0  --    BUN mg/dL 13   < > 13  --    CREATININE mg/dL 0.89   < > 0.85  --    GLUCOSE mg/dL 113*   < > 112*  --    CALCIUM mg/dL 9.6   < > 9.4  --    ALK PHOS U/L  --   --  108  --    ALT (SGPT) U/L  --   --  10  --    AST (SGOT) U/L  --   --  14  --    HSTROP T ng/L  --   --  13*  --    PROBNP pg/mL  --   --   --  801.9   PROCALCITONIN ng/mL  --   --   --  0.03    < > = values in this interval not displayed.     Estimated Creatinine Clearance: 82.3 mL/min (by C-G formula based on SCr of 0.89 mg/dL).  Brief Urine Lab Results  (Last result in the past 365 days)        Color   Clarity   Blood   Leuk Est   Nitrite   Protein   CREAT   Urine HCG        06/02/23 0703 Dark Yellow   Clear   Negative   Negative   Negative   Negative                   Microbiology Results (last 10 days)       Procedure Component Value - Date/Time    Respiratory Panel PCR w/COVID-19(SARS-CoV-2) LINDA/ZULEIMA/SADIE/PAD/COR/MAD/DON In-House, NP Swab in UTM/VTM, 3-4 HR TAT - Swab, Nasopharynx [[id number]]  (Normal) Collected: 10/19/23 1255    Lab Status: Final result Specimen: Swab from Nasopharynx Updated: 10/19/23 1350     ADENOVIRUS, PCR Not Detected     Coronavirus 229E Not Detected     Coronavirus HKU1 Not Detected     Coronavirus NL63 Not Detected     Coronavirus OC43 Not Detected     COVID19 Not Detected     Human Metapneumovirus Not Detected     Human Rhinovirus/Enterovirus Not Detected     Influenza A PCR Not Detected     Influenza B PCR Not Detected     Parainfluenza Virus 1 Not Detected     Parainfluenza Virus 2 Not Detected     Parainfluenza Virus 3 Not Detected     Parainfluenza Virus 4 Not Detected     RSV, PCR Not Detected     Bordetella pertussis pcr Not Detected     Bordetella parapertussis PCR Not Detected     Chlamydophila pneumoniae PCR Not Detected     Mycoplasma pneumo by PCR Not Detected    Narrative:       In the setting of a positive respiratory panel with a viral infection PLUS a negative procalcitonin without other underlying concern for bacterial infection, consider observing off antibiotics or discontinuation of antibiotics and continue supportive care. If the respiratory panel is positive for atypical bacterial infection (Bordetella pertussis, Chlamydophila pneumoniae, or Mycoplasma pneumoniae), consider antibiotic de-escalation to target atypical bacterial infection.            ECG/EMG Results (most recent)       Procedure Component Value Units Date/Time    SCANNED - TELEMETRY   [[id number]] Resulted: 10/19/23     Updated: 10/19/23 2154    SCANNED - TELEMETRY   [[id number]] Resulted: 10/19/23     Updated: 10/19/23 2328    SCANNED - TELEMETRY   [[id number]] Resulted: 10/19/23     Updated: 10/20/23 0227    ECG 12 Lead Dyspnea [[id number]] Collected: 10/19/23 1241     Updated: 10/20/23 0730     QT Interval 363 ms      QTC Interval 461 ms     Narrative:      HEART RATE= 97  bpm  RR Interval= 621  ms  GA Interval=   ms  P Horizontal Axis=   deg  P Front Axis=   deg  QRSD Interval= 87  ms  QT Interval= 363  ms  QTcB= 461  ms  QRS Axis= -31  deg  T Wave Axis= 32  deg  - ABNORMAL ECG -  Atrial fibrillation  Left axis deviation  Consider anterior infarct  When compared with ECG of 22-Feb-2023 13:38:43,  Significant change in rhythm: previously sinus  Electronically Signed By: Steven Mccann (SADIE) 20-Oct-2023 07:30:01  Date and Time of Study: 2023-10-19 12:41:43            Results for orders placed during the hospital encounter of 09/20/19    Duplex Carotid Ultrasound CAR    Interpretation Summary  · Proximal right internal carotid artery is normal.  · Mid right internal carotid artery is normal.  · Distal right internal carotid artery is normal.  · All other right side carotid system vessels are normal.  · Proximal left internal carotid artery is normal.  · Mid left internal carotid artery is normal.  · Distal left internal  carotid artery is normal.  · All other left side carotid system vessels are normal.      Results for orders placed during the hospital encounter of 09/20/19    Transthoracic Echo Complete With Contrast if Necessary Per Protocol    Interpretation Summary  · Left ventricular systolic function is normal.  · Estimated EF = 67%.      XR Chest 2 View    Result Date: 10/19/2023  Impression: No active cardiopulmonary disease Electronically Signed: Mundo Aviles  10/19/2023 1:15 PM EDT  Workstation ID: [id number]       Estimated Creatinine Clearance: 82.3 mL/min (by C-G formula based on SCr of 0.89 mg/dL).    Assessment & Plan   Assessment/Plan       Active Hospital Problems    Diagnosis  POA    **Shortness of breath [R06.02]  Yes      Resolved Hospital Problems   No resolved problems to display.     Dyspnea/new onset afib  - recent diagnosis of alphagal  - cbc, cmp, mag, procal are unremarkable  - bnp 800  - trop 13, 20  - inr .98  - RPP negative  - EKG rate 97, afib  - chest xray reviewed and showing no cardiopulmonary disease  - pt refused stress test  - echo pending  - lovenox      Hypertension  -well Controlled   BP Readings from Last 1 Encounters:   10/20/23 124/65     - Continue lisinopril  - Monitor while admitted     HPL  - cont home statin    VTE Prophylaxis -   Mechanical Order History:       None          Pharmalogical Order History:        Ordered     Dose Route Frequency Stop    10/19/23 1636  Enoxaparin Sodium (LOVENOX) syringe 135 mg         1 mg/kg SC Every 12 Hours --    10/19/23 1528  Enoxaparin Sodium (LOVENOX) syringe 135 mg         1 mg/kg SC Once 10/19/23 1600                    CODE STATUS:    There are no questions and answers to display.       This patient has been examined wearing personal protective equipment.     I discussed the patient's findings and my recommendations with patient and nursing staff.      Signature:Electronically signed by Jessica Yang PA-C, 10/20/23, 8:46 AM EDT.

## 2023-10-20 NOTE — PLAN OF CARE
Problem: Adult Inpatient Plan of Care  Goal: Plan of Care Review  Outcome: Ongoing, Progressing  Flowsheets (Taken 10/20/2023 0532)  Progress: no change  Plan of Care Reviewed With: patient  Outcome Evaluation: pt did have some shortness of breath with exertion  will have a carfiology consult with Dr. Yip  Goal: Patient-Specific Goal (Individualized)  Outcome: Ongoing, Progressing  Goal: Absence of Hospital-Acquired Illness or Injury  Outcome: Ongoing, Progressing  Intervention: Identify and Manage Fall Risk  Recent Flowsheet Documentation  Taken 10/20/2023 0415 by Odette Castaneda RN  Safety Promotion/Fall Prevention:   safety round/check completed   room organization consistent   nonskid shoes/slippers when out of bed   lighting adjusted   clutter free environment maintained   activity supervised   assistive device/personal items within reach  Taken 10/20/2023 0215 by Odette Castaneda, RN  Safety Promotion/Fall Prevention:   safety round/check completed   room organization consistent   nonskid shoes/slippers when out of bed   lighting adjusted   clutter free environment maintained   assistive device/personal items within reach   activity supervised  Taken 10/20/2023 0020 by Odette Castaneda, RN  Safety Promotion/Fall Prevention:   safety round/check completed   room organization consistent   nonskid shoes/slippers when out of bed   lighting adjusted   clutter free environment maintained   activity supervised   assistive device/personal items within reach  Taken 10/19/2023 2205 by Odette Castaneda, RN  Safety Promotion/Fall Prevention:   safety round/check completed   room organization consistent   nonskid shoes/slippers when out of bed   lighting adjusted   clutter free environment maintained   assistive device/personal items within reach   activity supervised  Taken 10/19/2023 2010 by Odette Castaneda, RN  Safety Promotion/Fall Prevention:   safety round/check completed   room organization  consistent   nonskid shoes/slippers when out of bed   lighting adjusted   clutter free environment maintained   assistive device/personal items within reach   activity supervised  Intervention: Prevent Skin Injury  Recent Flowsheet Documentation  Taken 10/20/2023 0415 by Odette Castaneda RN  Body Position: position changed independently  Taken 10/20/2023 0020 by Odette Castaneda RN  Body Position: position changed independently  Taken 10/19/2023 2010 by Odette Castaneda RN  Body Position: position changed independently  Intervention: Prevent and Manage VTE (Venous Thromboembolism) Risk  Recent Flowsheet Documentation  Taken 10/20/2023 0415 by Odette Castaneda RN  Activity Management: ambulated to bathroom  Taken 10/20/2023 0020 by Odette Castaneda RN  Activity Management: ambulated to bathroom  Taken 10/19/2023 2010 by Odette Castaneda RN  Activity Management: ambulated to bathroom  Intervention: Prevent Infection  Recent Flowsheet Documentation  Taken 10/20/2023 0415 by Odette Castaneda RN  Infection Prevention:   single patient room provided   rest/sleep promoted   personal protective equipment utilized   hand hygiene promoted  Taken 10/20/2023 0215 by Odette Castaneda RN  Infection Prevention:   single patient room provided   rest/sleep promoted   personal protective equipment utilized   hand hygiene promoted  Taken 10/20/2023 0020 by Odette Castaneda RN  Infection Prevention:   single patient room provided   rest/sleep promoted   personal protective equipment utilized   hand hygiene promoted  Taken 10/19/2023 2205 by Odette Castaneda RN  Infection Prevention:   single patient room provided   personal protective equipment utilized   rest/sleep promoted   hand hygiene promoted  Taken 10/19/2023 2010 by Odette Castaneda RN  Infection Prevention:   single patient room provided   rest/sleep promoted   personal protective equipment utilized   hand hygiene promoted  Goal:  Optimal Comfort and Wellbeing  Outcome: Ongoing, Progressing  Intervention: Provide Person-Centered Care  Recent Flowsheet Documentation  Taken 10/19/2023 2010 by Odette Castaneda RN  Trust Relationship/Rapport:   care explained   choices provided   questions answered   questions encouraged   reassurance provided   thoughts/feelings acknowledged  Goal: Readiness for Transition of Care  Outcome: Ongoing, Progressing     Problem: Diabetes Comorbidity  Goal: Blood Glucose Level Within Targeted Range  Outcome: Ongoing, Progressing  Intervention: Monitor and Manage Glycemia  Recent Flowsheet Documentation  Taken 10/20/2023 0415 by Odette Castaneda RN  Glycemic Management: blood glucose monitored  Taken 10/20/2023 0020 by Odette Castaneda RN  Glycemic Management: blood glucose monitored  Taken 10/19/2023 2010 by Odette Castaneda RN  Glycemic Management: blood glucose monitored     Problem: Pain Chronic (Persistent) (Comorbidity Management)  Goal: Acceptable Pain Control and Functional Ability  Outcome: Ongoing, Progressing  Intervention: Manage Persistent Pain  Recent Flowsheet Documentation  Taken 10/20/2023 0415 by Odette Castaneda RN  Medication Review/Management: medications reviewed  Taken 10/20/2023 0215 by Odette Castaneda RN  Medication Review/Management: medications reviewed  Taken 10/20/2023 0020 by Odette Castaneda RN  Medication Review/Management: medications reviewed  Taken 10/19/2023 2205 by Odette Castaneda RN  Medication Review/Management: medications reviewed  Taken 10/19/2023 2010 by Odette Castaneda RN  Medication Review/Management: medications reviewed  Intervention: Optimize Psychosocial Wellbeing  Recent Flowsheet Documentation  Taken 10/19/2023 2010 by Odette Castaneda RN  Supportive Measures: active listening utilized  Diversional Activities: television  Family/Support System Care:   support provided   self-care encouraged   Goal Outcome Evaluation:  Plan of  Care Reviewed With: patient        Progress: no change  Outcome Evaluation: pt did have some shortness of breath with exertion  will have a carfiology consult with Dr. Yip

## 2023-10-20 NOTE — PLAN OF CARE
Problem: Adult Inpatient Plan of Care  Goal: Plan of Care Review  Outcome: Ongoing, Progressing  Goal: Patient-Specific Goal (Individualized)  Outcome: Ongoing, Progressing  Goal: Absence of Hospital-Acquired Illness or Injury  Outcome: Ongoing, Progressing  Intervention: Identify and Manage Fall Risk  Recent Flowsheet Documentation  Taken 10/20/2023 1615 by Clary Hardin, RN  Safety Promotion/Fall Prevention:   safety round/check completed   toileting scheduled   room organization consistent   nonskid shoes/slippers when out of bed   mobility aid in reach   lighting adjusted   fall prevention program maintained   clutter free environment maintained   assistive device/personal items within reach   activity supervised  Taken 10/20/2023 1403 by Clary Hardin, ANAT  Safety Promotion/Fall Prevention:   safety round/check completed   toileting scheduled   room organization consistent   nonskid shoes/slippers when out of bed   mobility aid in reach   lighting adjusted   fall prevention program maintained   clutter free environment maintained   assistive device/personal items within reach   activity supervised  Taken 10/20/2023 1209 by Clary Hardin, ANAT  Safety Promotion/Fall Prevention:   safety round/check completed   toileting scheduled   room organization consistent   nonskid shoes/slippers when out of bed   mobility aid in reach   lighting adjusted   fall prevention program maintained   clutter free environment maintained   assistive device/personal items within reach   activity supervised  Taken 10/20/2023 1002 by Clary Hardin, RN  Safety Promotion/Fall Prevention:   safety round/check completed   toileting scheduled   room organization consistent   nonskid shoes/slippers when out of bed   mobility aid in reach   lighting adjusted   fall prevention program maintained   clutter free environment maintained   assistive device/personal items within reach   activity supervised  Taken 10/20/2023 0828 by Clary Hardin, RN  Safety  Promotion/Fall Prevention:   safety round/check completed   toileting scheduled   room organization consistent   nonskid shoes/slippers when out of bed   mobility aid in reach   lighting adjusted   fall prevention program maintained   clutter free environment maintained   assistive device/personal items within reach   activity supervised  Intervention: Prevent Skin Injury  Recent Flowsheet Documentation  Taken 10/20/2023 1615 by Clary Hardin RN  Body Position:   position changed independently   weight shifting  Taken 10/20/2023 1403 by Clary Hardin RN  Body Position:   position changed independently   weight shifting  Taken 10/20/2023 1209 by Clary Hardin RN  Body Position:   position changed independently   weight shifting  Taken 10/20/2023 1002 by Clary Hardin RN  Body Position:   position changed independently   weight shifting  Taken 10/20/2023 0828 by Clary Hardin RN  Body Position:   position changed independently   weight shifting  Intervention: Prevent and Manage VTE (Venous Thromboembolism) Risk  Recent Flowsheet Documentation  Taken 10/20/2023 0828 by Clary Hardin RN  Activity Management: ambulated to bathroom  VTE Prevention/Management:   bilateral   sequential compression devices off   patient refused intervention  Range of Motion: active ROM (range of motion) encouraged  Goal: Optimal Comfort and Wellbeing  Outcome: Ongoing, Progressing  Intervention: Monitor Pain and Promote Comfort  Recent Flowsheet Documentation  Taken 10/20/2023 1615 by Clary Hardin RN  Pain Management Interventions: see MAR  Taken 10/20/2023 1209 by Clary Hardin RN  Pain Management Interventions: pain management plan reviewed with patient/caregiver  Taken 10/20/2023 1017 by Clary Hardin RN  Pain Management Interventions: see MAR  Taken 10/20/2023 0828 by Clary Hardin RN  Pain Management Interventions: pain management plan reviewed with patient/caregiver  Intervention: Provide Person-Centered Care  Recent Flowsheet  Documentation  Taken 10/20/2023 0828 by Clary Hardin RN  Trust Relationship/Rapport:   care explained   choices provided  Goal: Readiness for Transition of Care  Outcome: Ongoing, Progressing     Problem: Diabetes Comorbidity  Goal: Blood Glucose Level Within Targeted Range  Outcome: Ongoing, Progressing     Problem: Pain Chronic (Persistent) (Comorbidity Management)  Goal: Acceptable Pain Control and Functional Ability  Outcome: Ongoing, Progressing  Intervention: Manage Persistent Pain  Recent Flowsheet Documentation  Taken 10/20/2023 1615 by Clary Hardin RN  Medication Review/Management: medications reviewed  Taken 10/20/2023 1403 by Clary Hardin RN  Medication Review/Management: medications reviewed  Taken 10/20/2023 1209 by Clary Hardin RN  Medication Review/Management: medications reviewed  Taken 10/20/2023 1002 by Clary Hardin RN  Medication Review/Management: medications reviewed  Taken 10/20/2023 0828 by Clary Hardin RN  Medication Review/Management: medications reviewed  Intervention: Develop Pain Management Plan  Recent Flowsheet Documentation  Taken 10/20/2023 1615 by Clary Hardin RN  Pain Management Interventions: see MAR  Taken 10/20/2023 1209 by Clary Hardin RN  Pain Management Interventions: pain management plan reviewed with patient/caregiver  Taken 10/20/2023 1017 by Clary Hardin RN  Pain Management Interventions: see MAR  Taken 10/20/2023 0828 by Clary Hardin RN  Pain Management Interventions: pain management plan reviewed with patient/caregiver  Intervention: Optimize Psychosocial Wellbeing  Recent Flowsheet Documentation  Taken 10/20/2023 0828 by Clary Hardin RN  Diversional Activities: television     Problem: Pain Acute  Goal: Acceptable Pain Control and Functional Ability  Outcome: Ongoing, Progressing  Intervention: Prevent or Manage Pain  Recent Flowsheet Documentation  Taken 10/20/2023 1615 by Clary Hardin RN  Medication Review/Management: medications reviewed  Taken 10/20/2023 1403 by  Buren, Clary, RN  Medication Review/Management: medications reviewed  Taken 10/20/2023 1209 by Clary Hardin RN  Medication Review/Management: medications reviewed  Taken 10/20/2023 1002 by Clary Hardin RN  Medication Review/Management: medications reviewed  Taken 10/20/2023 0828 by Clary Hardin RN  Medication Review/Management: medications reviewed  Intervention: Develop Pain Management Plan  Recent Flowsheet Documentation  Taken 10/20/2023 1615 by Clary Hardin RN  Pain Management Interventions: see MAR  Taken 10/20/2023 1209 by Clary Hardin RN  Pain Management Interventions: pain management plan reviewed with patient/caregiver  Taken 10/20/2023 1017 by Clary Hardin RN  Pain Management Interventions: see MAR  Taken 10/20/2023 0828 by Clary Hardin RN  Pain Management Interventions: pain management plan reviewed with patient/caregiver  Intervention: Optimize Psychosocial Wellbeing  Recent Flowsheet Documentation  Taken 10/20/2023 0828 by Clary Hardin RN  Diversional Activities: television     Problem: Fluid Volume Deficit  Goal: Fluid Balance  Outcome: Ongoing, Progressing     Problem: Fall Injury Risk  Goal: Absence of Fall and Fall-Related Injury  Outcome: Ongoing, Progressing  Intervention: Identify and Manage Contributors  Recent Flowsheet Documentation  Taken 10/20/2023 1615 by Clary Hardin RN  Medication Review/Management: medications reviewed  Taken 10/20/2023 1403 by Clary Hardin RN  Medication Review/Management: medications reviewed  Taken 10/20/2023 1209 by Clary Hardin RN  Medication Review/Management: medications reviewed  Taken 10/20/2023 1002 by Clary Hardin RN  Medication Review/Management: medications reviewed  Taken 10/20/2023 0828 by Clary Hardin RN  Medication Review/Management: medications reviewed  Intervention: Promote Injury-Free Environment  Recent Flowsheet Documentation  Taken 10/20/2023 1615 by Clary Hardin RN  Safety Promotion/Fall Prevention:   safety round/check completed   toileting  scheduled   room organization consistent   nonskid shoes/slippers when out of bed   mobility aid in reach   lighting adjusted   fall prevention program maintained   clutter free environment maintained   assistive device/personal items within reach   activity supervised  Taken 10/20/2023 1403 by Clary Hardin, ANAT  Safety Promotion/Fall Prevention:   safety round/check completed   toileting scheduled   room organization consistent   nonskid shoes/slippers when out of bed   mobility aid in reach   lighting adjusted   fall prevention program maintained   clutter free environment maintained   assistive device/personal items within reach   activity supervised  Taken 10/20/2023 1209 by Clary Hardin, ANAT  Safety Promotion/Fall Prevention:   safety round/check completed   toileting scheduled   room organization consistent   nonskid shoes/slippers when out of bed   mobility aid in reach   lighting adjusted   fall prevention program maintained   clutter free environment maintained   assistive device/personal items within reach   activity supervised  Taken 10/20/2023 1002 by Clary Hardin, ANAT  Safety Promotion/Fall Prevention:   safety round/check completed   toileting scheduled   room organization consistent   nonskid shoes/slippers when out of bed   mobility aid in reach   lighting adjusted   fall prevention program maintained   clutter free environment maintained   assistive device/personal items within reach   activity supervised  Taken 10/20/2023 0828 by Clary Hardin, RN  Safety Promotion/Fall Prevention:   safety round/check completed   toileting scheduled   room organization consistent   nonskid shoes/slippers when out of bed   mobility aid in reach   lighting adjusted   fall prevention program maintained   clutter free environment maintained   assistive device/personal items within reach   activity supervised   Goal Outcome Evaluation:   Patient has echo ordered. Plan of care discussed with patient who is agreeable.

## 2023-10-21 ENCOUNTER — APPOINTMENT (OUTPATIENT)
Dept: CARDIOLOGY | Facility: HOSPITAL | Age: 68
End: 2023-10-21
Payer: MEDICARE

## 2023-10-21 ENCOUNTER — READMISSION MANAGEMENT (OUTPATIENT)
Dept: CALL CENTER | Facility: HOSPITAL | Age: 68
End: 2023-10-21
Payer: MEDICARE

## 2023-10-21 VITALS
WEIGHT: 280 LBS | SYSTOLIC BLOOD PRESSURE: 142 MMHG | HEIGHT: 65 IN | HEART RATE: 76 BPM | DIASTOLIC BLOOD PRESSURE: 61 MMHG | BODY MASS INDEX: 46.65 KG/M2 | OXYGEN SATURATION: 95 % | TEMPERATURE: 97.8 F | RESPIRATION RATE: 23 BRPM

## 2023-10-21 LAB
ANION GAP SERPL CALCULATED.3IONS-SCNC: 7 MMOL/L (ref 5–15)
BASOPHILS # BLD AUTO: 0 10*3/MM3 (ref 0–0.2)
BASOPHILS NFR BLD AUTO: 0.4 % (ref 0–1.5)
BH CV ECHO MEAS - AO MAX PG: 13 MMHG
BH CV ECHO MEAS - AO MEAN PG: 6 MMHG
BH CV ECHO MEAS - AO V2 MAX: 180 CM/SEC
BH CV ECHO MEAS - AO V2 VTI: 38.7 CM
BH CV ECHO MEAS - AVA(I,D): 2.7 CM2
BH CV ECHO MEAS - EDV(CUBED): 140.6 ML
BH CV ECHO MEAS - EDV(MOD-SP2): 82.5 ML
BH CV ECHO MEAS - EDV(MOD-SP4): 103 ML
BH CV ECHO MEAS - EF(MOD-BP): 65.3 %
BH CV ECHO MEAS - EF(MOD-SP2): 68 %
BH CV ECHO MEAS - EF(MOD-SP4): 62.3 %
BH CV ECHO MEAS - ESV(CUBED): 27 ML
BH CV ECHO MEAS - ESV(MOD-SP2): 26.4 ML
BH CV ECHO MEAS - ESV(MOD-SP4): 38.8 ML
BH CV ECHO MEAS - FS: 42.3 %
BH CV ECHO MEAS - IVS/LVPW: 1 CM
BH CV ECHO MEAS - IVSD: 1.1 CM
BH CV ECHO MEAS - LA DIMENSION: 4.7 CM
BH CV ECHO MEAS - LAT PEAK E' VEL: 11.7 CM/SEC
BH CV ECHO MEAS - LV DIASTOLIC VOL/BSA (35-75): 45.1 CM2
BH CV ECHO MEAS - LV MASS(C)D: 220.8 GRAMS
BH CV ECHO MEAS - LV MAX PG: 9.6 MMHG
BH CV ECHO MEAS - LV MEAN PG: 5 MMHG
BH CV ECHO MEAS - LV SYSTOLIC VOL/BSA (12-30): 17 CM2
BH CV ECHO MEAS - LV V1 MAX: 155 CM/SEC
BH CV ECHO MEAS - LV V1 VTI: 33.8 CM
BH CV ECHO MEAS - LVIDD: 5.2 CM
BH CV ECHO MEAS - LVIDS: 3 CM
BH CV ECHO MEAS - LVOT AREA: 3.1 CM2
BH CV ECHO MEAS - LVOT DIAM: 2 CM
BH CV ECHO MEAS - LVPWD: 1.1 CM
BH CV ECHO MEAS - MED PEAK E' VEL: 10 CM/SEC
BH CV ECHO MEAS - MV DEC SLOPE: 311 CM/SEC2
BH CV ECHO MEAS - MV MAX PG: 4.2 MMHG
BH CV ECHO MEAS - MV MEAN PG: 2 MMHG
BH CV ECHO MEAS - MV P1/2T: 110.2 MSEC
BH CV ECHO MEAS - MV V2 VTI: 31.8 CM
BH CV ECHO MEAS - MVA(P1/2T): 2 CM2
BH CV ECHO MEAS - MVA(VTI): 3.3 CM2
BH CV ECHO MEAS - PA V2 MAX: 151 CM/SEC
BH CV ECHO MEAS - RAP SYSTOLE: 3 MMHG
BH CV ECHO MEAS - RV MAX PG: 5.8 MMHG
BH CV ECHO MEAS - RV V1 MAX: 120 CM/SEC
BH CV ECHO MEAS - RV V1 VTI: 33.3 CM
BH CV ECHO MEAS - RVDD: 3.3 CM
BH CV ECHO MEAS - RVSP: 25.1 MMHG
BH CV ECHO MEAS - SI(MOD-SP2): 24.6 ML/M2
BH CV ECHO MEAS - SI(MOD-SP4): 28.1 ML/M2
BH CV ECHO MEAS - SV(LVOT): 106.2 ML
BH CV ECHO MEAS - SV(MOD-SP2): 56.1 ML
BH CV ECHO MEAS - SV(MOD-SP4): 64.2 ML
BH CV ECHO MEAS - TAPSE (>1.6): 1.97 CM
BH CV ECHO MEAS - TR MAX PG: 22.1 MMHG
BH CV ECHO MEAS - TR MAX VEL: 235 CM/SEC
BH CV XLRA - TDI S': 12.2 CM/SEC
BUN SERPL-MCNC: 13 MG/DL (ref 8–23)
BUN/CREAT SERPL: 13.7 (ref 7–25)
CALCIUM SPEC-SCNC: 9.3 MG/DL (ref 8.6–10.5)
CHLORIDE SERPL-SCNC: 106 MMOL/L (ref 98–107)
CO2 SERPL-SCNC: 32 MMOL/L (ref 22–29)
CREAT SERPL-MCNC: 0.95 MG/DL (ref 0.57–1)
DEPRECATED RDW RBC AUTO: 46.4 FL (ref 37–54)
EGFRCR SERPLBLD CKD-EPI 2021: 65.8 ML/MIN/1.73
EOSINOPHIL # BLD AUTO: 0.1 10*3/MM3 (ref 0–0.4)
EOSINOPHIL NFR BLD AUTO: 1.3 % (ref 0.3–6.2)
ERYTHROCYTE [DISTWIDTH] IN BLOOD BY AUTOMATED COUNT: 13.9 % (ref 12.3–15.4)
GLUCOSE BLDC GLUCOMTR-MCNC: 120 MG/DL (ref 70–105)
GLUCOSE BLDC GLUCOMTR-MCNC: 98 MG/DL (ref 70–105)
GLUCOSE SERPL-MCNC: 116 MG/DL (ref 65–99)
HCT VFR BLD AUTO: 37 % (ref 34–46.6)
HGB BLD-MCNC: 11.9 G/DL (ref 12–15.9)
INR PPP: 1.29 (ref 0.93–1.1)
LYMPHOCYTES # BLD AUTO: 2.8 10*3/MM3 (ref 0.7–3.1)
LYMPHOCYTES NFR BLD AUTO: 39.8 % (ref 19.6–45.3)
MCH RBC QN AUTO: 30.6 PG (ref 26.6–33)
MCHC RBC AUTO-ENTMCNC: 32.1 G/DL (ref 31.5–35.7)
MCV RBC AUTO: 95.3 FL (ref 79–97)
MONOCYTES # BLD AUTO: 0.7 10*3/MM3 (ref 0.1–0.9)
MONOCYTES NFR BLD AUTO: 9.4 % (ref 5–12)
NEUTROPHILS NFR BLD AUTO: 3.4 10*3/MM3 (ref 1.7–7)
NEUTROPHILS NFR BLD AUTO: 49.1 % (ref 42.7–76)
NRBC BLD AUTO-RTO: 0 /100 WBC (ref 0–0.2)
PLATELET # BLD AUTO: 180 10*3/MM3 (ref 140–450)
PMV BLD AUTO: 9.4 FL (ref 6–12)
POTASSIUM SERPL-SCNC: 4.3 MMOL/L (ref 3.5–5.2)
PROTHROMBIN TIME: 13.8 SECONDS (ref 9.6–11.7)
RBC # BLD AUTO: 3.88 10*6/MM3 (ref 3.77–5.28)
SINUS: 3.3 CM
SODIUM SERPL-SCNC: 145 MMOL/L (ref 136–145)
WBC NRBC COR # BLD: 7 10*3/MM3 (ref 3.4–10.8)

## 2023-10-21 PROCEDURE — 82948 REAGENT STRIP/BLOOD GLUCOSE: CPT

## 2023-10-21 PROCEDURE — G0378 HOSPITAL OBSERVATION PER HR: HCPCS

## 2023-10-21 PROCEDURE — 93306 TTE W/DOPPLER COMPLETE: CPT | Performed by: INTERNAL MEDICINE

## 2023-10-21 PROCEDURE — 85610 PROTHROMBIN TIME: CPT | Performed by: PHYSICIAN ASSISTANT

## 2023-10-21 PROCEDURE — 63710000001 ONDANSETRON PER 8 MG: Performed by: PHYSICIAN ASSISTANT

## 2023-10-21 PROCEDURE — 85025 COMPLETE CBC W/AUTO DIFF WBC: CPT | Performed by: PHYSICIAN ASSISTANT

## 2023-10-21 PROCEDURE — 93306 TTE W/DOPPLER COMPLETE: CPT

## 2023-10-21 PROCEDURE — 99214 OFFICE O/P EST MOD 30 MIN: CPT | Performed by: INTERNAL MEDICINE

## 2023-10-21 PROCEDURE — 80048 BASIC METABOLIC PNL TOTAL CA: CPT | Performed by: PHYSICIAN ASSISTANT

## 2023-10-21 RX ORDER — AMIODARONE HYDROCHLORIDE 200 MG/1
200 TABLET ORAL
Qty: 30 TABLET | Refills: 0 | Status: SHIPPED | OUTPATIENT
Start: 2023-10-22

## 2023-10-21 RX ORDER — ONDANSETRON 4 MG/1
4 TABLET, FILM COATED ORAL ONCE
Status: COMPLETED | OUTPATIENT
Start: 2023-10-21 | End: 2023-10-21

## 2023-10-21 RX ADMIN — CITALOPRAM HYDROBROMIDE 20 MG: 20 TABLET ORAL at 09:32

## 2023-10-21 RX ADMIN — METOPROLOL SUCCINATE 12.5 MG: 25 TABLET, EXTENDED RELEASE ORAL at 09:32

## 2023-10-21 RX ADMIN — AMIODARONE HYDROCHLORIDE 200 MG: 200 TABLET ORAL at 09:32

## 2023-10-21 RX ADMIN — ATORVASTATIN CALCIUM 10 MG: 10 TABLET, FILM COATED ORAL at 09:32

## 2023-10-21 RX ADMIN — LISINOPRIL 20 MG: 20 TABLET ORAL at 09:32

## 2023-10-21 RX ADMIN — ONDANSETRON HYDROCHLORIDE 4 MG: 4 TABLET, FILM COATED ORAL at 14:22

## 2023-10-21 RX ADMIN — OXYCODONE HYDROCHLORIDE 10 MG: 5 TABLET ORAL at 13:44

## 2023-10-21 RX ADMIN — PANTOPRAZOLE SODIUM 40 MG: 40 TABLET, DELAYED RELEASE ORAL at 06:13

## 2023-10-21 RX ADMIN — Medication 10 ML: at 09:36

## 2023-10-21 NOTE — DISCHARGE SUMMARY
[facility]    Smita Mayo KANDY LORENZ    CHIEF COMPLAINT:     Shortness of breath    HISTORY OF PRESENT ILLNESS:    HPI    68-year-old female with increasing shortness of breath last 2 to 3 weeks.  Worse with exertion better with rest.  No chest pain neck arm jaw pain no fever chills sweats cough congestion no leg pain or swelling.  Patient went to the doctor's office today was found to be in A-fib and sent to the ER.  She denies any palpitations dizziness or syncope.  No recent long car ride plane ride immobilization or foreign travels or antibiotic use.  No recent flus viruses or vaccinations.      Observation 10/20/23  Pt concurs with er hpi. Cardiology consulted,pt started on lovenox.     Past Medical History:   Diagnosis Date    Anxiety and depression     Arthritis     mild    Chronic back pain     Chronic fatigue syndrome     Diabetes     Fibromyalgia     GERD (gastroesophageal reflux disease)     Hyperlipidemia     Hypertension     Nausea     Neuropathy     Restless legs     Uterine cancer      Past Surgical History:   Procedure Laterality Date    CHOLECYSTECTOMY      COLONOSCOPY      ENDOSCOPY N/A 9/7/2021    Procedure: ESOPHAGOGASTRODUODENOSCOPY with dilation (bougie # 50);  Surgeon: Gordon Morales MD;  Location: [facility] ENDOSCOPY;  Service: Gastroenterology;  Laterality: N/A;  Post Op: gastritis, hiatal hernia, esophageal ring    ENDOSCOPY N/A 2/22/2023    Procedure: ESOPHAGOGASTRODUODENOSCOPY with gastric biopsy's;  Surgeon: Gordon Morales MD;  Location: [facility] ENDOSCOPY;  Service: Gastroenterology;  Laterality: N/A;  gastritis    HYSTERECTOMY      SIGMOIDOSCOPY N/A 9/7/2021    Procedure: FLEX SIGMOIDOSCOPY with polypectomy and cautery of rectal polyp;  Surgeon: Gordon Morales MD;  Location: [facility] ENDOSCOPY;  Service: Gastroenterology;  Laterality: N/A;  Post Op: rectal polyp     Family History   Problem Relation Age of Onset    Heart disease Mother      Heart disease Father      Social History     Tobacco Use    Smoking status: Never     Passive exposure: Current    Smokeless tobacco: Never   Vaping Use    Vaping Use: Never used   Substance Use Topics    Alcohol use: No    Drug use: Never     Medications Prior to Admission   Medication Sig Dispense Refill Last Dose    atorvastatin (LIPITOR) 40 MG tablet Take 1 tablet by mouth Daily.       butalbital-acetaminophen-caffeine (FIORICET, ESGIC) -40 MG per tablet Take 1 tablet by mouth Every 4 (Four) Hours As Needed for Migraine.       citalopram (CeleXA) 20 MG tablet Take 1 tablet by mouth Daily.       esomeprazole (nexIUM) 40 MG capsule Take 1 capsule by mouth Every Morning Before Breakfast.       gabapentin (NEURONTIN) 300 MG capsule Take 1 capsule by mouth 3 (Three) Times a Day.       lisinopril (PRINIVIL,ZESTRIL) 40 MG tablet Take 1 tablet by mouth Daily.       NON FORMULARY Pt gets samples of diabetic med, unsure of name       ondansetron ODT (ZOFRAN-ODT) 4 MG disintegrating tablet Place 1 tablet on the tongue Every 8 (Eight) Hours As Needed for Nausea. 8 tablet 0     oxyCODONE-acetaminophen (Percocet)  MG per tablet Take 1 tablet by mouth 4 times daily 120 tablet 0     tiZANidine (ZANAFLEX) 2 MG tablet Take 1 tablet by mouth 3 (Three) Times a Day.       Vibegron (Gemtesa) 75 MG tablet Take 1 tablet by mouth Daily.       vitamin D (ERGOCALCIFEROL) 1.25 MG (94565 UT) capsule capsule Take 1 capsule by mouth 1 (One) Time Per Week. wednesday   10/18/2023     Allergies:  Indomethacin and Iodine      There is no immunization history on file for this patient.        REVIEW OF SYSTEMS:    ROS    Cardiovascular:  Positive for dyspnea on exertion. Negative for chest pain and palpitations.   Respiratory:  Positive for shortness of breath    Vital Signs  Temp:  [96.4 °F (35.8 °C)-98.6 °F (37 °C)] 96.8 °F (36 °C)  Heart Rate:  [] 73  Resp:  [15-18] 15  BP: (124-183)/(55-78) 183/78          Physical  Exam:  Physical Exam    Constitutional:       Appearance: Normal appearance. She is obese.   Cardiovascular:      Rate and Rhythm: Normal rate. Rhythm irregular.      Pulses: Normal pulses.      Heart sounds: Normal heart sounds.   Pulmonary:      Effort: Pulmonary effort is normal.      Breath sounds: Normal breath sounds.   Skin:     General: Skin is warm and dry.   Neurological:      General: No focal deficit present.      Mental Status: She is alert and oriented to person, place, and time.   Psychiatric:         Mood and Affect: Mood normal.         Behavior: Behavior normal.        Emotional Behavior:    wnl   Debilities:   None    Results Review:    I reviewed the patient's new clinical results.  Lab Results (most recent)       Procedure Component Value Units Date/Time    POC Glucose Once [[id number]]  (Normal) Collected: 10/21/23 0719    Specimen: Blood Updated: 10/21/23 0720     Glucose 98 mg/dL      Comment: Serial Number: [id number]:  [id number]       Basic Metabolic Panel [[id number]]  (Abnormal) Collected: 10/21/23 0248    Specimen: Blood from Hand, Right Updated: 10/21/23 0419     Glucose 116 mg/dL      BUN 13 mg/dL      Creatinine 0.95 mg/dL      Sodium 145 mmol/L      Potassium 4.3 mmol/L      Chloride 106 mmol/L      CO2 32.0 mmol/L      Calcium 9.3 mg/dL      BUN/Creatinine Ratio 13.7     Anion Gap 7.0 mmol/L      eGFR 65.8 mL/min/1.73     Narrative:      GFR Normal >60  Chronic Kidney Disease <60  Kidney Failure <15      Protime-INR [[id number]]  (Abnormal) Collected: 10/21/23 0248    Specimen: Blood from Hand, Right Updated: 10/21/23 0407     Protime 13.8 Seconds      INR 1.29    CBC & Differential [[id number]]  (Abnormal) Collected: 10/21/23 0248    Specimen: Blood from Hand, Right Updated: 10/21/23 0355    Narrative:      The following orders were created for panel order CBC & Differential.  Procedure                               Abnormality         Status                     ---------                                -----------         ------                     CBC Auto Differential[[id number]]        Abnormal            Final result                 Please view results for these tests on the individual orders.    CBC Auto Differential [[id number]]  (Abnormal) Collected: 10/21/23 0248    Specimen: Blood from Hand, Right Updated: 10/21/23 0355     WBC 7.00 10*3/mm3      RBC 3.88 10*6/mm3      Hemoglobin 11.9 g/dL      Hematocrit 37.0 %      MCV 95.3 fL      MCH 30.6 pg      MCHC 32.1 g/dL      RDW 13.9 %      RDW-SD 46.4 fl      MPV 9.4 fL      Platelets 180 10*3/mm3      Neutrophil % 49.1 %      Lymphocyte % 39.8 %      Monocyte % 9.4 %      Eosinophil % 1.3 %      Basophil % 0.4 %      Neutrophils, Absolute 3.40 10*3/mm3      Lymphocytes, Absolute 2.80 10*3/mm3      Monocytes, Absolute 0.70 10*3/mm3      Eosinophils, Absolute 0.10 10*3/mm3      Basophils, Absolute 0.00 10*3/mm3      nRBC 0.0 /100 WBC     POC Glucose Once [[id number]]  (Normal) Collected: 10/20/23 2028    Specimen: Blood Updated: 10/20/23 2029     Glucose 98 mg/dL      Comment: Serial Number: [id number]:  [id number]       High Sensitivity Troponin T [[id number]]  (Abnormal) Collected: 10/20/23 0311    Specimen: Blood Updated: 10/20/23 0915     HS Troponin T 20 ng/L     Narrative:      High Sensitive Troponin T Reference Range:  <10.0 ng/L- Negative Female for AMI  <15.0 ng/L- Negative Male for AMI  >=10 - Abnormal Female indicating possible myocardial injury.  >=15 - Abnormal Male indicating possible myocardial injury.   Clinicians would have to utilize clinical acumen, EKG, Troponin, and serial changes to determine if it is an Acute Myocardial Infarction or myocardial injury due to an underlying chronic condition.         Basic Metabolic Panel [[id number]]  (Abnormal) Collected: 10/20/23 0311    Specimen: Blood Updated: 10/20/23 0428     Glucose 113 mg/dL      BUN 13 mg/dL      Creatinine 0.89 mg/dL      Sodium 143 mmol/L      Potassium  4.4 mmol/L      Chloride 104 mmol/L      CO2 30.0 mmol/L      Calcium 9.6 mg/dL      BUN/Creatinine Ratio 14.6     Anion Gap 9.0 mmol/L      eGFR 71.2 mL/min/1.73     Narrative:      GFR Normal >60  Chronic Kidney Disease <60  Kidney Failure <15      Protime-INR [[id number]]  (Normal) Collected: 10/20/23 0311    Specimen: Blood Updated: 10/20/23 0408     Protime 10.7 Seconds      INR 0.98    CBC & Differential [[id number]]  (Abnormal) Collected: 10/20/23 0311    Specimen: Blood Updated: 10/20/23 0402    Narrative:      The following orders were created for panel order CBC & Differential.  Procedure                               Abnormality         Status                     ---------                               -----------         ------                     CBC Auto Differential[[id number]]        Abnormal            Final result                 Please view results for these tests on the individual orders.    CBC Auto Differential [[id number]]  (Abnormal) Collected: 10/20/23 0311    Specimen: Blood Updated: 10/20/23 0402     WBC 8.00 10*3/mm3      RBC 4.40 10*6/mm3      Hemoglobin 13.8 g/dL      Hematocrit 42.1 %      MCV 95.7 fL      MCH 31.3 pg      MCHC 32.7 g/dL      RDW 13.8 %      RDW-SD 45.9 fl      MPV 9.4 fL      Platelets 211 10*3/mm3      Neutrophil % 48.0 %      Lymphocyte % 43.0 %      Monocyte % 7.3 %      Eosinophil % 1.0 %      Basophil % 0.7 %      Neutrophils, Absolute 3.80 10*3/mm3      Lymphocytes, Absolute 3.40 10*3/mm3      Monocytes, Absolute 0.60 10*3/mm3      Eosinophils, Absolute 0.10 10*3/mm3      Basophils, Absolute 0.10 10*3/mm3      nRBC 0.0 /100 WBC     Comprehensive Metabolic Panel [[id number]]  (Abnormal) Collected: 10/19/23 1432    Specimen: Blood Updated: 10/19/23 1507     Glucose 112 mg/dL      BUN 13 mg/dL      Creatinine 0.85 mg/dL      Sodium 142 mmol/L      Potassium 3.8 mmol/L      Chloride 102 mmol/L      CO2 29.0 mmol/L      Calcium 9.4 mg/dL      Total Protein 6.4 g/dL       Albumin 4.1 g/dL      ALT (SGPT) 10 U/L      AST (SGOT) 14 U/L      Alkaline Phosphatase 108 U/L      Total Bilirubin 0.2 mg/dL      Globulin 2.3 gm/dL      A/G Ratio 1.8 g/dL      BUN/Creatinine Ratio 15.3     Anion Gap 11.0 mmol/L      eGFR 75.2 mL/min/1.73     Narrative:      GFR Normal >60  Chronic Kidney Disease <60  Kidney Failure <15      Single High Sensitivity Troponin T [[id number]]  (Abnormal) Collected: 10/19/23 1432    Specimen: Blood Updated: 10/19/23 1507     HS Troponin T 13 ng/L     Narrative:      High Sensitive Troponin T Reference Range:  <10.0 ng/L- Negative Female for AMI  <15.0 ng/L- Negative Male for AMI  >=10 - Abnormal Female indicating possible myocardial injury.  >=15 - Abnormal Male indicating possible myocardial injury.   Clinicians would have to utilize clinical acumen, EKG, Troponin, and serial changes to determine if it is an Acute Myocardial Infarction or myocardial injury due to an underlying chronic condition.         Magnesium [[id number]]  (Normal) Collected: 10/19/23 1432    Specimen: Blood Updated: 10/19/23 1507     Magnesium 1.6 mg/dL     Extra Tubes [[id number]] Collected: 10/19/23 1254    Specimen: Blood, Venous Line Updated: 10/19/23 1401    Narrative:      The following orders were created for panel order Extra Tubes.  Procedure                               Abnormality         Status                     ---------                               -----------         ------                     Gold Top - SST[[id number]]                                   Final result               Light Blue Top[[id number]]                                   Final result                 Please view results for these tests on the individual orders.    Gold Top - SST [[id number]] Collected: 10/19/23 1254    Specimen: Blood Updated: 10/19/23 1401     Extra Tube Hold for add-ons.     Comment: Auto resulted.       Light Blue Top [[id number]] Collected: 10/19/23 1254    Specimen: Blood Updated: 10/19/23  1401     Extra Tube Hold for add-ons.     Comment: Auto resulted       BNP [[id number]]  (Normal) Collected: 10/19/23 1254    Specimen: Blood Updated: 10/19/23 1357     proBNP 801.9 pg/mL     Narrative:      This assay is used as an aid in the diagnosis of individuals suspected of having heart failure. It can be used as an aid in the diagnosis of acute decompensated heart failure (ADHF) in patients presenting with signs and symptoms of ADHF to the emergency department (ED). In addition, NT-proBNP of <300 pg/mL indicates ADHF is not likely.    Age Range Result Interpretation  NT-proBNP Concentration (pg/mL:      <50             Positive            >450                   Gray                 300-450                    Negative             <300    50-75           Positive            >900                  Gray                300-900                  Negative            <300      >75             Positive            >1800                  Gray                300-1800                  Negative            <300    Respiratory Panel PCR w/COVID-19(SARS-CoV-2) LINDA/ZULEIMA/SADIE/PAD/COR/MAD/DON In-House, NP Swab in UTM/VTM, 3-4 HR TAT - Swab, Nasopharynx [[id number]]  (Normal) Collected: 10/19/23 1255    Specimen: Swab from Nasopharynx Updated: 10/19/23 1350     ADENOVIRUS, PCR Not Detected     Coronavirus 229E Not Detected     Coronavirus HKU1 Not Detected     Coronavirus NL63 Not Detected     Coronavirus OC43 Not Detected     COVID19 Not Detected     Human Metapneumovirus Not Detected     Human Rhinovirus/Enterovirus Not Detected     Influenza A PCR Not Detected     Influenza B PCR Not Detected     Parainfluenza Virus 1 Not Detected     Parainfluenza Virus 2 Not Detected     Parainfluenza Virus 3 Not Detected     Parainfluenza Virus 4 Not Detected     RSV, PCR Not Detected     Bordetella pertussis pcr Not Detected     Bordetella parapertussis PCR Not Detected     Chlamydophila pneumoniae PCR Not Detected     Mycoplasma pneumo by PCR  "Not Detected    Narrative:      In the setting of a positive respiratory panel with a viral infection PLUS a negative procalcitonin without other underlying concern for bacterial infection, consider observing off antibiotics or discontinuation of antibiotics and continue supportive care. If the respiratory panel is positive for atypical bacterial infection (Bordetella pertussis, Chlamydophila pneumoniae, or Mycoplasma pneumoniae), consider antibiotic de-escalation to target atypical bacterial infection.    Procalcitonin [[id number]]  (Normal) Collected: 10/19/23 1254    Specimen: Blood Updated: 10/19/23 1341     Procalcitonin 0.03 ng/mL     Narrative:      As a Marker for Sepsis (Non-Neonates):    1. <0.5 ng/mL represents a low risk of severe sepsis and/or septic shock.  2. >2 ng/mL represents a high risk of severe sepsis and/or septic shock.    As a Marker for Lower Respiratory Tract Infections that require antibiotic therapy:    PCT on Admission    Antibiotic Therapy       6-12 Hrs later    >0.5                Strongly Recommended  >0.25 - <0.5        Recommended   0.1 - 0.25          Discouraged              Remeasure/reassess PCT  <0.1                Strongly Discouraged     Remeasure/reassess PCT    As 28 day mortality risk marker: \"Change in Procalcitonin Result\" (>80% or <=80%) if Day 0 (or Day 1) and Day 4 values are available. Refer to http://www.[employer]-pct-calculator.com    Change in PCT <=80%  A decrease of PCT levels below or equal to 80% defines a positive change in PCT test result representing a higher risk for 28-day all-cause mortality of patients diagnosed with severe sepsis for septic shock.    Change in PCT >80%  A decrease of PCT levels of more than 80% defines a negative change in PCT result representing a lower risk for 28-day all-cause mortality of patients diagnosed with severe sepsis or septic shock.       Scan Slide [[id number]] Collected: 10/19/23 1254    Specimen: Blood Updated: 10/19/23 " 1335     RBC Morphology Normal     WBC Morphology Normal     Clumped Platelets Present            Imaging Results (Most Recent)       Procedure Component Value Units Date/Time    XR Chest 2 View [[id number]] Collected: 10/19/23 1313     Updated: 10/19/23 1317    Narrative:      XR CHEST 2 VW    Date of Exam: 10/19/2023 1:01 PM EDT    Indication: soa    Comparison: 2/18/2022    Findings:  Heart size and pulmonary vasculature are within normal limits. No suspicious infiltrate or edema. Costophrenic angle sharp      Impression:      Impression:  No active cardiopulmonary disease       Electronically Signed: Mundo Aviles    10/19/2023 1:15 PM EDT    Workstation ID: [id number]          reviewed    ECG/EMG Results (most recent)       Procedure Component Value Units Date/Time    SCANNED - TELEMETRY   [[id number]] Resulted: 10/19/23     Updated: 10/19/23 2154    SCANNED - TELEMETRY   [[id number]] Resulted: 10/19/23     Updated: 10/19/23 2328    SCANNED - TELEMETRY   [[id number]] Resulted: 10/19/23     Updated: 10/20/23 0227    ECG 12 Lead Dyspnea [[id number]] Collected: 10/19/23 1241     Updated: 10/20/23 0730     QT Interval 363 ms      QTC Interval 461 ms     Narrative:      HEART RATE= 97  bpm  RR Interval= 621  ms  TN Interval=   ms  P Horizontal Axis=   deg  P Front Axis=   deg  QRSD Interval= 87  ms  QT Interval= 363  ms  QTcB= 461  ms  QRS Axis= -31  deg  T Wave Axis= 32  deg  - ABNORMAL ECG -  Atrial fibrillation  Left axis deviation  Consider anterior infarct  When compared with ECG of 22-Feb-2023 13:38:43,  Significant change in rhythm: previously sinus  Electronically Signed By: Steven Mccann) 20-Oct-2023 07:30:01  Date and Time of Study: 2023-10-19 12:41:43    SCANNED - TELEMETRY   [[id number]] Resulted: 10/19/23     Updated: 10/20/23 0956    SCANNED - TELEMETRY   [[id number]] Resulted: 10/19/23     Updated: 10/20/23 1117    SCANNED - TELEMETRY   [[id number]] Resulted: 10/19/23     Updated: 10/20/23 1231    SCANNED -  TELEMETRY   [[id number]] Resulted: 10/19/23     Updated: 10/20/23 2214    Adult Transthoracic Echo Complete w/ Color, Spectral and Contrast if Necessary Per Protocol [[id number]] Resulted: 10/21/23 1026     Updated: 10/21/23 1026          reviewed    Results for orders placed during the hospital encounter of 09/20/19    Duplex Carotid Ultrasound CAR    Interpretation Summary  · Proximal right internal carotid artery is normal.  · Mid right internal carotid artery is normal.  · Distal right internal carotid artery is normal.  · All other right side carotid system vessels are normal.  · Proximal left internal carotid artery is normal.  · Mid left internal carotid artery is normal.  · Distal left internal carotid artery is normal.  · All other left side carotid system vessels are normal.      Results for orders placed during the hospital encounter of 09/20/19    Transthoracic Echo Complete With Contrast if Necessary Per Protocol    Interpretation Summary  · Left ventricular systolic function is normal.  · Estimated EF = 67%.      Microbiology Results (last 10 days)       Procedure Component Value - Date/Time    Respiratory Panel PCR w/COVID-19(SARS-CoV-2) LINDA/ZULEIMA/SADIE/PAD/COR/MAD/DON In-House, NP Swab in UTM/VTM, 3-4 HR TAT - Swab, Nasopharynx [[id number]]  (Normal) Collected: 10/19/23 1255    Lab Status: Final result Specimen: Swab from Nasopharynx Updated: 10/19/23 1350     ADENOVIRUS, PCR Not Detected     Coronavirus 229E Not Detected     Coronavirus HKU1 Not Detected     Coronavirus NL63 Not Detected     Coronavirus OC43 Not Detected     COVID19 Not Detected     Human Metapneumovirus Not Detected     Human Rhinovirus/Enterovirus Not Detected     Influenza A PCR Not Detected     Influenza B PCR Not Detected     Parainfluenza Virus 1 Not Detected     Parainfluenza Virus 2 Not Detected     Parainfluenza Virus 3 Not Detected     Parainfluenza Virus 4 Not Detected     RSV, PCR Not Detected     Bordetella pertussis pcr Not  Detected     Bordetella parapertussis PCR Not Detected     Chlamydophila pneumoniae PCR Not Detected     Mycoplasma pneumo by PCR Not Detected    Narrative:      In the setting of a positive respiratory panel with a viral infection PLUS a negative procalcitonin without other underlying concern for bacterial infection, consider observing off antibiotics or discontinuation of antibiotics and continue supportive care. If the respiratory panel is positive for atypical bacterial infection (Bordetella pertussis, Chlamydophila pneumoniae, or Mycoplasma pneumoniae), consider antibiotic de-escalation to target atypical bacterial infection.            Assessment & Plan     Shortness of breath     Dyspnea/new onset afib  - recent diagnosis of alphagal  - cbc, cmp, mag, procal are unremarkable  - bnp 800  - trop 13, 20  - inr .98  - RPP negative  - EKG rate 97, afib  - chest xray reviewed and showing no cardiopulmonary disease  - pt refused stress test  - echo EF 61-65%, The left atrial cavity is dilated.   - Cardiology consulted  - recommends starting amiodarone  - lovenox transitioned to xarelto       Hypertension  -well Controlled       BP Readings from Last 1 Encounters:   10/20/23 124/65      - Continue lisinopril  - Monitor while admitted      HPL  - cont home statin      I discussed the patients findings and my recommendations with patient and nursing.     Discharge Diagnosis:      Shortness of breath      Hospital Course  Patient is a 68 y.o. female presented with shortness of breath. Er evaluated pt and admitted to observation. Labs including cbc, cmp, mag and procal are normal. . Troponin stable. Rpp negative. INR .98 and today is 1.29. chest xray is normal. EKG shoing rate 97 and new onset afib. Cardiology was consulted. Pt refuses stress test. Echo showing left atrial dilation and ef 61-65%. Pt started on lovenox but has transitioned to xarelto. Pt also started on amiodarone. Pt will follow up as outpt with  cardiology. Discharge discussed with pt and she is agreeable to plan. Instructed pt to return to er if symptoms reoccur or worsen.      Past Medical History:     Past Medical History:   Diagnosis Date    Anxiety and depression     Arthritis     mild    Chronic back pain     Chronic fatigue syndrome     Diabetes     Fibromyalgia     GERD (gastroesophageal reflux disease)     Hyperlipidemia     Hypertension     Nausea     Neuropathy     Restless legs     Uterine cancer        Past Surgical History:     Past Surgical History:   Procedure Laterality Date    CHOLECYSTECTOMY      COLONOSCOPY      ENDOSCOPY N/A 9/7/2021    Procedure: ESOPHAGOGASTRODUODENOSCOPY with dilation (bougie # 50);  Surgeon: Gordon Morales MD;  Location: [facility] ENDOSCOPY;  Service: Gastroenterology;  Laterality: N/A;  Post Op: gastritis, hiatal hernia, esophageal ring    ENDOSCOPY N/A 2/22/2023    Procedure: ESOPHAGOGASTRODUODENOSCOPY with gastric biopsy's;  Surgeon: Gordon Morales MD;  Location: [facility] ENDOSCOPY;  Service: Gastroenterology;  Laterality: N/A;  gastritis    HYSTERECTOMY      SIGMOIDOSCOPY N/A 9/7/2021    Procedure: FLEX SIGMOIDOSCOPY with polypectomy and cautery of rectal polyp;  Surgeon: Gordon Morales MD;  Location: [facility] ENDOSCOPY;  Service: Gastroenterology;  Laterality: N/A;  Post Op: rectal polyp       Social History:   Social History     Socioeconomic History    Marital status:    Tobacco Use    Smoking status: Never     Passive exposure: Current    Smokeless tobacco: Never   Vaping Use    Vaping Use: Never used   Substance and Sexual Activity    Alcohol use: No    Drug use: Never    Sexual activity: Defer       Procedures Performed         Consults:   Consults       Date and Time Order Name Status Description    10/19/2023  3:30 PM Cardiology (on-call MD unless specified) Completed             Condition on Discharge:     Stable    Discharge Disposition      Discharge Medications     Discharge  Medications        ASK your doctor about these medications        Instructions Start Date   atorvastatin 40 MG tablet  Commonly known as: LIPITOR   40 mg, Oral, Daily      butalbital-acetaminophen-caffeine -40 MG per tablet  Commonly known as: FIORICET, ESGIC   Take 1 tablet by mouth Every 4 (Four) Hours As Needed for Migraine.      citalopram 20 MG tablet  Commonly known as: CeleXA   20 mg, Oral, Daily      esomeprazole 40 MG capsule  Commonly known as: nexIUM   40 mg, Oral, Every Morning Before Breakfast      gabapentin 300 MG capsule  Commonly known as: NEURONTIN   300 mg, Oral, 3 Times Daily      Gemtesa 75 MG tablet  Generic drug: Vibegron   75 mg, Oral, Daily      lisinopril 40 MG tablet  Commonly known as: PRINIVIL,ZESTRIL   40 mg, Oral, Daily      NON FORMULARY   Pt gets samples of diabetic med, unsure of name      ondansetron ODT 4 MG disintegrating tablet  Commonly known as: ZOFRAN-ODT   4 mg, Translingual, Every 8 Hours PRN      oxyCODONE-acetaminophen  MG per tablet  Commonly known as: Percocet   Take 1 tablet by mouth 4 times daily      tiZANidine 2 MG tablet  Commonly known as: ZANAFLEX   1 tablet, Oral, 3 Times Daily      vitamin D 1.25 MG (01784 UT) capsule capsule  Commonly known as: ERGOCALCIFEROL   50,000 Units, Oral, Weekly, wednesday                Discharge Diet:     Activity at Discharge:     Follow-up Appointments  No future appointments.      Test Results Pending at Discharge       Risk for Readmission (LACE) Score: 9 (10/21/2023  6:00 AM)      Less Than 30 minutes spent in discharge activities for this patient    Jessica Yang PA-C  10/21/23  10:36 EDT

## 2023-10-21 NOTE — PLAN OF CARE
Problem: Adult Inpatient Plan of Care  Goal: Plan of Care Review  Outcome: Met  Flowsheets (Taken 10/21/2023 1444)  Progress: improving  Plan of Care Reviewed With: patient  Outcome Evaluation: Patient had heart ECHO today, patient is ready to discharge.  Goal: Patient-Specific Goal (Individualized)  Outcome: Met  Goal: Absence of Hospital-Acquired Illness or Injury  Outcome: Met  Intervention: Identify and Manage Fall Risk  Recent Flowsheet Documentation  Taken 10/21/2023 1000 by Umu Villareal RN  Safety Promotion/Fall Prevention: safety round/check completed  Taken 10/21/2023 0802 by Umu Villareal RN  Safety Promotion/Fall Prevention: safety round/check completed  Intervention: Prevent Skin Injury  Recent Flowsheet Documentation  Taken 10/21/2023 0802 by Umu Villareal RN  Body Position: position changed independently  Intervention: Prevent and Manage VTE (Venous Thromboembolism) Risk  Recent Flowsheet Documentation  Taken 10/21/2023 0802 by Umu Villareal RN  Activity Management:   ambulated in room   up ad jodi   activity encouraged  Range of Motion: active ROM (range of motion) encouraged  Intervention: Prevent Infection  Recent Flowsheet Documentation  Taken 10/21/2023 0802 by Umu Villareal RN  Infection Prevention:   rest/sleep promoted   hand hygiene promoted   single patient room provided  Goal: Optimal Comfort and Wellbeing  Outcome: Met  Goal: Readiness for Transition of Care  Outcome: Met     Problem: Diabetes Comorbidity  Goal: Blood Glucose Level Within Targeted Range  Outcome: Met     Problem: Pain Chronic (Persistent) (Comorbidity Management)  Goal: Acceptable Pain Control and Functional Ability  Outcome: Met  Intervention: Manage Persistent Pain  Recent Flowsheet Documentation  Taken 10/21/2023 0802 by Umu Villareal RN  Medication Review/Management: medications reviewed  Intervention: Optimize Psychosocial Wellbeing  Recent Flowsheet  Documentation  Taken 10/21/2023 0802 by Umu Villareal RN  Supportive Measures:   verbalization of feelings encouraged   active listening utilized     Problem: Pain Acute  Goal: Acceptable Pain Control and Functional Ability  Outcome: Met  Intervention: Prevent or Manage Pain  Recent Flowsheet Documentation  Taken 10/21/2023 0802 by Umu Villareal RN  Medication Review/Management: medications reviewed  Intervention: Optimize Psychosocial Wellbeing  Recent Flowsheet Documentation  Taken 10/21/2023 0802 by Umu Villareal RN  Supportive Measures:   verbalization of feelings encouraged   active listening utilized     Problem: Fluid Volume Deficit  Goal: Fluid Balance  Outcome: Met     Problem: Fall Injury Risk  Goal: Absence of Fall and Fall-Related Injury  Outcome: Met  Intervention: Identify and Manage Contributors  Recent Flowsheet Documentation  Taken 10/21/2023 0802 by Umu Villareal RN  Medication Review/Management: medications reviewed  Intervention: Promote Injury-Free Environment  Recent Flowsheet Documentation  Taken 10/21/2023 1000 by Umu Villareal RN  Safety Promotion/Fall Prevention: safety round/check completed  Taken 10/21/2023 0802 by Umu Villareal RN  Safety Promotion/Fall Prevention: safety round/check completed   Goal Outcome Evaluation:  Plan of Care Reviewed With: patient        Progress: improving  Outcome Evaluation: Patient had heart ECHO today, patient is ready to discharge.

## 2023-10-21 NOTE — PLAN OF CARE
Problem: Adult Inpatient Plan of Care  Goal: Plan of Care Review  Outcome: Ongoing, Progressing  Flowsheets (Taken 10/21/2023 0430)  Progress: improving  Plan of Care Reviewed With: patient  Goal: Patient-Specific Goal (Individualized)  Outcome: Ongoing, Progressing  Goal: Absence of Hospital-Acquired Illness or Injury  Outcome: Ongoing, Progressing  Intervention: Identify and Manage Fall Risk  Recent Flowsheet Documentation  Taken 10/21/2023 0400 by Odette Castaneda RN  Safety Promotion/Fall Prevention:   safety round/check completed   room organization consistent   nonskid shoes/slippers when out of bed   lighting adjusted   clutter free environment maintained   assistive device/personal items within reach   activity supervised  Taken 10/21/2023 0200 by Odette Castaneda, RN  Safety Promotion/Fall Prevention:   safety round/check completed   room organization consistent   nonskid shoes/slippers when out of bed   lighting adjusted   clutter free environment maintained   assistive device/personal items within reach   activity supervised  Taken 10/21/2023 0018 by Odette Castaneda, RN  Safety Promotion/Fall Prevention:   safety round/check completed   room organization consistent   nonskid shoes/slippers when out of bed   lighting adjusted   clutter free environment maintained   assistive device/personal items within reach   activity supervised  Taken 10/20/2023 2206 by Odette Castaneda, RN  Safety Promotion/Fall Prevention:   safety round/check completed   room organization consistent   nonskid shoes/slippers when out of bed   lighting adjusted   clutter free environment maintained   activity supervised   assistive device/personal items within reach  Taken 10/20/2023 2000 by Odette Castaneda RN  Safety Promotion/Fall Prevention:   safety round/check completed   room organization consistent   nonskid shoes/slippers when out of bed   lighting adjusted   clutter free environment maintained   assistive  device/personal items within reach   activity supervised  Intervention: Prevent Skin Injury  Recent Flowsheet Documentation  Taken 10/21/2023 0400 by Odette Castaneda RN  Body Position: position changed independently  Taken 10/21/2023 0018 by Odette Castaneda RN  Body Position: position changed independently  Taken 10/20/2023 2000 by Odette Castaneda RN  Body Position: position changed independently  Intervention: Prevent and Manage VTE (Venous Thromboembolism) Risk  Recent Flowsheet Documentation  Taken 10/21/2023 0400 by Odette Castaneda RN  Activity Management: ambulated to bathroom  Taken 10/21/2023 0018 by Odette Castaneda RN  Activity Management: ambulated to bathroom  Taken 10/20/2023 2000 by Odette Castaneda RN  Activity Management: ambulated to bathroom  Intervention: Prevent Infection  Recent Flowsheet Documentation  Taken 10/21/2023 0400 by Odette Castaneda RN  Infection Prevention:   single patient room provided   rest/sleep promoted   personal protective equipment utilized   hand hygiene promoted  Taken 10/21/2023 0200 by Odette Castaneda RN  Infection Prevention:   single patient room provided   rest/sleep promoted   personal protective equipment utilized   hand hygiene promoted  Taken 10/21/2023 0018 by Odette Castaneda RN  Infection Prevention:   single patient room provided   rest/sleep promoted   personal protective equipment utilized   hand hygiene promoted  Taken 10/20/2023 2206 by Odette Castaneda RN  Infection Prevention:   single patient room provided   rest/sleep promoted   personal protective equipment utilized   hand hygiene promoted  Taken 10/20/2023 2000 by Odette Castaneda RN  Infection Prevention:   single patient room provided   rest/sleep promoted   personal protective equipment utilized   hand hygiene promoted  Goal: Optimal Comfort and Wellbeing  Outcome: Ongoing, Progressing  Intervention: Provide Person-Centered Care  Recent Flowsheet  Documentation  Taken 10/20/2023 2000 by Odette Castaneda RN  Trust Relationship/Rapport:   care explained   choices provided   questions answered   questions encouraged   reassurance provided   thoughts/feelings acknowledged  Goal: Readiness for Transition of Care  Outcome: Ongoing, Progressing     Problem: Diabetes Comorbidity  Goal: Blood Glucose Level Within Targeted Range  Outcome: Ongoing, Progressing  Intervention: Monitor and Manage Glycemia  Recent Flowsheet Documentation  Taken 10/21/2023 0400 by Odette Castaneda RN  Glycemic Management: blood glucose monitored  Taken 10/21/2023 0018 by Odette Castaneda RN  Glycemic Management: blood glucose monitored  Taken 10/20/2023 2000 by Odette Castaneda RN  Glycemic Management: blood glucose monitored     Problem: Pain Chronic (Persistent) (Comorbidity Management)  Goal: Acceptable Pain Control and Functional Ability  Outcome: Ongoing, Progressing  Intervention: Manage Persistent Pain  Recent Flowsheet Documentation  Taken 10/21/2023 0400 by Odette Castaneda RN  Medication Review/Management: medications reviewed  Taken 10/21/2023 0200 by Odette Castaneda RN  Medication Review/Management: medications reviewed  Taken 10/21/2023 0018 by Odette Castaneda RN  Medication Review/Management: medications reviewed  Taken 10/20/2023 2206 by Odette Castaneda RN  Medication Review/Management: medications reviewed  Taken 10/20/2023 2000 by Odette Castaneda RN  Medication Review/Management: medications reviewed  Intervention: Optimize Psychosocial Wellbeing  Recent Flowsheet Documentation  Taken 10/20/2023 2000 by Odette Castaneda RN  Supportive Measures: active listening utilized  Diversional Activities:   television   smartphone  Family/Support System Care:   support provided   self-care encouraged     Problem: Pain Acute  Goal: Acceptable Pain Control and Functional Ability  Outcome: Ongoing, Progressing  Intervention: Prevent or Manage  Pain  Recent Flowsheet Documentation  Taken 10/21/2023 0400 by Odette Castaneda RN  Medication Review/Management: medications reviewed  Taken 10/21/2023 0200 by Odette Castaneda RN  Medication Review/Management: medications reviewed  Taken 10/21/2023 0018 by Odette Castaneda RN  Medication Review/Management: medications reviewed  Taken 10/20/2023 2206 by Odette Castaneda RN  Medication Review/Management: medications reviewed  Taken 10/20/2023 2000 by Odette Castaneda RN  Medication Review/Management: medications reviewed  Intervention: Optimize Psychosocial Wellbeing  Recent Flowsheet Documentation  Taken 10/20/2023 2000 by Odette Castaneda RN  Supportive Measures: active listening utilized  Diversional Activities:   television   smartphone     Problem: Fluid Volume Deficit  Goal: Fluid Balance  Outcome: Ongoing, Progressing     Problem: Fall Injury Risk  Goal: Absence of Fall and Fall-Related Injury  Outcome: Ongoing, Progressing  Intervention: Identify and Manage Contributors  Recent Flowsheet Documentation  Taken 10/21/2023 0400 by Odette Castaneda RN  Medication Review/Management: medications reviewed  Taken 10/21/2023 0200 by Odette Castaneda RN  Medication Review/Management: medications reviewed  Taken 10/21/2023 0018 by Odette Castaneda RN  Medication Review/Management: medications reviewed  Taken 10/20/2023 2206 by Odette Castaneda RN  Medication Review/Management: medications reviewed  Taken 10/20/2023 2000 by Odette Castaneda RN  Medication Review/Management: medications reviewed  Intervention: Promote Injury-Free Environment  Recent Flowsheet Documentation  Taken 10/21/2023 0400 by Odette Castaneda RN  Safety Promotion/Fall Prevention:   safety round/check completed   room organization consistent   nonskid shoes/slippers when out of bed   lighting adjusted   clutter free environment maintained   assistive device/personal items within reach   activity  supervised  Taken 10/21/2023 0200 by Odette Castaneda RN  Safety Promotion/Fall Prevention:   safety round/check completed   room organization consistent   nonskid shoes/slippers when out of bed   lighting adjusted   clutter free environment maintained   assistive device/personal items within reach   activity supervised  Taken 10/21/2023 0018 by Odette Castaneda RN  Safety Promotion/Fall Prevention:   safety round/check completed   room organization consistent   nonskid shoes/slippers when out of bed   lighting adjusted   clutter free environment maintained   assistive device/personal items within reach   activity supervised  Taken 10/20/2023 2206 by Odette Castaneda RN  Safety Promotion/Fall Prevention:   safety round/check completed   room organization consistent   nonskid shoes/slippers when out of bed   lighting adjusted   clutter free environment maintained   activity supervised   assistive device/personal items within reach  Taken 10/20/2023 2000 by Odette Castaneda, RN  Safety Promotion/Fall Prevention:   safety round/check completed   room organization consistent   nonskid shoes/slippers when out of bed   lighting adjusted   clutter free environment maintained   assistive device/personal items within reach   activity supervised   Goal Outcome Evaluation:  Plan of Care Reviewed With: patient        Progress: improving  Outcome Evaluation: pt did have some shortness of breath with exertion  will have a carfiology consult with Dr. Yip

## 2023-10-21 NOTE — PROGRESS NOTES
Cardiology Progress Note    Patient Identification:  Name: Kiki Perdomo  Age: 68 y.o.  Sex: female  :  1955  MRN: [id number]                 Follow Up / Chief Complaint: A-fib  Chief Complaint   Patient presents with    Shortness of Breath       Interval History: Patient is in A-fib was started on p.o. amiodarone and anticoagulation.       Subjective: Patient seen and examined.  Chart reviewed.  Labs reviewed.  Discussed with RN taking care of patient.      Objective:    10/21/2023: Potassium 4.3, glucose 120, hemoglobin 11.9, INR 1.29.    History of present illness:     Kiki Perdomo is a 68 y.o. female with a history of hypertension, dyslipidemia and diabetes who presented to [facility] with complaint of shortness of breath.     Patient has recently been seen by her primary care provider with complaints of progressive activity intolerance, shortness of breath, fatigue.  She has been undergoing outpatient work-up.  She was recently diagnosed with alpha gal syndrome.     She was seen by her primary care provider yesterday and was found to be in atrial fibrillation which is a new finding for the patient.  She denies any prior history of atrial fibrillation.  She has been unaware of feeling her heart racing.     She denies chest pain but does complain of shortness of breath and activity intolerance.  She denies recent swelling, PND, orthopnea or near syncope.     Patient was previously seen during a hospitalization back in 2019 where she presented with episodes of dizziness and lightheadedness and syncope.  She was found to be profoundly orthostatic.     She is known to have a history of uterine cancer, kidney stones, dyslipidemia, diabetes, hypertension, degenerative disc disease.     EKG in the emergency room demonstrated atrial fibrillation with controlled ventricular rates.High-sensitivity troponin was obtained results were 13, 20 proBNP was 800 magnesium level was  1.6    Assessment:      Atrial Fibrillation:     -Duration unknown     -Ventricular rates stable     -CHADS/VASC =4  SOA  Prior Hx Orthostatic Hypotension  Hypertension  DM  Dyslipidemia    Plan:    Echocardiogram   Lovenox has been started but will need to transition to oral a/c prior to d/c  Recommend op evaluation for sleep study   Check orthostatic VS  Recommend non invasive ischemic evallation but patient declines.   Will start low dose beta blcoker  If BP trends low will hold lisinopril  I would start Xarelto.  I would add amiodarone.  Continue anticoagulation we will follow-up in cardiology clinic as outpatient after being anticoagulated for cardioversion in 6 weeks.    Copied text in this portion of the note has been reviewed and is accurate as of 10/21/2023    Past Medical History:  Past Medical History:   Diagnosis Date    Anxiety and depression     Arthritis     mild    Chronic back pain     Chronic fatigue syndrome     Diabetes     Fibromyalgia     GERD (gastroesophageal reflux disease)     Hyperlipidemia     Hypertension     Nausea     Neuropathy     Restless legs     Uterine cancer      Past Surgical History:  Past Surgical History:   Procedure Laterality Date    CHOLECYSTECTOMY      COLONOSCOPY      ENDOSCOPY N/A 9/7/2021    Procedure: ESOPHAGOGASTRODUODENOSCOPY with dilation (bougie # [phone]);  Surgeon: Gordon Morales MD;  Location: [facility] END[facility]COPY;  Service: Gastroenterology;  Laterality: N/A;  Post Op: gastritis, hiatal hernia, esophageal ring    ENDOSCOPY N/A 2/22/2023    Procedure: ESOPHAGOGASTRODUODENOSCOPY with gastric biopsy's;  Surgeon: Gordon Morales MD;  Location: [facility] ENDOSCOPY;  Service: Gastroenterology;  Laterality: N/A;  gastritis    HYSTERECTOMY      SIGMOIDOSCOPY N/A 9/7/2021    Procedure: FLEX SIGMOIDOSCOPY with polypectomy and cautery of rectal polyp;  Surgeon: Gordon Morales MD;  Location: [facility] ENDOSCOPY;  Service: Gastroenterology;  Laterality: N/A;  Post  "Op: rectal polyp        Social History:   Social History     Tobacco Use    Smoking status: Never     Passive exposure: Current    Smokeless tobacco: Never   Substance Use Topics    Alcohol use: No      Family History:  Family History   Problem Relation Age of Onset    Heart disease Mother     Heart disease Father           Allergies:  Allergies   Allergen Reactions    Indomethacin Hives    Iodine Hives     Scheduled Meds:  amiodarone, 200 mg, Q24H  atorvastatin, 10 mg, Daily  citalopram, 20 mg, Daily  insulin lispro, 2-7 Units, 4x Daily AC & at Bedtime  lisinopril, 20 mg, Daily  metoprolol succinate XL, 12.5 mg, Q24H  pantoprazole, 40 mg, Q AM  rivaroxaban, 20 mg, Daily  sodium chloride, 10 mL, Q12H          Review of Systems:   ROS  Review of Systems   Constitution: Negative for chills and fever.   Cardiovascular: Negative for chest pain and palpitations.   Respiratory: Negative for cough and hemoptysis.    Gastrointestinal: Negative for nausea.        Constitutional:  Temp:  [96.4 °F (35.8 °C)-98.6 °F (37 °C)] 96.8 °F (36 °C)  Heart Rate:  [] 73  Resp:  [15-18] 15  BP: (124-183)/(55-78) 183/78    Physical Exam   BP (!) 183/78   Pulse 73   Temp 96.8 °F (36 °C) (Oral)   Resp 15   Ht 165.1 cm (65\")   Wt 127 kg (280 lb)   SpO2 100%   BMI 46.59 kg/m²   General:  Appears in no acute distress  Eyes: Sclera is anicteric,  conjunctiva is clear   HEENT:  No JVD. Thyroid not visibly enlarged. No mucosal pallor or cyanosis  Respiratory: Respirations regular and unlabored at rest.  Bilaterally good breath sounds, with good air entry in all fields. No crackles, rubs or wheezes auscultated  Cardiovascular: S1,S2, irregularly irregular rate. No murmur, rub or gallop auscultated.  . No pretibial pitting edema  Gastrointestinal: Abdomen nondistended, soft  Musculoskeletal:  No abnormal movements  Extremities: No digital clubbing or cyanosis  Skin: Color pink. Skin warm and dry to touch. No rashes  No xanthoma  Neuro: " Alert and awake, no lateralizing deficits appreciated    INTAKE AND OUTPUT:  No intake or output data in the 24 hours ending 10/21/23 1029    Cardiographics  Telemetry: Atrial fibrillation    ECG:   ECG 12 Lead Dyspnea   Final Result   HEART RATE= 97  bpm   RR Interval= 621  ms   UT Interval=   ms   P Horizontal Axis=   deg   P Front Axis=   deg   QRSD Interval= 87  ms   QT Interval= 363  ms   QTcB= 461  ms   QRS Axis= -31  deg   T Wave Axis= 32  deg   - ABNORMAL ECG -   Atrial fibrillation   Left axis deviation   Consider anterior infarct   When compared with ECG of 22-Feb-2023 13:38:43,   Significant change in rhythm: previously sinus   Electronically Signed By: Steven Mccann (SADIE) 20-Oct-2023 07:30:01   Date and Time of Study: 2023-10-19 12:41:43      SCANNED - TELEMETRY     Final Result      SCANNED - TELEMETRY     Final Result      SCANNED - TELEMETRY     Final Result      SCANNED - TELEMETRY     Final Result      SCANNED - TELEMETRY     Final Result      SCANNED - TELEMETRY     Final Result      SCANNED - TELEMETRY     Final Result        I have personally reviewed EKG    Echocardiogram: Results for orders placed during the hospital encounter of 09/20/19    Transthoracic Echo Complete With Contrast if Necessary Per Protocol    Interpretation Summary  · Left ventricular systolic function is normal.  · Estimated EF = 67%.      Lab Review   I have reviewed the labs  Results from last 7 days   Lab Units 10/20/23  0311 10/19/23  1432   HSTROP T ng/L 20* 13*     Results from last 7 days   Lab Units 10/19/23  1432   MAGNESIUM mg/dL 1.6     Results from last 7 days   Lab Units 10/21/23  0248   SODIUM mmol/L 145   POTASSIUM mmol/L 4.3   BUN mg/dL 13   CREATININE mg/dL 0.95   CALCIUM mg/dL 9.3         Results from last 7 days   Lab Units 10/21/23  0248 10/20/23  0311 10/19/23  1254   WBC 10*3/mm3 7.00 8.00 8.90   HEMOGLOBIN g/dL 11.9* 13.8 13.6   HEMATOCRIT % 37.0 42.1 42.2   PLATELETS 10*3/mm3 180 211 237     Results  "from last 7 days   Lab Units 10/21/23  0248 10/20/23  0311   INR  1.29* 0.98       RADIOLOGY:  Imaging Results (Last 24 Hours)       ** No results found for the last 24 hours. **                  )10/21/2023  Brian Puga MD      [facility] Dragon/Transcription:   \"Dictated utilizing Dragon dictation\".   "

## 2023-10-22 NOTE — OUTREACH NOTE
Prep Survey      Flowsheet Row Responses   [facility] facility patient discharged from? [facility]   Is LACE score < 7 ? No   Eligibility Readm Mgmt   Discharge diagnosis Dyspnea/new onset afib   Does the patient have one of the following disease processes/diagnoses(primary or secondary)? Other   Does the patient have Home health ordered? No   Is there a DME ordered? No   Prep survey completed? Yes            Rachelle JACOME - Registered Nurse

## 2023-10-24 ENCOUNTER — READMISSION MANAGEMENT (OUTPATIENT)
Dept: CALL CENTER | Facility: HOSPITAL | Age: 68
End: 2023-10-24
Payer: MEDICARE

## 2023-10-24 ENCOUNTER — TELEPHONE (OUTPATIENT)
Dept: CARDIOLOGY | Facility: CLINIC | Age: 68
End: 2023-10-24
Payer: MEDICARE

## 2023-10-24 RX ORDER — METOPROLOL SUCCINATE 25 MG/1
25 TABLET, EXTENDED RELEASE ORAL DAILY
Qty: 90 TABLET | Refills: 1 | Status: SHIPPED | OUTPATIENT
Start: 2023-10-24

## 2023-10-24 NOTE — OUTREACH NOTE
Medical Week 1 Survey      Flowsheet Row Responses   [facility] patient discharged from? [facility]   Does the patient have one of the following disease processes/diagnoses(primary or secondary)? Other   Week 1 attempt successful? Yes   Call start time 1134   Call end time 1137   Discharge diagnosis Dyspnea/new onset afib   Is patient permission given to speak with other caregiver? Yes   List who call center can speak with Alexys Perdomo   Person spoke with today (if not patient) and relationship Alexys Perdomo-    Meds reviewed with patient/caregiver? Yes   Is the patient having any side effects they believe may be caused by any medication additions or changes? No   Does the patient have all medications ordered at discharge? Yes   Is the patient taking all medications as directed (includes completed medication regime)? Yes   Does the patient have a primary care provider?  Yes   Does the patient have an appointment with their PCP within 7 days of discharge? No   Comments regarding PCP  states she will schedule with her pcp, and he also asked if patient should schedule with cardiology.  provided with number for Dr. Gudino, so she can schedule her follow up.   What is preventing the patient from scheduling follow up appointments within 7 days of discharge? Haven't had time   Nursing Interventions Educated patient on importance of making appointment, Advised patient to make appointment   Has the patient kept scheduled appointments due by today? N/A   Has home health visited the patient within 72 hours of discharge? N/A   Psychosocial issues? No   Did the patient receive a copy of their discharge instructions? Yes   Nursing interventions Reviewed instructions with patient   What is the patient's perception of their health status since discharge? Improving   Is the patient/caregiver able to teach back signs and symptoms related to disease process for when to call PCP? Yes   Is the patient/caregiver  able to teach back signs and symptoms related to disease process for when to call 911? Yes   Is the patient/caregiver able to teach back the hierarchy of who to call/visit for symptoms/problems? PCP, Specialist, Home health nurse, Urgent Care, ED, 911 Yes   If the patient is a current smoker, are they able to teach back resources for cessation? Not a smoker   Week 1 call completed? Yes   Call end time 1137            Freida Garcia Licensed Nurse

## 2023-10-24 NOTE — TELEPHONE ENCOUNTER
"  Caller: DUANE    Relationship: SELF    Best call back number: [phone]    What is the best time to reach you: ANY        What was the call regarding: PT SAID SHE HAS BROKEN OUT IN A RASH ON THE LEFT HAND SIDE OF HER BELLY.  IT'S RED, FEELS \"FEVERISH\" AND IT IS REALLY ITCHY.  SHE HAS TRIED BENADRYL CREAM AND MEDICATION AND IT HAS NOT HELPED.   SHE IS CONCERNED THAT IT MAY BE ONE OF THE TWO MEDICATIONS DR. WEBBER STARTED HER ON WHILE IN THE HOSPITAL.    ALSO, SCHEDULED PT A 1 MO HOSPITAL FOLLOW UP ON 11/21/23 WITH DEANN. PT MENTIONED DR. WEBBER TOLD HER THAT TIMEFRAME IN THE HOSPITAL BUT COULD NOT FIND IT ON THE DISCHARGE.  "

## 2023-11-01 ENCOUNTER — READMISSION MANAGEMENT (OUTPATIENT)
Dept: CALL CENTER | Facility: HOSPITAL | Age: 68
End: 2023-11-01
Payer: MEDICARE

## 2023-11-01 NOTE — OUTREACH NOTE
Medical Week 2 Survey      Flowsheet Row Responses   [facility] [place] facility patient discharged from? [facility]   Does the patient have one of the following disease processes/diagnoses(primary or secondary)? Other   Week 2 attempt successful? No   Unsuccessful attempts Attempt 1            Aundrea JACOME - Licensed Nurse

## 2023-11-03 ENCOUNTER — OFFICE VISIT (OUTPATIENT)
Dept: CARDIOLOGY | Facility: CLINIC | Age: 68
End: 2023-11-03
Payer: MEDICARE

## 2023-11-03 VITALS
BODY MASS INDEX: 47.65 KG/M2 | SYSTOLIC BLOOD PRESSURE: 134 MMHG | HEART RATE: 111 BPM | OXYGEN SATURATION: 97 % | HEIGHT: 65 IN | RESPIRATION RATE: 18 BRPM | DIASTOLIC BLOOD PRESSURE: 91 MMHG | WEIGHT: 286 LBS

## 2023-11-03 DIAGNOSIS — Z09 HOSPITAL DISCHARGE FOLLOW-UP: Primary | ICD-10-CM

## 2023-11-03 DIAGNOSIS — I48.0 PAROXYSMAL ATRIAL FIBRILLATION: ICD-10-CM

## 2023-11-03 DIAGNOSIS — I10 HYPERTENSION, UNSPECIFIED TYPE: ICD-10-CM

## 2023-11-03 DIAGNOSIS — E78.2 MIXED HYPERLIPIDEMIA: ICD-10-CM

## 2023-11-03 DIAGNOSIS — I48.91 ATRIAL FIBRILLATION, CONTROLLED: ICD-10-CM

## 2023-11-03 RX ORDER — MAGNESIUM OXIDE 400 MG/1
400 TABLET ORAL DAILY
COMMUNITY

## 2023-11-03 RX ORDER — ORAL SEMAGLUTIDE 7 MG/1
1 TABLET ORAL DAILY
COMMUNITY
Start: 2023-10-19

## 2023-11-03 RX ORDER — DULOXETIN HYDROCHLORIDE 30 MG/1
1 CAPSULE, DELAYED RELEASE ORAL DAILY
COMMUNITY
Start: 2023-10-18 | End: 2023-11-03

## 2023-11-03 NOTE — PROGRESS NOTES
Cardiology Office Follow Up Visit      Primary Care Provider:  Smita Mayo APRN    Reason for f/u:     Hospital follow up    Subjective     CC:    Hospital follow up    History of Present Illness       Kiki Perdomo is a 68 y.o. female who is a patient of Dr. Gudino. Pmh includes obesity, HTN, HLD, DM, shortness of breath, recent diagnosis of alpha gal syndrome.   Stress test 2019 no ischemia      She presented to hospital with shortness of breath. She was found to be in atrial fibrillation which was new for her. She was started on amiodarone and xarelto and discharged with a plan for LAURA DCCV as outpt in 4-6 weeks. ECHO at that time showed normal LVEF, dilated atria.    Ms Perdomo presented for follow up today. Since discharge she has experienced some chest pressure and palpitations. She is noted to be in atrial fibrillation still. She states she was started on amiodarone after hospitalization but has not felt well on it and would like to stop it. She is on Xarelto for anticoagulation. No bleeding issues.       ASSESSMENT/PLAN:    Hospital Discharge follow up  Atrial Fibrillation, chads vasc at least 3- on Xarelto  HTN  HLD  Normal LVEF, diastolic dysfunction      MEDICAL DECISION MAKING:  Patient presents today for hospital discharge follow up for admission with atrial fibrillation. She remains in atrial fibrillation. She reports she is feeling as if she is intolerant to the amiodarone. We discussed LAURA DCCV. She is agreeable. Continue anticoagulation. Will arrange for LAURA DCCV Tuesday before Thanksgiving per patient request. She is on beta blockers.          Past Medical History:   Diagnosis Date   • Abnormal ECG 09/19/2023   • Allergy to alpha-gal    • Anxiety and depression    • Arthritis     mild   • Atrial fibrillation 09/19/2023   • Chronic back pain    • Chronic fatigue syndrome    • Diabetes    • Fibromyalgia    • GERD (gastroesophageal reflux disease)    • Hyperlipidemia    • Hypertension    •  Nausea    • Neuropathy    • Restless legs    • Uterine cancer     remission       Past Surgical History:   Procedure Laterality Date   • CARDIAC CATHETERIZATION  2005   • CHOLECYSTECTOMY     • COLONOSCOPY     • ENDOSCOPY N/A 09/07/2021    Procedure: ESOPHAGOGASTRODUODENOSCOPY with dilation (bougie # 50);  Surgeon: Gordno Morales MD;  Location: [facility] ENDOSCOPY;  Service: Gastroenterology;  Laterality: N/A;  Post Op: gastritis, hiatal hernia, esophageal ring   • ENDOSCOPY N/A 02/22/2023    Procedure: ESOPHAGOGASTRODUODENOSCOPY with gastric biopsy's;  Surgeon: Gordon Morales MD;  Location: [facility] ENDOSCOPY;  Service: Gastroenterology;  Laterality: N/A;  gastritis   • HYSTERECTOMY     • SIGMOIDOSCOPY N/A 09/07/2021    Procedure: FLEX SIGMOIDOSCOPY with polypectomy and cautery of rectal polyp;  Surgeon: Gordon Morales MD;  Location: [facility] ENDOSCOPY;  Service: Gastroenterology;  Laterality: N/A;  Post Op: rectal polyp         Current Outpatient Medications:   •  atorvastatin (LIPITOR) 40 MG tablet, Take 1 tablet by mouth Daily., Disp: , Rfl:   •  butalbital-acetaminophen-caffeine (FIORICET, ESGIC) -40 MG per tablet, Take 1 tablet by mouth Every 4 (Four) Hours As Needed for Migraine., Disp: , Rfl:   •  citalopram (CeleXA) 20 MG tablet, Take 1 tablet by mouth Daily., Disp: , Rfl:   •  esomeprazole (nexIUM) 40 MG capsule, Take 1 capsule by mouth Every Morning Before Breakfast., Disp: , Rfl:   •  gabapentin (NEURONTIN) 300 MG capsule, Take 1 capsule by mouth 3 (Three) Times a Day., Disp: , Rfl:   •  lisinopril (PRINIVIL,ZESTRIL) 40 MG tablet, Take 1 tablet by mouth Daily., Disp: , Rfl:   •  magnesium oxide (MAG-OX) 400 MG tablet, Take 1 tablet by mouth Daily., Disp: , Rfl:   •  metoprolol succinate XL (TOPROL-XL) 25 MG 24 hr tablet, Take 1 tablet by mouth Daily., Disp: 90 tablet, Rfl: 1  •  ondansetron ODT (ZOFRAN-ODT) 4 MG disintegrating tablet, Place 1 tablet on the tongue Every 8 (Eight)  Hours As Needed for Nausea., Disp: 8 tablet, Rfl: 0  •  rivaroxaban (XARELTO) 20 MG tablet, Take 1 tablet by mouth Daily. Indications: Atrial Fibrillation, Disp: 30 tablet, Rfl: 0  •  Rybelsus 7 MG tablet, Take 7 mg by mouth Daily., Disp: , Rfl:   •  tiZANidine (ZANAFLEX) 2 MG tablet, Take 1 tablet by mouth 3 (Three) Times a Day., Disp: , Rfl:   •  Vibegron (Gemtesa) 75 MG tablet, Take 1 tablet by mouth Daily., Disp: , Rfl:   •  vitamin D (ERGOCALCIFEROL) 1.25 MG (79636 UT) capsule capsule, Take 1 capsule by mouth 1 (One) Time Per Week. wednesday, Disp: , Rfl:   •  oxyCODONE-acetaminophen (PERCOCET)  MG per tablet, Take 1 tablet by mouth Every 6 (Six) Hours As Needed for Moderate Pain., Disp: , Rfl:     Social History     Socioeconomic History   • Marital status:    Tobacco Use   • Smoking status: Never     Passive exposure: Current   • Smokeless tobacco: Never   Vaping Use   • Vaping Use: Never used   Substance and Sexual Activity   • Alcohol use: Never   • Drug use: Never   • Sexual activity: Not Currently     Partners: Male     Birth control/protection: Diaphragm, Hysterectomy       Family History   Problem Relation Age of Onset   • Heart disease Mother    • Heart attack Mother    • Heart disease Father         Stroke       The following portions of the patient's history were reviewed and updated as appropriate: allergies, current medications, past family history, past medical history, past social history, past surgical history and problem list.    Review of Systems   Constitutional: Negative for chills, diaphoresis and malaise/fatigue.   Cardiovascular:  Positive for irregular heartbeat and palpitations. Negative for chest pain, dyspnea on exertion, leg swelling, near-syncope, orthopnea, paroxysmal nocturnal dyspnea and syncope.   Respiratory:  Negative for cough, shortness of breath, sleep disturbances due to breathing and sputum production.    Gastrointestinal:  Negative for change in bowel habit.  "  Genitourinary:  Negative for urgency.   Neurological:  Negative for dizziness and headaches.   Psychiatric/Behavioral:  Negative for altered mental status.        Pertinent items are noted in HPI, all other systems reviewed and negative    /91 (BP Location: Left arm, Patient Position: Sitting, Cuff Size: Large Adult)   Pulse 111   Resp 18   Ht 165.1 cm (65\")   Wt 130 kg (286 lb)   SpO2 97%   BMI 47.59 kg/m² .  Objective     Constitutional:       Appearance: Not in distress.   Neck:      Vascular: JVD normal.   Pulmonary:      Effort: Pulmonary effort is normal.      Breath sounds: Normal breath sounds.   Cardiovascular:      Normal rate. Irregular rhythm.   Pulses:     Intact distal pulses.   Edema:     Peripheral edema absent.   Abdominal:      General: Bowel sounds are normal.      Palpations: Abdomen is soft.   Musculoskeletal: Normal range of motion. Skin:     General: Skin is warm and dry.   Neurological:      General: No focal deficit present.      Mental Status: Oriented to person, place and time.           ECG 12 Lead    Date/Time: 11/3/2023 9:09 AM  Performed by: Ara Ambriz APRN    Authorized by: Ara Ambriz APRN  Comparison: not compared with previous ECG   Previous ECG: no previous ECG available  Rhythm: atrial fibrillation  Rate: tachycardic  BPM: 111  Conduction: left anterior fascicular block        EKG ordered by and reviewed by me in office             Kiki Perdomo  reports that she has never smoked. She has been exposed to tobacco smoke. She has never used smokeless tobacco.. I have educated her on the risk of diseases from using tobacco products such as cancer, COPD, and heart disease.       "

## 2023-11-03 NOTE — H&P (VIEW-ONLY)
Cardiology Office Follow Up Visit      Primary Care Provider:  Smita Mayo APRN    Reason for f/u:     Hospital follow up    Subjective     CC:    Hospital follow up    History of Present Illness       Kiki Perdomo is a 68 y.o. female who is a patient of Dr. Gudino. Pmh includes obesity, HTN, HLD, DM, shortness of breath, recent diagnosis of alpha gal syndrome.   Stress test 2019 no ischemia      She presented to hospital with shortness of breath. She was found to be in atrial fibrillation which was new for her. She was started on amiodarone and xarelto and discharged with a plan for [facility]A DCCV as outpt in 4-6 weeks. ECHO at that time showed normal LVEF, dilated atria.    Ms Perdomo presented for follow up today. Since discharge she has experienced some chest pressure and palpitations. She is noted to be in atrial fibrillation still. She states she was started on amiodarone after hospitalization but has not felt well on it and would like to stop it. She is on Xarelto for anticoagulation. No bleeding issues.       ASSESSMENT/PLAN:    Hospital Discharge follow up  Atrial Fibrillation, chads vasc at least 3- on Xarelto  HTN  HLD  Normal LVEF, diastolic dysfunction      MEDICAL DECISION MAKING:  Patient presents today for hospital discharge follow up for admission with atrial fibrillation. She remains in atrial fibrillation. She reports she is feeling as if she is intolerant to the amiodarone. We discussed LAURA DCCV. She is agreeable. Continue anticoagulation. Will arrange for LAURA DCCV Tuesday before Thanksgiving per patient request. She is on beta blockers.          Past Medical History:   Diagnosis Date   • Abnormal ECG 09/19/2023   • Allergy to alpha-gal    • Anxiety and depression    • Arthritis     mild   • Atrial fibrillation 09/19/2023   • Chronic back pain    • Chronic fatigue syndrome    • Diabetes    • Fibromyalgia    • GERD (gastroesophageal reflux disease)    • Hyperlipidemia    • Hypertension    •  Nausea    • Neuropathy    • Restless legs    • Uterine cancer     remission       Past Surgical History:   Procedure Laterality Date   • CARDIAC CATHETERIZATION  2005   • CHOLECYSTECTOMY     • COLONOSCOPY     • ENDOSCOPY N/A 09/07/2021    Procedure: ESOPHAGOGASTRODUODENOSCOPY with dilation (bougie # 50);  Surgeon: Gordon Morales MD;  Location: [facility] ENDOSCOPY;  Service: Gastroenterology;  Laterality: N/A;  Post Op: gastritis, hiatal hernia, esophageal ring   • ENDOSCOPY N/A 02/22/2023    Procedure: ESOPHAGOGASTRODUODENOSCOPY with gastric biopsy's;  Surgeon: Gordon Morales MD;  Location: [facility] ENDOSCOPY;  Service: Gastroenterology;  Laterality: N/A;  gastritis   • HYSTERECTOMY     • SIGMOIDOSCOPY N/A 09/07/2021    Procedure: FLEX SIGMOIDOSCOPY with polypectomy and cautery of rectal polyp;  Surgeon: Gordon Morales MD;  Location: [facility] ENDOSCOPY;  Service: Gastroenterology;  Laterality: N/A;  Post Op: rectal polyp         Current Outpatient Medications:   •  atorvastatin (LIPITOR) 40 MG tablet, Take 1 tablet by mouth Daily., Disp: , Rfl:   •  butalbital-acetaminophen-caffeine (FIORICET, ESGIC) -40 MG per tablet, Take 1 tablet by mouth Every 4 (Four) Hours As Needed for Migraine., Disp: , Rfl:   •  citalopram (CeleXA) 20 MG tablet, Take 1 tablet by mouth Daily., Disp: , Rfl:   •  esomeprazole (nexIUM) 40 MG capsule, Take 1 capsule by mouth Every Morning Before Breakfast., Disp: , Rfl:   •  gabapentin (NEURONTIN) 300 MG capsule, Take 1 capsule by mouth 3 (Three) Times a Day., Disp: , Rfl:   •  lisinopril (PRINIVIL,ZESTRIL) 40 MG tablet, Take 1 tablet by mouth Daily., Disp: , Rfl:   •  magnesium oxide (MAG-OX) 400 MG tablet, Take 1 tablet by mouth Daily., Disp: , Rfl:   •  metoprolol succinate XL (TOPROL-XL) 25 MG 24 hr tablet, Take 1 tablet by mouth Daily., Disp: 90 tablet, Rfl: 1  •  ondansetron ODT (ZOFRAN-ODT) 4 MG disintegrating tablet, Place 1 tablet on the tongue Every 8 (Eight)  Hours As Needed for Nausea., Disp: 8 tablet, Rfl: 0  •  rivaroxaban (XARELTO) 20 MG tablet, Take 1 tablet by mouth Daily. Indications: Atrial Fibrillation, Disp: 30 tablet, Rfl: 0  •  Rybelsus 7 MG tablet, Take 7 mg by mouth Daily., Disp: , Rfl:   •  tiZANidine (ZANAFLEX) 2 MG tablet, Take 1 tablet by mouth 3 (Three) Times a Day., Disp: , Rfl:   •  Vibegron (Gemtesa) 75 MG tablet, Take 1 tablet by mouth Daily., Disp: , Rfl:   •  vitamin D (ERGOCALCIFEROL) 1.25 MG (38086 UT) capsule capsule, Take 1 capsule by mouth 1 (One) Time Per Week. wednesday, Disp: , Rfl:   •  oxyCODONE-acetaminophen (PERCOCET)  MG per tablet, Take 1 tablet by mouth Every 6 (Six) Hours As Needed for Moderate Pain., Disp: , Rfl:     Social History     Socioeconomic History   • Marital status:    Tobacco Use   • Smoking status: Never     Passive exposure: Current   • Smokeless tobacco: Never   Vaping Use   • Vaping Use: Never used   Substance and Sexual Activity   • Alcohol use: Never   • Drug use: Never   • Sexual activity: Not Currently     Partners: Male     Birth control/protection: Diaphragm, Hysterectomy       Family History   Problem Relation Age of Onset   • Heart disease Mother    • Heart attack Mother    • Heart disease Father         Stroke       The following portions of the patient's history were reviewed and updated as appropriate: allergies, current medications, past family history, past medical history, past social history, past surgical history and problem list.    Review of Systems   Constitutional: Negative for chills, diaphoresis and malaise/fatigue.   Cardiovascular:  Positive for irregular heartbeat and palpitations. Negative for chest pain, dyspnea on exertion, leg swelling, near-syncope, orthopnea, paroxysmal nocturnal dyspnea and syncope.   Respiratory:  Negative for cough, shortness of breath, sleep disturbances due to breathing and sputum production.    Gastrointestinal:  Negative for change in bowel habit.  "  Genitourinary:  Negative for urgency.   Neurological:  Negative for dizziness and headaches.   Psychiatric/Behavioral:  Negative for altered mental status.        Pertinent items are noted in HPI, all other systems reviewed and negative    /91 (BP Location: Left arm, Patient Position: Sitting, Cuff Size: Large Adult)   Pulse 111   Resp 18   Ht 165.1 cm (65\")   Wt 130 kg (286 lb)   SpO2 97%   BMI 47.59 kg/m² .  Objective     Constitutional:       Appearance: Not in distress.   Neck:      Vascular: JVD normal.   Pulmonary:      Effort: Pulmonary effort is normal.      Breath sounds: Normal breath sounds.   Cardiovascular:      Normal rate. Irregular rhythm.   Pulses:     Intact distal pulses.   Edema:     Peripheral edema absent.   Abdominal:      General: Bowel sounds are normal.      Palpations: Abdomen is soft.   Musculoskeletal: Normal range of motion. Skin:     General: Skin is warm and dry.   Neurological:      General: No focal deficit present.      Mental Status: Oriented to person, place and time.           ECG 12 Lead    Date/Time: 11/3/2023 9:09 AM  Performed by: Ara Ambriz APRN    Authorized by: Ara Ambriz APRN  Comparison: not compared with previous ECG   Previous ECG: no previous ECG available  Rhythm: atrial fibrillation  Rate: tachycardic  BPM: 111  Conduction: left anterior fascicular block        EKG ordered by and reviewed by me in office             Kiki Perdomo  reports that she has never smoked. She has been exposed to tobacco smoke. She has never used smokeless tobacco.. I have educated her on the risk of diseases from using tobacco products such as cancer, COPD, and heart disease.       "

## 2023-11-20 ENCOUNTER — TELEPHONE (OUTPATIENT)
Dept: CARDIOLOGY | Facility: CLINIC | Age: 68
End: 2023-11-20

## 2023-11-20 DIAGNOSIS — I48.0 PAROXYSMAL A-FIB: Primary | ICD-10-CM

## 2023-11-20 RX ORDER — OXYCODONE AND ACETAMINOPHEN 10; 325 MG/1; MG/1
1 TABLET ORAL EVERY 6 HOURS PRN
COMMUNITY

## 2023-11-20 NOTE — TELEPHONE ENCOUNTER
Caller: Kiki Perdomo    Relationship: Self    Best call back number: [phone]    PATIENT HAS AN ABLATION SCHEDULED 11.21.23  SHE WENT TO THE HOSPITAL OVER THE WEEKEND TO HAVE HER LABS DONE HOWEVER, THERE WERE NEVER ANY ORDERS PUT IN, SO SHE WAS NOT ABLE TO HAVE THAT COMPLETED. PLEASE CALL THE PATIENT AND ADVISE

## 2023-11-21 ENCOUNTER — HOSPITAL ENCOUNTER (OUTPATIENT)
Dept: CARDIOLOGY | Facility: HOSPITAL | Age: 68
Discharge: HOME | End: 2023-11-21
Admitting: INTERNAL MEDICINE
Payer: MEDICARE

## 2023-11-21 ENCOUNTER — ANESTHESIA (OUTPATIENT)
Dept: CARDIOLOGY | Facility: HOSPITAL | Age: 68
End: 2023-11-21

## 2023-11-21 ENCOUNTER — ANESTHESIA EVENT (OUTPATIENT)
Dept: CARDIOLOGY | Facility: HOSPITAL | Age: 68
End: 2023-11-21

## 2023-11-21 DIAGNOSIS — I48.91 ATRIAL FIBRILLATION, CONTROLLED: ICD-10-CM

## 2023-11-21 DIAGNOSIS — I48.0 PAROXYSMAL ATRIAL FIBRILLATION: ICD-10-CM

## 2023-11-21 LAB
QT INTERVAL: 431 MS
QTC INTERVAL: 438 MS

## 2023-11-21 PROCEDURE — 93005 ELECTROCARDIOGRAM TRACING: CPT | Performed by: INTERNAL MEDICINE

## 2023-11-21 NOTE — NURSING NOTE
Patient arrived to area for LAURA/CV. Patient was hooked to heart monitor. Patient was in normal sinus rhythm. EKG obtained and MD notified. MD spoke with patient and procedure was cancelled.

## 2023-11-21 NOTE — ANESTHESIA PREPROCEDURE EVALUATION
Anesthesia Evaluation     NPO Solid Status: > 8 hours  NPO Liquid Status: > 8 hours           Airway   Mallampati: II  TM distance: >3 FB  Neck ROM: full  No difficulty expected  Dental - normal exam     Pulmonary - normal exam   (+) asthma,shortness of breath  Cardiovascular - normal exam    (+) hypertension well controlled, dysrhythmias Atrial Fib, hyperlipidemia      Neuro/Psych  (+) numbness, psychiatric history Anxiety and Depression  GI/Hepatic/Renal/Endo    (+) morbid obesity, GERD well controlled, diabetes mellitus type 2    Musculoskeletal     Abdominal  - normal exam    Bowel sounds: normal.   Substance History      OB/GYN          Other   arthritis,   history of cancer                  Anesthesia Plan    ASA 4     MAC   total IV anesthesia  intravenous induction     Anesthetic plan, risks, benefits, and alternatives have been provided, discussed and informed consent has been obtained with: patient.  Pre-procedure education provided  Plan discussed with CRNA.    CODE STATUS:

## 2023-11-30 LAB
QT INTERVAL: 431 MS
QTC INTERVAL: 438 MS

## 2023-12-19 ENCOUNTER — HOSPITAL ENCOUNTER (OUTPATIENT)
Facility: HOSPITAL | Age: 68
Setting detail: OBSERVATION
Discharge: HOME | End: 2023-12-21
Attending: EMERGENCY MEDICINE | Admitting: EMERGENCY MEDICINE
Payer: MEDICARE

## 2023-12-19 ENCOUNTER — APPOINTMENT (OUTPATIENT)
Dept: CT IMAGING | Facility: HOSPITAL | Age: 68
End: 2023-12-19
Payer: MEDICARE

## 2023-12-19 ENCOUNTER — APPOINTMENT (OUTPATIENT)
Dept: GENERAL RADIOLOGY | Facility: HOSPITAL | Age: 68
End: 2023-12-19
Payer: MEDICARE

## 2023-12-19 DIAGNOSIS — R74.8 ELEVATED LIVER ENZYMES: Primary | ICD-10-CM

## 2023-12-19 DIAGNOSIS — N39.0 URINARY TRACT INFECTION WITHOUT HEMATURIA, SITE UNSPECIFIED: ICD-10-CM

## 2023-12-19 LAB
ALBUMIN SERPL-MCNC: 3.3 G/DL (ref 3.5–5.2)
ALBUMIN/GLOB SERPL: 1.2 G/DL
ALP SERPL-CCNC: 250 U/L (ref 39–117)
ALT SERPL W P-5'-P-CCNC: 232 U/L (ref 1–33)
ANION GAP SERPL CALCULATED.3IONS-SCNC: 13 MMOL/L (ref 5–15)
APAP SERPL-MCNC: <5 MCG/ML (ref 0–30)
AST SERPL-CCNC: 197 U/L (ref 1–32)
BACTERIA UR QL AUTO: ABNORMAL /HPF
BACTERIA UR QL AUTO: ABNORMAL /HPF
BASOPHILS # BLD AUTO: 0 10*3/MM3 (ref 0–0.2)
BASOPHILS NFR BLD AUTO: 0.3 % (ref 0–1.5)
BILIRUB SERPL-MCNC: 1.4 MG/DL (ref 0–1.2)
BILIRUB UR QL STRIP: ABNORMAL
BILIRUB UR QL STRIP: ABNORMAL
BUN SERPL-MCNC: 15 MG/DL (ref 8–23)
BUN/CREAT SERPL: 15.5 (ref 7–25)
CALCIUM SPEC-SCNC: 9.1 MG/DL (ref 8.6–10.5)
CHLORIDE SERPL-SCNC: 96 MMOL/L (ref 98–107)
CLARITY UR: ABNORMAL
CLARITY UR: CLEAR
CO2 SERPL-SCNC: 29 MMOL/L (ref 22–29)
COLOR UR: ABNORMAL
COLOR UR: ABNORMAL
CREAT SERPL-MCNC: 0.97 MG/DL (ref 0.57–1)
DEPRECATED RDW RBC AUTO: 47.3 FL (ref 37–54)
EGFRCR SERPLBLD CKD-EPI 2021: 64.2 ML/MIN/1.73
EOSINOPHIL # BLD AUTO: 0.1 10*3/MM3 (ref 0–0.4)
EOSINOPHIL NFR BLD AUTO: 1.4 % (ref 0.3–6.2)
ERYTHROCYTE [DISTWIDTH] IN BLOOD BY AUTOMATED COUNT: 14.1 % (ref 12.3–15.4)
GLOBULIN UR ELPH-MCNC: 2.7 GM/DL
GLUCOSE SERPL-MCNC: 146 MG/DL (ref 65–99)
GLUCOSE UR STRIP-MCNC: NEGATIVE MG/DL
GLUCOSE UR STRIP-MCNC: NEGATIVE MG/DL
HAV IGM SERPL QL IA: NORMAL
HBV CORE IGM SERPL QL IA: NORMAL
HBV SURFACE AG SERPL QL IA: NORMAL
HCT VFR BLD AUTO: 40.9 % (ref 34–46.6)
HCV AB SER DONR QL: NORMAL
HGB BLD-MCNC: 13.3 G/DL (ref 12–15.9)
HGB UR QL STRIP.AUTO: NEGATIVE
HGB UR QL STRIP.AUTO: NEGATIVE
HOLD SPECIMEN: NORMAL
HYALINE CASTS UR QL AUTO: ABNORMAL /LPF
HYALINE CASTS UR QL AUTO: ABNORMAL /LPF
KETONES UR QL STRIP: ABNORMAL
KETONES UR QL STRIP: ABNORMAL
LEUKOCYTE ESTERASE UR QL STRIP.AUTO: ABNORMAL
LEUKOCYTE ESTERASE UR QL STRIP.AUTO: ABNORMAL
LIPASE SERPL-CCNC: 9 U/L (ref 13–60)
LYMPHOCYTES # BLD AUTO: 0.8 10*3/MM3 (ref 0.7–3.1)
LYMPHOCYTES NFR BLD AUTO: 14 % (ref 19.6–45.3)
MCH RBC QN AUTO: 30.7 PG (ref 26.6–33)
MCHC RBC AUTO-ENTMCNC: 32.5 G/DL (ref 31.5–35.7)
MCV RBC AUTO: 94.7 FL (ref 79–97)
MONOCYTES # BLD AUTO: 0.3 10*3/MM3 (ref 0.1–0.9)
MONOCYTES NFR BLD AUTO: 5.9 % (ref 5–12)
MUCOUS THREADS URNS QL MICRO: ABNORMAL /HPF
NEUTROPHILS NFR BLD AUTO: 4.6 10*3/MM3 (ref 1.7–7)
NEUTROPHILS NFR BLD AUTO: 78.4 % (ref 42.7–76)
NITRITE UR QL STRIP: POSITIVE
NITRITE UR QL STRIP: POSITIVE
NRBC BLD AUTO-RTO: 0 /100 WBC (ref 0–0.2)
PH UR STRIP.AUTO: 5.5 [PH] (ref 5–8)
PH UR STRIP.AUTO: 6 [PH] (ref 5–8)
PLATELET # BLD AUTO: 192 10*3/MM3 (ref 140–450)
PMV BLD AUTO: 9.9 FL (ref 6–12)
POTASSIUM SERPL-SCNC: 4.3 MMOL/L (ref 3.5–5.2)
PROT SERPL-MCNC: 6 G/DL (ref 6–8.5)
PROT UR QL STRIP: ABNORMAL
PROT UR QL STRIP: ABNORMAL
RBC # BLD AUTO: 4.31 10*6/MM3 (ref 3.77–5.28)
RBC # UR STRIP: ABNORMAL /HPF
RBC # UR STRIP: ABNORMAL /HPF
REF LAB TEST METHOD: ABNORMAL
REF LAB TEST METHOD: ABNORMAL
SARS-COV-2 RNA RESP QL NAA+PROBE: NOT DETECTED
SODIUM SERPL-SCNC: 138 MMOL/L (ref 136–145)
SP GR UR STRIP: 1.02 (ref 1–1.03)
SP GR UR STRIP: 1.04 (ref 1–1.03)
SQUAMOUS #/AREA URNS HPF: ABNORMAL /HPF
SQUAMOUS #/AREA URNS HPF: ABNORMAL /HPF
TRANS CELLS #/AREA URNS HPF: ABNORMAL /HPF
UROBILINOGEN UR QL STRIP: ABNORMAL
UROBILINOGEN UR QL STRIP: ABNORMAL
WBC # UR STRIP: ABNORMAL /HPF
WBC # UR STRIP: ABNORMAL /HPF
WBC NRBC COR # BLD AUTO: 5.8 10*3/MM3 (ref 3.4–10.8)
WHOLE BLOOD HOLD COAG: NORMAL
WHOLE BLOOD HOLD SPECIMEN: NORMAL

## 2023-12-19 PROCEDURE — 25010000002 CEFTRIAXONE PER 250 MG: Performed by: EMERGENCY MEDICINE

## 2023-12-19 PROCEDURE — G0378 HOSPITAL OBSERVATION PER HR: HCPCS

## 2023-12-19 PROCEDURE — 74176 CT ABD & PELVIS W/O CONTRAST: CPT

## 2023-12-19 PROCEDURE — 36415 COLL VENOUS BLD VENIPUNCTURE: CPT

## 2023-12-19 PROCEDURE — 81001 URINALYSIS AUTO W/SCOPE: CPT | Performed by: EMERGENCY MEDICINE

## 2023-12-19 PROCEDURE — 80053 COMPREHEN METABOLIC PANEL: CPT | Performed by: EMERGENCY MEDICINE

## 2023-12-19 PROCEDURE — 96365 THER/PROPH/DIAG IV INF INIT: CPT

## 2023-12-19 PROCEDURE — 87635 SARS-COV-2 COVID-19 AMP PRB: CPT | Performed by: EMERGENCY MEDICINE

## 2023-12-19 PROCEDURE — 96361 HYDRATE IV INFUSION ADD-ON: CPT

## 2023-12-19 PROCEDURE — 71045 X-RAY EXAM CHEST 1 VIEW: CPT

## 2023-12-19 PROCEDURE — 83690 ASSAY OF LIPASE: CPT | Performed by: EMERGENCY MEDICINE

## 2023-12-19 PROCEDURE — 25810000003 LACTATED RINGERS PER 1000 ML: Performed by: EMERGENCY MEDICINE

## 2023-12-19 PROCEDURE — 85025 COMPLETE CBC W/AUTO DIFF WBC: CPT | Performed by: EMERGENCY MEDICINE

## 2023-12-19 PROCEDURE — P9612 CATHETERIZE FOR URINE SPEC: HCPCS

## 2023-12-19 PROCEDURE — 99284 EMERGENCY DEPT VISIT MOD MDM: CPT

## 2023-12-19 PROCEDURE — 80143 DRUG ASSAY ACETAMINOPHEN: CPT | Performed by: EMERGENCY MEDICINE

## 2023-12-19 PROCEDURE — 80074 ACUTE HEPATITIS PANEL: CPT | Performed by: EMERGENCY MEDICINE

## 2023-12-19 RX ORDER — POLYETHYLENE GLYCOL 3350 17 G/17G
17 POWDER, FOR SOLUTION ORAL DAILY PRN
Status: DISCONTINUED | OUTPATIENT
Start: 2023-12-19 | End: 2023-12-21 | Stop reason: HOSPADM

## 2023-12-19 RX ORDER — OXYCODONE HYDROCHLORIDE 5 MG/1
10 TABLET ORAL ONCE
Status: COMPLETED | OUTPATIENT
Start: 2023-12-19 | End: 2023-12-19

## 2023-12-19 RX ORDER — SODIUM CHLORIDE 0.9 % (FLUSH) 0.9 %
10 SYRINGE (ML) INJECTION AS NEEDED
Status: DISCONTINUED | OUTPATIENT
Start: 2023-12-19 | End: 2023-12-21 | Stop reason: HOSPADM

## 2023-12-19 RX ORDER — BISACODYL 5 MG/1
5 TABLET, DELAYED RELEASE ORAL DAILY PRN
Status: DISCONTINUED | OUTPATIENT
Start: 2023-12-19 | End: 2023-12-21 | Stop reason: HOSPADM

## 2023-12-19 RX ORDER — BISACODYL 10 MG
10 SUPPOSITORY, RECTAL RECTAL DAILY PRN
Status: DISCONTINUED | OUTPATIENT
Start: 2023-12-19 | End: 2023-12-21 | Stop reason: HOSPADM

## 2023-12-19 RX ORDER — CHOLECALCIFEROL (VITAMIN D3) 125 MCG
5 CAPSULE ORAL NIGHTLY PRN
Status: DISCONTINUED | OUTPATIENT
Start: 2023-12-19 | End: 2023-12-21 | Stop reason: HOSPADM

## 2023-12-19 RX ORDER — ONDANSETRON 2 MG/ML
4 INJECTION INTRAMUSCULAR; INTRAVENOUS EVERY 6 HOURS PRN
Status: DISCONTINUED | OUTPATIENT
Start: 2023-12-19 | End: 2023-12-20 | Stop reason: SDUPTHER

## 2023-12-19 RX ORDER — ACETAMINOPHEN 325 MG/1
650 TABLET ORAL EVERY 4 HOURS PRN
Status: DISCONTINUED | OUTPATIENT
Start: 2023-12-19 | End: 2023-12-21 | Stop reason: HOSPADM

## 2023-12-19 RX ORDER — AMOXICILLIN 250 MG
2 CAPSULE ORAL 2 TIMES DAILY
Status: DISCONTINUED | OUTPATIENT
Start: 2023-12-20 | End: 2023-12-21 | Stop reason: HOSPADM

## 2023-12-19 RX ORDER — SODIUM CHLORIDE 0.9 % (FLUSH) 0.9 %
10 SYRINGE (ML) INJECTION EVERY 12 HOURS SCHEDULED
Status: DISCONTINUED | OUTPATIENT
Start: 2023-12-19 | End: 2023-12-21 | Stop reason: HOSPADM

## 2023-12-19 RX ORDER — SODIUM CHLORIDE, SODIUM LACTATE, POTASSIUM CHLORIDE, CALCIUM CHLORIDE 600; 310; 30; 20 MG/100ML; MG/100ML; MG/100ML; MG/100ML
100 INJECTION, SOLUTION INTRAVENOUS CONTINUOUS
Status: DISCONTINUED | OUTPATIENT
Start: 2023-12-19 | End: 2023-12-21 | Stop reason: HOSPADM

## 2023-12-19 RX ORDER — SODIUM CHLORIDE, SODIUM LACTATE, POTASSIUM CHLORIDE, CALCIUM CHLORIDE 600; 310; 30; 20 MG/100ML; MG/100ML; MG/100ML; MG/100ML
125 INJECTION, SOLUTION INTRAVENOUS CONTINUOUS
Status: DISCONTINUED | OUTPATIENT
Start: 2023-12-19 | End: 2023-12-19

## 2023-12-19 RX ORDER — SODIUM CHLORIDE 9 MG/ML
40 INJECTION, SOLUTION INTRAVENOUS AS NEEDED
Status: DISCONTINUED | OUTPATIENT
Start: 2023-12-19 | End: 2023-12-21 | Stop reason: HOSPADM

## 2023-12-19 RX ADMIN — SODIUM CHLORIDE, POTASSIUM CHLORIDE, SODIUM LACTATE AND CALCIUM CHLORIDE 125 ML/HR: 600; 310; 30; 20 INJECTION, SOLUTION INTRAVENOUS at 19:27

## 2023-12-19 RX ADMIN — OXYCODONE HYDROCHLORIDE 10 MG: 5 TABLET ORAL at 22:12

## 2023-12-19 RX ADMIN — CEFTRIAXONE 2000 MG: 2 INJECTION, POWDER, FOR SOLUTION INTRAMUSCULAR; INTRAVENOUS at 22:57

## 2023-12-20 ENCOUNTER — APPOINTMENT (OUTPATIENT)
Dept: ULTRASOUND IMAGING | Facility: HOSPITAL | Age: 68
End: 2023-12-20
Payer: MEDICARE

## 2023-12-20 ENCOUNTER — ON CAMPUS - OUTPATIENT (AMBULATORY)
Dept: URBAN - METROPOLITAN AREA HOSPITAL 85 | Facility: HOSPITAL | Age: 68
End: 2023-12-20

## 2023-12-20 DIAGNOSIS — R74.01 ELEVATION OF LEVELS OF LIVER TRANSAMINASE LEVELS: ICD-10-CM

## 2023-12-20 DIAGNOSIS — R53.1 WEAKNESS: ICD-10-CM

## 2023-12-20 DIAGNOSIS — E11.9 TYPE 2 DIABETES MELLITUS WITHOUT COMPLICATIONS: ICD-10-CM

## 2023-12-20 DIAGNOSIS — K21.9 GASTRO-ESOPHAGEAL REFLUX DISEASE WITHOUT ESOPHAGITIS: ICD-10-CM

## 2023-12-20 LAB
ALBUMIN SERPL-MCNC: 3.1 G/DL (ref 3.5–5.2)
ALBUMIN/GLOB SERPL: 1.1 G/DL
ALP SERPL-CCNC: 240 U/L (ref 39–117)
ALPHA1 GLOB MFR UR ELPH: 204 MG/DL (ref 90–200)
ALT SERPL W P-5'-P-CCNC: 195 U/L (ref 1–33)
ANION GAP SERPL CALCULATED.3IONS-SCNC: 12 MMOL/L (ref 5–15)
AST SERPL-CCNC: 108 U/L (ref 1–32)
BASOPHILS # BLD AUTO: 0 10*3/MM3 (ref 0–0.2)
BASOPHILS NFR BLD AUTO: 0.4 % (ref 0–1.5)
BILIRUB SERPL-MCNC: 0.6 MG/DL (ref 0–1.2)
BUN SERPL-MCNC: 13 MG/DL (ref 8–23)
BUN/CREAT SERPL: 15.1 (ref 7–25)
CALCIUM SPEC-SCNC: 9.2 MG/DL (ref 8.6–10.5)
CERULOPLASMIN SERPL-MCNC: 24 MG/DL (ref 19–39)
CHLORIDE SERPL-SCNC: 98 MMOL/L (ref 98–107)
CK SERPL-CCNC: 54 U/L (ref 20–180)
CO2 SERPL-SCNC: 28 MMOL/L (ref 22–29)
CREAT SERPL-MCNC: 0.86 MG/DL (ref 0.57–1)
DEPRECATED RDW RBC AUTO: 48.1 FL (ref 37–54)
EGFRCR SERPLBLD CKD-EPI 2021: 74.2 ML/MIN/1.73
EOSINOPHIL # BLD AUTO: 0.1 10*3/MM3 (ref 0–0.4)
EOSINOPHIL NFR BLD AUTO: 2.5 % (ref 0.3–6.2)
ERYTHROCYTE [DISTWIDTH] IN BLOOD BY AUTOMATED COUNT: 14 % (ref 12.3–15.4)
FERRITIN SERPL-MCNC: 267.8 NG/ML (ref 13–150)
GGT SERPL-CCNC: 410 U/L (ref 5–36)
GLOBULIN UR ELPH-MCNC: 2.9 GM/DL
GLUCOSE BLDC GLUCOMTR-MCNC: 127 MG/DL (ref 70–105)
GLUCOSE BLDC GLUCOMTR-MCNC: 134 MG/DL (ref 70–105)
GLUCOSE BLDC GLUCOMTR-MCNC: 145 MG/DL (ref 70–105)
GLUCOSE SERPL-MCNC: 111 MG/DL (ref 65–99)
HCT VFR BLD AUTO: 41.6 % (ref 34–46.6)
HGB BLD-MCNC: 13.4 G/DL (ref 12–15.9)
IRON 24H UR-MRATE: 36 MCG/DL (ref 37–145)
LYMPHOCYTES # BLD AUTO: 1.6 10*3/MM3 (ref 0.7–3.1)
LYMPHOCYTES NFR BLD AUTO: 29.2 % (ref 19.6–45.3)
MCH RBC QN AUTO: 29.8 PG (ref 26.6–33)
MCHC RBC AUTO-ENTMCNC: 32.3 G/DL (ref 31.5–35.7)
MCV RBC AUTO: 92.3 FL (ref 79–97)
MONOCYTES # BLD AUTO: 0.4 10*3/MM3 (ref 0.1–0.9)
MONOCYTES NFR BLD AUTO: 8.3 % (ref 5–12)
NEUTROPHILS NFR BLD AUTO: 3.2 10*3/MM3 (ref 1.7–7)
NEUTROPHILS NFR BLD AUTO: 59.6 % (ref 42.7–76)
NRBC BLD AUTO-RTO: 0 /100 WBC (ref 0–0.2)
PLATELET # BLD AUTO: 187 10*3/MM3 (ref 140–450)
PMV BLD AUTO: 10 FL (ref 6–12)
POTASSIUM SERPL-SCNC: 3.5 MMOL/L (ref 3.5–5.2)
PROT SERPL-MCNC: 6 G/DL (ref 6–8.5)
QT INTERVAL: 364 MS
QTC INTERVAL: 492 MS
RBC # BLD AUTO: 4.5 10*6/MM3 (ref 3.77–5.28)
SODIUM SERPL-SCNC: 138 MMOL/L (ref 136–145)
WBC NRBC COR # BLD AUTO: 5.4 10*3/MM3 (ref 3.4–10.8)

## 2023-12-20 PROCEDURE — 85025 COMPLETE CBC W/AUTO DIFF WBC: CPT | Performed by: EMERGENCY MEDICINE

## 2023-12-20 PROCEDURE — 86015 ACTIN ANTIBODY EACH: CPT | Performed by: NURSE PRACTITIONER

## 2023-12-20 PROCEDURE — 82103 ALPHA-1-ANTITRYPSIN TOTAL: CPT | Performed by: NURSE PRACTITIONER

## 2023-12-20 PROCEDURE — 25010000002 PROCHLORPERAZINE 10 MG/2ML SOLUTION: Performed by: PHYSICIAN ASSISTANT

## 2023-12-20 PROCEDURE — 86665 EPSTEIN-BARR CAPSID VCA: CPT | Performed by: NURSE PRACTITIONER

## 2023-12-20 PROCEDURE — 82948 REAGENT STRIP/BLOOD GLUCOSE: CPT

## 2023-12-20 PROCEDURE — 99212 OFFICE O/P EST SF 10 MIN: CPT | Performed by: NURSE PRACTITIONER

## 2023-12-20 PROCEDURE — 80053 COMPREHEN METABOLIC PANEL: CPT | Performed by: EMERGENCY MEDICINE

## 2023-12-20 PROCEDURE — 96375 TX/PRO/DX INJ NEW DRUG ADDON: CPT

## 2023-12-20 PROCEDURE — 93010 ELECTROCARDIOGRAM REPORT: CPT | Performed by: INTERNAL MEDICINE

## 2023-12-20 PROCEDURE — 86376 MICROSOMAL ANTIBODY EACH: CPT | Performed by: NURSE PRACTITIONER

## 2023-12-20 PROCEDURE — 83540 ASSAY OF IRON: CPT | Performed by: NURSE PRACTITIONER

## 2023-12-20 PROCEDURE — 93005 ELECTROCARDIOGRAM TRACING: CPT | Performed by: PHYSICIAN ASSISTANT

## 2023-12-20 PROCEDURE — 25010000002 CEFTRIAXONE PER 250 MG: Performed by: PHYSICIAN ASSISTANT

## 2023-12-20 PROCEDURE — 82550 ASSAY OF CK (CPK): CPT | Performed by: NURSE PRACTITIONER

## 2023-12-20 PROCEDURE — 96376 TX/PRO/DX INJ SAME DRUG ADON: CPT

## 2023-12-20 PROCEDURE — 82390 ASSAY OF CERULOPLASMIN: CPT | Performed by: NURSE PRACTITIONER

## 2023-12-20 PROCEDURE — 86645 CMV ANTIBODY IGM: CPT | Performed by: NURSE PRACTITIONER

## 2023-12-20 PROCEDURE — 76705 ECHO EXAM OF ABDOMEN: CPT

## 2023-12-20 PROCEDURE — 96366 THER/PROPH/DIAG IV INF ADDON: CPT

## 2023-12-20 PROCEDURE — 82728 ASSAY OF FERRITIN: CPT | Performed by: NURSE PRACTITIONER

## 2023-12-20 PROCEDURE — 25010000002 ONDANSETRON PER 1 MG: Performed by: PHYSICIAN ASSISTANT

## 2023-12-20 PROCEDURE — G0378 HOSPITAL OBSERVATION PER HR: HCPCS

## 2023-12-20 PROCEDURE — 25010000002 ONDANSETRON PER 1 MG: Performed by: EMERGENCY MEDICINE

## 2023-12-20 PROCEDURE — 25810000003 LACTATED RINGERS PER 1000 ML: Performed by: EMERGENCY MEDICINE

## 2023-12-20 PROCEDURE — 86381 MITOCHONDRIAL ANTIBODY EACH: CPT | Performed by: NURSE PRACTITIONER

## 2023-12-20 PROCEDURE — 86038 ANTINUCLEAR ANTIBODIES: CPT | Performed by: NURSE PRACTITIONER

## 2023-12-20 PROCEDURE — 82977 ASSAY OF GGT: CPT | Performed by: NURSE PRACTITIONER

## 2023-12-20 RX ORDER — ACETAMINOPHEN 325 MG/1
650 TABLET ORAL EVERY 4 HOURS PRN
Status: DISCONTINUED | OUTPATIENT
Start: 2023-12-20 | End: 2023-12-20 | Stop reason: SDUPTHER

## 2023-12-20 RX ORDER — DEXTROSE MONOHYDRATE 25 G/50ML
25 INJECTION, SOLUTION INTRAVENOUS
Status: DISCONTINUED | OUTPATIENT
Start: 2023-12-20 | End: 2023-12-21 | Stop reason: HOSPADM

## 2023-12-20 RX ORDER — METOPROLOL SUCCINATE 25 MG/1
25 TABLET, EXTENDED RELEASE ORAL DAILY
Status: DISCONTINUED | OUTPATIENT
Start: 2023-12-20 | End: 2023-12-21 | Stop reason: HOSPADM

## 2023-12-20 RX ORDER — IBUPROFEN 600 MG/1
1 TABLET ORAL
Status: DISCONTINUED | OUTPATIENT
Start: 2023-12-20 | End: 2023-12-21 | Stop reason: HOSPADM

## 2023-12-20 RX ORDER — LISINOPRIL 20 MG/1
40 TABLET ORAL DAILY
Status: DISCONTINUED | OUTPATIENT
Start: 2023-12-20 | End: 2023-12-21 | Stop reason: HOSPADM

## 2023-12-20 RX ORDER — SODIUM CHLORIDE 0.9 % (FLUSH) 0.9 %
10 SYRINGE (ML) INJECTION EVERY 12 HOURS SCHEDULED
Status: DISCONTINUED | OUTPATIENT
Start: 2023-12-20 | End: 2023-12-21 | Stop reason: HOSPADM

## 2023-12-20 RX ORDER — CITALOPRAM 20 MG/1
20 TABLET ORAL DAILY
Status: DISCONTINUED | OUTPATIENT
Start: 2023-12-20 | End: 2023-12-21 | Stop reason: HOSPADM

## 2023-12-20 RX ORDER — OXYCODONE HYDROCHLORIDE 5 MG/1
10 TABLET ORAL ONCE
Status: COMPLETED | OUTPATIENT
Start: 2023-12-20 | End: 2023-12-20

## 2023-12-20 RX ORDER — INSULIN LISPRO 100 [IU]/ML
2-7 INJECTION, SOLUTION INTRAVENOUS; SUBCUTANEOUS
Status: DISCONTINUED | OUTPATIENT
Start: 2023-12-20 | End: 2023-12-21 | Stop reason: HOSPADM

## 2023-12-20 RX ORDER — ONDANSETRON 4 MG/1
4 TABLET, FILM COATED ORAL EVERY 6 HOURS PRN
Status: DISCONTINUED | OUTPATIENT
Start: 2023-12-20 | End: 2023-12-21 | Stop reason: HOSPADM

## 2023-12-20 RX ORDER — OXYCODONE HYDROCHLORIDE 5 MG/1
10 TABLET ORAL EVERY 4 HOURS PRN
Status: DISCONTINUED | OUTPATIENT
Start: 2023-12-20 | End: 2023-12-21 | Stop reason: HOSPADM

## 2023-12-20 RX ORDER — SODIUM CHLORIDE 0.9 % (FLUSH) 0.9 %
10 SYRINGE (ML) INJECTION AS NEEDED
Status: DISCONTINUED | OUTPATIENT
Start: 2023-12-20 | End: 2023-12-20

## 2023-12-20 RX ORDER — ATORVASTATIN CALCIUM 40 MG/1
40 TABLET, FILM COATED ORAL NIGHTLY
Status: DISCONTINUED | OUTPATIENT
Start: 2023-12-20 | End: 2023-12-21 | Stop reason: HOSPADM

## 2023-12-20 RX ORDER — ALUMINA, MAGNESIA, AND SIMETHICONE 2400; 2400; 240 MG/30ML; MG/30ML; MG/30ML
15 SUSPENSION ORAL EVERY 6 HOURS PRN
Status: DISCONTINUED | OUTPATIENT
Start: 2023-12-20 | End: 2023-12-21 | Stop reason: HOSPADM

## 2023-12-20 RX ORDER — NICOTINE POLACRILEX 4 MG
15 LOZENGE BUCCAL
Status: DISCONTINUED | OUTPATIENT
Start: 2023-12-20 | End: 2023-12-21 | Stop reason: HOSPADM

## 2023-12-20 RX ORDER — SODIUM CHLORIDE 9 MG/ML
40 INJECTION, SOLUTION INTRAVENOUS AS NEEDED
Status: DISCONTINUED | OUTPATIENT
Start: 2023-12-20 | End: 2023-12-20

## 2023-12-20 RX ORDER — GABAPENTIN 300 MG/1
300 CAPSULE ORAL 3 TIMES DAILY
Status: DISCONTINUED | OUTPATIENT
Start: 2023-12-20 | End: 2023-12-21 | Stop reason: HOSPADM

## 2023-12-20 RX ORDER — OXYCODONE HYDROCHLORIDE 5 MG/1
10 TABLET ORAL EVERY 4 HOURS PRN
Status: DISCONTINUED | OUTPATIENT
Start: 2023-12-20 | End: 2023-12-20

## 2023-12-20 RX ORDER — ONDANSETRON 2 MG/ML
4 INJECTION INTRAMUSCULAR; INTRAVENOUS EVERY 6 HOURS PRN
Status: DISCONTINUED | OUTPATIENT
Start: 2023-12-20 | End: 2023-12-21 | Stop reason: HOSPADM

## 2023-12-20 RX ORDER — PROCHLORPERAZINE EDISYLATE 5 MG/ML
10 INJECTION INTRAMUSCULAR; INTRAVENOUS EVERY 6 HOURS PRN
Status: DISCONTINUED | OUTPATIENT
Start: 2023-12-20 | End: 2023-12-21 | Stop reason: HOSPADM

## 2023-12-20 RX ADMIN — ATORVASTATIN CALCIUM 40 MG: 40 TABLET, FILM COATED ORAL at 19:57

## 2023-12-20 RX ADMIN — Medication 10 ML: at 12:01

## 2023-12-20 RX ADMIN — METOPROLOL SUCCINATE 25 MG: 25 TABLET, EXTENDED RELEASE ORAL at 11:03

## 2023-12-20 RX ADMIN — ONDANSETRON 4 MG: 2 INJECTION INTRAMUSCULAR; INTRAVENOUS at 17:21

## 2023-12-20 RX ADMIN — METFORMIN HYDROCHLORIDE 500 MG: 500 TABLET ORAL at 12:01

## 2023-12-20 RX ADMIN — LISINOPRIL 40 MG: 20 TABLET ORAL at 12:00

## 2023-12-20 RX ADMIN — SODIUM CHLORIDE, POTASSIUM CHLORIDE, SODIUM LACTATE AND CALCIUM CHLORIDE 100 ML/HR: 600; 310; 30; 20 INJECTION, SOLUTION INTRAVENOUS at 16:55

## 2023-12-20 RX ADMIN — ONDANSETRON 4 MG: 2 INJECTION INTRAMUSCULAR; INTRAVENOUS at 04:08

## 2023-12-20 RX ADMIN — OXYCODONE HYDROCHLORIDE 10 MG: 5 TABLET ORAL at 17:21

## 2023-12-20 RX ADMIN — PROCHLORPERAZINE EDISYLATE 10 MG: 5 INJECTION INTRAMUSCULAR; INTRAVENOUS at 11:35

## 2023-12-20 RX ADMIN — GABAPENTIN 300 MG: 300 CAPSULE ORAL at 19:57

## 2023-12-20 RX ADMIN — METFORMIN HYDROCHLORIDE 500 MG: 500 TABLET ORAL at 17:10

## 2023-12-20 RX ADMIN — Medication 10 ML: at 19:58

## 2023-12-20 RX ADMIN — CITALOPRAM HYDROBROMIDE 20 MG: 20 TABLET ORAL at 12:00

## 2023-12-20 RX ADMIN — CEFTRIAXONE 2000 MG: 2 INJECTION, POWDER, FOR SOLUTION INTRAMUSCULAR; INTRAVENOUS at 19:56

## 2023-12-20 RX ADMIN — OXYCODONE HYDROCHLORIDE 10 MG: 5 TABLET ORAL at 04:08

## 2023-12-20 RX ADMIN — ALUMINUM HYDROXIDE, MAGNESIUM HYDROXIDE, AND DIMETHICONE 15 ML: 400; 400; 40 SUSPENSION ORAL at 19:56

## 2023-12-20 RX ADMIN — ONDANSETRON 4 MG: 2 INJECTION INTRAMUSCULAR; INTRAVENOUS at 10:43

## 2023-12-20 RX ADMIN — GABAPENTIN 300 MG: 300 CAPSULE ORAL at 15:29

## 2023-12-20 RX ADMIN — OXYCODONE HYDROCHLORIDE 10 MG: 5 TABLET ORAL at 11:03

## 2023-12-20 RX ADMIN — GABAPENTIN 300 MG: 300 CAPSULE ORAL at 11:03

## 2023-12-20 RX ADMIN — RIVAROXABAN 20 MG: 20 TABLET, FILM COATED ORAL at 17:10

## 2023-12-20 RX ADMIN — Medication 400 MG: at 12:00

## 2023-12-20 NOTE — H&P
[facility] H&P    Patient Name: Kiki Perdomo  : 1955  MRN: [id number]  Primary Care Physician: Smita Mayo APRN  Date of admission: 2023     Patient Care Team:  Smita Mayo APRN as PCP - General (Nurse Practitioner)          Subjective   History Present Illness     Chief Complaint:   Chief Complaint   Patient presents with    Vomiting     vomiting    Vomiting   Associated symptoms include diarrhea. Pertinent negatives include no coughing or fever.       68-year-old female presents with weakness.  She states she was treated for possible COVID approximately 3 weeks ago.  She had no test performed.  She was on Paxlovid.  She has had some nausea vomiting mild in nature.  She has had decreased p.o. intake.  She states she has had some mild diarrhea.  She states her urine has been darker but is still going.  She felt warm but has not checked her temperature.  She states her mouth has been dry but she has been drinking water she reports no cough or shortness of breath.  She has had some headaches.  No body aches.  She has back pain but this is chronic.     Observation 23  Pt concurs with er hpi. Pt still nauseated. Pt hr elevated in the 120s back in afib. Pt already on metoprolol and xarelto for chronic afib.    Review of Systems   Constitutional: Negative for fever.   Respiratory:  Negative for cough and shortness of breath.    Gastrointestinal:  Positive for diarrhea, nausea and vomiting.   Neurological:  Positive for weakness.             Personal History     Past Medical History:   Past Medical History:   Diagnosis Date    Abnormal ECG 2023    Allergy to alpha-gal     Anxiety and depression     Arthritis     mild    Atrial fibrillation 2023    Chronic back pain     Chronic fatigue syndrome     Diabetes     Fibromyalgia     GERD (gastroesophageal reflux disease)     Hyperlipidemia     Hypertension     Nausea     Neuropathy     Restless legs     Uterine  cancer     remission       Surgical History:      Past Surgical History:   Procedure Laterality Date    CARDIAC CATHETERIZATION  2005    CHOLECYSTECTOMY      COLONOSCOPY      ENDOSCOPY N/A 09/07/2021    Procedure: ESOPHAGOGASTRODUODENOSCOPY with dilation (bougie # [phone]);  Surgeon: Gordon Morales MD;  Location: [facility] ENDOSCOPY;  Service: Gastroenterology;  Laterality: N/A;  Post Op: gastritis, hiatal hernia, esophageal ring    ENDOSCOPY N/A 02/22/2023    Procedure: ESOPHAGOGASTRODUODENOSCOPY with gastric biopsy's;  Surgeon: Gordon Morales MD;  Location: [facility] ENDOSCOPY;  Service: Gastroenterology;  Laterality: N/A;  gastritis    HYSTERECTOMY      SIGMOIDOSCOPY N/A 09/07/2021    Procedure: FLEX SIGMOIDOSCOPY with polypectomy and cautery of rectal polyp;  Surgeon: Gordon Morales MD;  Location: [facility] ENDOSCOPY;  Service: Gastroenterology;  Laterality: N/A;  Post Op: rectal polyp           Family History: family history includes Heart attack in her mother; Heart disease in her father and mother. Otherwise pertinent FHx was reviewed and unremarkable.     Social History:  reports that she has never smoked. She has been exposed to tobacco smoke. She has never used smokeless tobacco. She reports that she does not drink alcohol and does not use drugs.      Medications:  Prior to Admission medications    Medication Sig Start Date End Date Taking? Authorizing Provider   atorvastatin (LIPITOR) 40 MG tablet Take 1 tablet by mouth Daily. 7/12/22  Yes Tierra Willett MD   butalbital-acetaminophen-caffeine (FIORICET, ESGIC) -40 MG per tablet Take 1 tablet by mouth Every 4 (Four) Hours As Needed for Migraine. 12/27/22  Yes Tierra Willett MD   citalopram (CeleXA) 20 MG tablet Take 1 tablet by mouth Daily. 11/16/12  Yes Tierra Willett MD   esomeprazole (nexIUM) 40 MG capsule Take 1 capsule by mouth Every Morning Before Breakfast.   Yes Tierra Willett MD   gabapentin (NEURONTIN)  300 MG capsule Take 1 capsule by mouth 3 (Three) Times a Day. 8/18/22  Yes Tierra Willett MD   lisinopril (PRINIVIL,ZESTRIL) 40 MG tablet Take 1 tablet by mouth Daily.   Yes Tierra Willett MD   magnesium oxide (MAG-OX) 400 MG tablet Take 1 tablet by mouth Daily.   Yes Tierra Willett MD   metoprolol succinate XL (TOPROL-XL) 25 MG 24 hr tablet Take 1 tablet by mouth Daily. 10/24/23  Yes Frederick Gudino MD   ondansetron ODT (ZOFRAN-ODT) 4 MG disintegrating tablet Place 1 tablet on the tongue Every 8 (Eight) Hours As Needed for Nausea. 6/2/23  Yes Grisel Guaman APRN   oxyCODONE-acetaminophen (PERCOCET)  MG per tablet Take 1 tablet by mouth Every 6 (Six) Hours As Needed for Moderate Pain.   Yes Tierra Willett MD   rivaroxaban (XARELTO) 20 MG tablet Take 1 tablet by mouth Daily. Indications: Atrial Fibrillation 10/21/23  Yes Jessica Yang PA-C   tiZANidine (ZANAFLEX) 2 MG tablet Take 1 tablet by mouth 3 (Three) Times a Day. 5/21/23  Yes Tierra Willett MD   vitamin D (ERGOCALCIFEROL) 1.25 MG (45846 UT) capsule capsule Take 1 capsule by mouth 1 (One) Time Per Week. wednesday   Yes Tierra Willett MD   metFORMIN (GLUCOPHAGE) 500 MG tablet Take 1 tablet by mouth 2 (Two) Times a Day With Meals.    Tierra Willett MD   Rybelsus 7 MG tablet Take 7 mg by mouth Daily. 10/19/23   Tierra Willett MD   Vibegron (Gemtesa) 75 MG tablet Take 1 tablet by mouth Daily.    Tierra Willett MD       Allergies:    Allergies   Allergen Reactions    Indomethacin Hives    Iodine Hives       Objective   Objective     Vital Signs  Temp:  [98.1 °F (36.7 °C)-98.7 °F (37.1 °C)] 98.2 °F (36.8 °C)  Heart Rate:  [] 100  Resp:  [19-20] 20  BP: ()/(50-87) 114/58  SpO2:  [82 %-100 %] 92 %  on  Flow (L/min):  [2] 2;   Device (Oxygen Therapy): nasal cannula  Body mass index is 45.64 kg/m².    Physical Exam  Constitutional:       Appearance: She is obese.   Cardiovascular:       Rate and Rhythm: Tachycardia present. Rhythm irregular.      Pulses: Normal pulses.      Heart sounds: Normal heart sounds.   Pulmonary:      Effort: Pulmonary effort is normal.      Breath sounds: Normal breath sounds.   Abdominal:      Tenderness: There is abdominal tenderness.   Skin:     General: Skin is warm and dry.   Neurological:      General: No focal deficit present.      Mental Status: She is alert and oriented to person, place, and time.   Psychiatric:         Mood and Affect: Mood normal.         Behavior: Behavior normal.         Results Review:  I have personally reviewed most recent cardiac tracings, lab results, microbiology results, and radiology images and interpretations and agree with findings, most notably: cbc, cmp, lipase, ua, hep panel, iron, ferritin, covid, ct abd, chest xray, ekg.    Results from last 7 days   Lab Units 12/20/23  0400   WBC 10*3/mm3 5.40   HEMOGLOBIN g/dL 13.4   HEMATOCRIT % 41.6   PLATELETS 10*3/mm3 187     Results from last 7 days   Lab Units 12/20/23  0400   SODIUM mmol/L 138   POTASSIUM mmol/L 3.5   CHLORIDE mmol/L 98   CO2 mmol/L 28.0   BUN mg/dL 13   CREATININE mg/dL 0.86   GLUCOSE mg/dL 111*   CALCIUM mg/dL 9.2   ALK PHOS U/L 240*   ALT (SGPT) U/L 195*   AST (SGOT) U/L 108*     Estimated Creatinine Clearance: 84 mL/min (by C-G formula based on SCr of 0.86 mg/dL).  Brief Urine Lab Results  (Last result in the past 365 days)        Color   Clarity   Blood   Leuk Est   Nitrite   Protein   CREAT   Urine HCG        12/19/23 2216 Orange  Comment: Any Substance that causes an abnormal urine color can alter the accuracy of the chemical reactions.   Clear   Negative   Small (1+)   Positive   30 mg/dL (1+)                   Microbiology Results (last 10 days)       Procedure Component Value - Date/Time    COVID-19,CEPHEID/MURIEL,COR/SADIE/PAD/MEGHAN/LAG IN-HOUSE,NP SWAB IN TRANSPORT MEDIA 1 HR TAT, RT-PCR - Swab, Nasopharynx [[id number]]  (Normal) Collected: 12/19/23 1914    Lab  Status: Final result Specimen: Swab from Nasopharynx Updated: 12/19/23 1947     COVID19 Not Detected    Narrative:      Fact sheet for providers: https://www.fda.gov/media/427488/download     Fact sheet for patients: https://www.fda.gov/media/827267/download  Fact sheet for providers: https://www.fda.gov/media/354086/download    Fact sheet for patients: https://www.fda.gov/media/997290/download    Test performed by PCR.            ECG/EMG Results (most recent)       Procedure Component Value Units Date/Time    ECG 12 Lead Rhythm Change [[id number]] Collected: 12/20/23 1154     Updated: 12/20/23 1156     QT Interval 364 ms      QTC Interval 492 ms     Narrative:      HEART RATE= 109  bpm  RR Interval= 548  ms  MD Interval=   ms  P Horizontal Axis=   deg  P Front Axis=   deg  QRSD Interval= 87  ms  QT Interval= 364  ms  QTcB= 492  ms  QRS Axis= -26  deg  T Wave Axis= 136  deg  - ABNORMAL ECG -  Atrial fibrillation  Inferior infarct, old  Abnrm T, consider ischemia, anterolateral lds  Electronically Signed By:   Date and Time of Study: 2023-12-20 11:54:29            Results for orders placed during the hospital encounter of 09/20/19    Duplex Carotid Ultrasound CAR    Interpretation Summary  · Proximal right internal carotid artery is normal.  · Mid right internal carotid artery is normal.  · Distal right internal carotid artery is normal.  · All other right side carotid system vessels are normal.  · Proximal left internal carotid artery is normal.  · Mid left internal carotid artery is normal.  · Distal left internal carotid artery is normal.  · All other left side carotid system vessels are normal.      Results for orders placed during the hospital encounter of 10/19/23    Adult Transthoracic Echo Complete w/ Color, Spectral and Contrast if Necessary Per Protocol    Interpretation Summary    Left ventricular systolic function is normal. Calculated left ventricular EF = 65.3% Left ventricular ejection fraction appears  to be 61 - 65%.    The left ventricular cavity is borderline dilated.    Left ventricular diastolic dysfunction is noted.    The left atrial cavity is dilated.    Estimated right ventricular systolic pressure from tricuspid regurgitation is normal (<35 mmHg).      CT Abdomen Pelvis Without Contrast    Result Date: 12/19/2023  Impression: No acute intra-abdominal process evident. Small hiatal hernia. 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 No adjacent [place] changes. Small fat-containing umbilical hernia. Prior cholecystectomy and hysterectomy. Electronically Signed: Sree De Santiago MD  12/19/2023 9:53 PM EST  Workstation ID: [id number]    XR Chest 1 View    Result Date: 12/19/2023  Impression: No radiographic evidence of acute chest process. Electronically Signed: Sree De Santiago MD  12/19/2023 6:53 PM EST  Workstation ID: [id number]       Estimated Creatinine Clearance: 84 mL/min (by C-G formula based on SCr of 0.86 mg/dL).    Assessment & Plan   Assessment/Plan       Active Hospital Problems    Diagnosis  POA    **UTI (urinary tract infection) [N39.0]  Yes      Resolved Hospital Problems   No resolved problems to display.     Nausea and vomiting  - cbc unremarkable  - lft elevated alt 232, ast 197, alk phos 250 total bili 1.4  - Lipase 9  - covid negative- pt took paxlovid for possible covid   - ct abd reviewed and showing small hiatal hernia, no acute intra abdominal process    UTI  - ua small LE, + for nitrites  - iv rocephin      Elevated LFTS  - alt 232 now 195, ast 197, now 108, alk phos 250 now 240, total bili 1.4 now .6  - gi consulted  - hep panel negative  - iron 36  - ferritin 267.8  - avoid hepatotoxic meds    Hx of afib  - EKG rate 109, afib  - pt on xarelto and metoprolol      VTE Prophylaxis -   Mechanical Order History:        Ordered        12/20/23 0809  Place Sequential Compression Device  Once            12/20/23 0809  Maintain Sequential  Compression Device  Continuous,   Status:  Canceled            12/19/23 2250  Place Sequential Compression Device  Once            12/19/23 2250  Maintain Sequential Compression Device  Continuous                          Pharmalogical Order History:        Ordered     Dose Route Frequency Stop    12/20/23 0809  rivaroxaban (XARELTO) tablet 20 mg         20 mg PO Daily With Dinner --                    CODE STATUS:    Code Status and Medical Interventions:   Ordered at: 12/20/23 0809     Code Status (Patient has no pulse and is not breathing):    CPR (Attempt to Resuscitate)     Medical Interventions (Patient has pulse or is breathing):    Full Support       This patient has been examined wearing personal protective equipment.     I discussed the patient's findings and my recommendations with patient and nursing staff.      Signature:Electronically signed by Jessica Yang PA-C, 12/20/23, 3:30 PM EST.

## 2023-12-20 NOTE — ED NOTES
Nursing report ED to floor  Kiki Perdomo  68 y.o.  female    HPI:   Chief Complaint   Patient presents with    Vomiting       Admitting doctor:   Hudson Rubio MD    Admitting diagnosis:   The primary encounter diagnosis was Elevated liver enzymes. A diagnosis of Urinary tract infection without hematuria, site unspecified was also pertinent to this visit.    Code status:   Current Code Status       Date Active Code Status Order ID Comments User Context       Prior            Allergies:   Indomethacin and Iodine    Isolation:  No active isolations     Fall Risk:  Fall Risk Assessment was completed, and patient is at low risk for falls.   Predictive Model Details         18 (Low) Factor Value    Calculated 12/19/2023 23:38 Age 68    Risk of Fall Model Musculoskeletal Assessment WDL     Active Peripheral IV Present     Imaging order in this encounter Present     Respiratory Rate 20     Skin Assessment WDL     Financial Class Other     Magnesium not on file     Number of Distinct Medication Classes administered 3     Albumin 3.3 g/dL     Drug Use No     Karlo Scale not on file     Total Bilirubin 1.4 mg/dL      U/L     Chloride 96 mmol/L     Peripheral Vascular Assessment WDL     Cardiac Assessment X     Diastolic BP 79     Gastrointestinal Assessment WDL     Calcium 9.1 mg/dL     Number of administrations of Analgesic Narcotics 1     Creatinine 0.97 mg/dL     Days after Admission 0.24     Potassium 4.3 mmol/L         Weight:       12/19/23  1752   Weight: 130 kg (286 lb)       Intake and Output  No intake or output data in the 24 hours ending 12/19/23 2339    Diet:   Dietary Orders (From admission, onward)       Start     Ordered    12/19/23 2251  Diet: Regular/House Diet; Texture: Regular Texture (IDDSI 7); Fluid Consistency: Thin (IDDSI 0)  Diet Effective Now        References:    Diet Order [place]   Question Answer Comment   Diets: Regular/House Diet    Texture: Regular Texture (IDDSI 7)    Fluid  Consistency: Thin (IDDSI 0)        12/19/23 2250                     Most recent vitals:   Vitals:    12/19/23 1917 12/19/23 1932 12/19/23 1947 12/19/23 2017   BP: 118/77 102/66 109/65 128/79   BP Location:       Pulse: 88 82 76 81   Resp:       Temp:       TempSrc:       SpO2: 97% 97% 96%    Weight:       Height:           Active LDAs/IV Access:   Lines, Drains & Airways       Active LDAs       Name Placement date Placement time Site Days    Peripheral IV 12/19/23 1847 Left;Posterior Forearm 12/19/23 1847  Forearm  less than 1    Peripheral IV 12/19/23 Anterior;Right Forearm 12/19/23  --  Forearm  less than 1                    Skin Condition:   Skin Assessments (last day)       None             Labs (abnormal labs have a star):   Labs Reviewed   COMPREHENSIVE METABOLIC PANEL - Abnormal; Notable for the following components:       Result Value    Glucose 146 (*)     Chloride 96 (*)     Albumin 3.3 (*)     ALT (SGPT) 232 (*)     AST (SGOT) 197 (*)     Alkaline Phosphatase 250 (*)     Total Bilirubin 1.4 (*)     All other components within normal limits    Narrative:     GFR Normal >60  Chronic Kidney Disease <60  Kidney Failure <15     URINALYSIS W/ MICROSCOPIC IF INDICATED (NO CULTURE) - Abnormal; Notable for the following components:    Color, UA Orange (*)     Appearance, UA Turbid (*)     Specific Gravity, UA 1.039 (*)     Ketones, UA Trace (*)     Bilirubin, UA Moderate (2+) (*)     Protein,  mg/dL (2+) (*)     Leuk Esterase, UA Small (1+) (*)     Nitrite, UA Positive (*)     All other components within normal limits   LIPASE - Abnormal; Notable for the following components:    Lipase 9 (*)     All other components within normal limits   CBC WITH AUTO DIFFERENTIAL - Abnormal; Notable for the following components:    Neutrophil % 78.4 (*)     Lymphocyte % 14.0 (*)     All other components within normal limits   URINALYSIS, MICROSCOPIC ONLY - Abnormal; Notable for the following components:    WBC, UA 6-10  (*)     Bacteria, UA 2+ (*)     Squamous Epithelial Cells, UA 21-30 (*)     All other components within normal limits   URINALYSIS W/ CULTURE IF INDICATED - Abnormal; Notable for the following components:    Color, UA Orange (*)     Ketones, UA Trace (*)     Bilirubin, UA Small (1+) (*)     Protein, UA 30 mg/dL (1+) (*)     Leuk Esterase, UA Small (1+) (*)     Nitrite, UA Positive (*)     All other components within normal limits    Narrative:     In absence of clinical symptoms, the presence of pyuria, bacteria, and/or nitrites on the urinalysis result does not correlate with infection.   URINALYSIS, MICROSCOPIC ONLY - Abnormal; Notable for the following components:    Squamous Epithelial Cells, UA 3-6 (*)     Transitional Epithelial Cells, UA 3-6 (*)     All other components within normal limits   COVID-19,CEPHEID/MURIEL,COR/SADIE/LAG/PAD/MEGHAN IN-HOUSE,NP SWAB IN TRANSPORT MEDIA 1 HR TAT, RT-PCR - Normal    Narrative:     Fact sheet for providers: https://www.fda.gov/media/496843/download     Fact sheet for patients: https://www.fda.gov/media/560685/download  Fact sheet for providers: https://www.fda.gov/media/673721/download    Fact sheet for patients: https://www.fda.gov/media/878483/download    Test performed by PCR.   HEPATITIS PANEL, ACUTE - Normal    Narrative:     Results may be falsely decreased if patient taking Biotin.    ACETAMINOPHEN LEVEL - Normal    Narrative:     Acetaminophen Therapeutic Range  5-20 ug/mL      Hours after ingestion            Toxic Value    4 Hours                           150 ug/mL    8 Hours                            70 ug/mL   12 Hours                            40 ug/mL   16 Hours                            20 ug/mL    These values apply to a single ingestion only.    RAINBOW DRAW    Narrative:     The following orders were created for panel order [place] Draw.  Procedure                               Abnormality         Status                     ---------                                -----------         ------                     Green Top (Gel)[[id number]]                                  Final result               Lavender Top[[id number]]                                     Final result               Gold Top - SST[[id number]]                                   Final result               Light Blue Top[[id number]]                                   Final result                 Please view results for these tests on the individual orders.   CBC WITH AUTO DIFFERENTIAL   COMPREHENSIVE METABOLIC PANEL   GREEN TOP   LAVENDER TOP   GOLD TOP - SST   LIGHT BLUE TOP   CBC AND DIFFERENTIAL    Narrative:     The following orders were created for panel order CBC & Differential.  Procedure                               Abnormality         Status                     ---------                               -----------         ------                     CBC Auto Differential[[id number]]        Abnormal            Final result                 Please view results for these tests on the individual orders.       LOC: Person, Place, and Time    Telemetry:  Observation Unit    Cardiac Monitoring Ordered: yes    EKG:   No orders to display       Medications Given in the ED:   Medications   lactated ringers infusion (125 mL/hr Intravenous New Bag 12/19/23 1927)   sodium chloride 0.9 % flush 10 mL (10 mL Intravenous Not Given 12/19/23 2309)   sodium chloride 0.9 % flush 10 mL (has no administration in time range)   sodium chloride 0.9 % infusion 40 mL (has no administration in time range)   acetaminophen (TYLENOL) tablet 650 mg (has no administration in time range)   sennosides-docusate (PERICOLACE) 8.6-50 MG per tablet 2 tablet (has no administration in time range)     And   polyethylene glycol (MIRALAX) packet 17 g (has no administration in time range)     And   bisacodyl (DULCOLAX) EC tablet 5 mg (has no administration in time range)     And   bisacodyl (DULCOLAX) suppository 10 mg (has no administration in time  range)   ondansetron (ZOFRAN) injection 4 mg (has no administration in time range)   melatonin tablet 5 mg (has no administration in time range)   lactated ringers infusion (100 mL/hr Intravenous Rate/Dose Change 12/19/23 5931)   oxyCODONE (ROXICODONE) immediate release tablet 10 mg (10 mg Oral Given 12/19/23 2212)   cefTRIAXone (ROCEPHIN) 2,000 mg in sodium chloride 0.9 % 100 mL IVPB (2,000 mg Intravenous New Bag 12/19/23 2257)       Imaging results:  CT Abdomen Pelvis Without Contrast    Result Date: 12/19/2023  Impression: No acute intra-abdominal process evident. Small hiatal hernia. 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 No adjacent [place] changes. Small fat-containing umbilical hernia. Prior cholecystectomy and hysterectomy. Electronically Signed: Sree De Santiago MD  12/19/2023 9:53 PM EST  Workstation ID: [id number]    XR Chest 1 View    Result Date: 12/19/2023  Impression: No radiographic evidence of acute chest process. Electronically Signed: Sree De Santiago MD  12/19/2023 6:53 PM EST  Workstation ID: [id number]     Social issues:   Social History     Socioeconomic History    Marital status:    Tobacco Use    Smoking status: Never     Passive exposure: Current    Smokeless tobacco: Never   Vaping Use    Vaping Use: Never used   Substance and Sexual Activity    Alcohol use: Never    Drug use: Never    Sexual activity: Not Currently     Partners: Male     Birth control/protection: Diaphragm, Hysterectomy       NIH Stroke Scale:  Interval: (not recorded)  1a. Level of Consciousness: (not recorded)  1b. LOC Questions: (not recorded)  1c. LOC Commands: (not recorded)  2. Best Gaze: (not recorded)  3. Visual: (not recorded)  4. Facial Palsy: (not recorded)  5a. Motor Arm, Left: (not recorded)  5b. Motor Arm, Right: (not recorded)  6a. Motor Leg, Left: (not recorded)  6b. Motor Leg, Right: (not recorded)  7. Limb Ataxia: (not recorded)  8. Sensory: (not  recorded)  9. Best Language: (not recorded)  10. Dysarthria: (not recorded)  11. Extinction and Inattention (formerly Neglect): (not recorded)    Total (NIH Stroke Scale): (not recorded)     Additional notable assessment information:     Nursing report ED to floor:  Odette Alcantara RN   12/19/23 23:39 EST

## 2023-12-20 NOTE — PLAN OF CARE
GI following. US liver pending. Pt has episodes of nausea this shift-treated with zofran and compazine. Afib with a HR hovering from low 100s to 110s with rest, HR stays at 120s-130s with activity such as getting up to bedside commode. Applied 2Lpm via nasal cannula as pt sats dropped to 89%. Jessica Yang made aware about the EKG result and abnormal HR. Pt currently on metoptolol and xarelto. Care on going.

## 2023-12-20 NOTE — CASE MANAGEMENT/SOCIAL WORK
Discharge Planning Assessment  [facility]     Patient Name: Kiki Perdomo  MRN: [id number]  Today's Date: 12/20/2023    Admit Date: 12/19/2023    Plan: Routine home with family   Discharge Needs Assessment       Row Name 12/20/23 1444       Living Environment    People in Home spouse    Name(s) of People in Home carmen Reardon    Current Living Arrangements home    Potentially Unsafe Housing Conditions none    Primary Care Provided by self    Provides Primary Care For no one    Family Caregiver if Needed spouse    Family Caregiver Names carmen Reardon    Quality of Family Relationships helpful;involved;supportive    Able to Return to Prior Arrangements yes       Resource/Environmental Concerns    Resource/Environmental Concerns none    Transportation Concerns none       Transition Planning    Patient/Family Anticipates Transition to home;home with family    Patient/Family Anticipated Services at Transition none    Transportation Anticipated family or friend will provide       Discharge Needs Assessment    Readmission Within the Last 30 Days no previous admission in last 30 days    Equipment Currently Used at Home commode;walker, standard;glucometer;bp cuff;wheelchair, motorized;shower chair    Concerns to be Addressed no discharge needs identified;denies needs/concerns at this time    Anticipated Changes Related to Illness none    Equipment Needed After Discharge none    Provided Post Acute Provider List? N/A    Provided Post Acute Provider Quality & Resource List? N/A                   Discharge Plan       Row Name 12/20/23 1444       Plan    Plan Routine home with family    Patient/Family in Agreement with Plan yes    Plan Comments CM met with pt and  Alexys at bedside to discuss discharge needs. Pt lives with  at home, does not drive and is independent with ADLs. PCP and pharmacy verified- pt enrolled in [facility] as requested. Current DME: BSC, walker, electric wc, BP cuff, glucometer, shower chair. No  current HHC and no additional DME/ HHC anticipated at disharge.  to provide transportation home.              Demographic Summary       Row Name 12/20/23 1443       General Information    Admission Type observation    Arrived From emergency department    Referral Source admission list    Reason for Consult care coordination/care conference;discharge planning    Preferred Language English       Contact Information    Permission Granted to Share Info With                    Functional Status       Row Name 12/20/23 1444       Functional Status    Usual Activity Tolerance moderate    Current Activity Tolerance moderate       Functional Status, IADL    Medications independent    Meal Preparation independent    Housekeeping independent    Laundry independent    Shopping assistive person             Julia Hargrove RN      Office phone: [phone]  Office fax: [phone]

## 2023-12-20 NOTE — CONSULTS
"GI CONSULT  NOTE:    Referring Provider:  Dr. Rubio    Chief complaint: Elevated liver enzymes    Subjective . \"I have felt sick for 3 weeks\"    History of present illness: Kiki Perdomo is a 68 y.o. female who has a history of GERD, cholecystectomy, A-fib on Xarelto, chronic pain on narcotics and diabetes.  She presents with feeling poorly over the last 3 weeks.  She thought she had COVID and took Paxlovid for 5 days starting on November 29.  She also takes Percocet for chronic back pain.  She had worsening weakness with decreased oral intake, dry mouth and decreased mobility over the last 3 weeks.    GI asked consult for elevated LFTs.  She denies history of liver disease or family history of liver issues.  No alcohol or tobacco abuse.  Gallbladder absent.  Minimal NSAID or Tylenol use and only new medication was Paxlovid.  She initially had some nausea and vomiting as well as some abdominal discomfort but this has resolved.  No diarrhea, melena or rectal bleeding.  She was scheduled for cardioversion in November but this was canceled secondary to good cardiac rhythm.    Endo History:  2/2023 EGD by Dr. Morales -grade a esophagitis, gastritis with biopsies H. pylori negative, hiatal hernia  9/2021 EGD/flexible sigmoidoscopy by Dr. Morales -esophageal ring s/p dilation, H. pylori positive gastritis, hiatal hernia, rectal polyp (TA)  4/2019 colonoscopy by Dr. Strauss -residual polyp regeneration from large rectal polyp resected in 2019, anterior midline anal fissure s/p Botox  3/2017 rectal EUS by Dr. Muñoz -lesion was limited to mucosa and submucosa without involving muscularis propria, antral mucosal resection was performed of this large distal rectal mass/polyp which was completely resected, biopsy consistent with villous adenomatous polyp    Past Medical History:  Past Medical History:   Diagnosis Date    Abnormal ECG 09/19/2023    Allergy to alpha-gal     Anxiety and depression     Arthritis     mild    " Atrial fibrillation 09/19/2023    Chronic back pain     Chronic fatigue syndrome     Diabetes     Fibromyalgia     GERD (gastroesophageal reflux disease)     Hyperlipidemia     Hypertension     Nausea     Neuropathy     Restless legs     Uterine cancer     remission       Past Surgical History:  Past Surgical History:   Procedure Laterality Date    CARDIAC CATHETERIZATION  2005    CHOLECYSTECTOMY      COLONOSCOPY      ENDOSCOPY N/A 09/07/2021    Procedure: ESOPHAGOGASTRODUODENOSCOPY with dilation (bougie # [phone]);  Surgeon: Gordon Morales MD;  Location: [facility] ENDOSCOPY;  Service: Gastroenterology;  Laterality: N/A;  Post Op: gastritis, hiatal hernia, esophageal ring    ENDOSCOPY N/A 02/22/2023    Procedure: ESOPHAGOGASTRODUODENOSCOPY with gastric biopsy's;  Surgeon: Gordon Morales MD;  Location: [facility] ENDOSCOPY;  Service: Gastroenterology;  Laterality: N/A;  gastritis    HYSTERECTOMY      SIGMOIDOSCOPY N/A 09/07/2021    Procedure: FLEX SIGMOIDOSCOPY with polypectomy and cautery of rectal polyp;  Surgeon: Gordon Morales MD;  Location: [facility] ENDOSCOPY;  Service: Gastroenterology;  Laterality: N/A;  Post Op: rectal polyp       Social History:  Social History     Tobacco Use    Smoking status: Never     Passive exposure: Current    Smokeless tobacco: Never   Vaping Use    Vaping Use: Never used   Substance Use Topics    Alcohol use: Never    Drug use: Never       Family History:  Family History   Problem Relation Age of Onset    Heart disease Mother     Heart attack Mother     Heart disease Father         Stroke       Medications:  Medications Prior to Admission   Medication Sig Dispense Refill Last Dose    atorvastatin (LIPITOR) 40 MG tablet Take 1 tablet by mouth Daily.   12/20/2023    butalbital-acetaminophen-caffeine (FIORICET, ESGIC) -40 MG per tablet Take 1 tablet by mouth Every 4 (Four) Hours As Needed for Migraine.   12/19/2023    citalopram (CeleXA) 20 MG tablet Take 1 tablet by  mouth Daily.   12/20/2023    esomeprazole (nexIUM) 40 MG capsule Take 1 capsule by mouth Every Morning Before Breakfast.   12/20/2023    gabapentin (NEURONTIN) 300 MG capsule Take 1 capsule by mouth 3 (Three) Times a Day.   Past Week    lisinopril (PRINIVIL,ZESTRIL) 40 MG tablet Take 1 tablet by mouth Daily.   12/20/2023    magnesium oxide (MAG-OX) 400 MG tablet Take 1 tablet by mouth Daily.   12/20/2023    metoprolol succinate XL (TOPROL-XL) 25 MG 24 hr tablet Take 1 tablet by mouth Daily. 90 tablet 1 12/20/2023    ondansetron ODT (ZOFRAN-ODT) 4 MG disintegrating tablet Place 1 tablet on the tongue Every 8 (Eight) Hours As Needed for Nausea. 8 tablet 0 12/19/2023    oxyCODONE-acetaminophen (PERCOCET)  MG per tablet Take 1 tablet by mouth Every 6 (Six) Hours As Needed for Moderate Pain.   12/20/2023    rivaroxaban (XARELTO) 20 MG tablet Take 1 tablet by mouth Daily. Indications: Atrial Fibrillation 30 tablet 0 12/20/2023    tiZANidine (ZANAFLEX) 2 MG tablet Take 1 tablet by mouth 3 (Three) Times a Day.   12/20/2023    vitamin D (ERGOCALCIFEROL) 1.25 MG (89226 UT) capsule capsule Take 1 capsule by mouth 1 (One) Time Per Week. wednesday 12/20/2023    metFORMIN (GLUCOPHAGE) 500 MG tablet Take 1 tablet by mouth 2 (Two) Times a Day With Meals.       Rybelsus 7 MG tablet Take 7 mg by mouth Daily.       Vibegron (Gemtesa) 75 MG tablet Take 1 tablet by mouth Daily.          Scheduled Meds:atorvastatin, 40 mg, Oral, Nightly  citalopram, 20 mg, Oral, Daily  gabapentin, 300 mg, Oral, TID  insulin lispro, 2-7 Units, Subcutaneous, 4x Daily AC & at Bedtime  lisinopril, 40 mg, Oral, Daily  magnesium oxide, 400 mg, Oral, Daily  metFORMIN, 500 mg, Oral, BID With Meals  metoprolol succinate XL, 25 mg, Oral, Daily  rivaroxaban, 20 mg, Oral, Daily With Dinner  senna-docusate sodium, 2 tablet, Oral, BID  sodium chloride, 10 mL, Intravenous, Q12H  sodium chloride, 10 mL, Intravenous, Q12H      Continuous Infusions:lactated  "ringers, 100 mL/hr, Last Rate: 100 mL/hr (12/20/23 1201)      PRN Meds:.  acetaminophen    aluminum-magnesium hydroxide-simethicone    senna-docusate sodium **AND** polyethylene glycol **AND** bisacodyl **AND** bisacodyl    dextrose    dextrose    glucagon (human recombinant)    melatonin    ondansetron **OR** ondansetron    oxyCODONE    prochlorperazine    sodium chloride    sodium chloride    ALLERGIES:  Indomethacin and Iodine    ROS:  The following systems were reviewed  Constitution:  No fevers, chills, no unintentional weight loss  Skin: no rash, no jaundice  Eyes:  No blurry vision, no eye pain  HENT:  No change in hearing or smell  Resp:  No dyspnea or cough  CV:  No chest pain or palpitations  :  No dysuria, hematuria  Musculoskeletal:  No leg cramps or arthralgias, + weakness  Neuro:  No tremor, no numbness  Psych:  No depression or confusion    Objective resting in bed, no acute distress, no family present    Vital Signs:   Vitals:    12/19/23 2017 12/19/23 2354 12/20/23 0631 12/20/23 1103   BP: 128/79 103/67 132/87 147/80   BP Location:  Right arm Right arm    Patient Position:  Lying Lying    Pulse: 81 85 87 116   Resp:  19 20    Temp:  98.1 °F (36.7 °C) 98.2 °F (36.8 °C)    TempSrc:  Oral Oral    SpO2:  99% 90%    Weight:  124 kg (274 lb 4 oz)     Height:  165.1 cm (65\")         Physical Exam:     General Appearance:    Awake and alert, in no acute distress, obese   Head:    Normocephalic, without obvious abnormality, atraumatic   Throat:   No oral lesions, no thrush, oral mucosa moist   Lungs:     Respirations regular, even and unlabored   Chest Wall:    No abnormalities observed   Abdomen:     Soft, minimal discomfort, nondistended   Rectal:     Deferred   Extremities:   Moves all extremities, no cyanosis       Skin:   No rash, no jaundice, normal palpation, flushed cheeks       Neurologic:   Cranial nerves 2 - 12 grossly intact       Results Review:   I reviewed the patient's labs and " imaging.  CBC    Results from last 7 days   Lab Units 12/20/23  0400 12/19/23  1816   WBC 10*3/mm3 5.40 5.80   HEMOGLOBIN g/dL 13.4 13.3   PLATELETS 10*3/mm3 187 192     CMP   Results from last 7 days   Lab Units 12/20/23  0400 12/19/23  1816   SODIUM mmol/L 138 138   POTASSIUM mmol/L 3.5 4.3   CHLORIDE mmol/L 98 96*   CO2 mmol/L 28.0 29.0   BUN mg/dL 13 15   CREATININE mg/dL 0.86 0.97   GLUCOSE mg/dL 111* 146*   ALBUMIN g/dL 3.1* 3.3*   BILIRUBIN mg/dL 0.6 1.4*   ALK PHOS U/L 240* 250*   AST (SGOT) U/L 108* 197*   ALT (SGPT) U/L 195* 232*   LIPASE U/L  --  9*     Cr Clearance Estimated Creatinine Clearance: 84 mL/min (by C-G formula based on SCr of 0.86 mg/dL).  Coag     HbA1C   Lab Results   Component Value Date    HGBA1C 6.3 (H) 02/18/2022    HGBA1C 5.6 03/31/2020    HGBA1C 5.5 09/21/2019     Blood Glucose   Glucose   Date/Time Value Ref Range Status   12/20/2023 1203 145 (H) 70 - 105 mg/dL Final     Comment:     Serial Number: [id number]:  [id number]     Infection     UA    Results from last 7 days   Lab Units 12/19/23  2216   NITRITE UA  Positive*   WBC UA /HPF 0-2   BACTERIA UA /HPF None Seen   SQUAM EPITHEL UA /HPF 3-6*     Imaging Results (Last 72 Hours)       Procedure Component Value Units Date/Time    CT Abdomen Pelvis Without Contrast [[id number]] Collected: 12/19/23 2149     Updated: 12/19/23 2155    Narrative:      CT ABDOMEN PELVIS WO CONTRAST    Date of Exam: 12/19/2023 9:24 PM EST    Indication: elevated lft's.    Comparison: Noncontrast CT of the abdomen and pelvis performed on June 2, 2023    Technique: Axial CT images were obtained of the abdomen and pelvis without the administration of contrast. Sagittal and coronal reconstructions were performed.  Automated exposure control and iterative reconstruction methods were used.      Findings:    Lung Bases:  The visualized lung bases and lower mediastinal structures are unremarkable.      Peritoneum:  No free intraperitoneal air or fluid.      Abdominal wall:  Small fat-containing umbilical hernia.    Liver:  The liver is within normal limits in size and contour. Small splenic granulomas are visualized.      Biliary/Gallbladder:  The gallbladder is surgically absent. The biliary tree is nondilated.    Pancreas:  Pancreas is within normal limits. There is no evidence of pancreatic mass or peripancreatic fluid.      Spleen:  The spleen is normal in size and contour. Multiple splenic granulomas are visualized.    Gastrointestinal/Mesentery:   Small to moderate hiatal hernia is visualized. Diverticulum arising off of the third portion of the duodenum, posterior wall is visualized measuring 2 x 4.7 cm in maximum AP and transverse dimension. No adjacent inflammatory changes are seen. No evidence   of bowel obstruction or gross inflammatory changes.The appendix appears within normal limits.  No mesenteric fluid collections identified.      Adrenals:  Adrenal glands are unremarkable.      Kidneys:  The kidneys are in anatomic position. No evidence of nephrolithiasis. No evidence of hydronephrosis or perinephric fat stranding.    Bladder:   The urinary bladder is unremarkable.    Reproductive organs:    The patient is post hysterectomy.      Retroperitoneal/Lymph Nodes:  No retroperitoneal adenopathy is identified.     Vasculature:  Mild aortic atheromatous changes.        Bony Structures:    No acute fracture or aggressive lesions. Mild chronic loss of vertebral body height at T12. Post L1 kyphoplasty. Advanced chronic loss of vertebral body height at L1.          Impression:      Impression:  No acute intra-abdominal process evident.    Small hiatal hernia.    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 No adjacent [place] changes.    Small fat-containing umbilical hernia.     Prior cholecystectomy and hysterectomy.            Electronically Signed: Sree De Santiago MD    12/19/2023 9:53 PM EST     Workstation ID: [id number]    XR Chest 1 View [[id number]] Collected: 12/19/23 1853     Updated: 12/19/23 1856    Narrative:      XR CHEST 1 VW    Date of Exam: 12/19/2023 6:32 PM EST    Indication: soa    Comparison: Chest radiograph performed on October 19, 2023    Findings:  Overlying monitoring wires limit diagnostic sensitivity. There are no consolidations.No pleural effusion. No evidence of pneumothorax.  The pulmonary vasculature appears within normal limits.  The cardiac and mediastinal silhouette appear unremarkable.  No acute osseous abnormality identified.      Impression:      Impression:  No radiographic evidence of acute chest process.      Electronically Signed: Sree De Santiago MD    12/19/2023 6:53 PM EST    Workstation ID: [id number]            ASSESSMENT:  Elevated LFTs  UTI  Weakness  GERD  DMII  Chronic pain on narcotics  History of A-fib -on Xarelto  History of cholecystectomy    PLAN:  Patient is a 67 y/o female with a history of chronic pain on narcotics, A-fib on Xarelto, GERD and diabetes.  She presents with feeling poorly over the last 3 weeks with weakness, decreased mobility, decreased oral intake and feeling poorly overall.  She did take Paxlovid recently for presumed COVID.  She was found to have UTI.  CT shows no intra abdominal acute process with normal-appearing liver.  Chest x-ray and COVID negative.  Hepatitis panel negative.  LFTs improving, consider drug-induced liver injury from Paxlovid vs other.  Review of records show normal LFTs on 10/19/2023.  Check CK, EBV and CMV.  Plan full liver serology workup and check ultrasound.  Okay for diet as tolerated.  On ceftriaxone for UTI.  Avoid hepatotoxic medications.  Continue supportive care and further recommendations to follow workup.    I discussed the patients findings and my recommendations with the patient.    We appreciate the referral    Electronically signed by KANDY Wallace, 12/20/23, 12:19 PM EST.

## 2023-12-20 NOTE — ED PROVIDER NOTES
Subjective   History of Present Illness  68-year-old female presents with weakness.  She states she was treated for possible COVID approximately 3 weeks ago.  She had no test performed.  She was on Paxlovid.  She has had some nausea vomiting mild in nature.  She has had decreased p.o. intake.  She states she has had some mild diarrhea.  She states her urine has been darker but is still going.  She felt warm but has not checked her temperature.  She states her mouth has been dry but she has been drinking water she reports no cough or shortness of breath.  She has had some headaches.  No body aches.  She has back pain but this is chronic.  Review of Systems    Past Medical History:   Diagnosis Date    Abnormal ECG 09/19/2023    Allergy to alpha-gal     Anxiety and depression     Arthritis     mild    Atrial fibrillation 09/19/2023    Chronic back pain     Chronic fatigue syndrome     Diabetes     Fibromyalgia     GERD (gastroesophageal reflux disease)     Hyperlipidemia     Hypertension     Nausea     Neuropathy     Restless legs     Uterine cancer     remission       Allergies   Allergen Reactions    Indomethacin Hives    Iodine Hives       Past Surgical History:   Procedure Laterality Date    CARDIAC CATHETERIZATION  2005    CHOLECYSTECTOMY      COLONOSCOPY      ENDOSCOPY N/A 09/07/2021    Procedure: ESOPHAGOGASTRODUODENOSCOPY with dilation (bougie # [phone]);  Surgeon: Gordon Morales MD;  Location: [facility] ENDOSCOPY;  Service: Gastroenterology;  Laterality: N/A;  Post Op: gastritis, hiatal hernia, esophageal ring    ENDOSCOPY N/A 02/22/2023    Procedure: ESOPHAGOGASTRODUODENOSCOPY with gastric biopsy's;  Surgeon: Gordon Morales MD;  Location: [facility] ENDOSCOPY;  Service: Gastroenterology;  Laterality: N/A;  gastritis    HYSTERECTOMY      SIGMOIDOSCOPY N/A 09/07/2021    Procedure: FLEX SIGMOIDOSCOPY with polypectomy and cautery of rectal polyp;  Surgeon: Gordon Morales MD;  Location: [facility]  ENDOSCOPY;  Service: Gastroenterology;  Laterality: N/A;  Post Op: rectal polyp       Family History   Problem Relation Age of Onset    Heart disease Mother     Heart attack Mother     Heart disease Father         Stroke       Social History     Socioeconomic History    Marital status:    Tobacco Use    Smoking status: Never     Passive exposure: Current    Smokeless tobacco: Never   Vaping Use    Vaping Use: Never used   Substance and Sexual Activity    Alcohol use: Never    Drug use: Never    Sexual activity: Not Currently     Partners: Male     Birth control/protection: Diaphragm, Hysterectomy     Prior to Admission medications    Medication Sig Start Date End Date Taking? Authorizing Provider   atorvastatin (LIPITOR) 40 MG tablet Take 1 tablet by mouth Daily. 7/12/22   Tierra Willett MD   butalbital-acetaminophen-caffeine (FIORICET, ESGIC) -40 MG per tablet Take 1 tablet by mouth Every 4 (Four) Hours As Needed for Migraine. 12/27/22   Tierra Willett MD   citalopram (CeleXA) 20 MG tablet Take 1 tablet by mouth Daily. 11/16/12   Tierra Willett MD   esomeprazole (nexIUM) 40 MG capsule Take 1 capsule by mouth Every Morning Before Breakfast.    Tierra Willett MD   gabapentin (NEURONTIN) 300 MG capsule Take 1 capsule by mouth 3 (Three) Times a Day. 8/18/22   Tierra Willett MD   lisinopril (PRINIVIL,ZESTRIL) 40 MG tablet Take 1 tablet by mouth Daily.    Tierra Willett MD   magnesium oxide (MAG-OX) 400 MG tablet Take 1 tablet by mouth Daily.    Tierra Willett MD   metoprolol succinate XL (TOPROL-XL) 25 MG 24 hr tablet Take 1 tablet by mouth Daily. 10/24/23   Frederick Gudino MD   ondansetron ODT (ZOFRAN-ODT) 4 MG disintegrating tablet Place 1 tablet on the tongue Every 8 (Eight) Hours As Needed for Nausea. 6/2/23   Grisel Guaman APRN   oxyCODONE-acetaminophen (PERCOCET)  MG per tablet Take 1 tablet by mouth Every 6 (Six) Hours As Needed for Moderate  Pain.    Tierra Willett MD   rivaroxaban (XARELTO) 20 MG tablet Take 1 tablet by mouth Daily. Indications: Atrial Fibrillation 10/21/23   Jessica Yang PA-C   Rybelsus 7 MG tablet Take 7 mg by mouth Daily. 10/19/23   Tierra Willett MD   tiZANidine (ZANAFLEX) 2 MG tablet Take 1 tablet by mouth 3 (Three) Times a Day. 5/21/23   Tierra Willett MD   Vibegron (Gemtesa) 75 MG tablet Take 1 tablet by mouth Daily.    Tierra Willett MD   vitamin D (ERGOCALCIFEROL) 1.25 MG (81929 UT) capsule capsule Take 1 capsule by mouth 1 (One) Time Per Week. wednesday    ProviderTierra MD           Objective   Physical Exam  68-year-old female awake alert.  Generally overweight.  Pupils equal round react light.  Oropharynx dry but clear neck supple chest clear equal breath sounds.  Cardiovascular regular rhythm abdomen is soft without localizing tenderness mass rebound or guarding.  Extremities without tenderness significant edema.  Neurologic exam without focal findings noted.  Procedures           ED Course      Results for orders placed or performed during the hospital encounter of 12/19/23   COVID-19,CEPHEID/MURIEL,COR/SADIE/PAD/MEGHAN/LAG IN-HOUSE,NP SWAB IN TRANSPORT MEDIA 1 HR TAT, RT-PCR - Swab, Nasopharynx    Specimen: Nasopharynx; Swab   Result Value Ref Range    COVID19 Not Detected Not Detected - Ref. Range   Comprehensive Metabolic Panel    Specimen: Blood   Result Value Ref Range    Glucose 146 (H) 65 - 99 mg/dL    BUN 15 8 - 23 mg/dL    Creatinine 0.97 0.57 - 1.00 mg/dL    Sodium 138 136 - 145 mmol/L    Potassium 4.3 3.5 - 5.2 mmol/L    Chloride 96 (L) 98 - 107 mmol/L    CO2 29.0 22.0 - 29.0 mmol/L    Calcium 9.1 8.6 - 10.5 mg/dL    Total Protein 6.0 6.0 - 8.5 g/dL    Albumin 3.3 (L) 3.5 - 5.2 g/dL    ALT (SGPT) 232 (H) 1 - 33 U/L    AST (SGOT) 197 (H) 1 - 32 U/L    Alkaline Phosphatase 250 (H) 39 - 117 U/L    Total Bilirubin 1.4 (H) 0.0 - 1.2 mg/dL    Globulin 2.7 gm/dL    A/G Ratio 1.2 g/dL     BUN/Creatinine Ratio 15.5 7.0 - 25.0    Anion Gap 13.0 5.0 - 15.0 mmol/L    eGFR 64.2 >60.0 mL/min/1.73   Urinalysis With Microscopic If Indicated (No Culture) - Urine, Clean Catch    Specimen: Urine, Clean Catch   Result Value Ref Range    Color, UA Orange (A) Yellow, Straw    Appearance, UA Turbid (A) Clear    pH, UA 5.5 5.0 - 8.0    Specific Gravity, UA 1.039 (H) 1.005 - 1.030    Glucose, UA Negative Negative    Ketones, UA Trace (A) Negative    Bilirubin, UA Moderate (2+) (A) Negative    Blood, UA Negative Negative    Protein,  mg/dL (2+) (A) Negative    Leuk Esterase, UA Small (1+) (A) Negative    Nitrite, UA Positive (A) Negative    Urobilinogen, UA 1.0 E.U./dL 0.2 - 1.0 E.U./dL   Lipase    Specimen: Blood   Result Value Ref Range    Lipase 9 (L) 13 - 60 U/L   CBC Auto Differential    Specimen: Blood   Result Value Ref Range    WBC 5.80 3.40 - 10.80 10*3/mm3    RBC 4.31 3.77 - 5.28 10*6/mm3    Hemoglobin 13.3 12.0 - 15.9 g/dL    Hematocrit 40.9 34.0 - 46.6 %    MCV 94.7 79.0 - 97.0 fL    MCH 30.7 26.6 - 33.0 pg    MCHC 32.5 31.5 - 35.7 g/dL    RDW 14.1 12.3 - 15.4 %    RDW-SD 47.3 37.0 - 54.0 fl    MPV 9.9 6.0 - 12.0 fL    Platelets 192 140 - 450 10*3/mm3    Neutrophil % 78.4 (H) 42.7 - 76.0 %    Lymphocyte % 14.0 (L) 19.6 - 45.3 %    Monocyte % 5.9 5.0 - 12.0 %    Eosinophil % 1.4 0.3 - 6.2 %    Basophil % 0.3 0.0 - 1.5 %    Neutrophils, Absolute 4.60 1.70 - 7.00 10*3/mm3    Lymphocytes, Absolute 0.80 0.70 - 3.10 10*3/mm3    Monocytes, Absolute 0.30 0.10 - 0.90 10*3/mm3    Eosinophils, Absolute 0.10 0.00 - 0.40 10*3/mm3    Basophils, Absolute 0.00 0.00 - 0.20 10*3/mm3    nRBC 0.0 0.0 - 0.2 /100 WBC   Urinalysis, Microscopic Only - Urine, Clean Catch    Specimen: Urine, Clean Catch   Result Value Ref Range    RBC, UA 0-2 None Seen, 0-2 /HPF    WBC, UA 6-10 (A) None Seen, 0-2 /HPF    Bacteria, UA 2+ (A) None Seen /HPF    Squamous Epithelial Cells, UA 21-30 (A) None Seen, 0-2 /HPF    Hyaline Casts, UA  None Seen None Seen /LPF    Mucus, UA Trace None Seen, Trace /HPF    Methodology Manual Light Microscopy    Urinalysis With Culture If Indicated - Straight Cath    Specimen: Straight Cath; Urine   Result Value Ref Range    Color, UA Orange (A) Yellow, Straw    Appearance, UA Clear Clear    pH, UA 6.0 5.0 - 8.0    Specific Gravity, UA 1.023 1.005 - 1.030    Glucose, UA Negative Negative    Ketones, UA Trace (A) Negative    Bilirubin, UA Small (1+) (A) Negative    Blood, UA Negative Negative    Protein, UA 30 mg/dL (1+) (A) Negative    Leuk Esterase, UA Small (1+) (A) Negative    Nitrite, UA Positive (A) Negative    Urobilinogen, UA 1.0 E.U./dL 0.2 - 1.0 E.U./dL   Hepatitis Panel, Acute    Specimen: Blood   Result Value Ref Range    Hepatitis B Surface Ag Non-Reactive Non-Reactive    Hep A IgM Non-Reactive Non-Reactive    Hep B C IgM Non-Reactive Non-Reactive    Hepatitis C Ab Non-Reactive Non-Reactive   Acetaminophen Level    Specimen: Blood   Result Value Ref Range    Acetaminophen <5.0 0.0 - 30.0 mcg/mL   Urinalysis, Microscopic Only - Straight Cath    Specimen: Straight Cath; Urine   Result Value Ref Range    RBC, UA 0-2 None Seen, 0-2 /HPF    WBC, UA 0-2 None Seen, 0-2 /HPF    Bacteria, UA None Seen None Seen /HPF    Squamous Epithelial Cells, UA 3-6 (A) None Seen, 0-2 /HPF    Transitional Epithelial Cells, UA 3-6 (A) 0 - 2 /HPF    Hyaline Casts, UA 3-6 None Seen /LPF    Methodology Manual Light Microscopy    Lavender Top   Result Value Ref Range    Extra Tube hold    Gold Top - SST   Result Value Ref Range    Extra Tube Hold for add-ons.    Light Blue Top   Result Value Ref Range    Extra Tube Hold for add-ons.      CT Abdomen Pelvis Without Contrast    Result Date: 12/19/2023  Impression: No acute intra-abdominal process evident. Small hiatal hernia. 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 No adjacent [place] changes. Small fat-containing umbilical  "hernia. Prior cholecystectomy and hysterectomy. Electronically Signed: Sree De Santiago MD  12/19/2023 9:53 PM EST  Workstation ID: [id number]    XR Chest 1 View    Result Date: 12/19/2023  Impression: No radiographic evidence of acute chest process. Electronically Signed: Sree De Santiago MD  12/19/2023 6:53 PM EST  Workstation ID: [id number]   Medications   lactated ringers infusion (125 mL/hr Intravenous New Bag 12/19/23 1927)   cefTRIAXone (ROCEPHIN) 2,000 mg in sodium chloride 0.9 % 100 mL IVPB (has no administration in time range)   oxyCODONE (ROXICODONE) immediate release tablet 10 mg (10 mg Oral Given 12/19/23 2212)     /79   Pulse 81   Temp 98.7 °F (37.1 °C) (Oral)   Resp 20   Ht 165.1 cm (65\")   Wt 130 kg (286 lb)   SpO2 96%   BMI 47.59 kg/m²                                          Medical Decision Making  Amount and/or Complexity of Data Reviewed  Labs: ordered.  Radiology: ordered.    Risk  Prescription drug management.    Chart review: Patient had admission in October of this year for new onset atrial fibrillation.  Comorbidity: As per past history   Differential: UTI, dehydration, viral illness  My EKG interpretation: Not indicated  Lab: COVID not detected, voided urinalysis revealed urine orange nitrite positive with 6010 white cells 2+ bacteria but 21-30 squamous cells.  Lipase low at 9 CBC white count 5.8 with human 13.3 platelet count 192 78 segs no bands.  Comprehensive metabolic panel remarkable for albumin of 3.3 ALT of 232 AST of 197 with alkaline phosphatase of 250 with bilirubin of 1.4.  My Radiology review and interpretation: Chest x-ray reveals clear lungs without pneumonia or evidence of failure CT abdomen pelvis revealed small hiatal hernia diverticulum of the posterior wall of the third portion of the duodenum no inflammatory change.  Gallbladder is surgical absent and biliary tree is nondilated.  Pancreas appears normal.  Discussion/treatment: Patient IV placed.  Findings were " discussed with her.  She did have some flank pain but reported was not sure if this was new or just related to her back pain.  Will check catheterized urine with culture.  If positive will treat.  Etiology of her elevated liver enzymes is not clear.  She reports she just takes 1 pain pill 4 times a day.  She does not report other Tylenol use other than rarely.  Will check Tylenol level and hepatitis profile.  She reports she is just not felt well the last 3 weeks.  Will place her in observation with GI consultation in a.m. she will be kept on IV fluids.  She was given oxycodone which is her routine pain medication.  Catheterized urine was obtained this is nitrite positive with 1+ leukocytes.  This will be sent for C&S.  She was given dose of Rocephin.  Acetaminophen level was also obtained found to be less than 5.  Hepatitis panel was negative.  Patient was evaluated using appropriate PPE      Final diagnoses:   Elevated liver enzymes   Urinary tract infection without hematuria, site unspecified       ED Disposition  ED Disposition       ED Disposition   Decision to Admit    Condition   --    Comment   --               No follow-up provider specified.       Medication List      No changes were made to your prescriptions during this visit.            Hudson Rubio MD  12/19/23 7121

## 2023-12-21 ENCOUNTER — READMISSION MANAGEMENT (OUTPATIENT)
Dept: CALL CENTER | Facility: HOSPITAL | Age: 68
End: 2023-12-21
Payer: MEDICARE

## 2023-12-21 ENCOUNTER — INPATIENT HOSPITAL (AMBULATORY)
Dept: URBAN - METROPOLITAN AREA HOSPITAL 84 | Facility: HOSPITAL | Age: 68
End: 2023-12-21
Payer: MEDICARE

## 2023-12-21 VITALS
HEIGHT: 65 IN | HEART RATE: 81 BPM | SYSTOLIC BLOOD PRESSURE: 133 MMHG | OXYGEN SATURATION: 97 % | TEMPERATURE: 97.2 F | BODY MASS INDEX: 45.69 KG/M2 | RESPIRATION RATE: 18 BRPM | WEIGHT: 274.25 LBS | DIASTOLIC BLOOD PRESSURE: 66 MMHG

## 2023-12-21 DIAGNOSIS — R10.10 UPPER ABDOMINAL PAIN, UNSPECIFIED: ICD-10-CM

## 2023-12-21 LAB
ALBUMIN SERPL-MCNC: 3.2 G/DL (ref 3.5–5.2)
ALBUMIN/GLOB SERPL: 1.6 G/DL
ALP SERPL-CCNC: 186 U/L (ref 39–117)
ALT SERPL W P-5'-P-CCNC: 91 U/L (ref 1–33)
ANION GAP SERPL CALCULATED.3IONS-SCNC: 7 MMOL/L (ref 5–15)
AST SERPL-CCNC: 29 U/L (ref 1–32)
BASOPHILS # BLD AUTO: 0 10*3/MM3 (ref 0–0.2)
BASOPHILS NFR BLD AUTO: 0.6 % (ref 0–1.5)
BILIRUB SERPL-MCNC: 0.3 MG/DL (ref 0–1.2)
BUN SERPL-MCNC: 10 MG/DL (ref 8–23)
BUN/CREAT SERPL: 12.7 (ref 7–25)
CALCIUM SPEC-SCNC: 8.9 MG/DL (ref 8.6–10.5)
CHLORIDE SERPL-SCNC: 103 MMOL/L (ref 98–107)
CMV IGM SERPL IA-ACNC: <30 AU/ML (ref 0–29.9)
CO2 SERPL-SCNC: 34 MMOL/L (ref 22–29)
CREAT SERPL-MCNC: 0.79 MG/DL (ref 0.57–1)
DEPRECATED RDW RBC AUTO: 47.7 FL (ref 37–54)
EGFRCR SERPLBLD CKD-EPI 2021: 82.1 ML/MIN/1.73
EOSINOPHIL # BLD AUTO: 0.1 10*3/MM3 (ref 0–0.4)
EOSINOPHIL NFR BLD AUTO: 2.1 % (ref 0.3–6.2)
ERYTHROCYTE [DISTWIDTH] IN BLOOD BY AUTOMATED COUNT: 14.2 % (ref 12.3–15.4)
GLOBULIN UR ELPH-MCNC: 2 GM/DL
GLUCOSE BLDC GLUCOMTR-MCNC: 108 MG/DL (ref 70–105)
GLUCOSE BLDC GLUCOMTR-MCNC: 111 MG/DL (ref 70–105)
GLUCOSE SERPL-MCNC: 123 MG/DL (ref 65–99)
HCT VFR BLD AUTO: 37.4 % (ref 34–46.6)
HGB BLD-MCNC: 11.9 G/DL (ref 12–15.9)
LKM-1 AB SER-ACNC: 1.6 UNITS (ref 0–20)
LYMPHOCYTES # BLD AUTO: 2.1 10*3/MM3 (ref 0.7–3.1)
LYMPHOCYTES NFR BLD AUTO: 37.5 % (ref 19.6–45.3)
MCH RBC QN AUTO: 30.5 PG (ref 26.6–33)
MCHC RBC AUTO-ENTMCNC: 31.9 G/DL (ref 31.5–35.7)
MCV RBC AUTO: 95.6 FL (ref 79–97)
MITOCHONDRIA M2 IGG SER-ACNC: <20 UNITS (ref 0–20)
MONOCYTES # BLD AUTO: 0.5 10*3/MM3 (ref 0.1–0.9)
MONOCYTES NFR BLD AUTO: 9.8 % (ref 5–12)
NEUTROPHILS NFR BLD AUTO: 2.8 10*3/MM3 (ref 1.7–7)
NEUTROPHILS NFR BLD AUTO: 50 % (ref 42.7–76)
NRBC BLD AUTO-RTO: 0 /100 WBC (ref 0–0.2)
PLATELET # BLD AUTO: 188 10*3/MM3 (ref 140–450)
PMV BLD AUTO: 8.8 FL (ref 6–12)
POTASSIUM SERPL-SCNC: 3.5 MMOL/L (ref 3.5–5.2)
PROT SERPL-MCNC: 5.2 G/DL (ref 6–8.5)
RBC # BLD AUTO: 3.92 10*6/MM3 (ref 3.77–5.28)
SMA IGG SER-ACNC: 2 UNITS (ref 0–19)
SODIUM SERPL-SCNC: 144 MMOL/L (ref 136–145)
WBC NRBC COR # BLD AUTO: 5.6 10*3/MM3 (ref 3.4–10.8)

## 2023-12-21 PROCEDURE — 80053 COMPREHEN METABOLIC PANEL: CPT | Performed by: NURSE PRACTITIONER

## 2023-12-21 PROCEDURE — 25810000003 LACTATED RINGERS PER 1000 ML: Performed by: EMERGENCY MEDICINE

## 2023-12-21 PROCEDURE — 99232 SBSQ HOSP IP/OBS MODERATE 35: CPT | Performed by: NURSE PRACTITIONER

## 2023-12-21 PROCEDURE — 82948 REAGENT STRIP/BLOOD GLUCOSE: CPT

## 2023-12-21 PROCEDURE — G0378 HOSPITAL OBSERVATION PER HR: HCPCS

## 2023-12-21 PROCEDURE — 85025 COMPLETE CBC W/AUTO DIFF WBC: CPT | Performed by: PHYSICIAN ASSISTANT

## 2023-12-21 RX ADMIN — OXYCODONE HYDROCHLORIDE 10 MG: 5 TABLET ORAL at 15:20

## 2023-12-21 RX ADMIN — OXYCODONE HYDROCHLORIDE 10 MG: 5 TABLET ORAL at 07:32

## 2023-12-21 RX ADMIN — METOPROLOL SUCCINATE 25 MG: 25 TABLET, EXTENDED RELEASE ORAL at 08:41

## 2023-12-21 RX ADMIN — CITALOPRAM HYDROBROMIDE 20 MG: 20 TABLET ORAL at 08:41

## 2023-12-21 RX ADMIN — METFORMIN HYDROCHLORIDE 500 MG: 500 TABLET ORAL at 07:32

## 2023-12-21 RX ADMIN — SODIUM CHLORIDE, POTASSIUM CHLORIDE, SODIUM LACTATE AND CALCIUM CHLORIDE 100 ML/HR: 600; 310; 30; 20 INJECTION, SOLUTION INTRAVENOUS at 08:49

## 2023-12-21 RX ADMIN — GABAPENTIN 300 MG: 300 CAPSULE ORAL at 08:41

## 2023-12-21 RX ADMIN — LISINOPRIL 40 MG: 20 TABLET ORAL at 08:41

## 2023-12-21 RX ADMIN — Medication 400 MG: at 08:41

## 2023-12-21 NOTE — CASE MANAGEMENT/SOCIAL WORK
Continued Stay Note  [facility]     Patient Name: Kiki Perdomo  MRN: [id number]  Today's Date: 12/21/2023    Admit Date: 12/19/2023    Plan: Routine home with family   Discharge Plan       Row Name 12/21/23 1131       Plan    Plan Comments Barriers: IV antibiotics, GI consult             Julia Hargrove RN     Office phone: [phone]  Office fax: [phone]

## 2023-12-21 NOTE — PROGRESS NOTES
" LOS: 0 days   Patient Care Team:  Smita Mayo APRN as PCP - General (Nurse Practitioner)      Subjective \"I am better than yesterday\"    Interval History:     Subjective: No nausea or vomiting and tolerating diet without difficulty.  No abdominal pain.  She had skipped 4 days between bowel movements then had some mild diarrhea after hard stool.  No melena or rectal bleeding.  Overall feeling better.    ROS:   No chest pain, shortness of breath, or cough.      Medication Review:     Current Facility-Administered Medications:     acetaminophen (TYLENOL) tablet 650 mg, 650 mg, Oral, Q4H PRN, Hudson Rubio MD    aluminum-magnesium hydroxide-simethicone (MAALOX MAX) 400-400-40 MG/5ML suspension 15 mL, 15 mL, Oral, Q6H PRN, Jessica Yang PA-C, 15 mL at 12/1956    atorvastatin (LIPITOR) tablet 40 mg, 40 mg, Oral, Nightly, Jessica Yang PA-C, 40 mg at 12/20/23 1957    sennosides-docusate (PERICOLACE) 8.6-50 MG per tablet 2 tablet, 2 tablet, Oral, BID **AND** polyethylene glycol (MIRALAX) packet 17 g, 17 g, Oral, Daily PRN **AND** bisacodyl (DULCOLAX) EC tablet 5 mg, 5 mg, Oral, Daily PRN **AND** bisacodyl (DULCOLAX) suppository 10 mg, 10 mg, Rectal, Daily PRN, Hudson Rubio MD    cefTRIAXone (ROCEPHIN) 2,000 mg in sodium chloride 0.9 % 100 mL IVPB, 2,000 mg, Intravenous, Q24H, Jessica Yang PA-C, Stopped at 12/20/23 2100    citalopram (CeleXA) tablet 20 mg, 20 mg, Oral, Daily, Jessica Yang PA-C, 20 mg at 12/21/23 0841    dextrose (D50W) (25 g/50 mL) IV injection 25 g, 25 g, Intravenous, Q15 Min PRN, Jessica Yang PA-C    dextrose (GLUTOSE) oral gel 15 g, 15 g, Oral, Q15 Min PRN, Jessica Yang PA-C    gabapentin (NEURONTIN) capsule 300 mg, 300 mg, Oral, TID, Jessica Yang PA-C, 300 mg at 12/21/23 0841    glucagon (GLUCAGEN) injection 1 mg, 1 mg, Intramuscular, Q15 Min PRN, Jessica Yang PA-C    insulin lispro (HUMALOG/ADMELOG) injection 2-7 Units, 2-7 Units, Subcutaneous, 4x " Daily AC & at Bedtime, Jessica Yang PA-C    lactated ringers infusion, 100 mL/hr, Intravenous, Continuous, Hudson Rubio MD, Last Rate: 100 mL/hr at 12/21/23 0849, 100 mL/hr at 12/21/23 0849    lisinopril (PRINIVIL,ZESTRIL) tablet 40 mg, 40 mg, Oral, Daily, Jessica Yang PA-C, 40 mg at 12/21/23 0841    magnesium oxide (MAG-OX) tablet 400 mg, 400 mg, Oral, Daily, Jessica Yang PA-C, 400 mg at 12/21/23 0841    melatonin tablet 5 mg, 5 mg, Oral, Nightly PRN, Hudson Rubio MD    metFORMIN (GLUCOPHAGE) tablet 500 mg, 500 mg, Oral, BID With Meals, Jessica Yang PA-C, 500 mg at 12/21/23 0732    metoprolol succinate XL (TOPROL-XL) 24 hr tablet 25 mg, 25 mg, Oral, Daily, Jessica Yang PA-C, 25 mg at 12/21/23 0841    ondansetron (ZOFRAN) tablet 4 mg, 4 mg, Oral, Q6H PRN **OR** ondansetron (ZOFRAN) injection 4 mg, 4 mg, Intravenous, Q6H PRN, Jessica Yang PA-C, 4 mg at 12/20/23 1721    oxyCODONE (ROXICODONE) immediate release tablet 10 mg, 10 mg, Oral, Q4H PRN, Jessica Yang PA-C, 10 mg at 12/21/23 0732    prochlorperazine (COMPAZINE) injection 10 mg, 10 mg, Intravenous, Q6H PRN, Jessica Yang PA-C, 10 mg at 12/20/23 1135    rivaroxaban (XARELTO) tablet 20 mg, 20 mg, Oral, Daily With Dinner, Jessica Yang PA-C, 20 mg at 12/20/23 1710    sodium chloride 0.9 % flush 10 mL, 10 mL, Intravenous, Q12H, Hudson Rubio MD, 10 mL at 12/20/23 1201    sodium chloride 0.9 % flush 10 mL, 10 mL, Intravenous, PRN, Hudson Rubio MD, 10 mL at 12/20/23 1958    sodium chloride 0.9 % flush 10 mL, 10 mL, Intravenous, Q12H, Jessica Yang PA-C, 10 mL at 12/20/23 1201    sodium chloride 0.9 % infusion 40 mL, 40 mL, Intravenous, PRN, Hudson Rubio MD      Objective resting in bed, no acute distress, no family present    Vital Signs  Vitals:    12/20/23 2309 12/21/23 0300 12/21/23 0642 12/21/23 1031   BP: 137/71 129/72 173/68 120/65   BP Location: Left arm Left arm Left arm Right arm   Patient Position:  Lying Lying Lying Lying   Pulse: 80 116 94 90   Resp: 20 20 21 20   Temp: 97.8 °F (36.6 °C) 97.7 °F (36.5 °C) 97.8 °F (36.6 °C) 97.5 °F (36.4 °C)   TempSrc: Oral Oral Oral Oral   SpO2: 100% (!) 85% 90% 94%   Weight:       Height:           Physical Exam:     General Appearance:    Awake and alert, in no acute distress, obese   Head:    Normocephalic, without obvious abnormality   Eyes:          Conjunctivae normal, anicteric sclera   Throat:   No oral lesions, no thrush, oral mucosa moist   Neck:   supple, no JVD   Lungs:     respirations regular, even and unlabored   Abdomen:     Soft, non-tender, nondistended   Rectal:     Deferred   Extremities:    no cyanosis   Skin:   No bruising or rash, no jaundice        Results Review:    CBC    Results from last 7 days   Lab Units 12/20/23  0400 12/19/23  1816   WBC 10*3/mm3 5.40 5.80   HEMOGLOBIN g/dL 13.4 13.3   PLATELETS 10*3/mm3 187 192     CMP   Results from last 7 days   Lab Units 12/20/23  0400 12/19/23  1816   SODIUM mmol/L 138 138   POTASSIUM mmol/L 3.5 4.3   CHLORIDE mmol/L 98 96*   CO2 mmol/L 28.0 29.0   BUN mg/dL 13 15   CREATININE mg/dL 0.86 0.97   GLUCOSE mg/dL 111* 146*   ALBUMIN g/dL 3.1* 3.3*   BILIRUBIN mg/dL 0.6 1.4*   ALK PHOS U/L 240* 250*   AST (SGOT) U/L 108* 197*   ALT (SGPT) U/L 195* 232*   LIPASE U/L  --  9*     Cr Clearance Estimated Creatinine Clearance: 84 mL/min (by C-G formula based on SCr of 0.86 mg/dL).  Coag     HbA1C   Lab Results   Component Value Date    HGBA1C 6.3 (H) 02/18/2022    HGBA1C 5.6 03/31/2020    HGBA1C 5.5 09/21/2019     Blood Glucose   Glucose   Date/Time Value Ref Range Status   12/21/2023 0716 108 (H) 70 - 105 mg/dL Final     Comment:     Serial Number: [id number]:  [id number]   12/20/2023 2049 134 (H) 70 - 105 mg/dL Final     Comment:     Serial Number: [id number]:  [id number]   12/20/2023 1654 127 (H) 70 - 105 mg/dL Final     Comment:     Serial Number: [id number]:  [id number]   12/20/2023 1203 145 (H)  70 - 105 mg/dL Final     Comment:     Serial Number: [id number]:  [id number]     Infection     UA    Results from last 7 days   Lab Units 12/19/23  2216   NITRITE UA  Positive*   WBC UA /HPF 0-2   BACTERIA UA /HPF None Seen   SQUAM EPITHEL UA /HPF 3-6*     Imaging Results (Last 72 Hours)       Procedure Component Value Units Date/Time    US Liver [[id number]] Collected: 12/20/23 1552     Updated: 12/20/23 1555    Narrative:      US LIVER    Date of Exam: 12/20/2023 2:42 PM EST    Indication: elevated LFTs.    Comparison: CT abdomen pelvis 12/19/2023    Technique: Grayscale and color Doppler ultrasound evaluation of the right upper quadrant was performed.      Findings:  Liver size is mildly enlarged, 18.6 cm in length. Liver echotexture is coarsened, suggesting features of steatosis. No focal liver lesion is identified. Smooth surface liver contour without gross evidence of cirrhosis. Image hepatic veins are patent.   Main portal vein is patent with hepatopetal flow. No intrahepatic biliary ductal dilation is seen. Gallbladder surgically absent. Common bile duct caliber is normal at 3 mm.      Impression:      Impression:    1. Mild hepatic enlargement with features of hepatic steatosis.  2. Cholecystectomy. No abnormal biliary dilation        Electronically Signed: Aleksandra Merida MD    12/20/2023 3:53 PM EST    Workstation ID: [id number]    CT Abdomen Pelvis Without Contrast [[id number]] Collected: 12/19/23 2149     Updated: 12/19/23 2155    Narrative:      CT ABDOMEN PELVIS WO CONTRAST    Date of Exam: 12/19/2023 9:24 PM EST    Indication: elevated lft's.    Comparison: Noncontrast CT of the abdomen and pelvis performed on June 2, 2023    Technique: Axial CT images were obtained of the abdomen and pelvis without the administration of contrast. Sagittal and coronal reconstructions were performed.  Automated exposure control and iterative reconstruction methods were used.      Findings:    Lung Bases:  The  visualized lung bases and lower mediastinal structures are unremarkable.      Peritoneum:  No free intraperitoneal air or fluid.     Abdominal wall:  Small fat-containing umbilical hernia.    Liver:  The liver is within normal limits in size and contour. Small splenic granulomas are visualized.      Biliary/Gallbladder:  The gallbladder is surgically absent. The biliary tree is nondilated.    Pancreas:  Pancreas is within normal limits. There is no evidence of pancreatic mass or peripancreatic fluid.      Spleen:  The spleen is normal in size and contour. Multiple splenic granulomas are visualized.    Gastrointestinal/Mesentery:   Small to moderate hiatal hernia is visualized. Diverticulum arising off of the third portion of the duodenum, posterior wall is visualized measuring 2 x 4.7 cm in maximum AP and transverse dimension. No adjacent inflammatory changes are seen. No evidence   of bowel obstruction or gross inflammatory changes.The appendix appears within normal limits.  No mesenteric fluid collections identified.      Adrenals:  Adrenal glands are unremarkable.      Kidneys:  The kidneys are in anatomic position. No evidence of nephrolithiasis. No evidence of hydronephrosis or perinephric fat stranding.    Bladder:   The urinary bladder is unremarkable.    Reproductive organs:    The patient is post hysterectomy.      Retroperitoneal/Lymph Nodes:  No retroperitoneal adenopathy is identified.     Vasculature:  Mild aortic atheromatous changes.        Bony Structures:    No acute fracture or aggressive lesions. Mild chronic loss of vertebral body height at T12. Post L1 kyphoplasty. Advanced chronic loss of vertebral body height at L1.          Impression:      Impression:  No acute intra-abdominal process evident.    Small hiatal hernia.    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 No adjacent [place] changes.    Small fat-containing umbilical hernia.      Prior cholecystectomy and hysterectomy.            Electronically Signed: Sree De Santiago MD    12/19/2023 9:53 PM EST    Workstation ID: [id number]    XR Chest 1 View [[id number]] Collected: 12/19/23 1853     Updated: 12/19/23 1856    Narrative:      XR CHEST 1 VW    Date of Exam: 12/19/2023 6:32 PM EST    Indication: soa    Comparison: Chest radiograph performed on October 19, 2023    Findings:  Overlying monitoring wires limit diagnostic sensitivity. There are no consolidations.No pleural effusion. No evidence of pneumothorax.  The pulmonary vasculature appears within normal limits.  The cardiac and mediastinal silhouette appear unremarkable.  No acute osseous abnormality identified.      Impression:      Impression:  No radiographic evidence of acute chest process.      Electronically Signed: Sree De Santiago MD    12/19/2023 6:53 PM EST    Workstation ID: [id number]            Assessment & Plan   Elevated LFTs  UTI  Weakness  GERD  DMII  Chronic pain on narcotics  History of A-fib -on Xarelto  History of cholecystectomy     PLAN:  Patient is a 67 y/o female with a history of chronic pain on narcotics, A-fib on Xarelto, GERD and diabetes.  She presents with feeling poorly over the last 3 weeks with weakness, decreased mobility, decreased oral intake and feeling poorly overall.  She did take Paxlovid recently for presumed COVID.  She was found to have UTI.  CT shows no intra abdominal acute process with normal-appearing liver.  Chest x-ray and COVID negative.  Hepatitis panel negative.  LFTs improving, consider drug-induced liver injury from Paxlovid vs other.  Review of records show normal LFTs on 10/19/2023.  Full liver serology workup in progress, acute hepatitis panel, acetaminophen level, CK, ceruloplasmin and CMV IgM negative.  Iron 36 with Hgb 13.4.  Ferritin 267 and .  Awaiting morning lab results for review.  If continues to improve, we will continue to monitor.  Patient is asking for possible  discharge home later today if she continues to improve.  Continue supportive care.    Electronically signed by KANDY Wallace, 12/21/23, 11:02 AM EST.

## 2023-12-21 NOTE — DISCHARGE SUMMARY
[facility]    Smita Mayo KANDY LORENZ    CHIEF COMPLAINT:     vomiting    HISTORY OF PRESENT ILLNESS:    HPI    68-year-old female presents with weakness.  She states she was treated for possible COVID approximately 3 weeks ago.  She had no test performed.  She was on Paxlovid.  She has had some nausea vomiting mild in nature.  She has had decreased p.o. intake.  She states she has had some mild diarrhea.  She states her urine has been darker but is still going.  She felt warm but has not checked her temperature.  She states her mouth has been dry but she has been drinking water she reports no cough or shortness of breath.  She has had some headaches.  No body aches.  She has back pain but this is chronic.      Observation 12/20/23  Pt concurs with er hpi. Pt still nauseated. Pt hr elevated in the 120s back in afib. Pt already on metoprolol and xarelto for chronic afib.       Past Medical History:   Diagnosis Date    Abnormal ECG 09/19/2023    Allergy to alpha-gal     Anxiety and depression     Arthritis     mild    Atrial fibrillation 09/19/2023    Chronic back pain     Chronic fatigue syndrome     Diabetes     Fibromyalgia     GERD (gastroesophageal reflux disease)     Hyperlipidemia     Hypertension     Nausea     Neuropathy     Restless legs     Uterine cancer     remission     Past Surgical History:   Procedure Laterality Date    CARDIAC CATHETERIZATION  2005    CHOLECYSTECTOMY      COLONOSCOPY      ENDOSCOPY N/A 09/07/2021    Procedure: ESOPHAGOGASTRODUODENOSCOPY with dilation (bougie # [phone]);  Surgeon: Gordon Morales MD;  Location: [facility] ENDOSCOPY;  Service: Gastroenterology;  Laterality: N/A;  Post Op: gastritis, hiatal hernia, esophageal ring    ENDOSCOPY N/A 02/22/2023    Procedure: ESOPHAGOGASTRODUODENOSCOPY with gastric biopsy's;  Surgeon: Gordon Morales MD;  Location: [facility] ENDOSCOPY;  Service: Gastroenterology;  Laterality: N/A;  gastritis    HYSTERECTOMY       SIGMOIDOSCOPY N/A 09/07/2021    Procedure: FLEX SIGMOIDOSCOPY with polypectomy and cautery of rectal polyp;  Surgeon: Gordon Morales MD;  Location: [facility] ENDOSCOPY;  Service: Gastroenterology;  Laterality: N/A;  Post Op: rectal polyp     Family History   Problem Relation Age of Onset    Heart disease Mother     Heart attack Mother     Heart disease Father         Stroke     Social History     Tobacco Use    Smoking status: Never     Passive exposure: Current    Smokeless tobacco: Never   Vaping Use    Vaping Use: Never used   Substance Use Topics    Alcohol use: Never    Drug use: Never     Medications Prior to Admission   Medication Sig Dispense Refill Last Dose    atorvastatin (LIPITOR) 40 MG tablet Take 1 tablet by mouth Daily.   12/20/2023    butalbital-acetaminophen-caffeine (FIORICET, ESGIC) -40 MG per tablet Take 1 tablet by mouth Every 4 (Four) Hours As Needed for Migraine.   12/19/2023    citalopram (CeleXA) 20 MG tablet Take 1 tablet by mouth Daily.   12/20/2023    esomeprazole (nexIUM) 40 MG capsule Take 1 capsule by mouth Every Morning Before Breakfast.   12/20/2023    gabapentin (NEURONTIN) 300 MG capsule Take 1 capsule by mouth 3 (Three) Times a Day.   Past Week    lisinopril (PRINIVIL,ZESTRIL) 40 MG tablet Take 1 tablet by mouth Daily.   12/20/2023    magnesium oxide (MAG-OX) 400 MG tablet Take 1 tablet by mouth Daily.   12/20/2023    metoprolol succinate XL (TOPROL-XL) 25 MG 24 hr tablet Take 1 tablet by mouth Daily. 90 tablet 1 12/20/2023    ondansetron ODT (ZOFRAN-ODT) 4 MG disintegrating tablet Place 1 tablet on the tongue Every 8 (Eight) Hours As Needed for Nausea. 8 tablet 0 12/19/2023    oxyCODONE-acetaminophen (PERCOCET)  MG per tablet Take 1 tablet by mouth Every 6 (Six) Hours As Needed for Moderate Pain.   12/20/2023    rivaroxaban (XARELTO) 20 MG tablet Take 1 tablet by mouth Daily. Indications: Atrial Fibrillation 30 tablet 0 12/20/2023    tiZANidine (ZANAFLEX) 2 MG  tablet Take 1 tablet by mouth 3 (Three) Times a Day.   12/20/2023    vitamin D (ERGOCALCIFEROL) 1.25 MG (65066 UT) capsule capsule Take 1 capsule by mouth 1 (One) Time Per Week. wednesday 12/20/2023    metFORMIN (GLUCOPHAGE) 500 MG tablet Take 1 tablet by mouth 2 (Two) Times a Day With Meals.       Rybelsus 7 MG tablet Take 7 mg by mouth Daily.       Vibegron (Gemtesa) 75 MG tablet Take 1 tablet by mouth Daily.        Allergies:  Indomethacin and Iodine      There is no immunization history on file for this patient.        REVIEW OF SYSTEMS:    ROS    Constitutional: Negative for fever.   Respiratory:  Negative for cough and shortness of breath.    Gastrointestinal:  Positive for diarrhea, nausea and vomiting.   Neurological:  Positive for weakness.     Vital Signs  Temp:  [97.2 °F (36.2 °C)-97.8 °F (36.6 °C)] 97.2 °F (36.2 °C)  Heart Rate:  [] 81  Resp:  [18-21] 18  BP: (120-173)/(65-72) 133/66          Physical Exam:  Physical Exam    Constitutional:       Appearance: She is obese.   Cardiovascular:      Rate and Rhythm: Tachycardia present. Rhythm irregular.      Pulses: Normal pulses.      Heart sounds: Normal heart sounds.   Pulmonary:      Effort: Pulmonary effort is normal.      Breath sounds: Normal breath sounds.   Abdominal:      Tenderness: There is abdominal tenderness.   Skin:     General: Skin is warm and dry.   Neurological:      General: No focal deficit present.      Mental Status: She is alert and oriented to person, place, and time.   Psychiatric:         Mood and Affect: Mood normal.         Behavior: Behavior kt    Emotional Behavior:    wnl   Debilities:   None      Results Review:    I reviewed the patient's new clinical results.  Lab Results (most recent)       Procedure Component Value Units Date/Time    Comprehensive Metabolic Panel [[id number]]  (Abnormal) Collected: 12/21/23 1428    Specimen: Blood Updated: 12/21/23 1458     Glucose 123 mg/dL      BUN 10 mg/dL      Creatinine  0.79 mg/dL      Sodium 144 mmol/L      Potassium 3.5 mmol/L      Chloride 103 mmol/L      CO2 34.0 mmol/L      Calcium 8.9 mg/dL      Total Protein 5.2 g/dL      Albumin 3.2 g/dL      ALT (SGPT) 91 U/L      AST (SGOT) 29 U/L      Alkaline Phosphatase 186 U/L      Total Bilirubin 0.3 mg/dL      Globulin 2.0 gm/dL      A/G Ratio 1.6 g/dL      BUN/Creatinine Ratio 12.7     Anion Gap 7.0 mmol/L      eGFR 82.1 mL/min/1.73     Narrative:      GFR Normal >60  Chronic Kidney Disease <60  Kidney Failure <15      CBC & Differential [[id number]]  (Abnormal) Collected: 12/21/23 1428    Specimen: Blood Updated: 12/21/23 1439    Narrative:      The following orders were created for panel order CBC & Differential.  Procedure                               Abnormality         Status                     ---------                               -----------         ------                     CBC Auto Differential[[id number]]        Abnormal            Final result                 Please view results for these tests on the individual orders.    CBC Auto Differential [[id number]]  (Abnormal) Collected: 12/21/23 1428    Specimen: Blood Updated: 12/21/23 1439     WBC 5.60 10*3/mm3      RBC 3.92 10*6/mm3      Hemoglobin 11.9 g/dL      Hematocrit 37.4 %      MCV 95.6 fL      MCH 30.5 pg      MCHC 31.9 g/dL      RDW 14.2 %      RDW-SD 47.7 fl      MPV 8.8 fL      Platelets 188 10*3/mm3      Neutrophil % 50.0 %      Lymphocyte % 37.5 %      Monocyte % 9.8 %      Eosinophil % 2.1 %      Basophil % 0.6 %      Neutrophils, Absolute 2.80 10*3/mm3      Lymphocytes, Absolute 2.10 10*3/mm3      Monocytes, Absolute 0.50 10*3/mm3      Eosinophils, Absolute 0.10 10*3/mm3      Basophils, Absolute 0.00 10*3/mm3      nRBC 0.0 /100 WBC     Anti-microsomal Antibody [[id number]] Collected: 12/20/23 1057    Specimen: Blood from Arm, Right Updated: 12/21/23 1410     Liver Fraction: 1.6 Units      Comment:                                 Negative    0.0 - 20.0                                   Equivocal  20.1 - 24.9                                  Positive         >24.9  LKM type 1 antibodies are detected in patients with  autoimmune hepatitis type 2 and in up to 8% of  patients with chronic HCV infection.       Narrative:      Performed at:  [address] [place] [address]  [id number]  : Lauri Smith PhD, Phone:  [phone]    POC Glucose Once [[id number]]  (Abnormal) Collected: 12/21/23 1132    Specimen: Blood Updated: 12/21/23 1133     Glucose 111 mg/dL      Comment: Serial Number: [id number]:  [id number]       POC Glucose Once [[id number]]  (Abnormal) Collected: 12/21/23 0716    Specimen: Blood Updated: 12/21/23 0717     Glucose 108 mg/dL      Comment: Serial Number: [id number]:  [id number]       Cytomegalovirus Antibody, IgM [[id number]] Collected: 12/20/23 1403    Specimen: Blood from Arm, Right Updated: 12/21/23 0612     CMV IgM <30.0 AU/mL      Comment:                                 Negative         <30.0                                  Equivocal  30.0 - 34.9                                  Positive         >34.9  A positive result is generally indicative of acute  infection, reactivation or persistent IgM production.       Narrative:      Performed at:  [address] [place] [address]  [id number]  : Lauri Smith PhD, Phone:  [phone]    Alpha - 1 - Antitrypsin [[id number]]  (Abnormal) Collected: 12/20/23 0400    Specimen: Blood Updated: 12/20/23 1716     ALPHA -1 ANTITRYPSIN 204 mg/dL     Ceruloplasmin [[id number]]  (Normal) Collected: 12/20/23 0400    Specimen: Blood Updated: 12/20/23 1715     Ceruloplasmin 24 mg/dL     Gamma GT [[id number]]  (Abnormal) Collected: 12/20/23 0400    Specimen: Blood Updated: 12/20/23 1715      U/L     CK [[id number]]  (Normal) Collected: 12/20/23 0400    Specimen: Blood Updated: 12/20/23 1715     Creatine Kinase 54 U/L     Sarah-Barr Virus VCA, IgM  [[id number]] Collected: 12/20/23 1403    Specimen: Blood from Arm, Right Updated: 12/20/23 1413    Ferritin [[id number]]  (Abnormal) Collected: 12/20/23 0400    Specimen: Blood Updated: 12/20/23 1122     Ferritin 267.80 ng/mL     Narrative:      Results may be falsely decreased if patient taking Biotin.      Iron [[id number]]  (Abnormal) Collected: 12/20/23 0400    Specimen: Blood Updated: 12/20/23 1111     Iron 36 mcg/dL     Anti-Smooth Muscle Antibody Titer [[id number]] Collected: 12/20/23 1057    Specimen: Blood from Arm, Right Updated: 12/20/23 1104    Mitochondrial Antibodies, M2 [[id number]] Collected: 12/20/23 1057    Specimen: Blood from Arm, Right Updated: 12/20/23 1104    MARIBELL [[id number]] Collected: 12/20/23 1057    Specimen: Blood from Arm, Right Updated: 12/20/23 1104    Comprehensive Metabolic Panel [[id number]]  (Abnormal) Collected: 12/20/23 0400    Specimen: Blood Updated: 12/20/23 0514     Glucose 111 mg/dL      BUN 13 mg/dL      Creatinine 0.86 mg/dL      Sodium 138 mmol/L      Potassium 3.5 mmol/L      Comment: Slight hemolysis detected by analyzer. Result may be falsely elevated.        Chloride 98 mmol/L      CO2 28.0 mmol/L      Calcium 9.2 mg/dL      Total Protein 6.0 g/dL      Albumin 3.1 g/dL      ALT (SGPT) 195 U/L      AST (SGOT) 108 U/L      Comment: Slight hemolysis detected by analyzer. Result may be falsely elevated.        Alkaline Phosphatase 240 U/L      Total Bilirubin 0.6 mg/dL      Globulin 2.9 gm/dL      A/G Ratio 1.1 g/dL      BUN/Creatinine Ratio 15.1     Anion Gap 12.0 mmol/L      eGFR 74.2 mL/min/1.73     Narrative:      GFR Normal >60  Chronic Kidney Disease <60  Kidney Failure <15      CBC Auto Differential [[id number]]  (Normal) Collected: 12/20/23 0400    Specimen: Blood Updated: 12/20/23 0444     WBC 5.40 10*3/mm3      RBC 4.50 10*6/mm3      Hemoglobin 13.4 g/dL      Hematocrit 41.6 %      MCV 92.3 fL      MCH 29.8 pg      MCHC 32.3 g/dL      RDW 14.0 %      RDW-SD 48.1 fl       MPV 10.0 fL      Platelets 187 10*3/mm3      Neutrophil % 59.6 %      Lymphocyte % 29.2 %      Monocyte % 8.3 %      Eosinophil % 2.5 %      Basophil % 0.4 %      Neutrophils, Absolute 3.20 10*3/mm3      Lymphocytes, Absolute 1.60 10*3/mm3      Monocytes, Absolute 0.40 10*3/mm3      Eosinophils, Absolute 0.10 10*3/mm3      Basophils, Absolute 0.00 10*3/mm3      nRBC 0.0 /100 WBC     Urinalysis, Microscopic Only - Straight Cath [[id number]]  (Abnormal) Collected: 12/19/23 2216    Specimen: Urine from Straight Cath Updated: 12/19/23 2247     RBC, UA 0-2 /HPF      WBC, UA 0-2 /HPF      Comment: Urine culture not indicated.        Bacteria, UA None Seen /HPF      Squamous Epithelial Cells, UA 3-6 /HPF      Transitional Epithelial Cells, UA 3-6 /HPF      Hyaline Casts, UA 3-6 /LPF      Methodology Manual Light Microscopy    Hepatitis Panel, Acute [[id number]]  (Normal) Collected: 12/19/23 1816    Specimen: Blood Updated: 12/19/23 2232     Hepatitis B Surface Ag Non-Reactive     Hep A IgM Non-Reactive     Hep B C IgM Non-Reactive     Hepatitis C Ab Non-Reactive    Narrative:      Results may be falsely decreased if patient taking Biotin.     Urinalysis With Culture If Indicated - Straight Cath [[id number]]  (Abnormal) Collected: 12/19/23 2216    Specimen: Urine from Straight Cath Updated: 12/19/23 2225     Color, UA Orange     Comment: Any Substance that causes an abnormal urine color can alter the accuracy of the chemical reactions.        Appearance, UA Clear     pH, UA 6.0     Specific Gravity, UA 1.023     Glucose, UA Negative     Ketones, UA Trace     Bilirubin, UA Small (1+)     Comment: Confirmation testing is unavailable.  A serum bilirubin is recommended for further assessment.        Blood, UA Negative     Protein, UA 30 mg/dL (1+)     Leuk Esterase, UA Small (1+)     Nitrite, UA Positive     Urobilinogen, UA 1.0 E.U./dL    Narrative:      In absence of clinical symptoms, the presence of pyuria, bacteria, and/or  nitrites on the urinalysis result does not correlate with infection.    Acetaminophen Level [[id number]]  (Normal) Collected: 12/19/23 1816    Specimen: Blood Updated: 12/19/23 2225     Acetaminophen <5.0 mcg/mL     Narrative:      Acetaminophen Therapeutic Range  5-20 ug/mL      Hours after ingestion            Toxic Value    4 Hours                           150 ug/mL    8 Hours                            70 ug/mL   12 Hours                            40 ug/mL   16 Hours                            20 ug/mL    These values apply to a single ingestion only.     COVID-19,CEPHEID/MURIEL,COR/SADIE/PAD/MEGHAN/LAG IN-HOUSE,NP SWAB IN TRANSPORT MEDIA 1 HR TAT, RT-PCR - Swab, Nasopharynx [[id number]]  (Normal) Collected: 12/19/23 1914    Specimen: Swab from Nasopharynx Updated: 12/19/23 1947     COVID19 Not Detected    Narrative:      Fact sheet for providers: https://www.fda.gov/media/997317/download     Fact sheet for patients: https://www.fda.gov/media/461814/download  Fact sheet for providers: https://www.fda.gov/media/005679/download    Fact sheet for patients: https://www.fda.gov/media/448444/download    Test performed by PCR.    Urinalysis, Microscopic Only - Urine, Clean Catch [[id number]]  (Abnormal) Collected: 12/19/23 1914    Specimen: Urine, Clean Catch Updated: 12/19/23 1940     RBC, UA 0-2 /HPF      WBC, UA 6-10 /HPF      Bacteria, UA 2+ /HPF      Squamous Epithelial Cells, UA 21-30 /HPF      Hyaline Casts, UA None Seen /LPF      Mucus, UA Trace /HPF      Methodology Manual Light Microscopy    Urinalysis With Microscopic If Indicated (No Culture) - Urine, Clean Catch [[id number]]  (Abnormal) Collected: 12/19/23 1914    Specimen: Urine, Clean Catch Updated: 12/19/23 1938     Color, UA Orange     Comment: Any Substance that causes an abnormal urine color can alter the accuracy of the chemical reactions.        Appearance, UA Turbid     pH, UA 5.5     Specific Gravity, UA 1.039     Glucose, UA Negative     Ketones, UA  Trace     Bilirubin, UA Moderate (2+)     Comment: Confirmation testing is unavailable.  A serum bilirubin is recommended for further assessment.        Blood, UA Negative     Protein,  mg/dL (2+)     Leuk Esterase, UA Small (1+)     Nitrite, UA Positive     Urobilinogen, UA 1.0 E.U./dL    [place] Draw [[id number]] Collected: 12/19/23 1816    Specimen: Blood Updated: 12/19/23 1917    Narrative:      The following orders were created for panel order [place] Draw.  Procedure                               Abnormality         Status                     ---------                               -----------         ------                     Green Top (Gel)[[id number]]                                  Final result               Lavender Top[[id number]]                                     Final result               Gold Top - SST[[id number]]                                   Final result               Light Blue Top[[id number]]                                   Final result                 Please view results for these tests on the individual orders.    Gold Top - SST [[id number]] Collected: 12/19/23 1816    Specimen: Blood Updated: 12/19/23 1917     Extra Tube Hold for add-ons.     Comment: Auto resulted.       Light Blue Top [[id number]] Collected: 12/19/23 1816    Specimen: Blood Updated: 12/19/23 1917     Extra Tube Hold for add-ons.     Comment: Auto resulted       Green Top (Gel) [[id number]] Collected: 12/19/23 1816    Specimen: Blood Updated: 12/19/23 1914    Lipase [[id number]]  (Abnormal) Collected: 12/19/23 1816    Specimen: Blood Updated: 12/19/23 1855     Lipase 9 U/L     Lavender Top [[id number]] Collected: 12/19/23 1816    Specimen: Blood Updated: 12/19/23 1855     Extra Tube hold    CBC & Differential [[id number]]  (Abnormal) Collected: 12/19/23 1816    Specimen: Blood Updated: 12/19/23 1854    Narrative:      The following orders were created for panel order CBC & Differential.  Procedure                                Abnormality         Status                     ---------                               -----------         ------                     CBC Auto Differential[[id number]]        Abnormal            Final result                 Please view results for these tests on the individual orders.            Imaging Results (Most Recent)       Procedure Component Value Units Date/Time    US Liver [[id number]] Collected: 12/20/23 1552     Updated: 12/20/23 1555    Narrative:      US LIVER    Date of Exam: 12/20/2023 2:42 PM EST    Indication: elevated LFTs.    Comparison: CT abdomen pelvis 12/19/2023    Technique: Grayscale and color Doppler ultrasound evaluation of the right upper quadrant was performed.      Findings:  Liver size is mildly enlarged, 18.6 cm in length. Liver echotexture is coarsened, suggesting features of steatosis. No focal liver lesion is identified. Smooth surface liver contour without gross evidence of cirrhosis. Image hepatic veins are patent.   Main portal vein is patent with hepatopetal flow. No intrahepatic biliary ductal dilation is seen. Gallbladder surgically absent. Common bile duct caliber is normal at 3 mm.      Impression:      Impression:    1. Mild hepatic enlargement with features of hepatic steatosis.  2. Cholecystectomy. No abnormal biliary dilation        Electronically Signed: Aleksandra Merida MD    12/20/2023 3:53 PM EST    Workstation ID: [id number]    CT Abdomen Pelvis Without Contrast [[id number]] Collected: 12/19/23 2149     Updated: 12/19/23 2155    Narrative:      CT ABDOMEN PELVIS WO CONTRAST    Date of Exam: 12/19/2023 9:24 PM EST    Indication: elevated lft's.    Comparison: Noncontrast CT of the abdomen and pelvis performed on June 2, 2023    Technique: Axial CT images were obtained of the abdomen and pelvis without the administration of contrast. Sagittal and coronal reconstructions were performed.  Automated exposure control and iterative reconstruction methods were  used.      Findings:    Lung Bases:  The visualized lung bases and lower mediastinal structures are unremarkable.      Peritoneum:  No free intraperitoneal air or fluid.     Abdominal wall:  Small fat-containing umbilical hernia.    Liver:  The liver is within normal limits in size and contour. Small splenic granulomas are visualized.      Biliary/Gallbladder:  The gallbladder is surgically absent. The biliary tree is nondilated.    Pancreas:  Pancreas is within normal limits. There is no evidence of pancreatic mass or peripancreatic fluid.      Spleen:  The spleen is normal in size and contour. Multiple splenic granulomas are visualized.    Gastrointestinal/Mesentery:   Small to moderate hiatal hernia is visualized. Diverticulum arising off of the third portion of the duodenum, posterior wall is visualized measuring 2 x 4.7 cm in maximum AP and transverse dimension. No adjacent inflammatory changes are seen. No evidence   of bowel obstruction or gross inflammatory changes.The appendix appears within normal limits.  No mesenteric fluid collections identified.      Adrenals:  Adrenal glands are unremarkable.      Kidneys:  The kidneys are in anatomic position. No evidence of nephrolithiasis. No evidence of hydronephrosis or perinephric fat stranding.    Bladder:   The urinary bladder is unremarkable.    Reproductive organs:    The patient is post hysterectomy.      Retroperitoneal/Lymph Nodes:  No retroperitoneal adenopathy is identified.     Vasculature:  Mild aortic atheromatous changes.        Bony Structures:    No acute fracture or aggressive lesions. Mild chronic loss of vertebral body height at T12. Post L1 kyphoplasty. Advanced chronic loss of vertebral body height at L1.          Impression:      Impression:  No acute intra-abdominal process evident.    Small hiatal hernia.    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 No adjacent [facility]  changes.    Small fat-containing umbilical hernia.     Prior cholecystectomy and hysterectomy.            Electronically Signed: Sree De Santiago MD    12/19/2023 9:53 PM EST    Workstation ID: [id number]    XR Chest 1 View [[id number]] Collected: 12/19/23 1853     Updated: 12/19/23 1856    Narrative:      XR CHEST 1 VW    Date of Exam: 12/19/2023 6:32 PM EST    Indication: soa    Comparison: Chest radiograph performed on October 19, 2023    Findings:  Overlying monitoring wires limit diagnostic sensitivity. There are no consolidations.No pleural effusion. No evidence of pneumothorax.  The pulmonary vasculature appears within normal limits.  The cardiac and mediastinal silhouette appear unremarkable.  No acute osseous abnormality identified.      Impression:      Impression:  No radiographic evidence of acute chest process.      Electronically Signed: Sree De Santiago MD    12/19/2023 6:53 PM EST    Workstation ID: [id number]          reviewed    ECG/EMG Results (most recent)       Procedure Component Value Units Date/Time    ECG 12 Lead Rhythm Change [[id number]] Collected: 12/20/23 1154     Updated: 12/20/23 1703     QT Interval 364 ms      QTC Interval 492 ms     Narrative:      HEART RATE= 109  bpm  RR Interval= 548  ms  DE Interval=   ms  P Horizontal Axis=   deg  P Front Axis=   deg  QRSD Interval= 87  ms  QT Interval= 364  ms  QTcB= 492  ms  QRS Axis= -26  deg  T Wave Axis= 136  deg  - ABNORMAL ECG -  Atrial fibrillation  Inferior infarct, old  Abnrm T, consider ischemia, anterolateral lds  When compared with ECG of 21-Nov-2023 8:39:11,  Significant change in rhythm: previously sinus  New or worsened ischemia or infarction  Electronically Signed By: Arlene Davila ([facility]) 20-Dec-2023 17:03:45  Date and Time of Study: 2023-12-20 11:54:29    SCANNED - TELEMETRY   [[id number]] Resulted: 12/19/23     Updated: 12/20/23 1959    SCANNED - TELEMETRY   [[id number]] Resulted: 12/19/23     Updated: 12/20/23 2354    SCANNED -  TELEMETRY   [[id number]] Resulted: 12/19/23     Updated: 12/21/23 0627    SCANNED - TELEMETRY   [[id number]] Resulted: 12/19/23     Updated: 12/21/23 0627    SCANNED - TELEMETRY   [[id number]] Resulted: 12/19/23     Updated: 12/21/23 0822          reviewed    Results for orders placed during the hospital encounter of 09/20/19    Duplex Carotid Ultrasound CAR    Interpretation Summary  · Proximal right internal carotid artery is normal.  · Mid right internal carotid artery is normal.  · Distal right internal carotid artery is normal.  · All other right side carotid system vessels are normal.  · Proximal left internal carotid artery is normal.  · Mid left internal carotid artery is normal.  · Distal left internal carotid artery is normal.  · All other left side carotid system vessels are normal.      Results for orders placed during the hospital encounter of 10/19/23    Adult Transthoracic Echo Complete w/ Color, Spectral and Contrast if Necessary Per Protocol    Interpretation Summary    Left ventricular systolic function is normal. Calculated left ventricular EF = 65.3% Left ventricular ejection fraction appears to be 61 - 65%.    The left ventricular cavity is borderline dilated.    Left ventricular diastolic dysfunction is noted.    The left atrial cavity is dilated.    Estimated right ventricular systolic pressure from tricuspid regurgitation is normal (<35 mmHg).      Microbiology Results (last 10 days)       Procedure Component Value - Date/Time    COVID-19,CEPHEID/MURIEL,COR/SADIE/PAD/MEGHAN/LAG IN-HOUSE,NP SWAB IN TRANSPORT MEDIA 1 HR TAT, RT-PCR - Swab, Nasopharynx [[id number]]  (Normal) Collected: 12/19/23 1914    Lab Status: Final result Specimen: Swab from Nasopharynx Updated: 12/19/23 1947     COVID19 Not Detected    Narrative:      Fact sheet for providers: https://www.fda.gov/media/323114/download     Fact sheet for patients: https://www.fda.gov/media/856923/download  Fact sheet for providers:  https://www.fda.gov/media/187195/download    Fact sheet for patients: https://www.fda.gov/media/462212/download    Test performed by PCR.            Assessment & Plan     UTI (urinary tract infection)     Nausea and vomiting  - cbc unremarkable  - lft elevated alt 232, ast 197, alk phos 250 total bili 1.4  - Lipase 9  - covid negative- pt took paxlovid for possible covid   - ct abd reviewed and showing small hiatal hernia, no acute intra abdominal process     UTI  - ua small LE, + for nitrites  - cx pending  - iv rocephin- dc  - awaiting cx results        Elevated LFTS  - alt 232 now 195, ast 197, now 108, alk phos 250 now 240, total bili 1.4 now .6  - gi consulted  - hep panel negative  - iron 36  - ferritin 267.8  - avoid hepatotoxic meds  - us liver 1. Mild hepatic enlargement with features of hepatic steatosis. 2. Cholecystectomy. No abnormal biliary dilation      Hx of afib  - EKG rate 109, afib  - pt on xarelto and metoprolol    I discussed the patients findings and my recommendations with patient and nursing staff.     Discharge Diagnosis:      UTI (urinary tract infection)      Hospital Course  Patient is a 68 y.o. female presented with nausea and vomiting. Labs including cbc and lipase were normal. LFTs were elevated and pts baseline is in the normal category. Pt had recently started taking paxlovid for covid. Ct abd was normal except for a small hernia. Pt Gi consulted and recommended Ua was + for nitrites and lE and was reflexed for a culture will dc iv rocephin and await cx results. Hepatitis panel was negative. Us showed hepatic steatosis. LFT are trending down. Pt to follow up with pcp for repeat liver enzymes next week. Discharge discussed with pt and she is agreeable to plan. Instructed pt to return to er if symptoms reoccur or worsen.        Past Medical History:     Past Medical History:   Diagnosis Date    Abnormal ECG 09/19/2023    Allergy to alpha-gal     Anxiety and depression     Arthritis      mild    Atrial fibrillation 09/19/2023    Chronic back pain     Chronic fatigue syndrome     Diabetes     Fibromyalgia     GERD (gastroesophageal reflux disease)     Hyperlipidemia     Hypertension     Nausea     Neuropathy     Restless legs     Uterine cancer     remission       Past Surgical History:     Past Surgical History:   Procedure Laterality Date    CARDIAC CATHETERIZATION  2005    CHOLECYSTECTOMY      COLONOSCOPY      ENDOSCOPY N/A 09/07/2021    Procedure: ESOPHAGOGASTRODUODENOSCOPY with dilation (bougie # [phone]);  Surgeon: Gordon Morales MD;  Location: [facility] ENDOSCOPY;  Service: Gastroenterology;  Laterality: N/A;  Post Op: gastritis, hiatal hernia, esophageal ring    ENDOSCOPY N/A 02/22/2023    Procedure: ESOPHAGOGASTRODUODENOSCOPY with gastric biopsy's;  Surgeon: Gordon Morales MD;  Location: [facility] ENDOSCOPY;  Service: Gastroenterology;  Laterality: N/A;  gastritis    HYSTERECTOMY      SIGMOIDOSCOPY N/A 09/07/2021    Procedure: FLEX SIGMOIDOSCOPY with polypectomy and cautery of rectal polyp;  Surgeon: Gordon Morales MD;  Location: [facility] ENDOSCOPY;  Service: Gastroenterology;  Laterality: N/A;  Post Op: rectal polyp       Social History:   Social History     Socioeconomic History    Marital status:    Tobacco Use    Smoking status: Never     Passive exposure: Current    Smokeless tobacco: Never   Vaping Use    Vaping Use: Never used   Substance and Sexual Activity    Alcohol use: Never    Drug use: Never    Sexual activity: Not Currently     Partners: Male     Birth control/protection: Diaphragm, Hysterectomy       Procedures Performed         Consults:   Consults       Date and Time Order Name Status Description    12/19/2023 10:51 PM Inpatient Gastroenterology Consult Completed             Condition on Discharge:     Stable    Discharge Disposition      Discharge Medications     Discharge Medications        ASK your doctor about these medications        Instructions  Start Date   atorvastatin 40 MG tablet  Commonly known as: LIPITOR   40 mg, Oral, Daily      butalbital-acetaminophen-caffeine -40 MG per tablet  Commonly known as: FIORICET, ESGIC   Take 1 tablet by mouth Every 4 (Four) Hours As Needed for Migraine.      citalopram 20 MG tablet  Commonly known as: CeleXA   20 mg, Oral, Daily      esomeprazole 40 MG capsule  Commonly known as: nexIUM   40 mg, Oral, Every Morning Before Breakfast      gabapentin 300 MG capsule  Commonly known as: NEURONTIN   300 mg, Oral, 3 Times Daily      Gemtesa 75 MG tablet  Generic drug: Vibegron   75 mg, Oral, Daily      lisinopril 40 MG tablet  Commonly known as: PRINIVIL,ZESTRIL   40 mg, Oral, Daily      magnesium oxide 400 MG tablet  Commonly known as: MAG-OX   400 mg, Oral, Daily      metFORMIN 500 MG tablet  Commonly known as: GLUCOPHAGE   500 mg, Oral, 2 Times Daily With Meals      metoprolol succinate XL 25 MG 24 hr tablet  Commonly known as: TOPROL-XL   25 mg, Oral, Daily      ondansetron ODT 4 MG disintegrating tablet  Commonly known as: ZOFRAN-ODT   4 mg, Translingual, Every 8 Hours PRN      oxyCODONE-acetaminophen  MG per tablet  Commonly known as: PERCOCET   1 tablet, Oral, Every 6 Hours PRN      rivaroxaban 20 MG tablet  Commonly known as: XARELTO   20 mg, Oral, Daily      Rybelsus 7 MG tablet  Generic drug: Semaglutide   1 tablet, Oral, Daily      tiZANidine 2 MG tablet  Commonly known as: ZANAFLEX   1 tablet, Oral, 3 Times Daily      vitamin D 1.25 MG (15069 UT) capsule capsule  Commonly known as: ERGOCALCIFEROL   50,000 Units, Oral, Weekly, wednesday                Discharge Diet:     Activity at Discharge:     Follow-up Appointments  Future Appointments   Date Time Provider Department Center   2/14/2024  2:00 PM Frederick Gudino MD [facility]         Test Results Pending at Discharge  Pending Labs       Order Current Status    MARIBELL In process    Anti-Smooth Muscle Antibody Titer In process    Sarah-Elizalde  Virus VCA, IgM In process    Mitochondrial Antibodies, M2 In process             Risk for Readmission (LACE) Score: 9 (12/21/2023  6:00 AM)      Less Than 30 minutes spent in discharge activities for this patient    Jessica Yang PA-C  12/21/23  15:13 EST

## 2023-12-21 NOTE — PLAN OF CARE
Goal Outcome Evaluation:  Plan of Care Reviewed With: patient        Progress: no change   Pt is A&Ox4, up to bedside commode with assistance.

## 2023-12-21 NOTE — PLAN OF CARE
Goal Outcome Evaluation:  Plan of Care Reviewed With: patient        Progress: improving  Outcome Evaluation: Pt having repeat blood work today. Pt being signed off by GI and discharged this evening. Will continue to monitor patient.

## 2023-12-22 LAB — ANA SER QL: NEGATIVE

## 2023-12-22 NOTE — CASE MANAGEMENT/SOCIAL WORK
Case Management Discharge Note      Final Note: Routine home    Provided Post Acute Provider List?: N/A  Provided Post Acute Provider Quality & Resource List?: N/A            Transportation Services  Private: Car    Final Discharge Disposition Code: 01 - home or self-care

## 2023-12-22 NOTE — OUTREACH NOTE
Prep Survey      Flowsheet Row Responses   [facility] facility patient discharged from? [facility]   Is LACE score < 7 ? No   Eligibility Readm Mgmt   Discharge diagnosis urinary tract infection   Does the patient have one of the following disease processes/diagnoses(primary or secondary)? Other   Does the patient have Home health ordered? No   Is there a DME ordered? No   Prep survey completed? Yes            VERONA JASMINE - Registered Nurse

## 2023-12-26 LAB — EBV VCA IGM SER IA-ACNC: <36 U/ML (ref 0–35.9)

## 2023-12-27 ENCOUNTER — READMISSION MANAGEMENT (OUTPATIENT)
Dept: CALL CENTER | Facility: HOSPITAL | Age: 68
End: 2023-12-27
Payer: MEDICARE

## 2023-12-27 NOTE — OUTREACH NOTE
Medical Week 1 Survey      Flowsheet Row Responses   [facility] facility patient discharged from? [facility]   Does the patient have one of the following disease processes/diagnoses(primary or secondary)? Other   Week 1 attempt successful? No   Unsuccessful attempts Attempt 1            Aundrea JACOME - Licensed Nurse

## 2024-01-04 ENCOUNTER — READMISSION MANAGEMENT (OUTPATIENT)
Dept: CALL CENTER | Facility: HOSPITAL | Age: 69
End: 2024-01-04
Payer: MEDICARE

## 2024-01-04 NOTE — OUTREACH NOTE
Medical Week 2 Survey      Flowsheet Row Responses   [facility] facility patient discharged from? [facility]   Does the patient have one of the following disease processes/diagnoses(primary or secondary)? Other   Week 2 attempt successful? No   Unsuccessful attempts Attempt 1            Layla Garcia Registered Nurse

## 2024-01-10 ENCOUNTER — READMISSION MANAGEMENT (OUTPATIENT)
Dept: CALL CENTER | Facility: HOSPITAL | Age: 69
End: 2024-01-10
Payer: MEDICARE

## 2024-01-10 NOTE — OUTREACH NOTE
Medical Week 2 Survey      Flowsheet Row Responses   [facility] patient discharged from? [facility]   Does the patient have one of the following disease processes/diagnoses(primary or secondary)? Other   Week 2 attempt successful? Yes   Call start time 1014   Discharge diagnosis urinary tract infection   Call end time 1017   Is the patient taking all medications as directed (includes completed medication regime)? Yes   Medication comments No changes   Does the patient have a primary care provider?  Yes   Has the patient kept scheduled appointments due by today? Yes   Psychosocial issues? No   What is the patient's perception of their health status since discharge? Improving   Is the patient/caregiver able to teach back signs and symptoms related to disease process for when to call PCP? Yes   Is the patient/caregiver able to teach back signs and symptoms related to disease process for when to call 911? Yes   Is the patient/caregiver able to teach back the hierarchy of who to call/visit for symptoms/problems? PCP, Specialist, Home health nurse, Urgent Care, ED, 911 Yes   If the patient is a current smoker, are they able to teach back resources for cessation? Not a smoker   Additional teach back comments She is doing well.  Has followed up with PCP.  Denies any s/s of UTI   Week 2 Call Completed? Yes   Graduated Yes   Graduated/Revoked comments Denies questions or needs at this time.   Call end time 1017            Aundrea JACOME - Licensed Nurse

## 2024-02-14 ENCOUNTER — OFFICE VISIT (OUTPATIENT)
Dept: CARDIOLOGY | Facility: CLINIC | Age: 69
End: 2024-02-14
Payer: MEDICARE

## 2024-02-14 VITALS
HEIGHT: 65 IN | BODY MASS INDEX: 47.48 KG/M2 | WEIGHT: 285 LBS | RESPIRATION RATE: 16 BRPM | OXYGEN SATURATION: 97 % | HEART RATE: 68 BPM | DIASTOLIC BLOOD PRESSURE: 84 MMHG | SYSTOLIC BLOOD PRESSURE: 127 MMHG

## 2024-02-14 DIAGNOSIS — I10 PRIMARY HYPERTENSION: ICD-10-CM

## 2024-02-14 DIAGNOSIS — E78.2 MIXED HYPERLIPIDEMIA: Primary | ICD-10-CM

## 2024-02-14 DIAGNOSIS — I48.0 PAROXYSMAL ATRIAL FIBRILLATION: ICD-10-CM

## 2024-02-14 PROCEDURE — 3079F DIAST BP 80-89 MM HG: CPT | Performed by: INTERNAL MEDICINE

## 2024-02-14 PROCEDURE — 1160F RVW MEDS BY RX/DR IN RCRD: CPT | Performed by: INTERNAL MEDICINE

## 2024-02-14 PROCEDURE — 99214 OFFICE O/P EST MOD 30 MIN: CPT | Performed by: INTERNAL MEDICINE

## 2024-02-14 PROCEDURE — 3074F SYST BP LT 130 MM HG: CPT | Performed by: INTERNAL MEDICINE

## 2024-02-14 PROCEDURE — 1159F MED LIST DOCD IN RCRD: CPT | Performed by: INTERNAL MEDICINE

## 2024-02-14 PROCEDURE — 93000 ELECTROCARDIOGRAM COMPLETE: CPT | Performed by: INTERNAL MEDICINE

## 2024-02-20 NOTE — TELEPHONE ENCOUNTER
Caller: Kiki Perdomo    Relationship: Self    Best call back number:  [phone]    Requested Prescriptions:   Requested Prescriptions     Pending Prescriptions Disp Refills    rivaroxaban (XARELTO) 20 MG tablet 30 tablet 0     Sig: Take 1 tablet by mouth Daily. Indications: Atrial Fibrillation        Pharmacy where request should be sent: [id number] - [place], [place] - [address] [place] [address] - [phone] [facility] [phone] FX     Last office visit with prescribing clinician: 2/14/2024   Last telemedicine visit with prescribing clinician: Visit date not found   Next office visit with prescribing clinician: 8/15/2024     Additional details provided by patient:     Does the patient have less than a 3 day supply:  [] Yes  [x] No    Would you like a call back once the refill request has been completed: [] Yes [x] No    If the office needs to give you a call back, can they leave a voicemail: [] Yes [x] No    Kylee Armenta Rep   02/20/24 11:19 EST

## 2024-04-19 RX ORDER — METOPROLOL SUCCINATE 25 MG/1
25 TABLET, EXTENDED RELEASE ORAL DAILY
Qty: 90 TABLET | Refills: 1 | Status: SHIPPED | OUTPATIENT
Start: 2024-04-19

## 2024-04-23 ENCOUNTER — OFFICE (AMBULATORY)
Dept: URBAN - METROPOLITAN AREA CLINIC 64 | Facility: CLINIC | Age: 69
End: 2024-04-23

## 2024-04-23 VITALS
HEIGHT: 65 IN | HEART RATE: 65 BPM | DIASTOLIC BLOOD PRESSURE: 59 MMHG | WEIGHT: 283 LBS | SYSTOLIC BLOOD PRESSURE: 109 MMHG

## 2024-04-23 DIAGNOSIS — R11.0 NAUSEA: ICD-10-CM

## 2024-04-23 DIAGNOSIS — R68.81 EARLY SATIETY: ICD-10-CM

## 2024-04-23 DIAGNOSIS — R43.2 PARAGEUSIA: ICD-10-CM

## 2024-04-23 DIAGNOSIS — R10.84 GENERALIZED ABDOMINAL PAIN: ICD-10-CM

## 2024-04-23 DIAGNOSIS — R94.5 ABNORMAL RESULTS OF LIVER FUNCTION STUDIES: ICD-10-CM

## 2024-04-23 PROCEDURE — 99214 OFFICE O/P EST MOD 30 MIN: CPT | Performed by: NURSE PRACTITIONER

## 2024-04-23 RX ORDER — ESOMEPRAZOLE MAGNESIUM 40 MG/1
80 CAPSULE, DELAYED RELEASE ORAL
Qty: 180 | Refills: 3 | Status: ACTIVE
Start: 2024-04-23

## 2024-04-23 RX ORDER — SUCRALFATE 1 G/1
4 TABLET ORAL
Qty: 120 | Refills: 11 | Status: ACTIVE
Start: 2024-04-23

## 2024-05-24 ENCOUNTER — OFFICE (AMBULATORY)
Dept: URBAN - METROPOLITAN AREA PATHOLOGY 19 | Facility: PATHOLOGY | Age: 69
End: 2024-05-24
Payer: COMMERCIAL

## 2024-05-24 ENCOUNTER — ON CAMPUS - OUTPATIENT (AMBULATORY)
Dept: URBAN - METROPOLITAN AREA HOSPITAL 2 | Facility: HOSPITAL | Age: 69
End: 2024-05-24

## 2024-05-24 ENCOUNTER — OFFICE (AMBULATORY)
Dept: URBAN - METROPOLITAN AREA PATHOLOGY 19 | Facility: PATHOLOGY | Age: 69
End: 2024-05-24
Payer: MEDICARE

## 2024-05-24 VITALS
DIASTOLIC BLOOD PRESSURE: 91 MMHG | DIASTOLIC BLOOD PRESSURE: 67 MMHG | DIASTOLIC BLOOD PRESSURE: 79 MMHG | SYSTOLIC BLOOD PRESSURE: 173 MMHG | OXYGEN SATURATION: 100 % | HEART RATE: 75 BPM | OXYGEN SATURATION: 95 % | OXYGEN SATURATION: 93 % | DIASTOLIC BLOOD PRESSURE: 86 MMHG | HEART RATE: 84 BPM | OXYGEN SATURATION: 97 % | HEART RATE: 95 BPM | DIASTOLIC BLOOD PRESSURE: 97 MMHG | HEART RATE: 82 BPM | SYSTOLIC BLOOD PRESSURE: 164 MMHG | OXYGEN SATURATION: 96 % | DIASTOLIC BLOOD PRESSURE: 68 MMHG | TEMPERATURE: 98 F | SYSTOLIC BLOOD PRESSURE: 138 MMHG | SYSTOLIC BLOOD PRESSURE: 115 MMHG | SYSTOLIC BLOOD PRESSURE: 165 MMHG | RESPIRATION RATE: 14 BRPM | RESPIRATION RATE: 18 BRPM | RESPIRATION RATE: 16 BRPM | RESPIRATION RATE: 15 BRPM | HEART RATE: 79 BPM | DIASTOLIC BLOOD PRESSURE: 92 MMHG | SYSTOLIC BLOOD PRESSURE: 162 MMHG | HEART RATE: 76 BPM | RESPIRATION RATE: 17 BRPM | SYSTOLIC BLOOD PRESSURE: 128 MMHG | WEIGHT: 290 LBS | HEIGHT: 65 IN

## 2024-05-24 DIAGNOSIS — K31.A0 GASTRIC INTESTINAL METAPLASIA, UNSPECIFIED: ICD-10-CM

## 2024-05-24 DIAGNOSIS — K31.89 OTHER DISEASES OF STOMACH AND DUODENUM: ICD-10-CM

## 2024-05-24 DIAGNOSIS — R68.81 EARLY SATIETY: ICD-10-CM

## 2024-05-24 DIAGNOSIS — K31.A15 GASTRIC INTESTINAL METAPLASIA WITHOUT DYSPLASIA, INVOLVING M: ICD-10-CM

## 2024-05-24 DIAGNOSIS — K21.9 GASTRO-ESOPHAGEAL REFLUX DISEASE WITHOUT ESOPHAGITIS: ICD-10-CM

## 2024-05-24 DIAGNOSIS — R11.0 NAUSEA: ICD-10-CM

## 2024-05-24 LAB
GI HISTOLOGY: A. STOMACH ANTRUM: (no result)
GI HISTOLOGY: PDF REPORT: (no result)

## 2024-05-24 PROCEDURE — 88305 TISSUE EXAM BY PATHOLOGIST: CPT | Mod: 26 | Performed by: PATHOLOGY

## 2024-05-24 PROCEDURE — 43239 EGD BIOPSY SINGLE/MULTIPLE: CPT | Performed by: INTERNAL MEDICINE

## 2024-05-24 PROCEDURE — 88342 IMHCHEM/IMCYTCHM 1ST ANTB: CPT | Mod: 26 | Performed by: PATHOLOGY

## 2024-05-24 PROCEDURE — 43450 DILATE ESOPHAGUS 1/MULT PASS: CPT | Performed by: INTERNAL MEDICINE

## 2024-05-24 RX ORDER — METOCLOPRAMIDE 5 MG/1
10 TABLET ORAL
Qty: 60 | Refills: 11 | Status: ACTIVE
Start: 2024-05-24

## 2024-10-18 RX ORDER — METOPROLOL SUCCINATE 25 MG/1
25 TABLET, EXTENDED RELEASE ORAL DAILY
Qty: 90 TABLET | Refills: 1 | Status: SHIPPED | OUTPATIENT
Start: 2024-10-18

## (undated) DEVICE — PK ENDO GI 50

## (undated) DEVICE — BITEBLOCK ENDO W/STRAP 60F A/ LF DISP

## (undated) DEVICE — SINGLE-USE BIOPSY FORCEPS: Brand: RADIAL JAW 4

## (undated) DEVICE — TRAP WIDEEYE POLYP

## (undated) DEVICE — ERBE NESSY®PLATE 170 SPLIT; 168CM²; CABLE 3M: Brand: ERBE

## (undated) DEVICE — PAPR PRNT PK SONY W RIBN UPC55

## (undated) DEVICE — SNAR POLYP HOTSNARE/BRAIDED OVL/MINI 7F 2.8X10MM 230CM 1P/U